# Patient Record
Sex: MALE | Race: WHITE | NOT HISPANIC OR LATINO | Employment: OTHER | URBAN - METROPOLITAN AREA
[De-identification: names, ages, dates, MRNs, and addresses within clinical notes are randomized per-mention and may not be internally consistent; named-entity substitution may affect disease eponyms.]

---

## 2020-02-21 ENCOUNTER — HISTORICAL (OUTPATIENT)
Dept: LAB | Facility: HOSPITAL | Age: 75
End: 2020-02-21

## 2020-02-21 LAB
ABS NEUT (OLG): 4.51 X10(3)/MCL (ref 2.1–9.2)
ALBUMIN SERPL-MCNC: 4 GM/DL (ref 3.4–5)
ALBUMIN/GLOB SERPL: 1.3 {RATIO}
ALP SERPL-CCNC: 80 UNIT/L (ref 50–136)
ALT SERPL-CCNC: 57 UNIT/L (ref 12–78)
APPEARANCE, UA: CLEAR
AST SERPL-CCNC: 26 UNIT/L (ref 15–37)
BACTERIA SPEC CULT: NORMAL /HPF
BASOPHILS # BLD AUTO: 0.1 X10(3)/MCL (ref 0–0.2)
BASOPHILS NFR BLD AUTO: 1 %
BILIRUB SERPL-MCNC: 0.4 MG/DL (ref 0.2–1)
BILIRUB UR QL STRIP: NEGATIVE
BILIRUBIN DIRECT+TOT PNL SERPL-MCNC: 0.1 MG/DL (ref 0–0.2)
BILIRUBIN DIRECT+TOT PNL SERPL-MCNC: 0.3 MG/DL (ref 0–0.8)
BUN SERPL-MCNC: 12 MG/DL (ref 7–18)
CALCIUM SERPL-MCNC: 9.1 MG/DL (ref 8.5–10.1)
CHLORIDE SERPL-SCNC: 107 MMOL/L (ref 98–107)
CHOLEST SERPL-MCNC: 110 MG/DL (ref 0–200)
CHOLEST/HDLC SERPL: 2.3 {RATIO} (ref 0–5)
CO2 SERPL-SCNC: 30 MMOL/L (ref 21–32)
COLOR UR: YELLOW
CREAT SERPL-MCNC: 0.7 MG/DL (ref 0.7–1.3)
CREAT UR-MCNC: 166 MG/DL
EOSINOPHIL # BLD AUTO: 0.4 X10(3)/MCL (ref 0–0.9)
EOSINOPHIL NFR BLD AUTO: 5 %
ERYTHROCYTE [DISTWIDTH] IN BLOOD BY AUTOMATED COUNT: 12.7 % (ref 11.5–17)
EST. AVERAGE GLUCOSE BLD GHB EST-MCNC: 131 MG/DL
GLOBULIN SER-MCNC: 3 GM/DL (ref 2.4–3.5)
GLUCOSE (UA): NEGATIVE
GLUCOSE SERPL-MCNC: 130 MG/DL (ref 74–106)
HBA1C MFR BLD: 6.2 % (ref 4.2–6.3)
HCT VFR BLD AUTO: 39.3 % (ref 42–52)
HDLC SERPL-MCNC: 47 MG/DL (ref 35–60)
HGB BLD-MCNC: 12.5 GM/DL (ref 14–18)
HGB UR QL STRIP: NEGATIVE
KETONES UR QL STRIP: NEGATIVE
LDLC SERPL CALC-MCNC: 47 MG/DL (ref 0–129)
LEUKOCYTE ESTERASE UR QL STRIP: NEGATIVE
LYMPHOCYTES # BLD AUTO: 2.2 X10(3)/MCL (ref 0.6–4.6)
LYMPHOCYTES NFR BLD AUTO: 28 %
MCH RBC QN AUTO: 29 PG (ref 27–31)
MCHC RBC AUTO-ENTMCNC: 31.8 GM/DL (ref 33–36)
MCV RBC AUTO: 91.2 FL (ref 80–94)
MICROALBUMIN UR-MCNC: 3.6 MG/DL
MICROALBUMIN/CREAT RATIO PNL UR: 21.7 MG/GM CR (ref 0–30)
MONOCYTES # BLD AUTO: 0.7 X10(3)/MCL (ref 0.1–1.3)
MONOCYTES NFR BLD AUTO: 9 %
NEUTROPHILS # BLD AUTO: 4.51 X10(3)/MCL (ref 2.1–9.2)
NEUTROPHILS NFR BLD AUTO: 57 %
NITRITE UR QL STRIP: NEGATIVE
PH UR STRIP: 5 [PH] (ref 5–9)
PLATELET # BLD AUTO: 228 X10(3)/MCL (ref 130–400)
PMV BLD AUTO: 10.9 FL (ref 9.4–12.4)
POTASSIUM SERPL-SCNC: 4.4 MMOL/L (ref 3.5–5.1)
PROT SERPL-MCNC: 7 GM/DL (ref 6.4–8.2)
PROT UR QL STRIP: NEGATIVE
RBC # BLD AUTO: 4.31 X10(6)/MCL (ref 4.7–6.1)
RBC #/AREA URNS HPF: NORMAL /[HPF]
SODIUM SERPL-SCNC: 139 MMOL/L (ref 136–145)
SP GR UR STRIP: 1.02 (ref 1–1.03)
SQUAMOUS EPITHELIAL, UA: NORMAL
TRIGL SERPL-MCNC: 79 MG/DL (ref 30–150)
UROBILINOGEN UR STRIP-ACNC: 0.2
VLDLC SERPL CALC-MCNC: 16 MG/DL
WBC # SPEC AUTO: 7.9 X10(3)/MCL (ref 4.5–11.5)
WBC #/AREA URNS HPF: NORMAL /HPF

## 2020-03-12 ENCOUNTER — HISTORICAL (OUTPATIENT)
Dept: LAB | Facility: HOSPITAL | Age: 75
End: 2020-03-12

## 2020-03-15 LAB — FINAL CULTURE: NO GROWTH

## 2020-06-26 ENCOUNTER — HISTORICAL (OUTPATIENT)
Dept: ADMINISTRATIVE | Facility: HOSPITAL | Age: 75
End: 2020-06-26

## 2020-06-26 LAB
ALBUMIN SERPL-MCNC: 3.9 GM/DL (ref 3.4–5)
ALP SERPL-CCNC: 85 UNIT/L (ref 40–150)
ALT SERPL-CCNC: 53 UNIT/L (ref 0–55)
AST SERPL-CCNC: 35 UNIT/L (ref 5–34)
BILIRUB SERPL-MCNC: 0.4 MG/DL
BILIRUBIN DIRECT+TOT PNL SERPL-MCNC: 0.2 MG/DL (ref 0–0.5)
BILIRUBIN DIRECT+TOT PNL SERPL-MCNC: 0.2 MG/DL (ref 0–0.8)
CHOLEST SERPL-MCNC: 110 MG/DL
CHOLEST/HDLC SERPL: 3 {RATIO} (ref 0–5)
HDLC SERPL-MCNC: 42 MG/DL (ref 35–60)
LDLC SERPL CALC-MCNC: 54 MG/DL (ref 50–140)
LIVER PROFILE INTERP: ABNORMAL
PROT SERPL-MCNC: 7.3 GM/DL (ref 5.8–7.6)
TRIGL SERPL-MCNC: 72 MG/DL (ref 34–140)
VLDLC SERPL CALC-MCNC: 14 MG/DL

## 2020-07-31 ENCOUNTER — HISTORICAL (OUTPATIENT)
Dept: ADMINISTRATIVE | Facility: HOSPITAL | Age: 75
End: 2020-07-31

## 2020-07-31 LAB
ABS NEUT (OLG): 4.33 X10(3)/MCL (ref 2.1–9.2)
ALBUMIN SERPL-MCNC: 3.9 GM/DL (ref 3.4–5)
ALBUMIN/GLOB SERPL: 1.1 RATIO (ref 1.1–2)
ALP SERPL-CCNC: 69 UNIT/L (ref 40–150)
ALT SERPL-CCNC: 50 UNIT/L (ref 0–55)
APPEARANCE, UA: CLEAR
AST SERPL-CCNC: 35 UNIT/L (ref 5–34)
BACTERIA SPEC CULT: NORMAL /HPF
BASOPHILS # BLD AUTO: 0 X10(3)/MCL (ref 0–0.2)
BASOPHILS NFR BLD AUTO: 1 %
BILIRUB SERPL-MCNC: 0.5 MG/DL
BILIRUB UR QL STRIP: NEGATIVE
BILIRUBIN DIRECT+TOT PNL SERPL-MCNC: 0.2 MG/DL (ref 0–0.5)
BILIRUBIN DIRECT+TOT PNL SERPL-MCNC: 0.3 MG/DL (ref 0–0.8)
BUN SERPL-MCNC: 12.1 MG/DL (ref 8.4–25.7)
CALCIUM SERPL-MCNC: 9.7 MG/DL (ref 8.8–10)
CHLORIDE SERPL-SCNC: 105 MMOL/L (ref 98–107)
CHOLEST SERPL-MCNC: 110 MG/DL
CHOLEST/HDLC SERPL: 2 {RATIO} (ref 0–5)
CO2 SERPL-SCNC: 28 MMOL/L (ref 23–31)
COLOR UR: YELLOW
CREAT SERPL-MCNC: 0.71 MG/DL (ref 0.73–1.18)
CREAT UR-MCNC: 55.6 MG/DL (ref 58–161)
EOSINOPHIL # BLD AUTO: 0.4 X10(3)/MCL (ref 0–0.9)
EOSINOPHIL NFR BLD AUTO: 5 %
ERYTHROCYTE [DISTWIDTH] IN BLOOD BY AUTOMATED COUNT: 12.5 % (ref 11.5–17)
EST. AVERAGE GLUCOSE BLD GHB EST-MCNC: 137 MG/DL
GLOBULIN SER-MCNC: 3.4 GM/DL (ref 2.4–3.5)
GLUCOSE (UA): NEGATIVE
GLUCOSE SERPL-MCNC: 121 MG/DL (ref 82–115)
HBA1C MFR BLD: 6.4 %
HCT VFR BLD AUTO: 38.5 % (ref 42–52)
HDLC SERPL-MCNC: 44 MG/DL (ref 35–60)
HGB BLD-MCNC: 12.7 GM/DL (ref 14–18)
HGB UR QL STRIP: NEGATIVE
KETONES UR QL STRIP: NEGATIVE
LDLC SERPL CALC-MCNC: 50 MG/DL (ref 50–140)
LEUKOCYTE ESTERASE UR QL STRIP: NEGATIVE
LYMPHOCYTES # BLD AUTO: 2.1 X10(3)/MCL (ref 0.6–4.6)
LYMPHOCYTES NFR BLD AUTO: 28 %
MCH RBC QN AUTO: 29.1 PG (ref 27–31)
MCHC RBC AUTO-ENTMCNC: 33 GM/DL (ref 33–36)
MCV RBC AUTO: 88.3 FL (ref 80–94)
MICROALBUMIN UR-MCNC: 21.4 UG/ML
MICROALBUMIN/CREAT RATIO PNL UR: 38.5 MG/GM CR (ref 0–30)
MONOCYTES # BLD AUTO: 0.6 X10(3)/MCL (ref 0.1–1.3)
MONOCYTES NFR BLD AUTO: 9 %
NEUTROPHILS # BLD AUTO: 4.33 X10(3)/MCL (ref 2.1–9.2)
NEUTROPHILS NFR BLD AUTO: 57 %
NITRITE UR QL STRIP: NEGATIVE
PH UR STRIP: 6.5 [PH] (ref 5–9)
PLATELET # BLD AUTO: 204 X10(3)/MCL (ref 130–400)
PMV BLD AUTO: 10.8 FL (ref 9.4–12.4)
POTASSIUM SERPL-SCNC: 4.5 MMOL/L (ref 3.5–5.1)
PROT SERPL-MCNC: 7.3 GM/DL (ref 5.8–7.6)
PROT UR QL STRIP: NEGATIVE
PSA SERPL-MCNC: 1 NG/ML
RBC # BLD AUTO: 4.36 X10(6)/MCL (ref 4.7–6.1)
RBC #/AREA URNS HPF: NORMAL /[HPF]
SODIUM SERPL-SCNC: 138 MMOL/L (ref 136–145)
SP GR UR STRIP: 1.01 (ref 1–1.03)
SQUAMOUS EPITHELIAL, UA: NORMAL
TRIGL SERPL-MCNC: 81 MG/DL (ref 34–140)
UROBILINOGEN UR STRIP-ACNC: 0.2
VLDLC SERPL CALC-MCNC: 16 MG/DL
WBC # SPEC AUTO: 7.6 X10(3)/MCL (ref 4.5–11.5)
WBC #/AREA URNS HPF: NORMAL /HPF

## 2021-02-03 ENCOUNTER — HISTORICAL (OUTPATIENT)
Dept: ADMINISTRATIVE | Facility: HOSPITAL | Age: 76
End: 2021-02-03

## 2021-02-03 LAB
ALBUMIN SERPL-MCNC: 4 GM/DL (ref 3.4–4.8)
ALBUMIN/GLOB SERPL: 1.2 RATIO (ref 1.1–2)
ALP SERPL-CCNC: 66 UNIT/L (ref 40–150)
ALT SERPL-CCNC: 39 UNIT/L (ref 0–55)
AST SERPL-CCNC: 26 UNIT/L (ref 5–34)
BILIRUB SERPL-MCNC: 0.5 MG/DL
BILIRUBIN DIRECT+TOT PNL SERPL-MCNC: 0.2 MG/DL (ref 0–0.8)
BILIRUBIN DIRECT+TOT PNL SERPL-MCNC: 0.3 MG/DL (ref 0–0.5)
BUN SERPL-MCNC: 12.7 MG/DL (ref 8.4–25.7)
CALCIUM SERPL-MCNC: 9.5 MG/DL (ref 8.8–10)
CHLORIDE SERPL-SCNC: 105 MMOL/L (ref 98–107)
CHOLEST SERPL-MCNC: 81 MG/DL
CHOLEST/HDLC SERPL: 2 {RATIO} (ref 0–5)
CO2 SERPL-SCNC: 29 MMOL/L (ref 23–31)
CREAT SERPL-MCNC: 0.71 MG/DL (ref 0.73–1.18)
EST. AVERAGE GLUCOSE BLD GHB EST-MCNC: 137 MG/DL
GLOBULIN SER-MCNC: 3.3 GM/DL (ref 2.4–3.5)
GLUCOSE SERPL-MCNC: 102 MG/DL (ref 82–115)
HBA1C MFR BLD: 6.4 %
HDLC SERPL-MCNC: 37 MG/DL (ref 35–60)
LDLC SERPL CALC-MCNC: 32 MG/DL (ref 50–140)
POTASSIUM SERPL-SCNC: 4.1 MMOL/L (ref 3.5–5.1)
PROT SERPL-MCNC: 7.3 GM/DL (ref 5.8–7.6)
SODIUM SERPL-SCNC: 141 MMOL/L (ref 136–145)
TRIGL SERPL-MCNC: 60 MG/DL (ref 34–140)
VLDLC SERPL CALC-MCNC: 12 MG/DL

## 2021-08-04 ENCOUNTER — HISTORICAL (OUTPATIENT)
Dept: ADMINISTRATIVE | Facility: HOSPITAL | Age: 76
End: 2021-08-04

## 2021-08-04 LAB
ABS NEUT (OLG): 4.73 X10(3)/MCL (ref 2.1–9.2)
ALBUMIN SERPL-MCNC: 3.6 GM/DL (ref 3.4–4.8)
ALBUMIN/GLOB SERPL: 1.1 RATIO (ref 1.1–2)
ALP SERPL-CCNC: 73 UNIT/L (ref 40–150)
ALT SERPL-CCNC: 35 UNIT/L (ref 0–55)
APPEARANCE, UA: CLEAR
AST SERPL-CCNC: 26 UNIT/L (ref 5–34)
BACTERIA SPEC CULT: NORMAL /HPF
BASOPHILS # BLD AUTO: 0 X10(3)/MCL (ref 0–0.2)
BASOPHILS NFR BLD AUTO: 1 %
BILIRUB SERPL-MCNC: 0.5 MG/DL
BILIRUB UR QL STRIP: NEGATIVE
BILIRUBIN DIRECT+TOT PNL SERPL-MCNC: 0.2 MG/DL (ref 0–0.5)
BILIRUBIN DIRECT+TOT PNL SERPL-MCNC: 0.3 MG/DL (ref 0–0.8)
BUN SERPL-MCNC: 10.5 MG/DL (ref 8.4–25.7)
CALCIUM SERPL-MCNC: 9 MG/DL (ref 8.8–10)
CHLORIDE SERPL-SCNC: 106 MMOL/L (ref 98–107)
CHOLEST SERPL-MCNC: 104 MG/DL
CHOLEST/HDLC SERPL: 3 {RATIO} (ref 0–5)
CO2 SERPL-SCNC: 24 MMOL/L (ref 23–31)
COLOR UR: YELLOW
CREAT SERPL-MCNC: 0.68 MG/DL (ref 0.73–1.18)
EOSINOPHIL # BLD AUTO: 0.4 X10(3)/MCL (ref 0–0.9)
EOSINOPHIL NFR BLD AUTO: 4 %
ERYTHROCYTE [DISTWIDTH] IN BLOOD BY AUTOMATED COUNT: 12.8 % (ref 11.5–17)
EST. AVERAGE GLUCOSE BLD GHB EST-MCNC: 134.1 MG/DL
GLOBULIN SER-MCNC: 3.2 GM/DL (ref 2.4–3.5)
GLUCOSE (UA): NEGATIVE
GLUCOSE SERPL-MCNC: 122 MG/DL (ref 82–115)
HBA1C MFR BLD: 6.3 %
HCT VFR BLD AUTO: 37.7 % (ref 42–52)
HDLC SERPL-MCNC: 38 MG/DL (ref 35–60)
HGB BLD-MCNC: 12.2 GM/DL (ref 14–18)
HGB UR QL STRIP: NEGATIVE
KETONES UR QL STRIP: NEGATIVE
LDLC SERPL CALC-MCNC: 51 MG/DL (ref 50–140)
LEUKOCYTE ESTERASE UR QL STRIP: NEGATIVE
LYMPHOCYTES # BLD AUTO: 2.1 X10(3)/MCL (ref 0.6–4.6)
LYMPHOCYTES NFR BLD AUTO: 26 %
MCH RBC QN AUTO: 29.8 PG (ref 27–31)
MCHC RBC AUTO-ENTMCNC: 32.4 GM/DL (ref 33–36)
MCV RBC AUTO: 92.2 FL (ref 80–94)
MONOCYTES # BLD AUTO: 0.7 X10(3)/MCL (ref 0.1–1.3)
MONOCYTES NFR BLD AUTO: 9 %
NEUTROPHILS # BLD AUTO: 4.73 X10(3)/MCL (ref 2.1–9.2)
NEUTROPHILS NFR BLD AUTO: 60 %
NITRITE UR QL STRIP: NEGATIVE
PH UR STRIP: 5 [PH] (ref 5–9)
PLATELET # BLD AUTO: 225 X10(3)/MCL (ref 130–400)
PMV BLD AUTO: 10.5 FL (ref 9.4–12.4)
POTASSIUM SERPL-SCNC: 4.5 MMOL/L (ref 3.5–5.1)
PROT SERPL-MCNC: 6.8 GM/DL (ref 5.8–7.6)
PROT UR QL STRIP: NEGATIVE
RBC # BLD AUTO: 4.09 X10(6)/MCL (ref 4.7–6.1)
RBC #/AREA URNS HPF: NORMAL /[HPF]
SODIUM SERPL-SCNC: 142 MMOL/L (ref 136–145)
SP GR UR STRIP: 1.01 (ref 1–1.03)
SQUAMOUS EPITHELIAL, UA: NORMAL /HPF (ref 0–4)
TRIGL SERPL-MCNC: 77 MG/DL (ref 34–140)
UROBILINOGEN UR STRIP-ACNC: 0.2
VLDLC SERPL CALC-MCNC: 15 MG/DL
WBC # SPEC AUTO: 8 X10(3)/MCL (ref 4.5–11.5)
WBC #/AREA URNS HPF: NORMAL /HPF

## 2022-02-07 ENCOUNTER — HISTORICAL (OUTPATIENT)
Dept: LAB | Facility: HOSPITAL | Age: 77
End: 2022-02-07

## 2022-02-07 LAB
ALBUMIN SERPL-MCNC: 3.9 G/DL (ref 3.4–4.8)
ALBUMIN/GLOB SERPL: 1.2 {RATIO} (ref 1.1–2)
ALP SERPL-CCNC: 97 U/L (ref 40–150)
ALT SERPL-CCNC: 63 U/L (ref 0–55)
AST SERPL-CCNC: 44 U/L (ref 5–34)
BILIRUB SERPL-MCNC: 0.6 MG/DL
BILIRUBIN DIRECT+TOT PNL SERPL-MCNC: 0.3 (ref 0–0.5)
BILIRUBIN DIRECT+TOT PNL SERPL-MCNC: 0.3 (ref 0–0.8)
BUN SERPL-MCNC: 11.5 MG/DL (ref 8.4–25.7)
CALCIUM SERPL-MCNC: 9.1 MG/DL (ref 8.7–10.5)
CHLORIDE SERPL-SCNC: 104 MMOL/L (ref 98–107)
CHOLEST SERPL-MCNC: 96 MG/DL
CHOLEST/HDLC SERPL: 3 {RATIO} (ref 0–5)
CO2 SERPL-SCNC: 26 MMOL/L (ref 23–31)
CREAT SERPL-MCNC: 0.73 MG/DL (ref 0.73–1.18)
EST. AVERAGE GLUCOSE BLD GHB EST-MCNC: 159.9 MG/DL
GLOBULIN SER-MCNC: 3.2 G/DL (ref 2.4–3.5)
GLUCOSE SERPL-MCNC: 128 MG/DL (ref 82–115)
HBA1C MFR BLD: 7.2 %
HDLC SERPL-MCNC: 33 MG/DL (ref 35–60)
HEMOLYSIS INTERF INDEX SERPL-ACNC: 7
ICTERIC INTERF INDEX SERPL-ACNC: 0
LDLC SERPL CALC-MCNC: 46 MG/DL (ref 50–140)
LIPEMIC INTERF INDEX SERPL-ACNC: <0
POTASSIUM SERPL-SCNC: 4.2 MMOL/L (ref 3.5–5.1)
PROT SERPL-MCNC: 7.1 G/DL (ref 5.8–7.6)
SODIUM SERPL-SCNC: 139 MMOL/L (ref 136–145)
TRIGL SERPL-MCNC: 86 MG/DL (ref 34–140)
VLDLC SERPL CALC-MCNC: 17 MG/DL

## 2022-05-09 ENCOUNTER — LAB REQUISITION (OUTPATIENT)
Dept: LAB | Facility: HOSPITAL | Age: 77
End: 2022-05-09
Payer: MEDICARE

## 2022-05-09 DIAGNOSIS — E11.40 TYPE 2 DIABETES MELLITUS WITH DIABETIC NEUROPATHY, UNSPECIFIED: ICD-10-CM

## 2022-05-09 LAB
EST. AVERAGE GLUCOSE BLD GHB EST-MCNC: 122.6 MG/DL
HBA1C MFR BLD: 5.9 %

## 2022-05-09 PROCEDURE — 83036 HEMOGLOBIN GLYCOSYLATED A1C: CPT | Performed by: INTERNAL MEDICINE

## 2022-07-02 ENCOUNTER — HOSPITAL ENCOUNTER (INPATIENT)
Facility: HOSPITAL | Age: 77
LOS: 10 days | Discharge: NURSING, REHAB OR LONG-TERM CARE | DRG: 038 | End: 2022-07-12
Attending: STUDENT IN AN ORGANIZED HEALTH CARE EDUCATION/TRAINING PROGRAM | Admitting: INTERNAL MEDICINE
Payer: MEDICARE

## 2022-07-02 DIAGNOSIS — I63.9 CVA (CEREBRAL VASCULAR ACCIDENT): ICD-10-CM

## 2022-07-02 DIAGNOSIS — R29.898 WEAKNESS OF RIGHT UPPER EXTREMITY: ICD-10-CM

## 2022-07-02 DIAGNOSIS — I63.9 STROKE: ICD-10-CM

## 2022-07-02 DIAGNOSIS — I63.9 CEREBROVASCULAR ACCIDENT (CVA), UNSPECIFIED MECHANISM: Primary | ICD-10-CM

## 2022-07-02 DIAGNOSIS — R29.898 WEAKNESS OF RIGHT LOWER EXTREMITY: ICD-10-CM

## 2022-07-02 DIAGNOSIS — I63.9 IMPENDING CEREBROVASCULAR ACCIDENT: ICD-10-CM

## 2022-07-02 LAB
ALBUMIN SERPL-MCNC: 4 GM/DL (ref 3.4–4.8)
ALBUMIN/GLOB SERPL: 1.3 RATIO (ref 1.1–2)
ALP SERPL-CCNC: 78 UNIT/L (ref 40–150)
ALT SERPL-CCNC: 22 UNIT/L (ref 0–55)
APPEARANCE UR: CLEAR
APTT PPP: 34.4 SECONDS (ref 23.2–33.7)
AST SERPL-CCNC: 19 UNIT/L (ref 5–34)
AV INDEX (PROSTH): 0.53
AV MEAN GRADIENT: 4 MMHG
AV PEAK GRADIENT: 6 MMHG
AV VALVE AREA: 1.83 CM2
AV VELOCITY RATIO: 0.54
BACTERIA #/AREA URNS AUTO: NORMAL /HPF
BASOPHILS # BLD AUTO: 0.04 X10(3)/MCL (ref 0–0.2)
BASOPHILS NFR BLD AUTO: 0.6 %
BILIRUB UR QL STRIP.AUTO: NEGATIVE MG/DL
BILIRUBIN DIRECT+TOT PNL SERPL-MCNC: 0.5 MG/DL
BNP BLD-MCNC: 141.3 PG/ML
BSA FOR ECHO PROCEDURE: 2.16 M2
BUN SERPL-MCNC: 8.7 MG/DL (ref 8.4–25.7)
CALCIUM SERPL-MCNC: 9.3 MG/DL (ref 8.8–10)
CHLORIDE SERPL-SCNC: 106 MMOL/L (ref 98–107)
CHOLEST SERPL-MCNC: 106 MG/DL
CHOLEST/HDLC SERPL: 2 {RATIO} (ref 0–5)
CO2 SERPL-SCNC: 24 MMOL/L (ref 23–31)
COLOR UR AUTO: YELLOW
CORRECTED TEMPERATURE (PCO2): 36 MMHG
CORRECTED TEMPERATURE (PH): 7.4
CORRECTED TEMPERATURE (PO2): 90 MMHG
CREAT SERPL-MCNC: 0.74 MG/DL (ref 0.73–1.18)
CV ECHO LV RWT: 0.66 CM
DOP CALC AO PEAK VEL: 1.27 M/S
DOP CALC AO VTI: 28.4 CM
DOP CALC LVOT AREA: 3.5 CM2
DOP CALC LVOT DIAMETER: 2.1 CM
DOP CALC LVOT PEAK VEL: 0.69 M/S
DOP CALC LVOT STROKE VOLUME: 51.93 CM3
DOP CALC MV VTI: 42.5 CM
DOP CALCLVOT PEAK VEL VTI: 15 CM
E WAVE DECELERATION TIME: 198 MSEC
E/A RATIO: 1.12
E/E' RATIO: 11.33 M/S
ECHO LV POSTERIOR WALL: 1.47 CM (ref 0.6–1.1)
EJECTION FRACTION: 60 %
EOSINOPHIL # BLD AUTO: 0.21 X10(3)/MCL (ref 0–0.9)
EOSINOPHIL NFR BLD AUTO: 3.2 %
ERYTHROCYTE [DISTWIDTH] IN BLOOD BY AUTOMATED COUNT: 13 % (ref 11.5–17)
FLUAV AG UPPER RESP QL IA.RAPID: NOT DETECTED
FLUBV AG UPPER RESP QL IA.RAPID: NOT DETECTED
FRACTIONAL SHORTENING: 31 % (ref 28–44)
GLOBULIN SER-MCNC: 3 GM/DL (ref 2.4–3.5)
GLUCOSE SERPL-MCNC: 198 MG/DL (ref 82–115)
GLUCOSE UR QL STRIP.AUTO: ABNORMAL MG/DL
HCO3 UR-SCNC: 22.3 MMOL/L
HCT VFR BLD AUTO: 36.9 % (ref 42–52)
HDLC SERPL-MCNC: 46 MG/DL (ref 35–60)
HGB BLD-MCNC: 12.4 GM/DL (ref 14–18)
HGB BLD-MCNC: 12.7 G/DL
IMM GRANULOCYTES # BLD AUTO: 0.02 X10(3)/MCL (ref 0–0.04)
IMM GRANULOCYTES NFR BLD AUTO: 0.3 %
INR BLD: 1.05 (ref 0–1.3)
INTERVENTRICULAR SEPTUM: 1.46 CM (ref 0.6–1.1)
KETONES UR QL STRIP.AUTO: NEGATIVE MG/DL
LDLC SERPL CALC-MCNC: 48 MG/DL (ref 50–140)
LEFT ATRIUM SIZE: 4.4 CM
LEFT ATRIUM VOLUME INDEX MOD: 29.2 ML/M2
LEFT ATRIUM VOLUME MOD: 62.3 CM3
LEFT INTERNAL DIMENSION IN SYSTOLE: 3.07 CM (ref 2.1–4)
LEFT VENTRICLE DIASTOLIC VOLUME INDEX: 41.83 ML/M2
LEFT VENTRICLE DIASTOLIC VOLUME: 89.1 ML
LEFT VENTRICLE MASS INDEX: 122 G/M2
LEFT VENTRICLE SYSTOLIC VOLUME INDEX: 17.4 ML/M2
LEFT VENTRICLE SYSTOLIC VOLUME: 37 ML
LEFT VENTRICULAR INTERNAL DIMENSION IN DIASTOLE: 4.43 CM (ref 3.5–6)
LEFT VENTRICULAR MASS: 259.98 G
LEUKOCYTE ESTERASE UR QL STRIP.AUTO: NEGATIVE UNIT/L
LV LATERAL E/E' RATIO: 9.27 M/S
LV SEPTAL E/E' RATIO: 14.57 M/S
LVOT MG: 1 MMHG
LVOT MV: 0.46 CM/S
LYMPHOCYTES # BLD AUTO: 1.98 X10(3)/MCL (ref 0.6–4.6)
LYMPHOCYTES NFR BLD AUTO: 29.9 %
MAGNESIUM SERPL-MCNC: 1.7 MG/DL (ref 1.6–2.6)
MCH RBC QN AUTO: 29.5 PG (ref 27–31)
MCHC RBC AUTO-ENTMCNC: 33.6 MG/DL (ref 33–36)
MCV RBC AUTO: 87.9 FL (ref 80–94)
MONOCYTES # BLD AUTO: 0.77 X10(3)/MCL (ref 0.1–1.3)
MONOCYTES NFR BLD AUTO: 11.6 %
MV MEAN GRADIENT: 3 MMHG
MV PEAK A VEL: 0.91 M/S
MV PEAK E VEL: 1.02 M/S
MV PEAK GRADIENT: 7 MMHG
MV STENOSIS PRESSURE HALF TIME: 62 MS
MV VALVE AREA BY CONTINUITY EQUATION: 1.22 CM2
MV VALVE AREA P 1/2 METHOD: 3.55 CM2
NEUTROPHILS # BLD AUTO: 3.6 X10(3)/MCL (ref 2.1–9.2)
NEUTROPHILS NFR BLD AUTO: 54.4 %
NITRITE UR QL STRIP.AUTO: NEGATIVE
NRBC BLD AUTO-RTO: 0 %
PCO2 BLDA: 36 MMHG
PH SMN: 7.4 [PH]
PH UR STRIP.AUTO: 6.5 [PH]
PHOSPHATE SERPL-MCNC: 1.9 MG/DL (ref 2.3–4.7)
PISA TR MAX VEL: 2.7 M/S
PLATELET # BLD AUTO: 201 X10(3)/MCL (ref 130–400)
PMV BLD AUTO: 10.6 FL (ref 7.4–10.4)
PO2 BLDA: 90 MMHG
POC BASE DEFICIT: -2.1 MMOL/L
POC COHB: 4.7 %
POC IONIZED CALCIUM: 1.04 MMOL/L
POC METHB: 0.8 %
POC O2HB: 92.8 %
POC PTINR: 1 (ref 0.9–1.2)
POC PTWBT: 11.7 SEC (ref 9.7–14.3)
POC SATURATED O2: 96.9 %
POC TEMPERATURE: 37 °C
POCT GLUCOSE: 186 MG/DL (ref 70–110)
POTASSIUM BLD-SCNC: 3.5 MMOL/L
POTASSIUM SERPL-SCNC: 3.7 MMOL/L (ref 3.5–5.1)
PROT SERPL-MCNC: 7 GM/DL (ref 5.8–7.6)
PROT UR QL STRIP.AUTO: ABNORMAL MG/DL
PROTHROMBIN TIME: 13.6 SECONDS (ref 12.5–14.5)
PV PEAK VELOCITY: 1.04 CM/S
RBC # BLD AUTO: 4.2 X10(6)/MCL (ref 4.7–6.1)
RBC #/AREA URNS AUTO: <5 /HPF
RBC UR QL AUTO: NEGATIVE UNIT/L
RIGHT VENTRICULAR END-DIASTOLIC DIMENSION: 3.21 CM
SAMPLE: NORMAL
SARS-COV-2 RNA RESP QL NAA+PROBE: NOT DETECTED
SODIUM BLD-SCNC: 136 MMOL/L
SODIUM SERPL-SCNC: 139 MMOL/L (ref 136–145)
SP GR UR STRIP.AUTO: 1.02 (ref 1–1.03)
SPECIMEN SOURCE: NORMAL
SQUAMOUS #/AREA URNS AUTO: <5 /HPF
TDI LATERAL: 0.11 M/S
TDI SEPTAL: 0.07 M/S
TDI: 0.09 M/S
TR MAX PG: 29 MMHG
TRICUSPID ANNULAR PLANE SYSTOLIC EXCURSION: 1.86 CM
TRIGL SERPL-MCNC: 59 MG/DL (ref 34–140)
TROPONIN I SERPL-MCNC: <0.01 NG/ML (ref 0–0.04)
TSH SERPL-ACNC: 1.41 UIU/ML (ref 0.35–4.94)
UROBILINOGEN UR STRIP-ACNC: 0.2 MG/DL
VLDLC SERPL CALC-MCNC: 12 MG/DL
WBC # SPEC AUTO: 6.6 X10(3)/MCL (ref 4.5–11.5)
WBC #/AREA URNS AUTO: <5 /HPF

## 2022-07-02 PROCEDURE — 84100 ASSAY OF PHOSPHORUS: CPT | Performed by: STUDENT IN AN ORGANIZED HEALTH CARE EDUCATION/TRAINING PROGRAM

## 2022-07-02 PROCEDURE — 85730 THROMBOPLASTIN TIME PARTIAL: CPT | Performed by: STUDENT IN AN ORGANIZED HEALTH CARE EDUCATION/TRAINING PROGRAM

## 2022-07-02 PROCEDURE — 99291 CRITICAL CARE FIRST HOUR: CPT | Mod: 25

## 2022-07-02 PROCEDURE — 84484 ASSAY OF TROPONIN QUANT: CPT | Performed by: STUDENT IN AN ORGANIZED HEALTH CARE EDUCATION/TRAINING PROGRAM

## 2022-07-02 PROCEDURE — 96374 THER/PROPH/DIAG INJ IV PUSH: CPT | Mod: 59

## 2022-07-02 PROCEDURE — 93005 ELECTROCARDIOGRAM TRACING: CPT

## 2022-07-02 PROCEDURE — 80061 LIPID PANEL: CPT | Performed by: STUDENT IN AN ORGANIZED HEALTH CARE EDUCATION/TRAINING PROGRAM

## 2022-07-02 PROCEDURE — 63600175 PHARM REV CODE 636 W HCPCS: Mod: JG | Performed by: STUDENT IN AN ORGANIZED HEALTH CARE EDUCATION/TRAINING PROGRAM

## 2022-07-02 PROCEDURE — 87636 SARSCOV2 & INF A&B AMP PRB: CPT | Performed by: STUDENT IN AN ORGANIZED HEALTH CARE EDUCATION/TRAINING PROGRAM

## 2022-07-02 PROCEDURE — 83735 ASSAY OF MAGNESIUM: CPT | Performed by: STUDENT IN AN ORGANIZED HEALTH CARE EDUCATION/TRAINING PROGRAM

## 2022-07-02 PROCEDURE — 85025 COMPLETE CBC W/AUTO DIFF WBC: CPT | Performed by: STUDENT IN AN ORGANIZED HEALTH CARE EDUCATION/TRAINING PROGRAM

## 2022-07-02 PROCEDURE — 25000003 PHARM REV CODE 250: Performed by: STUDENT IN AN ORGANIZED HEALTH CARE EDUCATION/TRAINING PROGRAM

## 2022-07-02 PROCEDURE — 83880 ASSAY OF NATRIURETIC PEPTIDE: CPT | Performed by: STUDENT IN AN ORGANIZED HEALTH CARE EDUCATION/TRAINING PROGRAM

## 2022-07-02 PROCEDURE — 81001 URINALYSIS AUTO W/SCOPE: CPT | Performed by: STUDENT IN AN ORGANIZED HEALTH CARE EDUCATION/TRAINING PROGRAM

## 2022-07-02 PROCEDURE — 99900035 HC TECH TIME PER 15 MIN (STAT)

## 2022-07-02 PROCEDURE — 84443 ASSAY THYROID STIM HORMONE: CPT | Performed by: STUDENT IN AN ORGANIZED HEALTH CARE EDUCATION/TRAINING PROGRAM

## 2022-07-02 PROCEDURE — 36415 COLL VENOUS BLD VENIPUNCTURE: CPT | Performed by: STUDENT IN AN ORGANIZED HEALTH CARE EDUCATION/TRAINING PROGRAM

## 2022-07-02 PROCEDURE — 99223 PR INITIAL HOSPITAL CARE,LEVL III: ICD-10-PCS | Mod: ,,, | Performed by: PSYCHIATRY & NEUROLOGY

## 2022-07-02 PROCEDURE — 25500020 PHARM REV CODE 255: Performed by: STUDENT IN AN ORGANIZED HEALTH CARE EDUCATION/TRAINING PROGRAM

## 2022-07-02 PROCEDURE — 99223 1ST HOSP IP/OBS HIGH 75: CPT | Mod: ,,, | Performed by: PSYCHIATRY & NEUROLOGY

## 2022-07-02 PROCEDURE — 93010 EKG 12-LEAD: ICD-10-PCS | Mod: ,,, | Performed by: INTERNAL MEDICINE

## 2022-07-02 PROCEDURE — 82962 GLUCOSE BLOOD TEST: CPT

## 2022-07-02 PROCEDURE — 80053 COMPREHEN METABOLIC PANEL: CPT | Performed by: STUDENT IN AN ORGANIZED HEALTH CARE EDUCATION/TRAINING PROGRAM

## 2022-07-02 PROCEDURE — 85610 PROTHROMBIN TIME: CPT | Performed by: STUDENT IN AN ORGANIZED HEALTH CARE EDUCATION/TRAINING PROGRAM

## 2022-07-02 PROCEDURE — 20000000 HC ICU ROOM

## 2022-07-02 PROCEDURE — 82803 BLOOD GASES ANY COMBINATION: CPT

## 2022-07-02 PROCEDURE — 93010 ELECTROCARDIOGRAM REPORT: CPT | Mod: ,,, | Performed by: INTERNAL MEDICINE

## 2022-07-02 PROCEDURE — 84484 ASSAY OF TROPONIN QUANT: CPT

## 2022-07-02 PROCEDURE — 80047 BASIC METABLC PNL IONIZED CA: CPT

## 2022-07-02 RX ORDER — INSULIN ASPART 100 [IU]/ML
0-5 INJECTION, SOLUTION INTRAVENOUS; SUBCUTANEOUS EVERY 6 HOURS PRN
Status: DISCONTINUED | OUTPATIENT
Start: 2022-07-02 | End: 2022-07-12 | Stop reason: HOSPADM

## 2022-07-02 RX ORDER — SODIUM CHLORIDE 9 MG/ML
INJECTION, SOLUTION INTRAVENOUS
Status: COMPLETED | OUTPATIENT
Start: 2022-07-02 | End: 2022-07-02

## 2022-07-02 RX ORDER — SODIUM CHLORIDE 9 MG/ML
50 INJECTION, SOLUTION INTRAVENOUS ONCE
Status: DISCONTINUED | OUTPATIENT
Start: 2022-07-02 | End: 2022-07-12 | Stop reason: HOSPADM

## 2022-07-02 RX ORDER — ATORVASTATIN CALCIUM 40 MG/1
80 TABLET, FILM COATED ORAL NIGHTLY
Status: DISCONTINUED | OUTPATIENT
Start: 2022-07-02 | End: 2022-07-12 | Stop reason: HOSPADM

## 2022-07-02 RX ORDER — HYDRALAZINE HYDROCHLORIDE 20 MG/ML
10 INJECTION INTRAMUSCULAR; INTRAVENOUS EVERY 6 HOURS PRN
Status: DISCONTINUED | OUTPATIENT
Start: 2022-07-02 | End: 2022-07-12 | Stop reason: HOSPADM

## 2022-07-02 RX ORDER — GLUCAGON 1 MG
1 KIT INJECTION
Status: DISCONTINUED | OUTPATIENT
Start: 2022-07-02 | End: 2022-07-12 | Stop reason: HOSPADM

## 2022-07-02 RX ORDER — LABETALOL HYDROCHLORIDE 5 MG/ML
10 INJECTION, SOLUTION INTRAVENOUS
Status: DISCONTINUED | OUTPATIENT
Start: 2022-07-02 | End: 2022-07-12 | Stop reason: HOSPADM

## 2022-07-02 RX ORDER — ROSUVASTATIN CALCIUM 10 MG/1
10 TABLET, COATED ORAL
Status: ON HOLD | COMMUNITY
End: 2022-07-12 | Stop reason: HOSPADM

## 2022-07-02 RX ORDER — ASPIRIN 81 MG/1
81 TABLET ORAL
Status: ON HOLD | COMMUNITY
End: 2022-07-12 | Stop reason: HOSPADM

## 2022-07-02 RX ORDER — SODIUM CHLORIDE 0.9 % (FLUSH) 0.9 %
10 SYRINGE (ML) INJECTION
Status: DISCONTINUED | OUTPATIENT
Start: 2022-07-02 | End: 2022-07-12 | Stop reason: HOSPADM

## 2022-07-02 RX ORDER — METOPROLOL SUCCINATE 50 MG/1
50 TABLET, EXTENDED RELEASE ORAL
COMMUNITY
End: 2022-08-01

## 2022-07-02 RX ORDER — METFORMIN HYDROCHLORIDE 1000 MG/1
1500 TABLET ORAL
Status: ON HOLD | COMMUNITY
End: 2022-08-01 | Stop reason: HOSPADM

## 2022-07-02 RX ORDER — LISINOPRIL 5 MG/1
5 TABLET ORAL DAILY
Status: ON HOLD | COMMUNITY
Start: 2022-06-26 | End: 2022-08-01 | Stop reason: SDUPTHER

## 2022-07-02 RX ORDER — LABETALOL HYDROCHLORIDE 5 MG/ML
10 INJECTION, SOLUTION INTRAVENOUS
Status: COMPLETED | OUTPATIENT
Start: 2022-07-02 | End: 2022-07-02

## 2022-07-02 RX ADMIN — IOPAMIDOL 100 ML: 755 INJECTION, SOLUTION INTRAVENOUS at 09:07

## 2022-07-02 RX ADMIN — SODIUM CHLORIDE: 9 INJECTION, SOLUTION INTRAVENOUS at 10:07

## 2022-07-02 RX ADMIN — ALTEPLASE 75.5 MG: KIT at 09:07

## 2022-07-02 RX ADMIN — LABETALOL HYDROCHLORIDE 10 MG: 5 INJECTION, SOLUTION INTRAVENOUS at 09:07

## 2022-07-02 RX ADMIN — LABETALOL HYDROCHLORIDE 10 MG: 5 INJECTION INTRAVENOUS at 12:07

## 2022-07-02 RX ADMIN — LABETALOL HYDROCHLORIDE 10 MG: 5 INJECTION INTRAVENOUS at 10:07

## 2022-07-02 NOTE — PLAN OF CARE
"  Problem: Adult Inpatient Plan of Care  Goal: Patient-Specific Goal (Individualized)  Flowsheets (Taken 7/2/2022 5264)  Patient-Specific Goals (Include Timeframe): Pt states, "I would like to be able to walk again."     "

## 2022-07-02 NOTE — H&P
[facility] - Emergency Dept  Pulmonary Critical Care Note    Patient Name: Ayaz Huggins  MRN: [id number]  Admission Date: 7/2/2022  Hospital Length of Stay: 0 days  Code Status: No Order  Attending Provider: Pa Mccarthy MD  Primary Care Provider: Angel Parham Jr, MD     Subjective:     HPI:   Mr. Huggins is a 78 y/o male with a PMH of Type II DM, diabetic neuropathy, CAD s/p CABG x2 (2007), and HTN who presents to [facility] ED with complaints of right sided upper and lower extremity weakness. Patient's story is unclear upon the time of last known normal. He states he woke up this morning with a cramp in the RLE and noticed the right sided weakness. He states he went to bed at 3:00am this morning and had no weakness at that time. Patient had initially told the ED that his symptoms began after 7:30am and his CT head was negative for bleed. He then received tPA. Patient is being admitted to the ICU for post tPA monitoring and treatment.    Hospital Course/Significant events:  tPA administration (07/02/22)         Past Medical History:   Diagnosis Date    Hypertension        Social History     Socioeconomic History    Marital status:            Objective:   No current outpatient medications    Current Inpatient Medications   sodium chloride 0.9%   Intravenous ED 1 Time    sodium chloride 0.9%  50 mL Intravenous Once    sodium chloride 0.9%  50 mL Intravenous Once    alteplase  0.81 mg/kg (Order-Specific) Intravenous Once         No intake or output data in the 24 hours ending 07/02/22 0957    Review of Systems   Constitutional: Negative for chills and fever.   HENT: Negative.    Eyes: Positive for blurred vision. Negative for double vision.   Respiratory: Negative for sputum production, shortness of breath and wheezing.    Cardiovascular: Negative for chest pain, palpitations and leg swelling.   Gastrointestinal: Negative for abdominal pain, diarrhea, heartburn, nausea and vomiting.    Genitourinary: Negative.    Musculoskeletal: Negative for falls.   Neurological: Positive for tremors, focal weakness and weakness. Negative for dizziness, tingling, loss of consciousness and headaches.   Psychiatric/Behavioral: Negative.         Vital Signs (Most Recent):  Temp: 97.5 °F (36.4 °C) (07/02/22 0819)  Pulse: 83 (07/02/22 0938)  Resp: (!) 21 (07/02/22 0938)  BP: (!) 185/82 (07/02/22 0938)  SpO2: 96 % (07/02/22 0938)  There is no height or weight on file to calculate BMI.  Weight: 93.2 kg (205 lb 6.8 oz) Vital Signs (24h Range):  Temp:  [97.5 °F (36.4 °C)] 97.5 °F (36.4 °C)  Pulse:  [] 83  Resp:  [16-25] 21  SpO2:  [96 %-100 %] 96 %  BP: (180-197)/(78-98) 185/82     Physical Exam  Constitutional:       General: He is not in acute distress.  HENT:      Head: Normocephalic and atraumatic.      Mouth/Throat:      Mouth: Mucous membranes are moist.      Pharynx: Oropharynx is clear.   Eyes:      Extraocular Movements: Extraocular movements intact.      Pupils: Pupils are equal, round, and reactive to light.   Cardiovascular:      Rate and Rhythm: Normal rate and regular rhythm.      Pulses: Normal pulses.      Heart sounds: Normal heart sounds. No murmur heard.  Pulmonary:      Effort: Pulmonary effort is normal. No respiratory distress.      Breath sounds: Normal breath sounds. No wheezing, rhonchi or rales.   Abdominal:      General: Bowel sounds are normal.      Palpations: Abdomen is soft.   Musculoskeletal:      Cervical back: Neck supple.   Skin:     General: Skin is warm and dry.      Capillary Refill: Capillary refill takes less than 2 seconds.   Neurological:      Mental Status: He is alert and oriented to person, place, and time.      Cranial Nerves: Cranial nerve deficit present.      Sensory: No sensory deficit.      Motor: Weakness present. No tremor, atrophy or seizure activity.      Coordination: Coordination abnormal. Finger-Nose-Finger Test normal.      Gait: Gait abnormal.      Deep  Tendon Reflexes: Babinski sign absent on the right side. Babinski sign absent on the left side.      Comments: CN II-X are normal, CN XI is abnormal to right shoulder lift, RLE strength 0/5, RUE 2/5, initial NIHSS 7, unable to ambulate, unable to preform heel to shin or finger to nose on right side           Mechanical ventilation support:   Room Air    Lines/Drains/Airways     Peripheral Intravenous Line  Duration                Peripheral IV - Single Lumen 07/02/22 0923 18 G Left Antecubital <1 day         Peripheral IV - Single Lumen 07/02/22 0923 20 G Left Wrist <1 day                Significant Labs:    Lab Results   Component Value Date    WBC 6.6 07/02/2022    HGB 12.4 (L) 07/02/2022    HCT 36.9 (L) 07/02/2022    MCV 87.9 07/02/2022     07/02/2022         BMP  Lab Results   Component Value Date     07/02/2022    K 3.7 07/02/2022    CO2 24 07/02/2022    BUN 8.7 07/02/2022    CREATININE 0.74 07/02/2022    CALCIUM 9.3 07/02/2022    EGFRNONAA >60 07/02/2022       ABG  Recent Labs   Lab 07/02/22  0856   PH 7.40   PO2 90   PCO2 36   HCO3 22.3           Significant Imaging:  I have reviewed all pertinent imaging within the past 24 hours.    CT HEAD WITHOUT CONTRAST     CLINICAL HISTORY:  Stroke, follow up;     TECHNIQUE:  Axial scans were obtained from skull base to the vertex.     Coronal and sagittal reconstructions obtained from the axial data.     Automatic exposure control was utilized to limit radiation dose.     Contrast: None     Radiation Dose:     Total DLP: 1186 mGy*cm     COMPARISON:  None     FINDINGS:  Scalp/Skull:     No abnormalities.     Brain sulci: Appropriate for patient's age.     Ventricles: Normal in size and configuration. No hydrocephalus.     Extra-axial spaces:     No masses or fluid collections.     Parenchyma:     Discrete and confluent supratentorial white matter hypodensities are mild-moderate nonspecific chronic microangiopathic changes, statistically chronic  microvascular ischemia.     No mass, hemorrhage or CT evidence of an acute vascular insult.     Dural sinuses: No abnormal densities.     Sellar/Suprasellar region: No abnormalities.     Skull base and Craniocervical junction: No abnormalities.     Incidental findings:     Carotid siphon and vertebrobasilar atherosclerotic vascular calcifications.     Hypopneumatized mastoid air cells bilaterally a     Impression:     No acute intracranial abnormalities.      CTA STROKE MULTI-PHASE     CLINICAL HISTORY:  Neuro deficit, acute, stroke suspected;     TECHNIQUE:  Axial images obtained through the head and neck before and after the administration of intravenous contrast. Coronal, sagittal, MIP and 3D reconstructions were obtained from the axial data.     Automatic exposure control was utilized to limit radiation dose.     Radiation Dose:     Total DLP: 1937 mGy*cm     COMPARISON:  Head CT earlier the same day     FINDINGS:  Head CT with contrast:     No interval changes when compared to the previous CT.     No enhancing abnormalities.     If present, stenosis of the carotid bulbs is measured based on NASCET criteria, i.e. area of maximal stenosis compared to the cervical ICA distal to the bulb.     Cervical CTA:     Aortic arch: Patent.  Scattered calcified and noncalcified plaque.  Otherwise no abnormalities.     Common Carotid arteries: Patent, no abnormalities.     Carotid Bulbs: Patent.  Mixed plaque bilaterally with mild (less than 50%) narrowing on the right and moderate (~70%) narrowing of the left.  Otherwise no abnormalities.     Internal Carotid arteries: Patent, no abnormalities.     Vertebral arteries: Patent, no abnormalities.     Intracranial CTA:     Carotid arteries:     Calcified plaque at the siphons without significant narrowing.     Normal A1 and M1 segments.     Vertebrobasilar Circulation:     Vertebral arteries: Patent.  Scattered calcified plaque bilaterally with mild narrowing on the left  proximally.  Otherwise no abnormalities.     Basilar artery: Patent, no abnormalities.     Posterior cerebral arteries: Patent, no abnormalities.     Dural venous sinuses: Patent.     Normal Variants:     ACom: Patent.     PComs: Not visualized bilaterally.     Vertebral arteries: Co-dominant.     Additional findings:     No cervical lymphadenopathy.     No acute bony pathology.     Scattered atelectasis, otherwise motion degraded lung apices clear.     Impression:     1. Moderate left and mild right carotid bulb stenoses.  2.  No large vessel occlusions, critical stenoses, vascular malformations or aneurysms.    Assessment/Plan:     Assessment  1. CVA s/p tPA  2. Type II DM  3. HTN  4. CAD s/p CABG x2 (2007)  5. Normocytic Anemia       Plan  Admit to the ICU for neurological monitoring s/p tPA  Unknown last known normal per my exam, ED states deficits began 1.5 hours prior  Continue with q1h neurochecks  Keep blood pressure < 180/105, allow for 24h permissive HTN if BP <180/105mmHg  Keep NPO  Ordered CTA head and neck  Ordered echocardiogram with bubble study  Ordered MRI brain   Neurology is following, appreciate recommendations   Started on high intensity statin  Ordered PT/OT/SLP  PRN Hydralazine 10mg to keep BP <180/105mmHg      DVT Prophylaxis: SCDs  GI Prophylaxis: None     Greater than 30 minutes of critical care was time spent personally by me on the following activities: development of treatment plan with patient or surrogate and bedside caregivers, discussions with consultants, evaluation of patient's response to treatment, examination of patient, ordering and performing treatments and interventions, ordering and review of laboratory studies, ordering and review of radiographic studies, pulse oximetry, re-evaluation of patient's condition.  This critical care time did not overlap with that of any other provider or involve time for any procedures.     Kash Olivares, DO  Pulmonary Critical Care  Medicine  [facility] - Emergency Dept

## 2022-07-02 NOTE — CONSULTS
Neurology  Consult performed by: Danilo Duncan MD  Consult ordered by: Kash Olivarse DO      re acute ischemic stroke  R HP onset 730 am  tpa started about 2 h after onset  Sx may have slightly improved  No prior episodes or TIA  He is on ASA at home  Nonsmoker  TYPE 2 DM    Past Medical History:   Diagnosis Date    Diabetes mellitus     Hypertension     Mixed hyperlipidemia      Past Surgical History:   Procedure Laterality Date    CARDIAC SURGERY       Social History     Socioeconomic History    Marital status:       History reviewed. No pertinent family history.    Current NIH Stroke Score Values    Flowsheet Row Most Recent Value   Interval 1hour post tPA or Endovascular procedure completion   1a. Level of Consciousness 0-->Alert, keenly responsive   1b. LOC Questions 0-->Answers both questions correctly   1c. LOC Commands 0-->Performs both tasks correctly   2. Best Gaze 0-->Normal   3. Visual 0-->No visual loss   4. Facial Palsy 0-->Normal symmetrical movements   5a. Motor Arm, Left 0-->No drift, limb holds 90 (or 45) degrees for full 10 secs   5b. Motor Arm, Right 2-->Some effort against gravity, limb cannot get to or maintain (if cued) 90 (or 45) degrees, drifts down to bed, but has some effort against gravity   6a. Motor Leg, Left 0-->No drift, leg holds 30 degree position for full 5 secs   6b. Motor Leg, Right 4-->No movement   7. Limb Ataxia 1-->Present in one limb   8. Sensory 0-->Normal, no sensory loss   9. Best Language 0-->No aphasia, normal   10. Dysarthria 0-->Normal   11. Extinction and Inattention (formerly Neglect) 0-->No abnormality   Total (NIH Stroke Scale) 7        Mental Status: Alert and oriented x3. Language is fluent with good comprehension.    Cranial Nerve: Pupils are equal, round, and reactive to light. Visual fields are intact to confrontation. Normal fundi. Ocular movements are intact. Face is symmetric at rest and with activation with intact sensation throughout.  Hearing intact to finger rub bilaterally. Muscles of tongue and palate activate symmetrically. No dysarthria. Strength is full in sternocleidomastoid and trapezius bilaterally.    Motor: some adduction RLE; distal strength preserved RUE  0/5 R delt    Sensory: Sensation is intact to light touch, pinprick, vibration, and proprioception throughout. Romberg is negative.    Reflexes: 2+ and symmetric at the biceps, triceps, brachioradialis, patella, and Achilles bilaterally. Plantar response is flexor bilaterally.    Coordination: No dysmetria on finger-nose-finger or heel-knee-shin. Normal rapid alternating movements. Fast finger tapping with normal amplitude and speed.    Gait: Narrow based with normal stride length and good arm swing bilaterally. Able to walk on heels, toes, and in tandem.    cta moderate carotid stenosis    Vitals:    07/02/22 1445   BP: (!) 162/75   Pulse: 76   Resp: 15   Temp:      ldl 48 at goal    Imp  Acute L hemisphere stroke  S/p ivtpa  Post tpa care; bp parameters per protocol  Rehab  Asa/plavix  Continue statin  Will follow

## 2022-07-02 NOTE — ED PROVIDER NOTES
Encounter Date: 7/2/2022    SCRIBE #1 NOTE: I, Garciazehra Granda, am scribing for, and in the presence of,  Pa Mccarthy MD. I have scribed the following portions of the note - Other sections scribed: HPI, ROS, PE.   SCRIBE #2 NOTE: I, Maria Shaylee, am scribing for, and in the presence of,  Pa Mccarthy MD. I have scribed the following portions of the note - Other sections scribed: HPI, ROS, PE.     History     Chief Complaint   Patient presents with    Cerebrovascular Accident     Right leg paralysis that started 30 minutes PTA. Activated as a code FAST. Dr. Mccarthy at bedside on arrival.     76 y/o male with hx of DM presents to ED via [facility] for stroke like symptoms that started after 0730. Per EMS pt has right arm weakness and right leg paralysis. EMS states that pt has no aphasia, blurry vision, or headache. Pt takes daily asprin,  lisinopril, and metoprolol. Per EMS CBG is 155 on scene.     The history is provided by the EMS personnel and the patient. No  was used.   Cerebrovascular Accident  The primary symptoms include focal weakness. Primary symptoms do not include loss of sensation or fever. The symptoms began 1 to 2 hours ago. The symptoms are unchanged. The neurological symptoms are focal.   Weakness began 1 - 3 hours ago. The weakness is unchanged. There is no ability to contract the muscle with maximum physical effort.       Additional symptoms include weakness (right arm). Medical issues also include diabetes and hypertension.     Review of patient's allergies indicates:  Not on File  Past Medical History:   Diagnosis Date    Hypertension      History reviewed. No pertinent surgical history.  History reviewed. No pertinent family history.     Review of Systems   Constitutional: Negative for fever.   HENT: Negative for sore throat.    Eyes: Negative for visual disturbance.   Respiratory: Negative for shortness of breath.    Cardiovascular: Negative for chest pain.   Gastrointestinal:  Negative for abdominal pain.   Genitourinary: Negative for dysuria.   Musculoskeletal: Negative for joint swelling.   Skin: Negative for rash.   Neurological: Positive for focal weakness and weakness (right arm).        Paralysis of right leg   All other systems reviewed and are negative.      Physical Exam     Initial Vitals [07/02/22 0819]   BP Pulse Resp Temp SpO2   (!) 197/98 105 16 97.5 °F (36.4 °C) 100 %      MAP       --         Physical Exam    Nursing note and vitals reviewed.  Constitutional: He appears well-developed and well-nourished. He is not diaphoretic. No distress.   HENT:   Head: Normocephalic and atraumatic.   Eyes: Conjunctivae and EOM are normal. Pupils are equal, round, and reactive to light.   Neck:   Normal range of motion.  Cardiovascular: Normal rate, normal heart sounds and intact distal pulses.   No murmur heard.  Tachycardia   Pulmonary/Chest: Breath sounds normal. No respiratory distress. He has no wheezes. He has no rales.   Abdominal: Abdomen is soft. He exhibits no distension. There is no abdominal tenderness.   Musculoskeletal:         General: No tenderness or edema.      Cervical back: Normal range of motion.     Neurological: He is alert and oriented to person, place, and time. No cranial nerve deficit.   Unable to move RLE, 3/5 strength right upper extremity.   Skin: Skin is warm and dry. Capillary refill takes less than 2 seconds. No rash noted. No erythema.   Psychiatric: He has a normal mood and affect.         ED Course   Critical Care    Date/Time: 7/2/2022 8:35 AM  Performed by: Pa Mccarthy MD  Authorized by: Pa Mccarthy MD   Direct patient critical care time: 32 minutes  Additional history critical care time: 4 minutes  Ordering / reviewing critical care time: 2 minutes  Documentation critical care time: 2 minutes  Consulting other physicians critical care time: 6 minutes  Consult with family critical care time: 2 minutes  Other critical care time: 2  minutes  Total critical care time (exclusive of procedural time) : 50 minutes  Critical care time was exclusive of teaching time and separately billable procedures and treating other patients.  Critical care was necessary to treat or prevent imminent or life-threatening deterioration of the following conditions: CNS failure or compromise, dehydration and metabolic crisis.  Critical care was time spent personally by me on the following activities: discussions with consultants, pulse oximetry, vascular access procedures, evaluation of patient's response to treatment, interpretation of cardiac output measurements, examination of patient, ordering and performing treatments and interventions, ordering and review of radiographic studies, re-evaluation of patient's condition, development of treatment plan with patient or surrogate, blood draw for specimens, obtaining history from patient or surrogate and ordering and review of laboratory studies.        Labs Reviewed   COMPREHENSIVE METABOLIC PANEL - Abnormal; Notable for the following components:       Result Value    Glucose Level 198 (*)     All other components within normal limits   APTT - Abnormal; Notable for the following components:    PTT 34.4 (*)     All other components within normal limits   B-TYPE NATRIURETIC PEPTIDE - Abnormal; Notable for the following components:    Natriuretic Peptide 141.3 (*)     All other components within normal limits   CBC WITH DIFFERENTIAL - Abnormal; Notable for the following components:    RBC 4.20 (*)     Hgb 12.4 (*)     Hct 36.9 (*)     MPV 10.6 (*)     All other components within normal limits   LIPID PANEL - Abnormal; Notable for the following components:    LDL Cholesterol 48.00 (*)     All other components within normal limits   PROTIME-INR - Normal   TSH - Normal   TROPONIN I - Normal   CBC W/ AUTO DIFFERENTIAL    Narrative:     The following orders were created for panel order CBC W/ AUTO DIFFERENTIAL.  Procedure                                Abnormality         Status                     ---------                               -----------         ------                     CBC with Differential[[id number]]        Abnormal            Final result                 Please view results for these tests on the individual orders.   COVID/FLU A&B PCR   EXTRA TUBES    Narrative:     The following orders were created for panel order EXTRA TUBES.  Procedure                               Abnormality         Status                     ---------                               -----------         ------                     Light Green Top Hold[[id number]]                                                        Light Green Top Hold[[id number]]                                                          Please view results for these tests on the individual orders.   LIGHT GREEN TOP HOLD   LIGHT GREEN TOP HOLD   POCT GLUCOSE, HAND-HELD DEVICE   POCT INR     EKG Readings: (Independently Interpreted)   0848 Sinus tachycardia, 101 bpm, st depression at v3, v4, v5, v6, no STEMI       Imaging Results          CTA STROKE MULTI-PHASE (Final result)  Result time 07/02/22 09:45:53    Final result by Abner Moore MD (07/02/22 09:45:53)                 Impression:      1. Moderate left and mild right carotid bulb stenoses.  2.  No large vessel occlusions, critical stenoses, vascular malformations or aneurysms.      Electronically signed by: Abner Moore  Date:    07/02/2022  Time:    09:45             Narrative:    EXAMINATION:  CTA STROKE MULTI-PHASE    CLINICAL HISTORY:  Neuro deficit, acute, stroke suspected;    TECHNIQUE:  Axial images obtained through the head and neck before and after the administration of intravenous contrast. Coronal, sagittal, MIP and 3D reconstructions were obtained from the axial data.    Automatic exposure control was utilized to limit radiation dose.    Radiation Dose:    Total DLP: 1937 mGy*cm    COMPARISON:  Head CT earlier the same  day    FINDINGS:  Head CT with contrast:    No interval changes when compared to the previous CT.    No enhancing abnormalities.    If present, stenosis of the carotid bulbs is measured based on NASCET criteria, i.e. area of maximal stenosis compared to the cervical ICA distal to the bulb.    Cervical CTA:    Aortic arch: Patent.  Scattered calcified and noncalcified plaque.  Otherwise no abnormalities.    Common Carotid arteries: Patent, no abnormalities.    Carotid Bulbs: Patent.  Mixed plaque bilaterally with mild (less than 50%) narrowing on the right and moderate (~70%) narrowing of the left.  Otherwise no abnormalities.    Internal Carotid arteries: Patent, no abnormalities.    Vertebral arteries: Patent, no abnormalities.    Intracranial CTA:    Carotid arteries:    Calcified plaque at the siphons without significant narrowing.    Normal A1 and M1 segments.    Vertebrobasilar Circulation:    Vertebral arteries: Patent.  Scattered calcified plaque bilaterally with mild narrowing on the left proximally.  Otherwise no abnormalities.    Basilar artery: Patent, no abnormalities.    Posterior cerebral arteries: Patent, no abnormalities.    Dural venous sinuses: Patent.    Normal Variants:    ACom: Patent.    PComs: Not visualized bilaterally.    Vertebral arteries: Co-dominant.    Additional findings:    No cervical lymphadenopathy.    No acute bony pathology.    Scattered atelectasis, otherwise motion degraded lung apices clear.                               CT Head Without Contrast (Final result)  Result time 07/02/22 08:47:10    Final result by Abner Moore MD (07/02/22 08:47:10)                 Impression:      No acute intracranial abnormalities.    Case discussed with Dr. Mccarthy, 7/2/2022 @ 0847 hours.      Electronically signed by: Abner Moore  Date:    07/02/2022  Time:    08:47             Narrative:    EXAMINATION:  CT HEAD WITHOUT CONTRAST    CLINICAL HISTORY:  Stroke, follow  up;    TECHNIQUE:  Axial scans were obtained from skull base to the vertex.    Coronal and sagittal reconstructions obtained from the axial data.    Automatic exposure control was utilized to limit radiation dose.    Contrast: None    Radiation Dose:    Total DLP: 1186 mGy*cm    COMPARISON:  None    FINDINGS:  Scalp/Skull:    No abnormalities.    Brain sulci: Appropriate for patient's age.    Ventricles: Normal in size and configuration. No hydrocephalus.    Extra-axial spaces:    No masses or fluid collections.    Parenchyma:    Discrete and confluent supratentorial white matter hypodensities are mild-moderate nonspecific chronic microangiopathic changes, statistically chronic microvascular ischemia.    No mass, hemorrhage or CT evidence of an acute vascular insult.    Dural sinuses: No abnormal densities.    Sellar/Suprasellar region: No abnormalities.    Skull base and Craniocervical junction: No abnormalities.    Incidental findings:    Carotid siphon and vertebrobasilar atherosclerotic vascular calcifications.    Hypopneumatized mastoid air cells bilaterally a                                 Medications   0.9%  NaCl infusion (has no administration in time range)   labetaloL injection 10 mg (has no administration in time range)   0.9%  NaCl infusion (has no administration in time range)   0.9%  NaCl infusion (has no administration in time range)   alteplase (ACtivase) injection 75.5 mg (has no administration in time range)   ALTEPLASE IV BOLUS FROM VIAL 8.4 mg (has no administration in time range)   labetaloL injection 10 mg (10 mg Intravenous Given 7/2/22 2221)   iopamidoL (ISOVUE-370) injection 100 mL (100 mLs Intravenous Given 7/2/22 8125)        Additional MDM:     NIH Stroke Scale:   Level of consciousness = 0 - alert  LOC questions = 0 - answers both correctly  LOC commands = 0 - performs both correctly  Best gaze = 0 - normal  Visual = 0 - no visual loss  Facial palsy = 0 - normal  Motor left arm =  0 -  no drift  Motor right arm =  3 - no effort against gravity  Motor left leg = 0 - no drift  Motor right leg =  4 - no movement  Limb ataxia = 0 - absent  Sensory = 0 - normal  Best language = 0 - no aphasia  Dysarthria = 0 - normal articulation  Extinction and inattention = 0 - no neglect  NIH Stroke Scale Total = 7        Patient is a 77-year-old male with past medical history of diabetes, hypertension, not on any anticoagulation medications presents to the emergency department for sudden onset of right-sided weakness that began at approximately 7:30 a.m. this morning.  Patient denies any falls or head injuries.  No history of significant bleeding.  No recent surgeries.  NIH of 7.  CT of the head without any evidence of hemorrhage.  CTA obtained.  Discussed results with patient.  Discussed option of tPA given the patient is within the window of receiving this medication, discussed the risks and benefits including the significant risk of life-threatening bleeding.  Patient and wife verbalized understanding and opted to receive tPA.  Patient's blood pressure less than 185 systolic, p.r.n. labetalol placed.  TPA administered.  Discussed with Neurology, Dr. Duncan.  Discussed with ICU who will evaluate admit the patient.  Answered all questions at this time.  Patient family verbalized understanding agreed to plan.             Attending Attestation:           Physician Attestation for Scribe:  Physician Attestation Statement for Scribe #1: I, Pa Mccarthy MD, reviewed documentation, as scribed by Jose Granda in my presence, and it is both accurate and complete.   Physician Attestation Statement for Scribe #2: I, Pa Mccarthy MD, reviewed documentation, as scribed by Maria June in my presence, and it is both accurate and complete. I also acknowledge and confirm the content of the note done by Scribe #1.          ED Course as of 07/02/22 0957   Sat Jul 02, 2022   2005 Paged Neurology  [DP]   1899 Pending INR.   [RP]      ED Course User Index  [DP] Kristal June  [RP] Pa Mccarthy MD             Clinical Impression:   Final diagnoses:  [I63.9] Stroke  [I63.9] Cerebrovascular accident (CVA), unspecified mechanism (Primary)  [R29.898] Weakness of right upper extremity  [R29.898] Weakness of right lower extremity          ED Disposition Condition    Admit               Pa Mccarthy MD  07/02/22 0958

## 2022-07-03 LAB
ALBUMIN SERPL-MCNC: 3.8 GM/DL (ref 3.4–4.8)
ALBUMIN/GLOB SERPL: 1.2 RATIO (ref 1.1–2)
ALP SERPL-CCNC: 80 UNIT/L (ref 40–150)
ALT SERPL-CCNC: 23 UNIT/L (ref 0–55)
APTT PPP: 32 SECONDS (ref 23.2–33.7)
AST SERPL-CCNC: 22 UNIT/L (ref 5–34)
BASOPHILS # BLD AUTO: 0.04 X10(3)/MCL (ref 0–0.2)
BASOPHILS NFR BLD AUTO: 0.4 %
BILIRUBIN DIRECT+TOT PNL SERPL-MCNC: 0.5 MG/DL
BUN SERPL-MCNC: 7.4 MG/DL (ref 8.4–25.7)
CALCIUM SERPL-MCNC: 9.7 MG/DL (ref 8.8–10)
CHLORIDE SERPL-SCNC: 109 MMOL/L (ref 98–107)
CO2 SERPL-SCNC: 22 MMOL/L (ref 23–31)
CREAT SERPL-MCNC: 0.69 MG/DL (ref 0.73–1.18)
EOSINOPHIL # BLD AUTO: 0.1 X10(3)/MCL (ref 0–0.9)
EOSINOPHIL NFR BLD AUTO: 0.9 %
ERYTHROCYTE [DISTWIDTH] IN BLOOD BY AUTOMATED COUNT: 13.1 % (ref 11.5–17)
GLOBULIN SER-MCNC: 3.3 GM/DL (ref 2.4–3.5)
GLUCOSE SERPL-MCNC: 156 MG/DL (ref 82–115)
HCT VFR BLD AUTO: 37.2 % (ref 42–52)
HGB BLD-MCNC: 12.6 GM/DL (ref 14–18)
IMM GRANULOCYTES # BLD AUTO: 0.04 X10(3)/MCL (ref 0–0.04)
IMM GRANULOCYTES NFR BLD AUTO: 0.4 %
INR BLD: 1.12 (ref 0–1.3)
LYMPHOCYTES # BLD AUTO: 1.89 X10(3)/MCL (ref 0.6–4.6)
LYMPHOCYTES NFR BLD AUTO: 17.2 %
MAGNESIUM SERPL-MCNC: 1.8 MG/DL (ref 1.6–2.6)
MCH RBC QN AUTO: 29.5 PG (ref 27–31)
MCHC RBC AUTO-ENTMCNC: 33.9 MG/DL (ref 33–36)
MCV RBC AUTO: 87.1 FL (ref 80–94)
MONOCYTES # BLD AUTO: 1.05 X10(3)/MCL (ref 0.1–1.3)
MONOCYTES NFR BLD AUTO: 9.6 %
NEUTROPHILS # BLD AUTO: 7.9 X10(3)/MCL (ref 2.1–9.2)
NEUTROPHILS NFR BLD AUTO: 71.5 %
NRBC BLD AUTO-RTO: 0 %
PHOSPHATE SERPL-MCNC: 3.1 MG/DL (ref 2.3–4.7)
PLATELET # BLD AUTO: 212 X10(3)/MCL (ref 130–400)
PMV BLD AUTO: 10.8 FL (ref 7.4–10.4)
POCT GLUCOSE: 141 MG/DL (ref 70–110)
POCT GLUCOSE: 157 MG/DL (ref 70–110)
POTASSIUM SERPL-SCNC: 3.8 MMOL/L (ref 3.5–5.1)
PROT SERPL-MCNC: 7.1 GM/DL (ref 5.8–7.6)
PROTHROMBIN TIME: 14.3 SECONDS (ref 12.5–14.5)
RBC # BLD AUTO: 4.27 X10(6)/MCL (ref 4.7–6.1)
SODIUM SERPL-SCNC: 142 MMOL/L (ref 136–145)
WBC # SPEC AUTO: 11 X10(3)/MCL (ref 4.5–11.5)

## 2022-07-03 PROCEDURE — 25500020 PHARM REV CODE 255: Performed by: INTERNAL MEDICINE

## 2022-07-03 PROCEDURE — 25000003 PHARM REV CODE 250: Performed by: STUDENT IN AN ORGANIZED HEALTH CARE EDUCATION/TRAINING PROGRAM

## 2022-07-03 PROCEDURE — 83735 ASSAY OF MAGNESIUM: CPT | Performed by: STUDENT IN AN ORGANIZED HEALTH CARE EDUCATION/TRAINING PROGRAM

## 2022-07-03 PROCEDURE — 85025 COMPLETE CBC W/AUTO DIFF WBC: CPT | Performed by: STUDENT IN AN ORGANIZED HEALTH CARE EDUCATION/TRAINING PROGRAM

## 2022-07-03 PROCEDURE — A9577 INJ MULTIHANCE: HCPCS | Performed by: INTERNAL MEDICINE

## 2022-07-03 PROCEDURE — 97162 PT EVAL MOD COMPLEX 30 MIN: CPT

## 2022-07-03 PROCEDURE — 99233 SBSQ HOSP IP/OBS HIGH 50: CPT | Mod: ,,, | Performed by: PSYCHIATRY & NEUROLOGY

## 2022-07-03 PROCEDURE — 85610 PROTHROMBIN TIME: CPT | Performed by: STUDENT IN AN ORGANIZED HEALTH CARE EDUCATION/TRAINING PROGRAM

## 2022-07-03 PROCEDURE — 63600175 PHARM REV CODE 636 W HCPCS

## 2022-07-03 PROCEDURE — 99233 PR SUBSEQUENT HOSPITAL CARE,LEVL III: ICD-10-PCS | Mod: ,,, | Performed by: PSYCHIATRY & NEUROLOGY

## 2022-07-03 PROCEDURE — 84100 ASSAY OF PHOSPHORUS: CPT | Performed by: STUDENT IN AN ORGANIZED HEALTH CARE EDUCATION/TRAINING PROGRAM

## 2022-07-03 PROCEDURE — 20000000 HC ICU ROOM

## 2022-07-03 PROCEDURE — 36415 COLL VENOUS BLD VENIPUNCTURE: CPT | Performed by: STUDENT IN AN ORGANIZED HEALTH CARE EDUCATION/TRAINING PROGRAM

## 2022-07-03 PROCEDURE — 92523 SPEECH SOUND LANG COMPREHEN: CPT

## 2022-07-03 PROCEDURE — 63600175 PHARM REV CODE 636 W HCPCS: Performed by: STUDENT IN AN ORGANIZED HEALTH CARE EDUCATION/TRAINING PROGRAM

## 2022-07-03 PROCEDURE — 85730 THROMBOPLASTIN TIME PARTIAL: CPT | Performed by: STUDENT IN AN ORGANIZED HEALTH CARE EDUCATION/TRAINING PROGRAM

## 2022-07-03 PROCEDURE — 80053 COMPREHEN METABOLIC PANEL: CPT | Performed by: STUDENT IN AN ORGANIZED HEALTH CARE EDUCATION/TRAINING PROGRAM

## 2022-07-03 RX ORDER — BISACODYL 10 MG
10 SUPPOSITORY, RECTAL RECTAL DAILY PRN
Status: DISCONTINUED | OUTPATIENT
Start: 2022-07-03 | End: 2022-07-12 | Stop reason: HOSPADM

## 2022-07-03 RX ORDER — ONDANSETRON 2 MG/ML
INJECTION INTRAMUSCULAR; INTRAVENOUS
Status: COMPLETED
Start: 2022-07-03 | End: 2022-07-03

## 2022-07-03 RX ORDER — LISINOPRIL 5 MG/1
5 TABLET ORAL DAILY
Status: DISCONTINUED | OUTPATIENT
Start: 2022-07-03 | End: 2022-07-04

## 2022-07-03 RX ORDER — METOPROLOL SUCCINATE 50 MG/1
50 TABLET, EXTENDED RELEASE ORAL DAILY
Status: DISCONTINUED | OUTPATIENT
Start: 2022-07-03 | End: 2022-07-12 | Stop reason: HOSPADM

## 2022-07-03 RX ORDER — POLYETHYLENE GLYCOL 3350 17 G/17G
17 POWDER, FOR SOLUTION ORAL DAILY
Status: DISCONTINUED | OUTPATIENT
Start: 2022-07-03 | End: 2022-07-12 | Stop reason: HOSPADM

## 2022-07-03 RX ORDER — AMOXICILLIN 250 MG
1 CAPSULE ORAL DAILY
Status: DISCONTINUED | OUTPATIENT
Start: 2022-07-03 | End: 2022-07-12 | Stop reason: HOSPADM

## 2022-07-03 RX ADMIN — HYDRALAZINE HYDROCHLORIDE 10 MG: 20 INJECTION INTRAMUSCULAR; INTRAVENOUS at 01:07

## 2022-07-03 RX ADMIN — LABETALOL HYDROCHLORIDE 10 MG: 5 INJECTION INTRAVENOUS at 06:07

## 2022-07-03 RX ADMIN — GADOBENATE DIMEGLUMINE 20 ML: 529 INJECTION, SOLUTION INTRAVENOUS at 01:07

## 2022-07-03 RX ADMIN — LABETALOL HYDROCHLORIDE 10 MG: 5 INJECTION INTRAVENOUS at 11:07

## 2022-07-03 RX ADMIN — HYDRALAZINE HYDROCHLORIDE 10 MG: 20 INJECTION INTRAMUSCULAR; INTRAVENOUS at 02:07

## 2022-07-03 RX ADMIN — SENNOSIDES AND DOCUSATE SODIUM 1 TABLET: 50; 8.6 TABLET ORAL at 02:07

## 2022-07-03 RX ADMIN — ONDANSETRON 4 MG: 2 INJECTION INTRAMUSCULAR; INTRAVENOUS at 09:07

## 2022-07-03 RX ADMIN — LABETALOL HYDROCHLORIDE 10 MG: 5 INJECTION INTRAVENOUS at 02:07

## 2022-07-03 RX ADMIN — ATORVASTATIN CALCIUM 80 MG: 40 TABLET, FILM COATED ORAL at 08:07

## 2022-07-03 RX ADMIN — METOPROLOL SUCCINATE 50 MG: 50 TABLET, FILM COATED, EXTENDED RELEASE ORAL at 06:07

## 2022-07-03 RX ADMIN — HYDRALAZINE HYDROCHLORIDE 10 MG: 20 INJECTION INTRAMUSCULAR; INTRAVENOUS at 08:07

## 2022-07-03 RX ADMIN — LABETALOL HYDROCHLORIDE 10 MG: 5 INJECTION INTRAVENOUS at 09:07

## 2022-07-03 RX ADMIN — LISINOPRIL 5 MG: 5 TABLET ORAL at 06:07

## 2022-07-03 NOTE — PLAN OF CARE
Problem: Physical Therapy  Goal: Physical Therapy Goal  Description: Goals to be met by: 2022     Patient will increase functional independence with mobility by performin. Supine to sit with Contact Guard Assistance  2. Sit to supine with Contact Guard Assistance  3. Sit to stand transfer with Contact Guard Assistance  4. Gait  x 150 feet with Minimal Assistance using Rolling Walker vs LRAD.     Outcome: Ongoing, Progressing

## 2022-07-03 NOTE — H&P
[facility] Hospital Medicine History & Physical Examination       Patient Name: Ayaz Huggins  MRN: [id number]  Patient Class: IP- Inpatient   Admission Date: 7/2/2022   Admitting Physician: MADELYN Service   Length of Stay: 1  Attending Physician: DAREN Orantes  Primary Care Provider: Angel Parham Jr, MD  Face-to-Face encounter date: 07/03/2022  Code Status:Full  Chief Complaint: Cerebrovascular Accident (Right leg paralysis that started 30 minutes PTA. Activated as a code FAST. Dr. Mccarthy at bedside on arrival.)        Patient information was obtained from patient, patient's family, past medical records and ER records.     HISTORY OF PRESENT ILLNESS:   Mr. Huggins is a 78 y/o male with a PMH of Type II DM, diabetic neuropathy, CAD s/p CABG x2 (2007), and HTN who presented to [facility] ED  On 7/2/22 with complaints of right sided upper and lower extremity weakness. Patient's story is unclear upon the time of last known normal. He states he woke up  with a cramp in the RLE and noticed the right sided weakness.The patient was admitted to the ICU with an ischemic CVA and administered tPA; he has had some improvement in his right-sided weakness although he is unable to purposefully move his right lower extremity.  Neurology has seen and evaluated the patient and has deemed this patient stable for transfer to the floor.  This patient was seen and evaluated in the intensive care unit.    PAST MEDICAL HISTORY:     Past Medical History:   Diagnosis Date    Diabetes mellitus     Hypertension     Mixed hyperlipidemia        PAST SURGICAL HISTORY:     Past Surgical History:   Procedure Laterality Date    CARDIAC SURGERY     hx of CABG x2 (2007)  ALLERGIES:   Penicillins    FAMILY HISTORY:   Reviewed and negative    SOCIAL HISTORY:     Social History     Tobacco Use    Smoking status: Not on file    Smokeless tobacco: Not on file   Substance Use Topics    Alcohol use: Not on file         HOME MEDICATIONS:     Prior to Admission medications    Medication Sig Start Date End Date Taking? Authorizing Provider   aspirin (ECOTRIN) 81 MG EC tablet Take 81 mg by mouth.   Yes Historical Provider   lisinopriL (PRINIVIL,ZESTRIL) 5 MG tablet Take 5 mg by mouth once daily. 6/26/22  Yes Historical Provider   metFORMIN (GLUCOPHAGE) 1000 MG tablet Take 1,500 mg by mouth.   Yes Historical Provider   metoprolol succinate (TOPROL-XL) 50 MG 24 hr tablet Take 50 mg by mouth.   Yes Historical Provider   rosuvastatin (CRESTOR) 10 MG tablet Take 10 mg by mouth.   Yes Historical Provider       REVIEW OF SYSTEMS:   Except as documented, all other systems reviewed and negative     PHYSICAL EXAM:     VITAL SIGNS: 24 HRS MIN & MAX LAST   Temp  Min: 98.2 °F (36.8 °C)  Max: 98.4 °F (36.9 °C) 98.2 °F (36.8 °C)   BP  Min: 154/78  Max: 200/84 (!) 179/80   Pulse  Min: 58  Max: 96  81   Resp  Min: 14  Max: 28 (!) 22   SpO2  Min: 94 %  Max: 99 % 98 %       General appearance: Well-developed, well-nourished male in no apparent distress.  HENT: Atraumatic head. Moist mucous membranes of oral cavity.  Eyes: Normal extraocular movements.   Neck: Supple.   Lungs: Clear to auscultation bilaterally. No wheezing present.   Heart: Regular rate and rhythm. S1 and S2 present with no murmurs/gallop/rub. No pedal edema. No JVD present.   Abdomen: Soft, non-distended, non-tender. No rebound tenderness/guarding. Bowel sounds are normal.   Extremities: No cyanosis, clubbing, or edema.  Skin: No Rash.   Neuro: Awake, alert, oriented x 3; RUE 3+, LUE 5+; unable to move RLE; good 5+ strength LLE; follows all simple commands; no facial asymmetry; speech is clear  Psych/mental status: Appropriate mood and affect. Responds appropriately to questions.     LABS AND IMAGING:     Recent Labs   Lab 07/02/22  0852 07/03/22  0228   WBC 6.6 11.0   RBC 4.20* 4.27*   HGB 12.4* 12.6*   HCT 36.9* 37.2*   MCV 87.9 87.1   MCH 29.5 29.5   MCHC 33.6 33.9   RDW 13.0  13.1    212   MPV 10.6* 10.8*       Recent Labs   Lab 07/02/22  0852 07/02/22  0856 07/03/22  0228     --  142   K 3.7  --  3.8   CO2 24  --  22*   BUN 8.7  --  7.4*   CREATININE 0.74  --  0.69*   CALCIUM 9.3  --  9.7   PH  --  7.40  --    MG 1.70  --  1.80   ALBUMIN 4.0  --  3.8   ALKPHOS 78  --  80   ALT 22  --  23   AST 19  --  22   BILITOT 0.5  --  0.5       Microbiology Results (last 7 days)     ** No results found for the last 168 hours. **           MRI Brain W WO Contrast  Narrative: EXAMINATION:  MRI BRAIN W WO CONTRAST    CLINICAL HISTORY:  Stroke, follow up;;    TECHNIQUE:  Multiplanar, multisequence MR images of the brain were obtained WITH and WITHOUT the administration of intravenous gadolinium based contrast.    COMPARISON:  Head CT/CTA 07/02/2022    FINDINGS:  Scalp: No abnormalities.    Bone marrow: No signal abnormalities.    Brain sulci: Appropriate for patient's age.    Ventricles: Normal in size and configuration. No hydrocephalus.    Extra-axial spaces:    No masses or fluid collections.    Parenchyma:    Scattered foci of ischemia supratentorially (L>R), the largest is a left posterior frontal 3 x 2.1 cm focus, with additional subcentimeter bifrontal, biparietal, left occipitotemporal and left basal ganglia foci.    No additional acute vascular insults.    The largest infarct is associated with patchy magnetic susceptibility, remaining do not show magnetic susceptibility.    No mass or large volume hemorrhage.    Punctate left posterior frontal foci of enhancement is favored to relate to ischemia.    Otherwise no enhancing abnormalities.    Vessels: Normal flow voids in major arteries and veins.    Sellar/Suprasellar region: No abnormalities.    Craniocervical junction: No abnormalities.  Impression: 1. Small acute infarcts supratentorially, the largest is a left posterior frontal 3 cm cortical based infarct with petechial hemorrhage.  No mass effect or shift.  Discussed with   Duncan.  2. Otherwise no acute intracranial abnormalities.    Electronically signed by: Abner Moore  Date:    07/03/2022  Time:    14:09        ASSESSMENT & PLAN:     Acute left hemispheric ischemic CVA, S/P TPA  -monitor neuro status closely  -ST/OT/PT    Hypertension  -BP parameters < 180/105  -prn antihypertensives    DM Type II  -diabetic diet   -CBG's ac & hs    Hyperlipidemia  -cont. Statin    Anemia of chronic disease  -monitor CBC      VTE Prophylaxis: will be placed on Heparin/Lovenox/ Xarelto/ SCD for DVT prophylaxis and will be advised to be as mobile as possible and sit in a chair as tolerated    Patient condition:  Stable/Fair/Gaurded/ Serious/ Critical    __________________________________________________________________________  INPATIENT LIST OF MEDICATIONS     Scheduled Meds:   sodium chloride 0.9%  50 mL Intravenous Once    sodium chloride 0.9%  50 mL Intravenous Once    atorvastatin  80 mg Oral QHS    lisinopriL  5 mg Oral Daily    metoprolol succinate  50 mg Oral Daily    polyethylene glycol  17 g Oral Daily    senna-docusate 8.6-50 mg  1 tablet Oral Daily     Continuous Infusions:  PRN Meds:.bisacodyL, dextrose 10%, dextrose 10%, dextrose 50%, glucagon (human recombinant), hydrALAZINE, insulin aspart U-100, labetalol, sodium chloride 0.9%      IKena NP  have reviewed and discussed the case with Dr. Murphy  Please see the following addendum for further assessment and plan from there attending MD.    07/03/2022    ________________________________________________________________________________    MD Addendum:  I, Dr. Murphy assumed care of this patient today at   For the patient encounter, I performed the substantive portion of the visit, I reviewed the NP/PA documentation, treatment plan, and medical decision making.  I had face to face time with this patient     Seen and examined the pt. Agree with above history physical exam and management.   Patient was downgraded  from ICU after receiving tPA and ICU monitoring.   Have petechial hemoragie in CT head and MRI which seems to be stable. Neurology is aware.   Denies having headache, RLE 1/5 and RUE3/5 strength. No CN abnormalities.     - cont atorvastatin 80 mg   - restart home BP meds.  - q4 neurochecks.   - pt/ot  - appreciate further neuro recs.       All diagnosis and differential diagnosis have been reviewed; assessment and plan has been documented; I have personally reviewed the labs and test results that are presently available; I have reviewed the patients medication list; I have reviewed the consulting providers response and recommendations. I have reviewed or attempted to review medical records based upon their availability.    All of the patient and family questions have been addressed and answered. Patient's is agreeable to the above stated plan. I will continue to monitor closely and make adjustments to medical management as needed.      Kena Mazariegos, DAREN   07/03/2022

## 2022-07-03 NOTE — PT/OT/SLP EVAL
Speech Language Pathology Department  Cognitive-Communication Evaluation    Patient Name:  Ayaz Huggins   MRN:  [id number]  Admitting Diagnosis: <principal problem not specified>    Recommendations:     General Recommendations:  SLP intervention not indicated  Communication strategies:  none    Barriers to Discharge:  None    History:     Past Medical History:   Diagnosis Date    Diabetes mellitus     Hypertension     Mixed hyperlipidemia        Past Surgical History:   Procedure Laterality Date    CARDIAC SURGERY         Previous level of Function   Education:college   Occupation: retired   Lives: with spouse   Handed: Right   Glasses: yes   Hearing Aids: no      Subjective     Patient awake.      Objective:     SPEECH PRODUCTION   Phoneme Production: wfl   Voice Quality: wfl   Voice Production: wfl   Speech Rate: wfl   Loudness: wfl   Respiration: wfl   Speech Intelligibility  Known Context: Greater that 90%  Unknown Context: Greater that 90%    AUDITORY COMPREHENSION  Identification:   Body parts: wfl   Objects: wfl  Following Directions:   1-Step: wfl   2-Step: wfl  Yes/No Questions:   Biographical: wfl   Environmental: wfl    VERBAL EXPRESSION  Automatic Speech:   Functional needs: wfl   Days of the week: wfl    Confrontation Naming   Body Parts: wfl   Objects: wfl  Wh- Questions:   Object name: [facility]   Object function: wfl    COGNITION  Orientation:   Person: [facility]   Place: wfl  Attention:   Focused: wfl   Sustained: wfl    Memory:   Immediate: wfl   Delayed: wfl   Short Term: wfl  Problem Solving   Functional simple: wfl   Functional complex: wfl  Organization:   Convergent thinking: wfl   Divergent thinking: wfl  Executive Function:   Attention to detail: wfl   Awareness: wfl    Assessment:     Pt presents with functional speech, language, cognitive linguistic note to be at baseline. No skilled SLP intervention is warranted at this time.     Goals:   Multidisciplinary  Problems     SLP Goals     Not on file                Plan:     Patient to be seen:      Plan of Care expires:     Plan of Care reviewed with:  patient   SLP Follow-Up:  No       Time Tracking:     SLP Treatment Date:    7/3/22  Speech Start Time:   1230  Speech Stop Time:      1300  Speech Total Time (min):       Billable minutes:  Evaluation of Speech Sound Production with Comprehension and Expression, 20 07/03/2022

## 2022-07-03 NOTE — PT/OT/SLP EVAL
Physical Therapy Evaluation    Patient Name:  Ayaz Huggins   MRN:  [id number]    Recommendations:     Discharge Recommendations:  rehabilitation facility   Discharge Equipment Recommendations: none   Barriers to discharge: severity of deficits    Assessment:     Ayaz Huggins is a 77 y.o. male admitted with a medical diagnosis of CVA s/p tPA.  He presents with the following impairments/functional limitations:  weakness, impaired endurance, impaired functional mobilty, gait instability, impaired balance, decreased coordination, decreased lower extremity function, decreased upper extremity function, impaired coordination. Pt will required rehab services upon dc to maximize functional potential.    Rehab Prognosis: Good; patient would benefit from acute skilled PT services to address these deficits and reach maximum level of function.    Recent Surgery: * No surgery found *      Plan:     During this hospitalization, patient to be seen daily to BID to address the identified rehab impairments via gait training, therapeutic activities, therapeutic exercises, neuromuscular re-education and progress toward the following goals:    · Plan of Care Expires:  08/02/22    Subjective     Chief Complaint: none  Patient/Family Comments/goals: to walk again  Pain/Comfort:  · Pain Rating 1: 0/10    Patients cultural, spiritual, Mosque conflicts given the current situation: no    Living Environment:  Pt lives w/ his wife in a SL home, no steps to enter.   Prior to admission, patients level of function was independent.  Equipment used at home: none.  DME owned (not currently used): none.  Upon discharge, patient will have assistance from wife.    Objective:     Communicated with RN prior to session.  Patient found HOB elevated with peripheral IV, SCD, telemetry  upon PT entry to room.    General Precautions: Standard, fall (BP <180/105mmHg)   Orthopedic Precautions:N/A   Braces: N/A  Respiratory Status: Room air     BP of  174/64mmHg     Exams:  · Cognitive Exam:  Patient is oriented to Person, Place, Time and Situation  · RLE Strength: Deficits: 1/5 hip flexion, 0/5 for knee and ankle  · LLE Strength: WFL    Functional Mobility:  · Bed Mobility:     · Supine to Sit: moderate assistance  · Sit to Supine: moderate assistance  · Transfers:     · Sit to Stand:  maximal assistance with rolling walker  · Gait: pt demo'd pre-gt side step/pivot toward HOB w/ use of RW, modA for balance, and PT blocking at R knee to prevent buckling.   · Balance: good sitting balance, poor+ standing balance w/ use of RW    Therapeutic Activities and Exercises:   Pt instructed to perform glut sets and bridges in the bed as tolerated for 10 reps 3x/day; demo'karma understanding.     AM-PAC 6 CLICK MOBILITY  Total Score:12     Patient left HOB elevated with all lines intact, call button in reach and RN notified.    GOALS:   Multidisciplinary Problems     Physical Therapy Goals        Problem: Physical Therapy    Goal Priority Disciplines Outcome Goal Variances Interventions   Physical Therapy Goal     PT, PT/OT Ongoing, Progressing     Description: Goals to be met by: 2022     Patient will increase functional independence with mobility by performin. Supine to sit with Contact Guard Assistance  2. Sit to supine with Contact Guard Assistance  3. Sit to stand transfer with Contact Guard Assistance  4. Gait  x 150 feet with Minimal Assistance using Rolling Walker vs LRAD.                      History:     Past Medical History:   Diagnosis Date    Diabetes mellitus     Hypertension     Mixed hyperlipidemia        Past Surgical History:   Procedure Laterality Date    CARDIAC SURGERY         Time Tracking:     PT Received On: 22  PT Start Time: 1102     PT Stop Time: 1118  PT Total Time (min): 16 min     Billable Minutes: Evaluation , moderate complexity      2022

## 2022-07-03 NOTE — PROGRESS NOTES
Subjective:       Patient ID: Ayaz Huggins is a 77 y.o. male.    Chief Complaint: Cerebrovascular Accident (Right leg paralysis that started 30 minutes PTA. Activated as a code FAST. Dr. Mccarthy at bedside on arrival.)    HPI no improvement in RLE  Otherwise, he is doing fine        Vitals:    07/03/22 1439   BP:    Pulse: 96   Resp: 17   Temp:        Review of Systems    The remainder of the 14 system ROS is noncontributory or negative unless mentioned/reviewed above.    Reviewed mri images with pt; mostly LACA, some small RACA and mild hemorrhage    Was on asa at home  Instructed nursing to hold blood thinners    He does endorse periodic palpitations, not so much here but at home  Objective:      Physical Exam  RLE0/5  Otherwise...  Mental Status: Alert and oriented x3. Language is fluent with good comprehension.    Cranial Nerve: Pupils are equal, round, and reactive to light. Visual fields are intact to confrontation. Normal fundi. Ocular movements are intact. Face is symmetric at rest and with activation with intact sensation throughout. Hearing intact to finger rub bilaterally. Muscles of tongue and palate activate symmetrically. No dysarthria. Strength is full in sternocleidomastoid and trapezius bilaterally.    Motor: Muscle bulk and tone are normal. Strength is 5/5 in all four extremities both proximally and distally. Intact fine motor movements bilaterally. There is no pronator drift or satelliting on arm roll.    Sensory: Sensation is intact to light touch, pinprick, vibration, and proprioception throughout. Romberg is negative.    Reflexes: 2+ and symmetric at the biceps, triceps, brachioradialis, patella, and Achilles bilaterally. Plantar response is flexor bilaterally.    Coordination: No dysmetria on finger-nose-finger or heel-knee-shin. Normal rapid alternating movements. Fast finger tapping with normal amplitude and speed.    Gait: Narrow based with normal stride length and good arm swing  bilaterally. Able to walk on heels, toes, and in tandem.    Assessment:       Problem List Items Addressed This Visit    None     Visit Diagnoses     Cerebrovascular accident (CVA), unspecified mechanism    -  Primary    Stroke        Relevant Orders    ECG 12 lead (Completed)    Weakness of right upper extremity        Weakness of right lower extremity        CVA (cerebral vascular accident)        Relevant Orders    Echo Saline Bubble? Yes (Completed)          Plan:       Bilateral stroke  Likely PAFib  Hold thinners for 7 days  Will need OAC  Will need inpatient rehab  Signing out to oncoming neurologist for tomorrow  The ICH elevates the level of complexity/acuity and complicates the management, thus necessitating the higher LOS

## 2022-07-03 NOTE — PROGRESS NOTES
[facility] - [facility] ICU  Pulmonary Critical Care Note    Patient Name: Ayaz Huggins  MRN: [id number]  Admission Date: 7/2/2022  Hospital Length of Stay: 1 days  Code Status: Full Code  Attending Provider: Kash Woodard MD  Primary Care Provider: Angel Parham Jr, MD     Subjective:     HPI:   Mr. Huggins is a 78 y/o male with a PMH of Type II DM, diabetic neuropathy, CAD s/p CABG x2 (2007), and HTN who presents to [facility] ED with complaints of right sided upper and lower extremity weakness. Patient's story is unclear upon the time of last known normal. He states he woke up this morning with a cramp in the RLE and noticed the right sided weakness. He states he went to bed at 3:00am this morning and had no weakness at that time. Patient had initially told the ED that his symptoms began after 7:30am and his CT head was negative for bleed. He then received tPA. Patient is being admitted to the ICU for post tPA monitoring and treatment.       Hospital Course/Significant events:      24 Hour Interval History:  No acute events overnight. Patient is showing ability to movement of his right upper extremity but lower extremity shows no movement. Has no complains today.    Past Medical History:   Diagnosis Date    Diabetes mellitus     Hypertension     Mixed hyperlipidemia        Social History     Socioeconomic History    Marital status:            Objective:     Current Outpatient Medications   Medication Instructions    aspirin (ECOTRIN) 81 mg, Oral    lisinopriL (PRINIVIL,ZESTRIL) 5 mg, Oral, Daily    metFORMIN (GLUCOPHAGE) 1,500 mg, Oral    metoprolol succinate (TOPROL-XL) 50 mg, Oral    rosuvastatin (CRESTOR) 10 mg, Oral       Current Inpatient Medications   sodium chloride 0.9%  50 mL Intravenous Once    sodium chloride 0.9%  50 mL Intravenous Once    atorvastatin  80 mg Oral QHS    polyethylene glycol  17 g Oral Daily    senna-docusate 8.6-50 mg  1 tablet Oral Daily            Intake/Output Summary (Last 24 hours) at 7/3/2022 1148  Last data filed at 7/3/2022 0400  Gross per 24 hour   Intake --   Output 1500 ml   Net -1500 ml       Review of Systems   Constitutional: Negative for chills and fever.   Respiratory: Negative for shortness of breath.    Cardiovascular: Negative for chest pain.   Gastrointestinal: Negative for nausea and vomiting.   Musculoskeletal: Negative for back pain.   Neurological: Negative for headaches.        Vital Signs (Most Recent):  Temp: 98.2 °F (36.8 °C) (07/03/22 1139)  Pulse: 69 (07/03/22 1139)  Resp: 20 (07/03/22 1141)  BP: (!) 174/94 (07/03/22 1114)  SpO2: 98 % (07/03/22 1141)  Body mass index is 30.18 kg/m².  Weight: 98.1 kg (216 lb 4.3 oz) Vital Signs (24h Range):  Temp:  [97.7 °F (36.5 °C)-98.4 °F (36.9 °C)] 98.2 °F (36.8 °C)  Pulse:  [58-92] 69  Resp:  [13-28] 20  SpO2:  [94 %-99 %] 98 %  BP: (154-194)/() 174/94     Physical Exam  Vitals and nursing note reviewed.   Constitutional:       Appearance: He is not ill-appearing.   HENT:      Head: Normocephalic and atraumatic.      Nose: Nose normal. No congestion.   Eyes:      Extraocular Movements: Extraocular movements intact.      Pupils: Pupils are equal, round, and reactive to light.   Cardiovascular:      Rate and Rhythm: Normal rate and regular rhythm.      Pulses: Normal pulses.      Heart sounds: No murmur heard.  Pulmonary:      Effort: Pulmonary effort is normal. No respiratory distress.      Breath sounds: Normal breath sounds. No wheezing.   Abdominal:      General: Abdomen is flat. There is no distension.      Palpations: Abdomen is soft.      Tenderness: There is no abdominal tenderness.   Musculoskeletal:         General: No tenderness. Normal range of motion.      Cervical back: Normal range of motion and neck supple. No tenderness.   Skin:     General: Skin is warm and dry.      Capillary Refill: Capillary refill takes 2 to 3 seconds.      Findings: No erythema.    Neurological:      Mental Status: He is alert.      Comments: CN I-XII intact. No deficits to the left side. Able to spontaneously move R upper extremity but not to lower extremity. Able to withdrawal to painful stimuli of R lower extremity.           Mechanical ventilation support:       Lines/Drains/Airways     Peripheral Intravenous Line  Duration                Peripheral IV - Single Lumen 07/02/22 0923 18 G Left Antecubital 1 day         Peripheral IV - Single Lumen 07/02/22 0923 20 G Left Wrist 1 day                Significant Labs:    Lab Results   Component Value Date    WBC 11.0 07/03/2022    HGB 12.6 (L) 07/03/2022    HCT 37.2 (L) 07/03/2022    MCV 87.1 07/03/2022     07/03/2022         BMP  Lab Results   Component Value Date     07/03/2022    K 3.8 07/03/2022    CO2 22 (L) 07/03/2022    BUN 7.4 (L) 07/03/2022    CREATININE 0.69 (L) 07/03/2022    CALCIUM 9.7 07/03/2022    EGFRNONAA >60 07/03/2022       ABG  Recent Labs   Lab 07/02/22  0856   PH 7.40   PO2 90   PCO2 36   HCO3 22.3           Significant Imaging:  I have reviewed all pertinent imaging within the past 24 hours.        Assessment/Plan:     Assessment  1. CVA s/p tPA  2. Type II DM  3. HTN  4. CAD s/p CABG x2 (2007)  5. Normocytic Anemia         Plan  1. MRI Brain will be done later today. Neurology is on board, appreciate their recommendations.  2. Will resume normal blood pressure goals now that he is 24 hours past tpa administration  3. Pending SLP assessment for swallow test. If passed, will re-start home meds. Will also start high-intensity statin.  4. Work-up for stroke, including echo and CTA negative at this point.  5. PT/OT.  6. Can likely be downgraded after MRI.    DVT Prophylaxis: SCDs  GI Prophylaxis: None     Greater than 30 minutes of critical care was time spent personally by me on the following activities: development of treatment plan with patient or surrogate and bedside caregivers, discussions with consultants,  evaluation of patient's response to treatment, examination of patient, ordering and performing treatments and interventions, ordering and review of laboratory studies, ordering and review of radiographic studies, pulse oximetry, re-evaluation of patient's condition.  This critical care time did not overlap with that of any other provider or involve time for any procedures.     Zackary Reynolds MD, PGY-III  Pulmonary Critical Care Medicine  [facility] - [facility] ICU

## 2022-07-04 PROBLEM — I63.9 STROKE: Status: ACTIVE | Noted: 2022-07-04

## 2022-07-04 LAB
ALBUMIN SERPL-MCNC: 3.9 GM/DL (ref 3.4–4.8)
ALBUMIN/GLOB SERPL: 1.1 RATIO (ref 1.1–2)
ALP SERPL-CCNC: 84 UNIT/L (ref 40–150)
ALT SERPL-CCNC: 21 UNIT/L (ref 0–55)
APTT PPP: 32.1 SECONDS (ref 23.2–33.7)
AST SERPL-CCNC: 17 UNIT/L (ref 5–34)
BASOPHILS # BLD AUTO: 0.03 X10(3)/MCL (ref 0–0.2)
BASOPHILS NFR BLD AUTO: 0.2 %
BILIRUBIN DIRECT+TOT PNL SERPL-MCNC: 0.7 MG/DL
BUN SERPL-MCNC: 11.7 MG/DL (ref 8.4–25.7)
CALCIUM SERPL-MCNC: 10.1 MG/DL (ref 8.8–10)
CHLORIDE SERPL-SCNC: 102 MMOL/L (ref 98–107)
CO2 SERPL-SCNC: 25 MMOL/L (ref 23–31)
CREAT SERPL-MCNC: 0.77 MG/DL (ref 0.73–1.18)
EOSINOPHIL # BLD AUTO: 0.01 X10(3)/MCL (ref 0–0.9)
EOSINOPHIL NFR BLD AUTO: 0.1 %
ERYTHROCYTE [DISTWIDTH] IN BLOOD BY AUTOMATED COUNT: 13.3 % (ref 11.5–17)
GLOBULIN SER-MCNC: 3.7 GM/DL (ref 2.4–3.5)
GLUCOSE SERPL-MCNC: 216 MG/DL (ref 82–115)
HCT VFR BLD AUTO: 39.1 % (ref 42–52)
HGB BLD-MCNC: 13.3 GM/DL (ref 14–18)
IMM GRANULOCYTES # BLD AUTO: 0.05 X10(3)/MCL (ref 0–0.04)
IMM GRANULOCYTES NFR BLD AUTO: 0.3 %
INR BLD: 1.1 (ref 0–1.3)
LYMPHOCYTES # BLD AUTO: 0.84 X10(3)/MCL (ref 0.6–4.6)
LYMPHOCYTES NFR BLD AUTO: 5.4 %
MAGNESIUM SERPL-MCNC: 1.7 MG/DL (ref 1.6–2.6)
MCH RBC QN AUTO: 29 PG (ref 27–31)
MCHC RBC AUTO-ENTMCNC: 34 MG/DL (ref 33–36)
MCV RBC AUTO: 85.2 FL (ref 80–94)
MONOCYTES # BLD AUTO: 1.12 X10(3)/MCL (ref 0.1–1.3)
MONOCYTES NFR BLD AUTO: 7.2 %
NEUTROPHILS # BLD AUTO: 13.5 X10(3)/MCL (ref 2.1–9.2)
NEUTROPHILS NFR BLD AUTO: 86.8 %
NRBC BLD AUTO-RTO: 0 %
PHOSPHATE SERPL-MCNC: 3.9 MG/DL (ref 2.3–4.7)
PLATELET # BLD AUTO: 251 X10(3)/MCL (ref 130–400)
PMV BLD AUTO: 10.7 FL (ref 7.4–10.4)
POCT GLUCOSE: 147 MG/DL (ref 70–110)
POCT GLUCOSE: 199 MG/DL (ref 70–110)
POCT GLUCOSE: 201 MG/DL (ref 70–110)
POTASSIUM SERPL-SCNC: 3.8 MMOL/L (ref 3.5–5.1)
PROT SERPL-MCNC: 7.6 GM/DL (ref 5.8–7.6)
PROTHROMBIN TIME: 14.1 SECONDS (ref 12.5–14.5)
RBC # BLD AUTO: 4.59 X10(6)/MCL (ref 4.7–6.1)
SODIUM SERPL-SCNC: 138 MMOL/L (ref 136–145)
WBC # SPEC AUTO: 15.5 X10(3)/MCL (ref 4.5–11.5)

## 2022-07-04 PROCEDURE — 97167 OT EVAL HIGH COMPLEX 60 MIN: CPT

## 2022-07-04 PROCEDURE — 25000003 PHARM REV CODE 250: Performed by: STUDENT IN AN ORGANIZED HEALTH CARE EDUCATION/TRAINING PROGRAM

## 2022-07-04 PROCEDURE — 85730 THROMBOPLASTIN TIME PARTIAL: CPT | Performed by: STUDENT IN AN ORGANIZED HEALTH CARE EDUCATION/TRAINING PROGRAM

## 2022-07-04 PROCEDURE — 99233 SBSQ HOSP IP/OBS HIGH 50: CPT | Mod: ,,, | Performed by: PSYCHIATRY & NEUROLOGY

## 2022-07-04 PROCEDURE — 63600175 PHARM REV CODE 636 W HCPCS: Performed by: STUDENT IN AN ORGANIZED HEALTH CARE EDUCATION/TRAINING PROGRAM

## 2022-07-04 PROCEDURE — 84100 ASSAY OF PHOSPHORUS: CPT | Performed by: STUDENT IN AN ORGANIZED HEALTH CARE EDUCATION/TRAINING PROGRAM

## 2022-07-04 PROCEDURE — 80053 COMPREHEN METABOLIC PANEL: CPT | Performed by: STUDENT IN AN ORGANIZED HEALTH CARE EDUCATION/TRAINING PROGRAM

## 2022-07-04 PROCEDURE — 83735 ASSAY OF MAGNESIUM: CPT | Performed by: STUDENT IN AN ORGANIZED HEALTH CARE EDUCATION/TRAINING PROGRAM

## 2022-07-04 PROCEDURE — 85610 PROTHROMBIN TIME: CPT | Performed by: STUDENT IN AN ORGANIZED HEALTH CARE EDUCATION/TRAINING PROGRAM

## 2022-07-04 PROCEDURE — 20000000 HC ICU ROOM

## 2022-07-04 PROCEDURE — 25000003 PHARM REV CODE 250: Performed by: NURSE PRACTITIONER

## 2022-07-04 PROCEDURE — 85025 COMPLETE CBC W/AUTO DIFF WBC: CPT | Performed by: STUDENT IN AN ORGANIZED HEALTH CARE EDUCATION/TRAINING PROGRAM

## 2022-07-04 PROCEDURE — 99233 PR SUBSEQUENT HOSPITAL CARE,LEVL III: ICD-10-PCS | Mod: ,,, | Performed by: PSYCHIATRY & NEUROLOGY

## 2022-07-04 PROCEDURE — 63600175 PHARM REV CODE 636 W HCPCS

## 2022-07-04 PROCEDURE — 36415 COLL VENOUS BLD VENIPUNCTURE: CPT | Performed by: STUDENT IN AN ORGANIZED HEALTH CARE EDUCATION/TRAINING PROGRAM

## 2022-07-04 RX ORDER — LISINOPRIL 10 MG/1
10 TABLET ORAL DAILY
Status: DISCONTINUED | OUTPATIENT
Start: 2022-07-05 | End: 2022-07-05

## 2022-07-04 RX ORDER — ONDANSETRON 2 MG/ML
INJECTION INTRAMUSCULAR; INTRAVENOUS
Status: COMPLETED
Start: 2022-07-04 | End: 2022-07-04

## 2022-07-04 RX ORDER — ASPIRIN 325 MG
325 TABLET ORAL DAILY
Status: DISCONTINUED | OUTPATIENT
Start: 2022-07-04 | End: 2022-07-12 | Stop reason: HOSPADM

## 2022-07-04 RX ORDER — PROMETHAZINE HYDROCHLORIDE 25 MG/ML
25 INJECTION, SOLUTION INTRAMUSCULAR; INTRAVENOUS EVERY 6 HOURS PRN
Status: DISCONTINUED | OUTPATIENT
Start: 2022-07-04 | End: 2022-07-12 | Stop reason: HOSPADM

## 2022-07-04 RX ORDER — ONDANSETRON 2 MG/ML
4 INJECTION INTRAMUSCULAR; INTRAVENOUS EVERY 6 HOURS PRN
Status: DISCONTINUED | OUTPATIENT
Start: 2022-07-04 | End: 2022-07-12 | Stop reason: HOSPADM

## 2022-07-04 RX ORDER — SODIUM CHLORIDE, SODIUM LACTATE, POTASSIUM CHLORIDE, CALCIUM CHLORIDE 600; 310; 30; 20 MG/100ML; MG/100ML; MG/100ML; MG/100ML
INJECTION, SOLUTION INTRAVENOUS CONTINUOUS
Status: DISCONTINUED | OUTPATIENT
Start: 2022-07-04 | End: 2022-07-11

## 2022-07-04 RX ADMIN — ASPIRIN 325 MG ORAL TABLET 325 MG: 325 PILL ORAL at 12:07

## 2022-07-04 RX ADMIN — INSULIN ASPART 2 UNITS: 100 INJECTION, SOLUTION INTRAVENOUS; SUBCUTANEOUS at 06:07

## 2022-07-04 RX ADMIN — ONDANSETRON 4 MG: 2 INJECTION INTRAMUSCULAR; INTRAVENOUS at 06:07

## 2022-07-04 RX ADMIN — LISINOPRIL 5 MG: 5 TABLET ORAL at 09:07

## 2022-07-04 RX ADMIN — SENNOSIDES AND DOCUSATE SODIUM 1 TABLET: 50; 8.6 TABLET ORAL at 09:07

## 2022-07-04 RX ADMIN — METOPROLOL SUCCINATE 50 MG: 50 TABLET, FILM COATED, EXTENDED RELEASE ORAL at 09:07

## 2022-07-04 RX ADMIN — SODIUM CHLORIDE, POTASSIUM CHLORIDE, SODIUM LACTATE AND CALCIUM CHLORIDE: 600; 310; 30; 20 INJECTION, SOLUTION INTRAVENOUS at 12:07

## 2022-07-04 RX ADMIN — ATORVASTATIN CALCIUM 80 MG: 40 TABLET, FILM COATED ORAL at 08:07

## 2022-07-04 RX ADMIN — POLYETHYLENE GLYCOL 3350 17 G: 17 POWDER, FOR SOLUTION ORAL at 09:07

## 2022-07-04 NOTE — HOSPITAL COURSE
Stroke workup  -CTh: no acute intracranial abnormalities  -CTA h/n: moderate left and mild right carotid bulb stenoses  -MRI brain: small acute infarcts supratentorially, the largest is a left posterior frontal 3cm cortical based infarct with petechial hemorrhage  -ECHO: EF 60-65%, bubble study negative, mild LA enlargement  -LDL: 48  -A1c: 5.9  -TSH: 1.4067  -reported compliance with ASA 81mg daily and Rosuvastatin 10mg daily at home

## 2022-07-04 NOTE — SUBJECTIVE & OBJECTIVE
Subjective:     Interval History: Sitting up in bed.  No new issues overnight.  Downgraded from ICU status, however, still boarding in ICU.    Current Facility-Administered Medications   Medication Dose Route Frequency Provider Last Rate Last Admin    0.9%  NaCl infusion  50 mL Intravenous Once Pa Mccarthy MD        0.9%  NaCl infusion  50 mL Intravenous Once Pa Mccarthy MD        aspirin tablet 325 mg  325 mg Oral Daily Dian Nevarez, FNP        atorvastatin tablet 80 mg  80 mg Oral QHS Kash Olivares, DO   80 mg at 07/03/22 2025    bisacodyL suppository 10 mg  10 mg Rectal Daily PRN Zackary Reynolds MD        dextrose 10% bolus 125 mL  12.5 g Intravenous PRN Kash Olivares, DO        dextrose 10% bolus 250 mL  25 g Intravenous PRN Kash Olivares, DO        dextrose 50% injection 12.5 g  12.5 g Intravenous PRN Kash Olivares, DO        glucagon (human recombinant) injection 1 mg  1 mg Intramuscular PRN Kash Olivares, DO        hydrALAZINE injection 10 mg  10 mg Intravenous Q6H PRN Kash Olivares, DO   10 mg at 07/03/22 2025    insulin aspart U-100 injection 0-5 Units  0-5 Units Subcutaneous Q6H PRN Kash Olivares, DO   2 Units at 07/04/22 0642    labetaloL injection 10 mg  10 mg Intravenous Q15 Min PRN Pa Mccarthy MD   10 mg at 07/03/22 2311    lactated ringers infusion   Intravenous Continuous Nikko Murphy MD 75 mL/hr at 07/04/22 1230 New Bag at 07/04/22 1230    [START ON 7/5/2022] lisinopriL tablet 10 mg  10 mg Oral Daily Nikko Murphy MD        metoprolol succinate (TOPROL-XL) 24 hr tablet 50 mg  50 mg Oral Daily Nikko Murphy MD   50 mg at 07/04/22 0914    ondansetron injection 4 mg  4 mg Intravenous Q6H PRN Nikko Murphy MD        polyethylene glycol packet 17 g  17 g Oral Daily Zackary Reynolds MD   17 g at 07/04/22 0914    promethazine injection 25 mg  25 mg Intramuscular Q6H PRN Nikko Murphy MD        senna-docusate 8.6-50 mg per tablet 1 tablet  1 tablet Oral Daily Zackary ARMAS  MD Rodolfo   1 tablet at 07/04/22 0914    sodium chloride 0.9% flush 10 mL  10 mL Intravenous PRN Kash Olivares DO           Review of Systems   Neurological:  Positive for weakness.   All other systems reviewed and are negative.    Objective:     Vital Signs (Most Recent):  Temp: 98.2 °F (36.8 °C) (07/04/22 0800)  Pulse: 67 (07/04/22 1015)  Resp: 18 (07/04/22 1015)  BP: (!) 158/71 (07/04/22 1000)  SpO2: (!) 93 % (07/04/22 1015)   Vital Signs (24h Range):  Temp:  [98.1 °F (36.7 °C)-98.2 °F (36.8 °C)] 98.2 °F (36.8 °C)  Pulse:  [64-96] 67  Resp:  [14-26] 18  SpO2:  [89 %-98 %] 93 %  BP: (143-213)/() 158/71     Weight: 98.1 kg (216 lb 4.3 oz)  Body mass index is 30.18 kg/m².    Physical Exam  Constitutional:       General: He is awake. He is not in acute distress.     Appearance: Normal appearance. He is not ill-appearing.   Eyes:      General: Vision grossly intact.      Extraocular Movements: Extraocular movements intact.      Pupils: Pupils are equal, round, and reactive to light.   Cardiovascular:      Rate and Rhythm: Normal rate and regular rhythm.      Pulses: Normal pulses.   Pulmonary:      Effort: Pulmonary effort is normal.   Skin:     General: Skin is warm and dry.   Neurological:      Mental Status: He is alert.      Comments:   Mental Status: Alert, oriented x 4, follows commands reliably  Language/Speech: naming and repetition intact; speech clear, fluent, coherent  Cranial Nerves:  -CN II, III: PERR, no visual field deficit, ptosis absent  -CN III, IV, VI: EOMI, no gaze preference, no nystagmus  -CN V: facial tactile sensation intact symmetrical  -CN VII: right facial droop  Motor: RUE 3/5, RLE 0/5 ... LUE 5/5, LLE 5/5  Sensory: normal to tactile stimulation  Cerebellar: no tremor or dysmetria  Memory: normal thought process intact  Gait: not observed     Psychiatric:         Mood and Affect: Mood normal.         Behavior: Behavior normal.     Significant Labs: BMP:   Recent Labs   Lab  07/03/22 0228 07/04/22  0203    138   K 3.8 3.8   CO2 22* 25   BUN 7.4* 11.7   CREATININE 0.69* 0.77   CALCIUM 9.7 10.1*   MG 1.80 1.70     CBC:   Recent Labs   Lab 07/03/22 0228 07/04/22  0203   WBC 11.0 15.5*   HGB 12.6* 13.3*   HCT 37.2* 39.1*    251       Significant Imaging: I have reviewed all pertinent imaging results/findings within the past 24 hours.

## 2022-07-04 NOTE — ASSESSMENT & PLAN NOTE
Start ASA 325mg daily  Continue Atorvastatin 80mg daily  Normalize blood pressure    Further recommendations may follow by MD.          Antithrombotics for secondary stroke prevention: Antiplatelets: Aspirin: 325 mg daily    Statins for secondary stroke prevention and hyperlipidemia, if present: Statins: Atorvastatin- 80 mg daily    Aggressive risk factor modification: HTN, DM, HLD     Rehab efforts: The patient has been evaluated by a stroke team provider and the therapy needs have been fully considered based off the presenting complaints and exam findings. The following therapy evaluations are needed: PM&R evaluate for appropriate placement    Diagnostics ordered/pending: MRI head without contrast to assess brain parenchyma    VTE prophylaxis: Mechanical prophylaxis: Place SCDs    BP parameters: Infarct: normalize blood pressure

## 2022-07-04 NOTE — PROGRESS NOTES
[facility] Progress Note        Chief Complaint: Inpatient Follow-up for     Subjective:  Mr. Huggins is a 78 y/o male with a PMH of Type II DM, diabetic neuropathy, CAD s/p CABG x2 (2007), and HTN who presented to [facility] ED  On 7/2/22 with complaints of right sided upper and lower extremity weakness. Patient's story is unclear upon the time of last known normal. He states he woke up  with a cramp in the RLE and noticed the right sided weakness.The patient was admitted to the ICU with an ischemic CVA and administered tPA; he has had some improvement in his right-sided weakness although he is unable to purposefully move his right lower extremity.  Neurology has seen and evaluated the patient and has deemed this patient stable for transfer to the floor.  This patient was seen and evaluated in the intensive care unit.    Seen and examined the patient febrile vital stable hemodynamically stable.  Patient is doing well.  Weakness in the right side persists working with physical therapy.  Only complaint is nausea and vomiting does not have any abdominal pain abdominal exam is completely with normal and he is passing flatus.    Objective/physical exam:  General: In no acute distress, AAOx3   Chest: Clear to auscultation bilaterally, non-labored   Heart: RRR, +S1, S2, no appreciable murmur  Abdomen: Soft, nontender, BS +  MSK: Warm, no lower extremity edema, no clubbing or cyanosis  Neurologic:  Cranial nerve II-XII intact, Strength 5/5 in all 4 extremities    VITAL SIGNS: 24 HRS MIN & MAX LAST   Temp  Min: 98.1 °F (36.7 °C)  Max: 98.2 °F (36.8 °C) 98.2 °F (36.8 °C)   BP  Min: 143/64  Max: 213/101 (!) 158/71   Pulse  Min: 64  Max: 96  67   Resp  Min: 14  Max: 26 18   SpO2  Min: 89 %  Max: 98 % (!) 93 %       Labs, Microbiology and Imaging were Reviewed.      Microbiology Results (last 7 days)     ** No results found for the last 168 hours. **             Medications   sodium  chloride 0.9%  50 mL Intravenous Once    sodium chloride 0.9%  50 mL Intravenous Once    atorvastatin  80 mg Oral QHS    [START ON 7/5/2022] lisinopriL  10 mg Oral Daily    metoprolol succinate  50 mg Oral Daily    polyethylene glycol  17 g Oral Daily    senna-docusate 8.6-50 mg  1 tablet Oral Daily        bisacodyL, dextrose 10%, dextrose 10%, dextrose 50%, glucagon (human recombinant), hydrALAZINE, insulin aspart U-100, labetalol, ondansetron, promethazine, sodium chloride 0.9%     Radiology:  MRI Brain W WO Contrast  Narrative: EXAMINATION:  MRI BRAIN W WO CONTRAST    CLINICAL HISTORY:  Stroke, follow up;;    TECHNIQUE:  Multiplanar, multisequence MR images of the brain were obtained WITH and WITHOUT the administration of intravenous gadolinium based contrast.    COMPARISON:  Head CT/CTA 07/02/2022    FINDINGS:  Scalp: No abnormalities.    Bone marrow: No signal abnormalities.    Brain sulci: Appropriate for patient's age.    Ventricles: Normal in size and configuration. No hydrocephalus.    Extra-axial spaces:    No masses or fluid collections.    Parenchyma:    Scattered foci of ischemia supratentorially (L>R), the largest is a left posterior frontal 3 x 2.1 cm focus, with additional subcentimeter bifrontal, biparietal, left occipitotemporal and left basal ganglia foci.    No additional acute vascular insults.    The largest infarct is associated with patchy magnetic susceptibility, remaining do not show magnetic susceptibility.    No mass or large volume hemorrhage.    Punctate left posterior frontal foci of enhancement is favored to relate to ischemia.    Otherwise no enhancing abnormalities.    Vessels: Normal flow voids in major arteries and veins.    Sellar/Suprasellar region: No abnormalities.    Craniocervical junction: No abnormalities.  Impression: 1. Small acute infarcts supratentorially, the largest is a left posterior frontal 3 cm cortical based infarct with petechial hemorrhage.  No mass  effect or shift.  Discussed with Dr. Duncan.  2. Otherwise no acute intracranial abnormalities.    Electronically signed by: Abner Moore  Date:    07/03/2022  Time:    14:09          Assessment/Plan:  Acute left hemispheric ischemic CVA, S/P TPA  -monitor neuro status closely  - q4 hr neurochecks  -ST/OT/PT  - zofran and phenergan for nausea.      Hypertension  -BP parameters < 180/105  -prn antihypertensives  - continue metoprolol 50 mg and continue lisinopril 10 mg.      DM Type II  -diabetic diet   -CBG's ac & hs     Hyperlipidemia  -cont. Statin     Anemia of chronic disease  -monitor CBC        All diagnosis and differential diagnosis have been reviewed; assessment and plan has been documented; I have personally reviewed the labs and test results that are presently available; I have reviewed the patients medication list; I have reviewed the consulting providers response and recommendations. I have reviewed or attempted to review medical records based upon their availability.       Nikko Murphy MD   07/04/2022

## 2022-07-04 NOTE — PLAN OF CARE
Problem: Occupational Therapy  Goal: Occupational Therapy Goal  Description: Goals to be met by: 7/18/22    Patient will increase functional independence with ADLs by performing:    UE Dressing with Setup Assistance.  LE Dressing with Contact Guard Assistance.  Toileting from bedside commode or toilet with Contact Guard Assistance for hygiene and clothing management.     Outcome: Ongoing, Progressing

## 2022-07-04 NOTE — PROGRESS NOTES
[facility]  Neurology  Progress Note    Patient Name: Ayaz Huggins  MRN: [id number]  Admission Date: 7/2/2022  Hospital Length of Stay: 2 days  Code Status: Full Code   Attending Provider: Nikko Murphy MD  Primary Care Physician: Angel Parham Jr, MD   Principal Problem:<principal problem not specified>    HPI:   Mr Huggins is a 77-year-old male with past medical history of DM, HTN, HLD, diabetic neuropathy, CAD s/p CABG, presented to ED on 7/2 with reports of right sided hemiplegia.  CTh showed no acute abnormalities.  He was given IV tPA and admitted to ICU for post tPA protocol.      Overview/Hospital Course:  Stroke workup  -CTh: no acute intracranial abnormalities  -CTA h/n: moderate left and mild right carotid bulb stenoses  -MRI brain: small acute infarcts supratentorially, the largest is a left posterior frontal 3cm cortical based infarct with petechial hemorrhage  -ECHO: EF 60-65%, bubble study negative, mild LA enlargement  -LDL: 48  -A1c: 5.9  -TSH: 1.4067  -reported compliance with ASA 81mg daily and Rosuvastatin 10mg daily at home          Subjective:     Interval History: Sitting up in bed.  No new issues overnight.  Downgraded from ICU status, however, still boarding in ICU.    Current Facility-Administered Medications   Medication Dose Route Frequency Provider Last Rate Last Admin    0.9%  NaCl infusion  50 mL Intravenous Once Pa Mccarthy MD        0.9%  NaCl infusion  50 mL Intravenous Once aP Mccarthy MD        aspirin tablet 325 mg  325 mg Oral Daily ARVIND Araujo        atorvastatin tablet 80 mg  80 mg Oral QHS Kash Olivares, DO   80 mg at 07/03/22 2025    bisacodyL suppository 10 mg  10 mg Rectal Daily PRN Zackary Reynolds MD        dextrose 10% bolus 125 mL  12.5 g Intravenous PRN Kash Olivares DO        dextrose 10% bolus 250 mL  25 g Intravenous PRN Kash Olivares DO        dextrose 50% injection 12.5 g  12.5 g Intravenous PRN  Kash Olivares,         glucagon (human recombinant) injection 1 mg  1 mg Intramuscular PRN Kash Olivares DO        hydrALAZINE injection 10 mg  10 mg Intravenous Q6H PRN Kash Olivares DO   10 mg at 07/03/22 2025    insulin aspart U-100 injection 0-5 Units  0-5 Units Subcutaneous Q6H PRN Kash Olivares, DO   2 Units at 07/04/22 0642    labetaloL injection 10 mg  10 mg Intravenous Q15 Min PRN Pa Mccarthy MD   10 mg at 07/03/22 2311    lactated ringers infusion   Intravenous Continuous Nikko Murphy MD 75 mL/hr at 07/04/22 1230 New Bag at 07/04/22 1230    [START ON 7/5/2022] lisinopriL tablet 10 mg  10 mg Oral Daily Nikko Murphy MD        metoprolol succinate (TOPROL-XL) 24 hr tablet 50 mg  50 mg Oral Daily Nikko Murphy MD   50 mg at 07/04/22 0914    ondansetron injection 4 mg  4 mg Intravenous Q6H PRN Nikko Murphy MD        polyethylene glycol packet 17 g  17 g Oral Daily Zackary Reynolds MD   17 g at 07/04/22 0914    promethazine injection 25 mg  25 mg Intramuscular Q6H PRN Nikko Murphy MD        senna-docusate 8.6-50 mg per tablet 1 tablet  1 tablet Oral Daily Zackary Reynolds MD   1 tablet at 07/04/22 0914    sodium chloride 0.9% flush 10 mL  10 mL Intravenous PRN Kash Olivares DO           Review of Systems   Neurological:  Positive for weakness.   All other systems reviewed and are negative.    Objective:     Vital Signs (Most Recent):  Temp: 98.2 °F (36.8 °C) (07/04/22 0800)  Pulse: 67 (07/04/22 1015)  Resp: 18 (07/04/22 1015)  BP: (!) 158/71 (07/04/22 1000)  SpO2: (!) 93 % (07/04/22 1015)   Vital Signs (24h Range):  Temp:  [98.1 °F (36.7 °C)-98.2 °F (36.8 °C)] 98.2 °F (36.8 °C)  Pulse:  [64-96] 67  Resp:  [14-26] 18  SpO2:  [89 %-98 %] 93 %  BP: (143-213)/() 158/71     Weight: 98.1 kg (216 lb 4.3 oz)  Body mass index is 30.18 kg/m².    Physical Exam  Constitutional:       General: He is awake. He is not in acute distress.     Appearance: Normal appearance. He is not  ill-appearing.   Eyes:      General: Vision grossly intact.      Extraocular Movements: Extraocular movements intact.      Pupils: Pupils are equal, round, and reactive to light.   Cardiovascular:      Rate and Rhythm: Normal rate and regular rhythm.      Pulses: Normal pulses.   Pulmonary:      Effort: Pulmonary effort is normal.   Skin:     General: Skin is warm and dry.   Neurological:      Mental Status: He is alert.      Comments:   Mental Status: Alert, oriented x 4, follows commands reliably  Language/Speech: naming and repetition intact; speech clear, fluent, coherent  Cranial Nerves:  -CN II, III: PERR, no visual field deficit, ptosis absent  -CN III, IV, VI: EOMI, no gaze preference, no nystagmus  -CN V: facial tactile sensation intact symmetrical  -CN VII: right facial droop  Motor: RUE 3/5, RLE 0/5 ... LUE 5/5, LLE 5/5  Sensory: normal to tactile stimulation  Cerebellar: no tremor or dysmetria  Memory: normal thought process intact  Gait: not observed     Psychiatric:         Mood and Affect: Mood normal.         Behavior: Behavior normal.     Significant Labs: BMP:   Recent Labs   Lab 07/03/22 0228 07/04/22  0203    138   K 3.8 3.8   CO2 22* 25   BUN 7.4* 11.7   CREATININE 0.69* 0.77   CALCIUM 9.7 10.1*   MG 1.80 1.70     CBC:   Recent Labs   Lab 07/03/22 0228 07/04/22  0203   WBC 11.0 15.5*   HGB 12.6* 13.3*   HCT 37.2* 39.1*    251       Significant Imaging: I have reviewed all pertinent imaging results/findings within the past 24 hours.    Assessment and Plan:     Stroke  Start ASA 325mg daily  Continue Atorvastatin 80mg daily  Normalize blood pressure    Further recommendations may follow by MD.          Antithrombotics for secondary stroke prevention: Antiplatelets: Aspirin: 325 mg daily    Statins for secondary stroke prevention and hyperlipidemia, if present: Statins: Atorvastatin- 80 mg daily    Aggressive risk factor modification: HTN, DM, HLD     Rehab efforts: The patient has  been evaluated by a stroke team provider and the therapy needs have been fully considered based off the presenting complaints and exam findings. The following therapy evaluations are needed: PM&R evaluate for appropriate placement    Diagnostics ordered/pending: MRI head without contrast to assess brain parenchyma    VTE prophylaxis: Mechanical prophylaxis: Place SCDs  VTE Risk Mitigation (From admission, onward)         Ordered     IP VTE HIGH RISK PATIENT  Once         07/02/22 1019     Place sequential compression device  Until discontinued         07/02/22 1019                BP parameters: Infarct: normalize blood pressure        ARVIND Araujo  Neurology  [facility]

## 2022-07-04 NOTE — HPI
Mr Huggins is a 77-year-old male with past medical history of DM, HTN, HLD, diabetic neuropathy, CAD s/p CABG, presented to ED on 7/2 with reports of right sided hemiplegia.  CTh showed no acute abnormalities.  He was given IV tPA and admitted to ICU for post tPA protocol.

## 2022-07-04 NOTE — PT/OT/SLP EVAL
Occupational Therapy   Evaluation    Name: Ayaz Huggins  MRN: [id number]  Admitting Diagnosis: CVA s/p tPA    Recommendations:     Discharge Recommendations: rehabilitation facility  Discharge Equipment Recommendations:  bedside commode, HW vs RW (pending progress), and possibly w/c (pending progress)    Assessment:     Ayaz Huggins is a 77 y.o. male with a medical diagnosis of CVA s/p tPA. Performance deficits affecting function: weakness, impaired endurance, impaired self care skills, impaired functional mobilty, impaired balance, decreased upper extremity function, decreased lower extremity function, decreased safety awareness, decreased ROM.      Rehab Prognosis: Good; patient would benefit from acute skilled OT services to address these deficits and reach maximum level of function.       Plan:     Patient to be seen daily to address the above listed problems via self-care/home management, therapeutic activities, therapeutic exercises, neuromuscular re-education  · Plan of Care Expires: 07/18/22  · Plan of Care Reviewed with: patient, spouse, daughter    Subjective     Chief Complaint: Pt reported slight nausea.  Patient/Family Comments/goals: Improve mobility of RLE.    Occupational Profile:  Living Environment: Lives with wife and 2 grandchildren (20 and 20 y/o) in 1-story house with 1 YUNIOR. Pt owns a walk-in shower with small threshold to enter and built-in bench.  Previous level of function: Independent with ADLs and med mgmt task, provided assist with remainder of IADLs, and pt was driving.  Assistance upon Discharge: Pt's wife.    Pain/Comfort:  · Pain Rating 1: 0/10    Patients cultural, spiritual, Yarsanism conflicts given the current situation: no    Objective:     Communicated with: RN prior to session.  Patient found lying supine with HOB elevated with peripheral IV, SCD, telemetry upon OT entry to room.    General Precautions: Standard, other (see comments) (BP < 180/105 mmHg)   Respiratory Status:  Room air   Vitals:   BP: 184/75 and 188/77 mmHg while lying supine. RN was notified, and cleared pt to sit EOB.  Pt's BP then measured 165/71 mmHg while seated EOB. RN was notified, and cleared pt to perform sit to stand.  HR: 70 and 82 bpm  O2 95% on RA    Occupational Performance:    Bed Mobility:    · Patient completed Supine to Sit with maximal assistance  · Patient completed Sit to Supine with maximal assistance   · Note: Pt required assist to lift RLE and with trunk during bed mobility tasks.    Functional Mobility/Transfers:  · Patient completed Sit <> Stand Transfer with maximal assistance  with  rolling walker     Cognitive/Visual Perceptual:  Cognitive/Psychosocial Skills:     -       Oriented to: Person, Place, Time and Situation   -       Follows Commands/attention:Follows one-step commands and Follows two-step commands  -       Communication: clear/fluent  -       Safety awareness/insight to disability: impaired     Physical Exam:  Balance:    -       Decreased sitting/standing balance. Pt required min A to assume/maintain upright seated posture 2/2 R trunk lean.  Sensation:    -       Intact LT sensation on BUEs. Pt reported chronic neuropathy in B feet.  Dominant hand:    -       R  Upper Extremity Range of Motion:     -       Right Upper Extremity: R elbow, wrist, and finger flex/ext WFL against gravity. Deficits noted in AROM of R shoulder flex/ext in gravity eliminated plane.  -       Left Upper Extremity: WNL  Upper Extremity Strength:    -       Right Upper Extremity: WFL except R shoulder flex/ext=2-/5  -       Left Upper Extremity: WNL    Patient left lying supine with HOB elevated with all lines intact, SCDs donned, call button in reach, pt's wife present, and pt's RUE positioned on pillow to increase pt's positioning and comfort.    GOALS:   Multidisciplinary Problems     Occupational Therapy Goals        Problem: Occupational Therapy    Goal Priority Disciplines Outcome Interventions    Occupational Therapy Goal     OT, PT/OT Ongoing, Progressing    Description: Goals to be met by: 7/18/22    Patient will increase functional independence with ADLs by performing:    UE Dressing with Setup Assistance.  LE Dressing with Contact Guard Assistance.  Toileting from bedside commode or toilet with Contact Guard Assistance for hygiene and clothing management.                      History:     Past Medical History:   Diagnosis Date    Diabetes mellitus     Hypertension     Mixed hyperlipidemia        Past Surgical History:   Procedure Laterality Date    CARDIAC SURGERY         Time Tracking:     OT Date of Treatment: 7/4/22   OT Start Time: 1258  OT Stop Time: 1332  OT Total Time (min): 34 min    Billable Minutes:Evaluation High complexity 34 mins    7/4/2022

## 2022-07-05 LAB
ALBUMIN SERPL-MCNC: 3.5 GM/DL (ref 3.4–4.8)
ALBUMIN/GLOB SERPL: 1 RATIO (ref 1.1–2)
ALP SERPL-CCNC: 72 UNIT/L (ref 40–150)
ALT SERPL-CCNC: 16 UNIT/L (ref 0–55)
APTT PPP: 31.1 SECONDS (ref 23.2–33.7)
AST SERPL-CCNC: 16 UNIT/L (ref 5–34)
BASOPHILS # BLD AUTO: 0.05 X10(3)/MCL (ref 0–0.2)
BASOPHILS NFR BLD AUTO: 0.4 %
BILIRUBIN DIRECT+TOT PNL SERPL-MCNC: 0.9 MG/DL
BUN SERPL-MCNC: 14.3 MG/DL (ref 8.4–25.7)
CALCIUM SERPL-MCNC: 9.8 MG/DL (ref 8.8–10)
CHLORIDE SERPL-SCNC: 104 MMOL/L (ref 98–107)
CO2 SERPL-SCNC: 26 MMOL/L (ref 23–31)
CREAT SERPL-MCNC: 0.73 MG/DL (ref 0.73–1.18)
EOSINOPHIL # BLD AUTO: 0.45 X10(3)/MCL (ref 0–0.9)
EOSINOPHIL NFR BLD AUTO: 3.4 %
ERYTHROCYTE [DISTWIDTH] IN BLOOD BY AUTOMATED COUNT: 13.4 % (ref 11.5–17)
GLOBULIN SER-MCNC: 3.4 GM/DL (ref 2.4–3.5)
GLUCOSE SERPL-MCNC: 148 MG/DL (ref 82–115)
HCT VFR BLD AUTO: 38.4 % (ref 42–52)
HGB BLD-MCNC: 12.6 GM/DL (ref 14–18)
IMM GRANULOCYTES # BLD AUTO: 0.04 X10(3)/MCL (ref 0–0.04)
IMM GRANULOCYTES NFR BLD AUTO: 0.3 %
INR BLD: 1.09 (ref 0–1.3)
LYMPHOCYTES # BLD AUTO: 2.38 X10(3)/MCL (ref 0.6–4.6)
LYMPHOCYTES NFR BLD AUTO: 18.1 %
MAGNESIUM SERPL-MCNC: 2 MG/DL (ref 1.6–2.6)
MCH RBC QN AUTO: 29.1 PG (ref 27–31)
MCHC RBC AUTO-ENTMCNC: 32.8 MG/DL (ref 33–36)
MCV RBC AUTO: 88.7 FL (ref 80–94)
MONOCYTES # BLD AUTO: 1.38 X10(3)/MCL (ref 0.1–1.3)
MONOCYTES NFR BLD AUTO: 10.5 %
NEUTROPHILS # BLD AUTO: 8.9 X10(3)/MCL (ref 2.1–9.2)
NEUTROPHILS NFR BLD AUTO: 67.3 %
NRBC BLD AUTO-RTO: 0 %
PHOSPHATE SERPL-MCNC: 3 MG/DL (ref 2.3–4.7)
PLATELET # BLD AUTO: 223 X10(3)/MCL (ref 130–400)
PMV BLD AUTO: 10.5 FL (ref 7.4–10.4)
POCT GLUCOSE: 154 MG/DL (ref 70–110)
POCT GLUCOSE: 157 MG/DL (ref 70–110)
POCT GLUCOSE: 207 MG/DL (ref 70–110)
POCT GLUCOSE: 215 MG/DL (ref 70–110)
POTASSIUM SERPL-SCNC: 3.6 MMOL/L (ref 3.5–5.1)
PROT SERPL-MCNC: 6.9 GM/DL (ref 5.8–7.6)
PROTHROMBIN TIME: 14 SECONDS (ref 12.5–14.5)
RBC # BLD AUTO: 4.33 X10(6)/MCL (ref 4.7–6.1)
SODIUM SERPL-SCNC: 139 MMOL/L (ref 136–145)
WBC # SPEC AUTO: 13.2 X10(3)/MCL (ref 4.5–11.5)

## 2022-07-05 PROCEDURE — 80053 COMPREHEN METABOLIC PANEL: CPT | Performed by: STUDENT IN AN ORGANIZED HEALTH CARE EDUCATION/TRAINING PROGRAM

## 2022-07-05 PROCEDURE — 85025 COMPLETE CBC W/AUTO DIFF WBC: CPT | Performed by: STUDENT IN AN ORGANIZED HEALTH CARE EDUCATION/TRAINING PROGRAM

## 2022-07-05 PROCEDURE — 97112 NEUROMUSCULAR REEDUCATION: CPT | Mod: CO

## 2022-07-05 PROCEDURE — 63600175 PHARM REV CODE 636 W HCPCS: Performed by: STUDENT IN AN ORGANIZED HEALTH CARE EDUCATION/TRAINING PROGRAM

## 2022-07-05 PROCEDURE — 97535 SELF CARE MNGMENT TRAINING: CPT | Mod: CO

## 2022-07-05 PROCEDURE — 97530 THERAPEUTIC ACTIVITIES: CPT

## 2022-07-05 PROCEDURE — 83735 ASSAY OF MAGNESIUM: CPT | Performed by: STUDENT IN AN ORGANIZED HEALTH CARE EDUCATION/TRAINING PROGRAM

## 2022-07-05 PROCEDURE — 85610 PROTHROMBIN TIME: CPT | Performed by: STUDENT IN AN ORGANIZED HEALTH CARE EDUCATION/TRAINING PROGRAM

## 2022-07-05 PROCEDURE — 25000003 PHARM REV CODE 250: Performed by: STUDENT IN AN ORGANIZED HEALTH CARE EDUCATION/TRAINING PROGRAM

## 2022-07-05 PROCEDURE — 99233 PR SUBSEQUENT HOSPITAL CARE,LEVL III: ICD-10-PCS | Mod: ,,, | Performed by: PSYCHIATRY & NEUROLOGY

## 2022-07-05 PROCEDURE — 84100 ASSAY OF PHOSPHORUS: CPT | Performed by: STUDENT IN AN ORGANIZED HEALTH CARE EDUCATION/TRAINING PROGRAM

## 2022-07-05 PROCEDURE — 36415 COLL VENOUS BLD VENIPUNCTURE: CPT | Performed by: STUDENT IN AN ORGANIZED HEALTH CARE EDUCATION/TRAINING PROGRAM

## 2022-07-05 PROCEDURE — 99233 SBSQ HOSP IP/OBS HIGH 50: CPT | Mod: ,,, | Performed by: PSYCHIATRY & NEUROLOGY

## 2022-07-05 PROCEDURE — 25000003 PHARM REV CODE 250: Performed by: NURSE PRACTITIONER

## 2022-07-05 PROCEDURE — 20000000 HC ICU ROOM

## 2022-07-05 PROCEDURE — 85730 THROMBOPLASTIN TIME PARTIAL: CPT | Performed by: STUDENT IN AN ORGANIZED HEALTH CARE EDUCATION/TRAINING PROGRAM

## 2022-07-05 RX ORDER — AMLODIPINE BESYLATE 5 MG/1
5 TABLET ORAL DAILY
Status: DISCONTINUED | OUTPATIENT
Start: 2022-07-05 | End: 2022-07-11

## 2022-07-05 RX ORDER — LISINOPRIL 20 MG/1
20 TABLET ORAL DAILY
Status: DISCONTINUED | OUTPATIENT
Start: 2022-07-06 | End: 2022-07-12 | Stop reason: HOSPADM

## 2022-07-05 RX ADMIN — SENNOSIDES AND DOCUSATE SODIUM 1 TABLET: 50; 8.6 TABLET ORAL at 08:07

## 2022-07-05 RX ADMIN — POLYETHYLENE GLYCOL 3350 17 G: 17 POWDER, FOR SOLUTION ORAL at 08:07

## 2022-07-05 RX ADMIN — ATORVASTATIN CALCIUM 80 MG: 40 TABLET, FILM COATED ORAL at 08:07

## 2022-07-05 RX ADMIN — ASPIRIN 325 MG ORAL TABLET 325 MG: 325 PILL ORAL at 08:07

## 2022-07-05 RX ADMIN — LISINOPRIL 10 MG: 10 TABLET ORAL at 08:07

## 2022-07-05 RX ADMIN — INSULIN ASPART 2 UNITS: 100 INJECTION, SOLUTION INTRAVENOUS; SUBCUTANEOUS at 06:07

## 2022-07-05 RX ADMIN — SODIUM CHLORIDE, POTASSIUM CHLORIDE, SODIUM LACTATE AND CALCIUM CHLORIDE: 600; 310; 30; 20 INJECTION, SOLUTION INTRAVENOUS at 01:07

## 2022-07-05 RX ADMIN — INSULIN ASPART 2 UNITS: 100 INJECTION, SOLUTION INTRAVENOUS; SUBCUTANEOUS at 09:07

## 2022-07-05 RX ADMIN — METOPROLOL SUCCINATE 50 MG: 50 TABLET, FILM COATED, EXTENDED RELEASE ORAL at 08:07

## 2022-07-05 RX ADMIN — AMLODIPINE BESYLATE 5 MG: 5 TABLET ORAL at 12:07

## 2022-07-05 NOTE — SUBJECTIVE & OBJECTIVE
Subjective:     Interval History: Sitting up in bed.  No new issues overnight. Still boarding in ICU.      Current Facility-Administered Medications   Medication Dose Route Frequency Provider Last Rate Last Admin    0.9%  NaCl infusion  50 mL Intravenous Once Pa Mccarthy MD        0.9%  NaCl infusion  50 mL Intravenous Once Pa Mccarthy MD        amLODIPine tablet 5 mg  5 mg Oral Daily Nikko Murphy MD   5 mg at 07/05/22 1253    aspirin tablet 325 mg  325 mg Oral Daily ARVIND Araujo   325 mg at 07/05/22 0831    atorvastatin tablet 80 mg  80 mg Oral QHS Kash Olivares, DO   80 mg at 07/04/22 2011    bisacodyL suppository 10 mg  10 mg Rectal Daily PRN Zackary Reynolds MD        dextrose 10% bolus 125 mL  12.5 g Intravenous PRN Kash Olivares, DO        dextrose 10% bolus 250 mL  25 g Intravenous PRN Kash Olivares, DO        dextrose 50% injection 12.5 g  12.5 g Intravenous PRN Kash Olivares, DO        glucagon (human recombinant) injection 1 mg  1 mg Intramuscular PRN Kash Olivares, DO        hydrALAZINE injection 10 mg  10 mg Intravenous Q6H PRN Kash Olivares, DO   10 mg at 07/03/22 2025    insulin aspart U-100 injection 0-5 Units  0-5 Units Subcutaneous Q6H PRN Kash Olivares, DO   2 Units at 07/05/22 0933    labetaloL injection 10 mg  10 mg Intravenous Q15 Min PRN Pa Mccarthy MD   10 mg at 07/03/22 2311    lactated ringers infusion   Intravenous Continuous Nikko Murphy MD 75 mL/hr at 07/05/22 1314 New Bag at 07/05/22 1314    [START ON 7/6/2022] lisinopriL tablet 20 mg  20 mg Oral Daily Nikko Murphy MD        metoprolol succinate (TOPROL-XL) 24 hr tablet 50 mg  50 mg Oral Daily Nikko Murphy MD   50 mg at 07/05/22 0831    ondansetron injection 4 mg  4 mg Intravenous Q6H PRN Nikko Murphy MD        polyethylene glycol packet 17 g  17 g Oral Daily Zackary Reynolds MD   17 g at 07/05/22 0831    promethazine injection 25 mg  25 mg Intramuscular Q6H PRN Nikko Murphy MD         senna-docusate 8.6-50 mg per tablet 1 tablet  1 tablet Oral Daily Zackary Reynolds MD   1 tablet at 07/05/22 0831    sodium chloride 0.9% flush 10 mL  10 mL Intravenous PRN Kash Olivares DO           Review of Systems   Neurological:  Positive for weakness.   All other systems reviewed and are negative.    Objective:     Vital Signs (Most Recent):  Temp: 98.1 °F (36.7 °C) (07/05/22 1600)  Pulse: 66 (07/05/22 1600)  Resp: (!) 22 (07/05/22 1600)  BP: (!) 157/68 (07/05/22 1600)  SpO2: 95 % (07/05/22 1600)   Vital Signs (24h Range):  Temp:  [97.5 °F (36.4 °C)-98.6 °F (37 °C)] 98.1 °F (36.7 °C)  Pulse:  [52-78] 66  Resp:  [15-26] 22  SpO2:  [91 %-98 %] 95 %  BP: (116-191)/() 157/68     Weight: 98.1 kg (216 lb 4.3 oz)  Body mass index is 30.18 kg/m².      Physical Exam  Constitutional:       General: He is awake. He is not in acute distress.     Appearance: Normal appearance. He is not ill-appearing.   Eyes:      General: Vision grossly intact.      Extraocular Movements: Extraocular movements intact.      Pupils: Pupils are equal, round, and reactive to light.   Cardiovascular:      Rate and Rhythm: Normal rate and regular rhythm.      Pulses: Normal pulses.   Pulmonary:      Effort: Pulmonary effort is normal.   Skin:     General: Skin is warm and dry.   Neurological:      Mental Status: He is alert.      Comments:   Mental Status: Alert, oriented x 4, follows commands reliably  Language/Speech: naming and repetition intact; speech clear, fluent, coherent  Cranial Nerves:  -CN II, III: PERR, no visual field deficit, ptosis absent  -CN III, IV, VI: EOMI, no gaze preference, no nystagmus  -CN VII: right facial droop  Motor: RUE 3/5, RLE 0/5 ... LUE 5/5, LLE 5/5  Sensory: normal to tactile stimulation  Cerebellar: no tremor or dysmetria  Memory: normal thought process intact  Gait: not observed  Psychiatric:         Mood and Affect: Mood normal.         Behavior: Behavior normal.      Significant Labs: BMP:    Recent Labs   Lab 07/04/22  0203 07/05/22  0124    139   K 3.8 3.6   CO2 25 26   BUN 11.7 14.3   CREATININE 0.77 0.73   CALCIUM 10.1* 9.8   MG 1.70 2.00     CBC:   Recent Labs   Lab 07/04/22  0203 07/05/22  0124   WBC 15.5* 13.2*   HGB 13.3* 12.6*   HCT 39.1* 38.4*    223       Significant Imaging: I have reviewed all pertinent imaging results/findings within the past 24 hours.

## 2022-07-05 NOTE — ASSESSMENT & PLAN NOTE
Continue ASA 325mg daily  Continue Atorvastatin 80mg daily  Normalize blood pressure  Aggressive risk management including BP< 140/90, LDL < 70, A1c< 7    Cardiology consulted for LINQ placement  Awaiting Vascular Surgery evaluation for carotid stenosis    Further recommendations may follow by MD.          Antithrombotics for secondary stroke prevention: Antiplatelets: Aspirin: 325 mg daily    Statins for secondary stroke prevention and hyperlipidemia, if present: Statins: Atorvastatin- 80 mg daily    Aggressive risk factor modification: HTN, DM, HLD     Rehab efforts: The patient has been evaluated by a stroke team provider and the therapy needs have been fully considered based off the presenting complaints and exam findings. The following therapy evaluations are needed: PM&R evaluate for appropriate placement    Diagnostics ordered/pending: None     VTE prophylaxis: Mechanical prophylaxis: Place SCDs    BP parameters: Infarct: normalize blood pressure

## 2022-07-05 NOTE — CONSULTS
[facility] - [facility] ICU  Cardiology  Consult Note    Patient Name: Ayaz Huggins  MRN: [id number]  Admission Date: 7/2/2022  Hospital Length of Stay: 3 days  Code Status: Full Code   Attending Provider: Nikko Murphy MD   Consulting Provider: CONCEPCION Parson  Primary Care Physician: Angel Parham Jr, MD  Principal Problem:<principal problem not specified>    Patient information was obtained from patient, past medical records and ER records.     Inpatient consult to Cardiology  Consult performed by: CONCEPCION Parson  Consult ordered by: ARVIND Elizondo        Subjective:     Chief Complaint:  Right side weakness     HPI: Mr. Huggins is a 78yo male patient known to Dr Marin with a history of HTN, HLD, DMII, and CAD s/p CABGx2 in 2007 by Dr Melgoza.  He presented to the ER on 7/2 with complaints of right sided weakness. He was given tPA and admitted for further evaluation.  A CT head was done and was negative.  A CTA was performed and revealed patent ICAs but ~70% stenosis of the left carotid bulb.  Neurology was consulted and has recommended vascular surgery consult for carotid stenosis and cardiology consult for Linq placement.  Patient denies any chest pain or SOB.  He denies any prior arrhythmias other than PVCs. He reports feeling well this morning and states slight improvement in RUE strength.      Past Medical History:   Diagnosis Date    Diabetes mellitus     Hypertension     Mixed hyperlipidemia        Past Surgical History:   Procedure Laterality Date    CARDIAC SURGERY         Review of patient's allergies indicates:   Allergen Reactions    Penicillins        No current facility-administered medications on file prior to encounter.     Current Outpatient Medications on File Prior to Encounter   Medication Sig    aspirin (ECOTRIN) 81 MG EC tablet Take 81 mg by mouth.    lisinopriL (PRINIVIL,ZESTRIL) 5 MG tablet Take 5 mg by mouth once daily.    metFORMIN  (GLUCOPHAGE) 1000 MG tablet Take 1,500 mg by mouth.    metoprolol succinate (TOPROL-XL) 50 MG 24 hr tablet Take 50 mg by mouth.    rosuvastatin (CRESTOR) 10 MG tablet Take 10 mg by mouth.     Family History    None       Tobacco Use    Smoking status: Not on file    Smokeless tobacco: Not on file   Substance and Sexual Activity    Alcohol use: Not on file    Drug use: Not on file    Sexual activity: Not on file     Review of Systems   Constitutional: Negative for chills and fever.   Cardiovascular: Negative for chest pain, dyspnea on exertion, orthopnea and palpitations.   Respiratory: Negative for cough, shortness of breath and wheezing.    Gastrointestinal: Negative for abdominal pain, nausea and vomiting.   Neurological: Positive for focal weakness. Negative for dizziness, headaches and numbness.   Psychiatric/Behavioral: Negative for altered mental status.     Objective:     Vital Signs (Most Recent):  Temp: 97.5 °F (36.4 °C) (07/05/22 0800)  Pulse: 63 (07/05/22 0915)  Resp: 17 (07/05/22 0915)  BP: (!) 153/109 (07/05/22 0900)  SpO2: 96 % (07/05/22 0915) Vital Signs (24h Range):  Temp:  [97.5 °F (36.4 °C)-98.6 °F (37 °C)] 97.5 °F (36.4 °C)  Pulse:  [52-90] 63  Resp:  [15-26] 17  SpO2:  [89 %-97 %] 96 %  BP: (116-188)/() 153/109     Weight: 98.1 kg (216 lb 4.3 oz)  Body mass index is 30.18 kg/m².    SpO2: 96 %  O2 Device (Oxygen Therapy): room air      Intake/Output Summary (Last 24 hours) at 7/5/2022 1135  Last data filed at 7/5/2022 0800  Gross per 24 hour   Intake 1866 ml   Output 1725 ml   Net 141 ml       Lines/Drains/Airways     Peripheral Intravenous Line  Duration                Peripheral IV - Single Lumen 07/02/22 0923 18 G Left Antecubital 3 days         Peripheral IV - Single Lumen 07/02/22 0923 20 G Left Wrist 3 days                Physical Exam  Vitals and nursing note reviewed.   Constitutional:       Appearance: Normal appearance.   HENT:      Head: Normocephalic.   Cardiovascular:       Rate and Rhythm: Normal rate and regular rhythm.      Pulses: Normal pulses.      Heart sounds: Normal heart sounds.   Abdominal:      Palpations: Abdomen is soft.   Musculoskeletal:      Cervical back: Normal range of motion.   Skin:     General: Skin is warm.   Neurological:      Mental Status: He is alert and oriented to person, place, and time.      Motor: Weakness present.   Psychiatric:         Mood and Affect: Mood normal.         Behavior: Behavior normal.         Significant Labs:   CMP   Recent Labs   Lab 07/04/22  0203 07/05/22  0124    139   K 3.8 3.6   CO2 25 26   BUN 11.7 14.3   CREATININE 0.77 0.73   CALCIUM 10.1* 9.8   ALBUMIN 3.9 3.5   BILITOT 0.7 0.9   ALKPHOS 84 72   AST 17 16   ALT 21 16   EGFRNONAA >60 >60   , CBC   Recent Labs   Lab 07/04/22  0203 07/05/22  0124   WBC 15.5* 13.2*   HGB 13.3* 12.6*   HCT 39.1* 38.4*    223   , Lipid Panel No results for input(s): CHOL, HDL, LDLCALC, TRIG, CHOLHDL in the last 48 hours. and Troponin No results for input(s): TROPONINI in the last 48 hours.      Assessment and Plan:     Acute CVA     S/p tPA on 7/2/22     MRI brain revealed scattered foci of ischemia in the left posterior frontal, biparietal, left occipitotemporal, and left basal ganglia     CTA revealed ~70% stenosis of the left carotid bulb     Echocardiogram revealed EF 60-65%, mild LAE, and negative bubble study     Still with RUE and RLE weakness     PT/OT following     Has been in sinus rhythm with PVCs on monitor       Will plan for 30 day [employer] monitor upon discharge to rule out Afib    Hx of CAD with CABGx2 in 2007      Denies any chest pain or SOB      Reports having an angiogram a few years ago which showed no significant disease    DMII      Management per attending                   Thank you for your consult. I will follow-up with patient. Please contact us if you have any additional questions.    Sweta Kruger, [facility]  Cardiology   [facility]  - [facility]

## 2022-07-05 NOTE — PROGRESS NOTES
Vascular Surgery Progress Note:     Patient CVA and left ICA stenosis. Would benefit from left CEA.     Continue with aspirin and statin.     Will plan surgery left CEA this week, likely Friday.     Dread Lama MD

## 2022-07-05 NOTE — PROGRESS NOTES
[facility] Medicine Progress Note        Chief Complaint: Inpatient Follow-up for     Subjective:  Mr. Huggins is a 76 y/o male with a PMH of Type II DM, diabetic neuropathy, CAD s/p CABG x2 (2007), and HTN who presented to [facility] ED  On 7/2/22 with complaints of right sided upper and lower extremity weakness. Patient's story is unclear upon the time of last known normal. He states he woke up  with a cramp in the RLE and noticed the right sided weakness.The patient was admitted to the ICU with an ischemic CVA and administered tPA; he has had some improvement in his right-sided weakness although he is unable to purposefully move his right lower extremity.  Neurology has seen and evaluated the patient and has deemed this patient stable for transfer to the floor.  This patient was seen and evaluated in the intensive care unit.    Patient stated his abdominal symptoms resolved completely.   Afeb VSS no issues. Working with Physical therapy.      Objective/physical exam:  General: In no acute distress, AAOx3   Chest: Clear to auscultation bilaterally, non-labored   Heart: RRR, +S1, S2, no appreciable murmur  Abdomen: Soft, nontender, BS +  MSK: Warm, no lower extremity edema, no clubbing or cyanosis  Neurologic:  Cranial nerve II-XII intact, Strength 5/5 in all 4 extremities    VITAL SIGNS: 24 HRS MIN & MAX LAST   Temp  Min: 97.5 °F (36.4 °C)  Max: 98.6 °F (37 °C) 97.5 °F (36.4 °C)   BP  Min: 116/67  Max: 188/77 (!) 153/109   Pulse  Min: 52  Max: 90  63   Resp  Min: 15  Max: 26 17   SpO2  Min: 89 %  Max: 97 % 96 %       Labs, Microbiology and Imaging were Reviewed.      Microbiology Results (last 7 days)     ** No results found for the last 168 hours. **             Medications   sodium chloride 0.9%  50 mL Intravenous Once    sodium chloride 0.9%  50 mL Intravenous Once    aspirin  325 mg Oral Daily    atorvastatin  80 mg Oral QHS    lisinopriL  10 mg Oral Daily    metoprolol  succinate  50 mg Oral Daily    polyethylene glycol  17 g Oral Daily    senna-docusate 8.6-50 mg  1 tablet Oral Daily        bisacodyL, dextrose 10%, dextrose 10%, dextrose 50%, glucagon (human recombinant), hydrALAZINE, insulin aspart U-100, labetalol, ondansetron, promethazine, sodium chloride 0.9%     Radiology:  MRI Brain W WO Contrast  Narrative: EXAMINATION:  MRI BRAIN W WO CONTRAST    CLINICAL HISTORY:  Stroke, follow up;;    TECHNIQUE:  Multiplanar, multisequence MR images of the brain were obtained WITH and WITHOUT the administration of intravenous gadolinium based contrast.    COMPARISON:  Head CT/CTA 07/02/2022    FINDINGS:  Scalp: No abnormalities.    Bone marrow: No signal abnormalities.    Brain sulci: Appropriate for patient's age.    Ventricles: Normal in size and configuration. No hydrocephalus.    Extra-axial spaces:    No masses or fluid collections.    Parenchyma:    Scattered foci of ischemia supratentorially (L>R), the largest is a left posterior frontal 3 x 2.1 cm focus, with additional subcentimeter bifrontal, biparietal, left occipitotemporal and left basal ganglia foci.    No additional acute vascular insults.    The largest infarct is associated with patchy magnetic susceptibility, remaining do not show magnetic susceptibility.    No mass or large volume hemorrhage.    Punctate left posterior frontal foci of enhancement is favored to relate to ischemia.    Otherwise no enhancing abnormalities.    Vessels: Normal flow voids in major arteries and veins.    Sellar/Suprasellar region: No abnormalities.    Craniocervical junction: No abnormalities.  Impression: 1. Small acute infarcts supratentorially, the largest is a left posterior frontal 3 cm cortical based infarct with petechial hemorrhage.  No mass effect or shift.  Discussed with Dr. Duncan.  2. Otherwise no acute intracranial abnormalities.    Electronically signed by: Abner  Moore  Date:    07/03/2022  Time:    14:09          Assessment/Plan:  Acute left hemispheric ischemic CVA, S/P TPA  -monitor neuro status closely  - q4 hr neurochecks  -ST/OT/PT  - zofran and phenergan for nausea.   - linq placement and Vascular surgery consult per neurology.  - aspirin 325 mg daily per neurology.      Hypertension  -BP parameters < 180/105  -prn antihypertensives  - continue metoprolol 50 mg and continue lisinopril 10 mg.      DM Type II  -diabetic diet   -CBG's ac & hs     Hyperlipidemia  -cont. Statin     Anemia of chronic disease  -monitor CBC        All diagnosis and differential diagnosis have been reviewed; assessment and plan has been documented; I have personally reviewed the labs and test results that are presently available; I have reviewed the patients medication list; I have reviewed the consulting providers response and recommendations. I have reviewed or attempted to review medical records based upon their availability.       Nikko Murphy MD   07/05/2022

## 2022-07-05 NOTE — PROGRESS NOTES
[facility] - [facility] ICU  Neurology  Progress Note    Patient Name: Ayaz Huggins  MRN: [id number]  Admission Date: 7/2/2022  Hospital Length of Stay: 3 days  Code Status: Full Code   Attending Provider: Nikko Murphy MD  Primary Care Physician: Angel Parham Jr, MD   Principal Problem:<principal problem not specified>    HPI:   Mr Huggins is a 77-year-old male with past medical history of DM, HTN, HLD, diabetic neuropathy, CAD s/p CABG, presented to ED on 7/2 with reports of right sided hemiplegia.  CTh showed no acute abnormalities.  He was given IV tPA and admitted to ICU for post tPA protocol.      Overview/Hospital Course:  Stroke workup  -CTh: no acute intracranial abnormalities  -CTA h/n: moderate left and mild right carotid bulb stenoses  -MRI brain: small acute infarcts supratentorially, the largest is a left posterior frontal 3cm cortical based infarct with petechial hemorrhage  -ECHO: EF 60-65%, bubble study negative, mild LA enlargement  -LDL: 48  -A1c: 5.9  -TSH: 1.4067  -reported compliance with ASA 81mg daily and Rosuvastatin 10mg daily at home          Subjective:     Interval History: Sitting up in bed.  No new issues overnight. Still boarding in ICU.      Current Facility-Administered Medications   Medication Dose Route Frequency Provider Last Rate Last Admin    0.9%  NaCl infusion  50 mL Intravenous Once Pa Mccarthy MD        0.9%  NaCl infusion  50 mL Intravenous Once Pa Mccarthy MD        amLODIPine tablet 5 mg  5 mg Oral Daily Nikko Murphy MD   5 mg at 07/05/22 1253    aspirin tablet 325 mg  325 mg Oral Daily ARVIND Araujo   325 mg at 07/05/22 0831    atorvastatin tablet 80 mg  80 mg Oral QHS Kash Olivares DO   80 mg at 07/04/22 2011    bisacodyL suppository 10 mg  10 mg Rectal Daily PRN Zackary Reynolds MD        dextrose 10% bolus 125 mL  12.5 g Intravenous PRN Kash Olivares DO        dextrose 10% bolus 250 mL  25 g Intravenous PRN Kash CHANEY  Elise, DO        dextrose 50% injection 12.5 g  12.5 g Intravenous PRN Kash Olivares, DO        glucagon (human recombinant) injection 1 mg  1 mg Intramuscular PRN Kash Olivares,         hydrALAZINE injection 10 mg  10 mg Intravenous Q6H PRN Kash Olivares, DO   10 mg at 07/03/22 2025    insulin aspart U-100 injection 0-5 Units  0-5 Units Subcutaneous Q6H PRN Kash Olivares, DO   2 Units at 07/05/22 0933    labetaloL injection 10 mg  10 mg Intravenous Q15 Min PRN Pa Mccarthy MD   10 mg at 07/03/22 2311    lactated ringers infusion   Intravenous Continuous Nikko Murphy MD 75 mL/hr at 07/05/22 1314 New Bag at 07/05/22 1314    [START ON 7/6/2022] lisinopriL tablet 20 mg  20 mg Oral Daily Nikko Murphy MD        metoprolol succinate (TOPROL-XL) 24 hr tablet 50 mg  50 mg Oral Daily Nikko Murphy MD   50 mg at 07/05/22 0831    ondansetron injection 4 mg  4 mg Intravenous Q6H PRN Nikko Murphy MD        polyethylene glycol packet 17 g  17 g Oral Daily Zackary Reynolds MD   17 g at 07/05/22 0831    promethazine injection 25 mg  25 mg Intramuscular Q6H PRN Nikko Murphy MD        senna-docusate 8.6-50 mg per tablet 1 tablet  1 tablet Oral Daily Zackary Reynolds MD   1 tablet at 07/05/22 0831    sodium chloride 0.9% flush 10 mL  10 mL Intravenous PRN Kash Olivares DO           Review of Systems   Neurological:  Positive for weakness.   All other systems reviewed and are negative.    Objective:     Vital Signs (Most Recent):  Temp: 98.1 °F (36.7 °C) (07/05/22 1600)  Pulse: 66 (07/05/22 1600)  Resp: (!) 22 (07/05/22 1600)  BP: (!) 157/68 (07/05/22 1600)  SpO2: 95 % (07/05/22 1600)   Vital Signs (24h Range):  Temp:  [97.5 °F (36.4 °C)-98.6 °F (37 °C)] 98.1 °F (36.7 °C)  Pulse:  [52-78] 66  Resp:  [15-26] 22  SpO2:  [91 %-98 %] 95 %  BP: (116-191)/() 157/68     Weight: 98.1 kg (216 lb 4.3 oz)  Body mass index is 30.18 kg/m².      Physical Exam  Constitutional:       General: He is awake. He is  not in acute distress.     Appearance: Normal appearance. He is not ill-appearing.   Eyes:      General: Vision grossly intact.      Extraocular Movements: Extraocular movements intact.      Pupils: Pupils are equal, round, and reactive to light.   Cardiovascular:      Rate and Rhythm: Normal rate and regular rhythm.      Pulses: Normal pulses.   Pulmonary:      Effort: Pulmonary effort is normal.   Skin:     General: Skin is warm and dry.   Neurological:      Mental Status: He is alert.      Comments:   Mental Status: Alert, oriented x 4, follows commands reliably  Language/Speech: naming and repetition intact; speech clear, fluent, coherent  Cranial Nerves:  -CN II, III: PERR, no visual field deficit, ptosis absent  -CN III, IV, VI: EOMI, no gaze preference, no nystagmus  -CN VII: right facial droop  Motor: RUE 3/5, RLE 0/5 ... LUE 5/5, LLE 5/5  Sensory: normal to tactile stimulation  Cerebellar: no tremor or dysmetria  Memory: normal thought process intact  Gait: not observed  Psychiatric:         Mood and Affect: Mood normal.         Behavior: Behavior normal.      Significant Labs: BMP:   Recent Labs   Lab 07/04/22  0203 07/05/22  0124    139   K 3.8 3.6   CO2 25 26   BUN 11.7 14.3   CREATININE 0.77 0.73   CALCIUM 10.1* 9.8   MG 1.70 2.00     CBC:   Recent Labs   Lab 07/04/22  0203 07/05/22  0124   WBC 15.5* 13.2*   HGB 13.3* 12.6*   HCT 39.1* 38.4*    223       Significant Imaging: I have reviewed all pertinent imaging results/findings within the past 24 hours.    Assessment and Plan:     Stroke  Continue ASA 325mg daily  Continue Atorvastatin 80mg daily  Aggressive risk management including BP< 140/90, LDL < 70, A1c< 7    Cardiology consulted for LINQ placement  Awaiting Vascular Surgery evaluation for carotid stenosis    Further recommendations may follow by MD.          Antithrombotics for secondary stroke prevention: Antiplatelets: Aspirin: 325 mg daily    Statins for secondary stroke  prevention and hyperlipidemia, if present: Statins: Atorvastatin- 80 mg daily    Aggressive risk factor modification: HTN, DM, HLD     Rehab efforts: The patient has been evaluated by a stroke team provider and the therapy needs have been fully considered based off the presenting complaints and exam findings. The following therapy evaluations are needed: PM&R evaluate for appropriate placement    Diagnostics ordered/pending: None     VTE prophylaxis: Mechanical prophylaxis: Place SCDs  VTE Risk Mitigation (From admission, onward)         Ordered     IP VTE HIGH RISK PATIENT  Once         07/02/22 1019     Place sequential compression device  Until discontinued         07/02/22 1019                BP parameters: Infarct: normalize blood pressure        ARVIND Araujo  Neurology  [facility] - [facility] ICU

## 2022-07-05 NOTE — CARE UPDATE
[id number] Spoke with therapist re placement for pt. They recommend rehab. Spoke with pt re rehab who is in agreement for it. Pt will be having a CEA prior to dcing from hospital. Will speak w pt about rehab after pt's sx.

## 2022-07-05 NOTE — CONSULTS
Vascular Surgery Consultation Note      Patient Name: Ayaz Huggins  : 1945  Age: 77 y.o.  MRN: [id number]                                                   History of Present Illness   HPI     Cerebrovascular Accident      Additional comments: Right leg paralysis that started 30 minutes PTA.   Activated as a code FAST. Dr. Mccarthy at bedside on arrival.          Last edited by Kena Hong RN on 2022  8:19 AM. (History)      Ayaz Huggins is a 77 y.o. male with past medical history of coronary artery disease status post CABG in , hypertensions, who presented to the emergency room on  with complaint right-sided weakness and paralysis.  Patient was given tPA and was transferred to the ICU for post tPA monitoring and treatment.  Vascular surgery was consulted for management of left ICA stenosis.    Since patient has received tPA he did regain some function to the right upper extremity however still no function to the right lower extremity.     Patient denies any chest pain or shortness of breath.  No nausea or vomiting.                                                 Past Medical & Surgical History   Past Medical and Surgical History  Allergies :   Penicillins    No current facility-administered medications on file prior to encounter.     Current Outpatient Medications on File Prior to Encounter   Medication Sig Dispense Refill    lisinopriL (PRINIVIL,ZESTRIL) 5 MG tablet Take 5 mg by mouth once daily.      metFORMIN (GLUCOPHAGE) 1000 MG tablet Take 1,500 mg by mouth.      metoprolol succinate (TOPROL-XL) 50 MG 24 hr tablet Take 50 mg by mouth.      [DISCONTINUED] rosuvastatin (CRESTOR) 10 MG tablet Take 10 mg by mouth.           Medical :   He has a past medical history of Diabetes mellitus, Hypertension, and Mixed hyperlipidemia.    Surgical :   He has a past surgical history that includes Cardiac surgery.     Family History  His family history is not on file.    Social History  He                                                    Review of Systems   Review of Systems   Constitutional: Negative for fever and malaise/fatigue.   HENT: Negative.    Eyes: Negative for blurred vision and double vision.   Respiratory: Negative for shortness of breath.    Cardiovascular: Negative for chest pain.   Gastrointestinal: Negative for diarrhea and nausea.   Genitourinary: Negative.    Musculoskeletal: Negative.    Skin: Negative.    Neurological: Positive for sensory change and focal weakness. Negative for speech change and headaches.   Endo/Heme/Allergies: Negative.    Psychiatric/Behavioral: Negative.                                                     Current Medications   Scheduled:   sodium chloride 0.9%  50 mL Intravenous Once    sodium chloride 0.9%  50 mL Intravenous Once    amLODIPine  5 mg Oral Daily    aspirin  325 mg Oral Daily    atorvastatin  80 mg Oral QHS    [START ON 7/6/2022] lisinopriL  20 mg Oral Daily    metoprolol succinate  50 mg Oral Daily    polyethylene glycol  17 g Oral Daily    senna-docusate 8.6-50 mg  1 tablet Oral Daily     Continuous:   lactated ringers 75 mL/hr at 07/05/22 1314     PRN:  bisacodyL, dextrose 10%, dextrose 10%, dextrose 50%, glucagon (human recombinant), hydrALAZINE, insulin aspart U-100, labetalol, ondansetron, promethazine, sodium chloride 0.9%                                                 Laboratory Findings   Labs:  Recent Labs     07/03/22 0228 07/04/22  0203 07/05/22  0124   WBC 11.0 15.5* 13.2*   HGB 12.6* 13.3* 12.6*   HCT 37.2* 39.1* 38.4*   MCV 87.1 85.2 88.7   MCH 29.5 29.0 29.1   MCHC 33.9 34.0 32.8*   RDW 13.1 13.3 13.4    251 223     Recent Labs     07/03/22 0228 07/04/22  0203 07/05/22  0124    138 139   K 3.8 3.8 3.6   BUN 7.4* 11.7 14.3   CREATININE 0.69* 0.77 0.73   GLUCOSE 156* 216* 148*     Recent Labs     07/03/22 0228 07/04/22 0203 07/05/22  0124   CALCIUM 9.7 10.1* 9.8   MG 1.80 1.70 2.00   PHOS 3.1 3.9 3.0  "    Recent Labs     07/03/22 0228 07/04/22  0203 07/05/22  0124   PROTIME 14.3 14.1 14.0   INR 1.12 1.10 1.09     Recent Labs     07/03/22 0228 07/04/22  0203 07/05/22  0124   BILITOT 0.5 0.7 0.9   AST 22 17 16   ALT 23 21 16   ALKPHOS 80 84 72   ALBUMIN 3.8 3.9 3.5                                                  Physical Exam & Imaging   BP (!) 157/68 (BP Location: Left arm, Patient Position: Lying)   Pulse 66   Temp 98.1 °F (36.7 °C) (Oral)   Resp (!) 22   Ht 5' 10.98" (1.803 m)   Wt 98.1 kg (216 lb 4.3 oz)   SpO2 95%   BMI 30.18 kg/m²     Gen: alert, well appearing, and in no distress  HEENT: NCAT.   CVS: RRR   Chest: No increase work of breathing,   Abd: soft, nontender, nondistended, no masses or organomegaly.  : Voiding spontaneously  MSK: no joint tenderness, deformity or swelling  Neuro:  No motor function to the right lower extremity.  Right upper extremity with motor function is strong  strength.  + weakness in the proximal muscle group.  Skin: normal coloration      Imaging:  MRI Brain W WO Contrast   Final Result      1. Small acute infarcts supratentorially, the largest is a left posterior frontal 3 cm cortical based infarct with petechial hemorrhage.  No mass effect or shift.  Discussed with Dr. Duncan.   2. Otherwise no acute intracranial abnormalities.         Electronically signed by: Abner Moore   Date:    07/03/2022   Time:    14:09      CTA STROKE MULTI-PHASE   Final Result      1. Moderate left and mild right carotid bulb stenoses.   2.  No large vessel occlusions, critical stenoses, vascular malformations or aneurysms.         Electronically signed by: Abner Moore   Date:    07/02/2022   Time:    09:45      CT Head Without Contrast   Final Result      No acute intracranial abnormalities.      Case discussed with Dr. Mccarthy, 7/2/2022 @ 0847 hours.         Electronically signed by: Abner Moore   Date:    07/02/2022   Time:    08:47                                            "          Assessment & Recommendations     Patient Active Problem List   Diagnosis    Stroke       Assessment:  Ayaz Huggins is 77 y.o. male with left CVA with significant motor deficit to the right lower extremity.    Recommendations:  - Patient would benefit from left carotid endarterectomy.  Risks and benefits were discussed with the patient regarding left carotid endarterectomy.  -He wished to proceed with the procedures.  -Continue with aspirin and statin therapy.      Dread Lama MD   Vascular Surgery

## 2022-07-05 NOTE — PT/OT/SLP PROGRESS
Physical Therapy Treatment    Patient Name:  Ayaz Huggins   MRN:  [id number]    Recommendations:     Discharge Recommendations:  rehabilitation facility   Discharge Equipment Recommendations: walker, rolling   Barriers to discharge: severity of deficits    Assessment:     Ayaz Huggins is a 77 y.o. male admitted with a medical diagnosis of CVA.  He presents with the following impairments/functional limitations:  weakness, impaired endurance, impaired functional mobilty, gait instability, decreased lower extremity function, decreased upper extremity function, decreased coordination, impaired balance, impaired coordination. Pt continues to demo significant weakness in RLE but does demo slight improvement w/ R UE strength.     Rehab Prognosis: Good; patient would benefit from acute skilled PT services to address these deficits and reach maximum level of function.    Recent Surgery: * No surgery found *      Plan:     During this hospitalization, patient to be seen daily to BID to address the identified rehab impairments via gait training, therapeutic activities, therapeutic exercises, neuromuscular re-education and progress toward the following goals:    · Plan of Care Expires:  08/04/22    Subjective     Chief Complaint: weakness  Patient/Family Comments/goals: to be able to walk  Pain/Comfort:  · Pain Rating 1: 0/10      Objective:     Communicated with RN prior to session.  Patient found HOB elevated with peripheral IV, SCD, telemetry upon PT entry to room.     General Precautions: Standard, fall (BP <180/105mmhg)   Orthopedic Precautions:N/A   Braces: N/A  Respiratory Status: Room air      BP prior to activity- 15/65mmHg     Functional Mobility:  · Bed Mobility:     · Supine to Sit: moderate assistance  · Sit to Supine: moderate assistance  · Transfers:     · Sit to Stand:  maximal assistance with rolling walker  · Gait: Pt was able to take side steps toward HOB w/ maxA to faciliate R LE and modA for balance at  trunk w/ use of RW. PT blocked R knee to prevent buckling and performed lateral weight shift.       AM-PAC 6 CLICK MOBILITY  Turning over in bed (including adjusting bedclothes, sheets and blankets)?: 2  Sitting down on and standing up from a chair with arms (e.g., wheelchair, bedside commode, etc.): 2  Moving from lying on back to sitting on the side of the bed?: 2  Moving to and from a bed to a chair (including a wheelchair)?: 2  Need to walk in hospital room?: 2  Climbing 3-5 steps with a railing?: 1  Basic Mobility Total Score: 11         Patient left HOB elevated with all lines intact, call button in reach and RN notified..    GOALS:   Multidisciplinary Problems     Physical Therapy Goals        Problem: Physical Therapy    Goal Priority Disciplines Outcome Goal Variances Interventions   Physical Therapy Goal     PT, PT/OT Ongoing, Progressing     Description: Goals to be met by: 2022     Patient will increase functional independence with mobility by performin. Supine to sit with Contact Guard Assistance  2. Sit to supine with Contact Guard Assistance  3. Sit to stand transfer with Contact Guard Assistance  4. Gait  x 150 feet with Minimal Assistance using Rolling Walker vs LRAD.                      Time Tracking:     PT Received On: 22  PT Start Time: 952     PT Stop Time: 1008  PT Total Time (min): 16 min     Billable Minutes: Therapeutic Activity , 16 mins    Treatment Type: Treatment  PT/PTA: PT     PTA Visit Number: 0     2022

## 2022-07-05 NOTE — PT/OT/SLP PROGRESS
Occupational Therapy  Treatment    Ayaz Huggins   MRN: [id number]   Admitting Diagnosis: CVA    OT Date of Treatment: 07/05/22   OT Start Time: 1127  OT Stop Time: 1157  OT Total Time (min): 30 min     Billable Minutes:  Self Care/Home Management 15 and Neuromuscular Re-education 15  Total Minutes: 30     OT/GREGORY: GREGORY     GREGORY Visit Number: 1    General Precautions: Standard, fall, other (see comments) (BP<180/105)  Orthopedic Precautions: N/A  Braces: N/A    Spiritual, Cultural Beliefs, Mu-ism Practices, Values that Affect Care: no    Subjective:  Communicated with RN prior to session.  Pt alert upon entry and agreeable to OT session.      Objective:  Patient found with: peripheral IV, SCD, telemetry, blood pressure cuff   /75    Functional Mobility:  Bed Mobility:   Supine to sit: Moderate Assistance   Sit to supine: Moderate Assistance   Rolling: Activity did not occur   Scooting: Minimal Assistance    LE Dressing:  Instructed pt in figure four position to don socks. SBA during task with extended time needed to bring leg up and use RUE. Min A for sitting balance    Balance:   Static Sit: POOR+: Needs MINIMAL assist to maintain  Dynamic Sit:  FAIR+: Maintains balance through MINIMAL excursions of active trunk motion  Static Stand: POOR: Needs MODERATE assist to maintain  Dynamic stand: POOR: Needs MOD (moderate) assist during gait    Additional Treatment:  Pt sit>stand x2 trials with Mod A x2 and R knee blocked to prevent buckling. Used RW for 1st trial with TC for proper RUE placement onto walker. Pt RLE noted to turn outwards during stand. HHA for 2nd trial with R knee blocked and RLE blocked in neutral position . Pt able to pivot feet laterally towards HOB. Mod A x2 throughout task with rest breaks provided 2/2 fatigue.     Pt instructed in dynamic sitting balance with emphasis on WB to affected side, crossing of midline, and coordination. Min verbal and visual cues for proper execution of task. Rest  break provided.     Patient left HOB elevated with all lines intact, call button in reach and SCDs donned.     ASSESSMENT:  Ayaz Huggins is a 77 y.o. male with a medical diagnosis of CVA. Tolerated session well and demo's good motivation and effort. Would benefit from rehab to increase strength, endurance and independence with ADLs.     Rehab potential is excellent    Activity tolerance: Good    Discharge recommendations: rehabilitation facility     Equipment recommendations: walker, rolling     GOALS:   Multidisciplinary Problems       Occupational Therapy Goals          Problem: Occupational Therapy    Goal Priority Disciplines Outcome Interventions   Occupational Therapy Goal     OT, PT/OT Ongoing, Progressing    Description: Goals to be met by: 7/18/22    Patient will increase functional independence with ADLs by performing:    UE Dressing with Setup Assistance.  LE Dressing with Contact Guard Assistance.  Toileting from bedside commode or toilet with Contact Guard Assistance for hygiene and clothing management.                          Plan:  Patient to be seen daily to address the above listed problems via self-care/home management, therapeutic activities, therapeutic exercises  Plan of Care expires: 07/18/22  Plan of Care reviewed with: patient     Documented by NORA Hart. Co-signed and reviewed by OLIVIA Villalobos Mai.          07/05/2022

## 2022-07-06 LAB
ALBUMIN SERPL-MCNC: 3.5 GM/DL (ref 3.4–4.8)
ALBUMIN/GLOB SERPL: 1 RATIO (ref 1.1–2)
ALP SERPL-CCNC: 79 UNIT/L (ref 40–150)
ALT SERPL-CCNC: 22 UNIT/L (ref 0–55)
ANION GAP SERPL CALC-SCNC: 18 MMOL/L (ref 8–16)
APTT PPP: 30.7 SECONDS (ref 23.2–33.7)
AST SERPL-CCNC: 21 UNIT/L (ref 5–34)
BASOPHILS # BLD AUTO: 0.07 X10(3)/MCL (ref 0–0.2)
BASOPHILS NFR BLD AUTO: 0.6 %
BILIRUBIN DIRECT+TOT PNL SERPL-MCNC: 1 MG/DL
BUN SERPL-MCNC: 11.5 MG/DL (ref 8.4–25.7)
BUN SERPL-MCNC: 9 MG/DL (ref 6–30)
CALCIUM SERPL-MCNC: 9.6 MG/DL (ref 8.8–10)
CHLORIDE SERPL-SCNC: 104 MMOL/L (ref 95–110)
CHLORIDE SERPL-SCNC: 105 MMOL/L (ref 98–107)
CO2 SERPL-SCNC: 25 MMOL/L (ref 23–31)
CREAT SERPL-MCNC: 0.6 MG/DL (ref 0.5–1.4)
CREAT SERPL-MCNC: 0.64 MG/DL (ref 0.73–1.18)
EOSINOPHIL # BLD AUTO: 0.57 X10(3)/MCL (ref 0–0.9)
EOSINOPHIL NFR BLD AUTO: 5.1 %
ERYTHROCYTE [DISTWIDTH] IN BLOOD BY AUTOMATED COUNT: 13.2 % (ref 11.5–17)
GLOBULIN SER-MCNC: 3.4 GM/DL (ref 2.4–3.5)
GLUCOSE SERPL-MCNC: 150 MG/DL (ref 82–115)
GLUCOSE SERPL-MCNC: 191 MG/DL (ref 70–110)
HCT VFR BLD AUTO: 37.4 % (ref 42–52)
HCT VFR BLD CALC: 38 %PCV (ref 36–54)
HGB BLD-MCNC: 12.5 GM/DL (ref 14–18)
HGB BLD-MCNC: 13 G/DL
IMM GRANULOCYTES # BLD AUTO: 0.03 X10(3)/MCL (ref 0–0.04)
IMM GRANULOCYTES NFR BLD AUTO: 0.3 %
INR BLD: 1.11 (ref 0–1.3)
LYMPHOCYTES # BLD AUTO: 2.19 X10(3)/MCL (ref 0.6–4.6)
LYMPHOCYTES NFR BLD AUTO: 19.6 %
MAGNESIUM SERPL-MCNC: 1.8 MG/DL (ref 1.6–2.6)
MCH RBC QN AUTO: 29.5 PG (ref 27–31)
MCHC RBC AUTO-ENTMCNC: 33.4 MG/DL (ref 33–36)
MCV RBC AUTO: 88.2 FL (ref 80–94)
MONOCYTES # BLD AUTO: 1.29 X10(3)/MCL (ref 0.1–1.3)
MONOCYTES NFR BLD AUTO: 11.6 %
NEUTROPHILS # BLD AUTO: 7 X10(3)/MCL (ref 2.1–9.2)
NEUTROPHILS NFR BLD AUTO: 62.8 %
NRBC BLD AUTO-RTO: 0 %
PHOSPHATE SERPL-MCNC: 2.9 MG/DL (ref 2.3–4.7)
PLATELET # BLD AUTO: 210 X10(3)/MCL (ref 130–400)
PMV BLD AUTO: 10.3 FL (ref 7.4–10.4)
POC CARDIAC TROPONIN I: 0 NG/ML
POC IONIZED CALCIUM: 1.21 MMOL/L (ref 1.06–1.42)
POC PTINR: 0.9 (ref 0.9–1.2)
POC PTWBT: 11.3 SEC (ref 9.7–14.3)
POC TCO2 (MEASURED): 25 MMOL/L (ref 23–29)
POCT GLUCOSE: 142 MG/DL (ref 70–110)
POCT GLUCOSE: 206 MG/DL (ref 70–110)
POCT GLUCOSE: 223 MG/DL (ref 70–110)
POTASSIUM BLD-SCNC: 3.6 MMOL/L (ref 3.5–5.1)
POTASSIUM SERPL-SCNC: 3.6 MMOL/L (ref 3.5–5.1)
PROT SERPL-MCNC: 6.9 GM/DL (ref 5.8–7.6)
PROTHROMBIN TIME: 14.2 SECONDS (ref 12.5–14.5)
RBC # BLD AUTO: 4.24 X10(6)/MCL (ref 4.7–6.1)
SAMPLE: ABNORMAL
SAMPLE: NORMAL
SAMPLE: NORMAL
SODIUM BLD-SCNC: 142 MMOL/L (ref 136–145)
SODIUM SERPL-SCNC: 139 MMOL/L (ref 136–145)
WBC # SPEC AUTO: 11.2 X10(3)/MCL (ref 4.5–11.5)

## 2022-07-06 PROCEDURE — 80053 COMPREHEN METABOLIC PANEL: CPT | Performed by: STUDENT IN AN ORGANIZED HEALTH CARE EDUCATION/TRAINING PROGRAM

## 2022-07-06 PROCEDURE — 97535 SELF CARE MNGMENT TRAINING: CPT | Mod: CO

## 2022-07-06 PROCEDURE — 84100 ASSAY OF PHOSPHORUS: CPT | Performed by: STUDENT IN AN ORGANIZED HEALTH CARE EDUCATION/TRAINING PROGRAM

## 2022-07-06 PROCEDURE — 25000003 PHARM REV CODE 250: Performed by: STUDENT IN AN ORGANIZED HEALTH CARE EDUCATION/TRAINING PROGRAM

## 2022-07-06 PROCEDURE — 85610 PROTHROMBIN TIME: CPT | Performed by: STUDENT IN AN ORGANIZED HEALTH CARE EDUCATION/TRAINING PROGRAM

## 2022-07-06 PROCEDURE — 63600175 PHARM REV CODE 636 W HCPCS: Performed by: STUDENT IN AN ORGANIZED HEALTH CARE EDUCATION/TRAINING PROGRAM

## 2022-07-06 PROCEDURE — 97530 THERAPEUTIC ACTIVITIES: CPT | Mod: CO

## 2022-07-06 PROCEDURE — 85730 THROMBOPLASTIN TIME PARTIAL: CPT | Performed by: STUDENT IN AN ORGANIZED HEALTH CARE EDUCATION/TRAINING PROGRAM

## 2022-07-06 PROCEDURE — 85025 COMPLETE CBC W/AUTO DIFF WBC: CPT | Performed by: STUDENT IN AN ORGANIZED HEALTH CARE EDUCATION/TRAINING PROGRAM

## 2022-07-06 PROCEDURE — 25000003 PHARM REV CODE 250: Performed by: NURSE PRACTITIONER

## 2022-07-06 PROCEDURE — 11000001 HC ACUTE MED/SURG PRIVATE ROOM

## 2022-07-06 PROCEDURE — 97530 THERAPEUTIC ACTIVITIES: CPT | Mod: CQ

## 2022-07-06 PROCEDURE — 83735 ASSAY OF MAGNESIUM: CPT | Performed by: STUDENT IN AN ORGANIZED HEALTH CARE EDUCATION/TRAINING PROGRAM

## 2022-07-06 PROCEDURE — 36415 COLL VENOUS BLD VENIPUNCTURE: CPT | Performed by: STUDENT IN AN ORGANIZED HEALTH CARE EDUCATION/TRAINING PROGRAM

## 2022-07-06 RX ADMIN — INSULIN ASPART 2 UNITS: 100 INJECTION, SOLUTION INTRAVENOUS; SUBCUTANEOUS at 04:07

## 2022-07-06 RX ADMIN — BISACODYL 10 MG: 10 SUPPOSITORY RECTAL at 01:07

## 2022-07-06 RX ADMIN — LISINOPRIL 20 MG: 20 TABLET ORAL at 09:07

## 2022-07-06 RX ADMIN — ASPIRIN 325 MG ORAL TABLET 325 MG: 325 PILL ORAL at 09:07

## 2022-07-06 RX ADMIN — METOPROLOL SUCCINATE 50 MG: 50 TABLET, FILM COATED, EXTENDED RELEASE ORAL at 09:07

## 2022-07-06 RX ADMIN — HYDRALAZINE HYDROCHLORIDE 10 MG: 20 INJECTION INTRAMUSCULAR; INTRAVENOUS at 01:07

## 2022-07-06 RX ADMIN — HYDRALAZINE HYDROCHLORIDE 10 MG: 20 INJECTION INTRAMUSCULAR; INTRAVENOUS at 02:07

## 2022-07-06 RX ADMIN — ATORVASTATIN CALCIUM 80 MG: 40 TABLET, FILM COATED ORAL at 08:07

## 2022-07-06 RX ADMIN — SENNOSIDES AND DOCUSATE SODIUM 1 TABLET: 50; 8.6 TABLET ORAL at 09:07

## 2022-07-06 RX ADMIN — AMLODIPINE BESYLATE 5 MG: 5 TABLET ORAL at 09:07

## 2022-07-06 NOTE — PROGRESS NOTES
[facility] Medicine Progress Note        Chief Complaint: Inpatient Follow-up for     Subjective:  Mr. Huggins is a 76 y/o male with a PMH of Type II DM, diabetic neuropathy, CAD s/p CABG x2 (2007), and HTN who presented to [facility] ED  On 7/2/22 with complaints of right sided upper and lower extremity weakness. Patient's story is unclear upon the time of last known normal. He states he woke up  with a cramp in the RLE and noticed the right sided weakness.The patient was admitted to the ICU with an ischemic CVA and administered tPA; he has had some improvement in his right-sided weakness although he is unable to purposefully move his right lower extremity.  Neurology has seen and evaluated the patient and has deemed this patient stable for transfer to the floor.  This patient was seen and evaluated in the intensive care unit.    Seen and examined patient afebrile vital stable hemodynamically stable.  Neurologically doing fine.      Objective/physical exam:  General: In no acute distress, AAOx3   Chest: Clear to auscultation bilaterally, non-labored   Heart: RRR, +S1, S2, no appreciable murmur  Abdomen: Soft, nontender, BS +  MSK: Warm, no lower extremity edema, no clubbing or cyanosis  Neurologic:  Cranial nerve II-XII intact, Strength 5/5 in all 4 extremities    VITAL SIGNS: 24 HRS MIN & MAX LAST   Temp  Min: 98 °F (36.7 °C)  Max: 98.6 °F (37 °C) 98.4 °F (36.9 °C)   BP  Min: 125/62  Max: 193/73 (!) 151/66   Pulse  Min: 57  Max: 88  64   Resp  Min: 14  Max: 24 19   SpO2  Min: 92 %  Max: 99 % 97 %       Labs, Microbiology and Imaging were Reviewed.      Microbiology Results (last 7 days)     ** No results found for the last 168 hours. **             Medications   sodium chloride 0.9%  50 mL Intravenous Once    sodium chloride 0.9%  50 mL Intravenous Once    amLODIPine  5 mg Oral Daily    aspirin  325 mg Oral Daily    atorvastatin  80 mg Oral QHS    lisinopriL  20 mg Oral Daily     metoprolol succinate  50 mg Oral Daily    polyethylene glycol  17 g Oral Daily    senna-docusate 8.6-50 mg  1 tablet Oral Daily        bisacodyL, dextrose 10%, dextrose 10%, dextrose 50%, glucagon (human recombinant), hydrALAZINE, insulin aspart U-100, labetalol, ondansetron, promethazine, sodium chloride 0.9%     Radiology:  MRI Brain W WO Contrast  Narrative: EXAMINATION:  MRI BRAIN W WO CONTRAST    CLINICAL HISTORY:  Stroke, follow up;;    TECHNIQUE:  Multiplanar, multisequence MR images of the brain were obtained WITH and WITHOUT the administration of intravenous gadolinium based contrast.    COMPARISON:  Head CT/CTA 07/02/2022    FINDINGS:  Scalp: No abnormalities.    Bone marrow: No signal abnormalities.    Brain sulci: Appropriate for patient's age.    Ventricles: Normal in size and configuration. No hydrocephalus.    Extra-axial spaces:    No masses or fluid collections.    Parenchyma:    Scattered foci of ischemia supratentorially (L>R), the largest is a left posterior frontal 3 x 2.1 cm focus, with additional subcentimeter bifrontal, biparietal, left occipitotemporal and left basal ganglia foci.    No additional acute vascular insults.    The largest infarct is associated with patchy magnetic susceptibility, remaining do not show magnetic susceptibility.    No mass or large volume hemorrhage.    Punctate left posterior frontal foci of enhancement is favored to relate to ischemia.    Otherwise no enhancing abnormalities.    Vessels: Normal flow voids in major arteries and veins.    Sellar/Suprasellar region: No abnormalities.    Craniocervical junction: No abnormalities.  Impression: 1. Small acute infarcts supratentorially, the largest is a left posterior frontal 3 cm cortical based infarct with petechial hemorrhage.  No mass effect or shift.  Discussed with Dr. Duncan.  2. Otherwise no acute intracranial abnormalities.    Electronically signed by: Abner  Teresa  Date:    07/03/2022  Time:    14:09          Assessment/Plan:  Acute left hemispheric ischemic CVA, S/P TPA  -monitor neuro status closely  - q4 hr neurochecks  -ST/OT/PT  - zofran and phenergan for nausea.   - vascular surgery is planning carotid endarterectomy on Friday.  Will follow-up.  - aspirin 325 mg daily per neurology.      Hypertension  -BP parameters < 180/105  -prn antihypertensives  - continue metoprolol 50 mg and continue lisinopril 10 mg.      DM Type II  -diabetic diet   -CBG's ac & hs     Hyperlipidemia  -cont. Statin     Anemia of chronic disease  -monitor CBC        All diagnosis and differential diagnosis have been reviewed; assessment and plan has been documented; I have personally reviewed the labs and test results that are presently available; I have reviewed the patients medication list; I have reviewed the consulting providers response and recommendations. I have reviewed or attempted to review medical records based upon their availability.       Nikko Murphy MD   07/06/2022

## 2022-07-06 NOTE — PT/OT/SLP PROGRESS
Physical Therapy Treatment    Patient Name:  Ayaz Huggins   MRN:  [id number]    Recommendations:     Discharge Recommendations:  rehabilitation facility   Discharge Equipment Recommendations: walker, rolling   Barriers to discharge: None    Assessment:     Pt seated in recliner, pleasant and motivated to participate in therapy. Tolerated tx session very well. Returned to bed and provided with HEP.     Rehab Prognosis: Good; patient would benefit from acute skilled PT services to address these deficits and reach maximum level of function.    Recent Surgery: Procedure(s) (LRB):  ENDARTERECTOMY, CAROTID (Right)      Plan:     During this hospitalization, patient to be seen BID to address the identified rehab impairments via gait training, therapeutic activities, therapeutic exercises, neuromuscular re-education and progress toward the following goals:    · Plan of Care Expires:  08/04/22    Subjective     Chief Complaint: None  Patient/Family Comments/goals: Improve strength and mobility  Pain/Comfort:  · Pain Rating 1: 0/10      Objective:     Communicated with RN prior to session.  Patient found up in chair with SCD, pulse ox (continuous), blood pressure cuff, telemetry upon PT entry to room.     General Precautions: Standard, fall   Orthopedic Precautions:N/A   Braces: N/A  Respiratory Status: Room air   HR 61  /67     Functional Mobility:  · Bed Mobility:     · Sit to Supine: moderate assistance using LLE to assist RLE into bed. Bridge to HOB with mod assist to complete.   · Transfers:     · Sit to Stand:  moderate assistance with hand-held assist  · Bed to Chair: moderate assistance and of 2 persons with  hand-held assist  using  Squat Pivot. Attempted stand pivot initially but unable to complete safely.   · Gait: Side steps at EOB with R knee blocked d/t buckling, total assist to advance RLE, cues to improve lateral weight shifting and to improve R knee stability.           Therapeutic Activities and  Exercises:   R hip IR and quad sets (no active quad contraction noted)    Patient left HOB elevated with all lines intact and call button in reach..    GOALS:   Multidisciplinary Problems     Physical Therapy Goals        Problem: Physical Therapy    Goal Priority Disciplines Outcome Goal Variances Interventions   Physical Therapy Goal     PT, PT/OT Ongoing, Progressing     Description: Goals to be met by: 2022     Patient will increase functional independence with mobility by performin. Supine to sit with Contact Guard Assistance  2. Sit to supine with Contact Guard Assistance  3. Sit to stand transfer with Contact Guard Assistance  4. Gait  x 150 feet with Minimal Assistance using Rolling Walker vs LRAD.                      Time Tracking:     PT Received On: 22  PT Start Time: 1150     PT Stop Time: 1223  PT Total Time (min): 33 min     Billable Minutes: Therapeutic Activity 2 units    Treatment Type: Treatment  PT/PTA: PTA     PTA Visit Number: 1     2022

## 2022-07-06 NOTE — PT/OT/SLP PROGRESS
Occupational Therapy  Treatment    Ayaz Huggins   MRN: [id number]   Admitting Diagnosis: Stroke    OT Date of Treatment: 07/06/22   OT Start Time: 1028  OT Stop Time: 1059  OT Total Time (min): 31 min     Billable Minutes:  Self Care/Home Management 16 and Therapeutic Activity 15  Total Minutes: 31     OT/GREGORY: GREGORY     GREGORY Visit Number: 2    General Precautions: Standard, fall, other (see comments) (BP<180/105)  Orthopedic Precautions: N/A  Braces: N/A    Spiritual, Cultural Beliefs, Jehovah's witness Practices, Values that Affect Care: no    Subjective:  Communicated with RN prior to session.  Pt alert and agreeable to OT session.    Objective:  Patient found with: SCD, peripheral IV, telemetry, blood pressure cuff   /68    Functional Mobility:  Bed Mobility:   Supine to sit: Moderate Assistance   Sit to supine: Activity did not occur   Rolling: Activity did not occur   Scooting: Minimal Assistance    Transfer Training:   Mod A squat-pivot t/f from EOB>BS recliner. Pt cued to reach across for chair with LUE. Pt left [facility] with NSG notified.    Grooming:  Pt completed oral hygiene seated EOB with table positioned on R side for RUE support. Pt cued to WB through RUE and cross midline with LUE to grab oral hygiene supplies. Completed task with SBA.     Balance:   Static Sit: GOOD-: Takes MODERATE challenges from all directions but inconsistently  Dynamic Sit:  GOOD-: Incosistently Maintains balance through MODERATE excursions of active trunk movement,     Static Stand: POOR: Needs MODERATE assist to maintain  Dynamic stand: POOR: Needs MOD (moderate) assist during gait    Additional Treatment:  Pt instructed in dynamic reaching for therapist hand seated EOB while WB through RUE. Pt able to cross midline at different levels while maintaining sitting balance with CGA for safety.     Patient left up in chair with all lines intact and call button in reach    ASSESSMENT:  Ayaz Huggins is a 77 y.o. male with a medical diagnosis  of stroke. Good motivation and participation noted throughout session. Progressing well towards goals. Would benefit from rehab to increase strength, endurance and independence with ADLs.     Rehab potential is excellent    Activity tolerance: Excellent    Discharge recommendations: rehabilitation facility     Equipment recommendations: walker, rolling     GOALS:   Multidisciplinary Problems     Occupational Therapy Goals        Problem: Occupational Therapy    Goal Priority Disciplines Outcome Interventions   Occupational Therapy Goal     OT, PT/OT Ongoing, Progressing    Description: Goals to be met by: 7/18/22    Patient will increase functional independence with ADLs by performing:    UE Dressing with Setup Assistance.  LE Dressing with Contact Guard Assistance.  Toileting from bedside commode or toilet with Contact Guard Assistance for hygiene and clothing management.                      Plan:  Patient to be seen daily to address the above listed problems via self-care/home management, therapeutic activities, therapeutic exercises  Plan of Care expires: 07/18/22  Plan of Care reviewed with: patient    Documented by NORA Hart. Co-signed and reviewed by OLIVIA Villalobos Mai.        07/06/2022

## 2022-07-07 LAB
ALBUMIN SERPL-MCNC: 3.6 GM/DL (ref 3.4–4.8)
ALBUMIN/GLOB SERPL: 1.2 RATIO (ref 1.1–2)
ALP SERPL-CCNC: 88 UNIT/L (ref 40–150)
ALT SERPL-CCNC: 26 UNIT/L (ref 0–55)
APTT PPP: 29.9 SECONDS (ref 23.2–33.7)
AST SERPL-CCNC: 20 UNIT/L (ref 5–34)
BASOPHILS # BLD AUTO: 0.07 X10(3)/MCL (ref 0–0.2)
BASOPHILS NFR BLD AUTO: 0.7 %
BILIRUBIN DIRECT+TOT PNL SERPL-MCNC: 0.9 MG/DL
BUN SERPL-MCNC: 11.9 MG/DL (ref 8.4–25.7)
CALCIUM SERPL-MCNC: 9.3 MG/DL (ref 8.8–10)
CHLORIDE SERPL-SCNC: 106 MMOL/L (ref 98–107)
CO2 SERPL-SCNC: 23 MMOL/L (ref 23–31)
CREAT SERPL-MCNC: 0.68 MG/DL (ref 0.73–1.18)
EOSINOPHIL # BLD AUTO: 0.39 X10(3)/MCL (ref 0–0.9)
EOSINOPHIL NFR BLD AUTO: 3.9 %
ERYTHROCYTE [DISTWIDTH] IN BLOOD BY AUTOMATED COUNT: 13.1 % (ref 11.5–17)
GLOBULIN SER-MCNC: 3.1 GM/DL (ref 2.4–3.5)
GLUCOSE SERPL-MCNC: 145 MG/DL (ref 82–115)
HCT VFR BLD AUTO: 37.4 % (ref 42–52)
HGB BLD-MCNC: 12.8 GM/DL (ref 14–18)
IMM GRANULOCYTES # BLD AUTO: 0.07 X10(3)/MCL (ref 0–0.04)
IMM GRANULOCYTES NFR BLD AUTO: 0.7 %
INR BLD: 1.06 (ref 0–1.3)
LYMPHOCYTES # BLD AUTO: 1.96 X10(3)/MCL (ref 0.6–4.6)
LYMPHOCYTES NFR BLD AUTO: 19.8 %
MAGNESIUM SERPL-MCNC: 1.9 MG/DL (ref 1.6–2.6)
MCH RBC QN AUTO: 29.6 PG (ref 27–31)
MCHC RBC AUTO-ENTMCNC: 34.2 MG/DL (ref 33–36)
MCV RBC AUTO: 86.4 FL (ref 80–94)
MONOCYTES # BLD AUTO: 1.2 X10(3)/MCL (ref 0.1–1.3)
MONOCYTES NFR BLD AUTO: 12.1 %
NEUTROPHILS # BLD AUTO: 6.2 X10(3)/MCL (ref 2.1–9.2)
NEUTROPHILS NFR BLD AUTO: 62.8 %
NRBC BLD AUTO-RTO: 0 %
PHOSPHATE SERPL-MCNC: 2.8 MG/DL (ref 2.3–4.7)
PLATELET # BLD AUTO: 237 X10(3)/MCL (ref 130–400)
PMV BLD AUTO: 10.6 FL (ref 7.4–10.4)
POCT GLUCOSE: 134 MG/DL (ref 70–110)
POCT GLUCOSE: 175 MG/DL (ref 70–110)
POCT GLUCOSE: 177 MG/DL (ref 70–110)
POCT GLUCOSE: 212 MG/DL (ref 70–110)
POTASSIUM SERPL-SCNC: 3.8 MMOL/L (ref 3.5–5.1)
PROT SERPL-MCNC: 6.7 GM/DL (ref 5.8–7.6)
PROTHROMBIN TIME: 13.7 SECONDS (ref 12.5–14.5)
RBC # BLD AUTO: 4.33 X10(6)/MCL (ref 4.7–6.1)
SODIUM SERPL-SCNC: 140 MMOL/L (ref 136–145)
WBC # SPEC AUTO: 9.9 X10(3)/MCL (ref 4.5–11.5)

## 2022-07-07 PROCEDURE — 99233 SBSQ HOSP IP/OBS HIGH 50: CPT | Mod: ,,, | Performed by: PSYCHIATRY & NEUROLOGY

## 2022-07-07 PROCEDURE — 25000003 PHARM REV CODE 250: Performed by: STUDENT IN AN ORGANIZED HEALTH CARE EDUCATION/TRAINING PROGRAM

## 2022-07-07 PROCEDURE — 85610 PROTHROMBIN TIME: CPT | Performed by: STUDENT IN AN ORGANIZED HEALTH CARE EDUCATION/TRAINING PROGRAM

## 2022-07-07 PROCEDURE — 97110 THERAPEUTIC EXERCISES: CPT | Mod: CQ

## 2022-07-07 PROCEDURE — 85025 COMPLETE CBC W/AUTO DIFF WBC: CPT | Performed by: STUDENT IN AN ORGANIZED HEALTH CARE EDUCATION/TRAINING PROGRAM

## 2022-07-07 PROCEDURE — 63600175 PHARM REV CODE 636 W HCPCS: Performed by: STUDENT IN AN ORGANIZED HEALTH CARE EDUCATION/TRAINING PROGRAM

## 2022-07-07 PROCEDURE — 83735 ASSAY OF MAGNESIUM: CPT | Performed by: STUDENT IN AN ORGANIZED HEALTH CARE EDUCATION/TRAINING PROGRAM

## 2022-07-07 PROCEDURE — 25000003 PHARM REV CODE 250: Performed by: NURSE PRACTITIONER

## 2022-07-07 PROCEDURE — 80053 COMPREHEN METABOLIC PANEL: CPT | Performed by: STUDENT IN AN ORGANIZED HEALTH CARE EDUCATION/TRAINING PROGRAM

## 2022-07-07 PROCEDURE — 99233 PR SUBSEQUENT HOSPITAL CARE,LEVL III: ICD-10-PCS | Mod: ,,, | Performed by: PSYCHIATRY & NEUROLOGY

## 2022-07-07 PROCEDURE — 97535 SELF CARE MNGMENT TRAINING: CPT | Mod: CO

## 2022-07-07 PROCEDURE — 97530 THERAPEUTIC ACTIVITIES: CPT | Mod: CQ

## 2022-07-07 PROCEDURE — 85730 THROMBOPLASTIN TIME PARTIAL: CPT | Performed by: STUDENT IN AN ORGANIZED HEALTH CARE EDUCATION/TRAINING PROGRAM

## 2022-07-07 PROCEDURE — 11000001 HC ACUTE MED/SURG PRIVATE ROOM

## 2022-07-07 PROCEDURE — 84100 ASSAY OF PHOSPHORUS: CPT | Performed by: STUDENT IN AN ORGANIZED HEALTH CARE EDUCATION/TRAINING PROGRAM

## 2022-07-07 PROCEDURE — 36415 COLL VENOUS BLD VENIPUNCTURE: CPT | Performed by: STUDENT IN AN ORGANIZED HEALTH CARE EDUCATION/TRAINING PROGRAM

## 2022-07-07 RX ADMIN — INSULIN ASPART 2 UNITS: 100 INJECTION, SOLUTION INTRAVENOUS; SUBCUTANEOUS at 11:07

## 2022-07-07 RX ADMIN — ASPIRIN 325 MG ORAL TABLET 325 MG: 325 PILL ORAL at 08:07

## 2022-07-07 RX ADMIN — SODIUM CHLORIDE, POTASSIUM CHLORIDE, SODIUM LACTATE AND CALCIUM CHLORIDE: 600; 310; 30; 20 INJECTION, SOLUTION INTRAVENOUS at 04:07

## 2022-07-07 RX ADMIN — LABETALOL HYDROCHLORIDE 10 MG: 5 INJECTION INTRAVENOUS at 04:07

## 2022-07-07 RX ADMIN — LISINOPRIL 20 MG: 20 TABLET ORAL at 08:07

## 2022-07-07 RX ADMIN — ATORVASTATIN CALCIUM 80 MG: 40 TABLET, FILM COATED ORAL at 08:07

## 2022-07-07 RX ADMIN — HYDRALAZINE HYDROCHLORIDE 10 MG: 20 INJECTION INTRAMUSCULAR; INTRAVENOUS at 12:07

## 2022-07-07 RX ADMIN — SENNOSIDES AND DOCUSATE SODIUM 1 TABLET: 50; 8.6 TABLET ORAL at 08:07

## 2022-07-07 RX ADMIN — AMLODIPINE BESYLATE 5 MG: 5 TABLET ORAL at 08:07

## 2022-07-07 RX ADMIN — METOPROLOL SUCCINATE 50 MG: 50 TABLET, FILM COATED, EXTENDED RELEASE ORAL at 08:07

## 2022-07-07 NOTE — PT/OT/SLP PROGRESS
Occupational Therapy  Treatment    Ayaz Huggins   MRN: [id number]   Admitting Diagnosis: CVA    OT Date of Treatment: 07/07/22   OT Start Time: 0900  OT Stop Time: 0930  OT Total Time (min): 30 min     Billable Minutes:  Self Care/Home Management 30  Total Minutes: 30     OT/GREGORY: GREGORY     GREGORY Visit Number: 3    General Precautions: Standard, fall, other (see comments) (BP<180/105)  Orthopedic Precautions: N/A  Braces: N/A    Spiritual, Cultural Beliefs, Muslim Practices, Values that Affect Care: no    Subjective:  Communicated with RN prior to session.  Pt alert and motivated for OT session this AM.     Objective:  Patient found with: SCD, PRAFO, pulse ox (continuous), blood pressure cuff, telemetry   /78    Functional Mobility:  Bed Mobility:   Supine to sit: Minimal Assistance   Sit to supine: Activity did not occur   Rolling: Activity did not occur   Scooting: Standby Assistance    Transfer Training:  Sit>stand from EOB with Min A in prep for t/f. Mod A x2 stand-step t/f from EOB>BS recliner. A required to advance RLE, proper RUE placement onto RW, and management of RW. Cues provided for safe descend to BS recliner. Pt educated on reaching back for chair and sitting slowly.     Grooming:  Pt rolled to sink for grooming. Sit>stand from recliner Mod A x2 with RW. Oral hygiene completed in standing with SBA for completion of task and Min A for standing balance. Slight knee buckling noted. RLE noted to turn outwards, blocked to keep in midline.     Balance:   Static Sit: GOOD: Takes MODERATE challenges from all directions  Dynamic Sit:  GOOD-: Incosistently Maintains balance through MODERATE excursions of active trunk movement,     Static Stand: POOR+: Needs MINIMAL assist to maintain  Dynamic stand: POOR: Needs MOD (moderate) assist during gait    Additional Treatment:  Pt instructed in table glide exercises for strengthening RUE. Demo'd good carryover.    Patient left up in chair with all lines intact and  call button in reach    ASSESSMENT:  Ayaz Huggins is a 77 y.o. male with a medical diagnosis of CVA. Tolerated session well with good motivation and participation noted.  Would benefit from rehab to continue increasing strength, endurance and independence with ADLs.      Rehab potential is excellent    Activity tolerance: Excellent    Discharge recommendations: rehabilitation facility     Equipment recommendations: walker, rolling     GOALS:   Multidisciplinary Problems     Occupational Therapy Goals        Problem: Occupational Therapy    Goal Priority Disciplines Outcome Interventions   Occupational Therapy Goal     OT, PT/OT Ongoing, Progressing    Description: Goals to be met by: 7/18/22    Patient will increase functional independence with ADLs by performing:    UE Dressing with Setup Assistance.  LE Dressing with Contact Guard Assistance.  Toileting from bedside commode or toilet with Contact Guard Assistance for hygiene and clothing management.                      Plan:  Patient to be seen daily to address the above listed problems via self-care/home management, therapeutic activities, therapeutic exercises  Plan of Care expires: 07/18/22  Plan of Care reviewed with: patient     Documented by NORA Hart. Co-signed and reviewed by OLIVIA Villalobos Mai.          07/07/2022

## 2022-07-07 NOTE — PLAN OF CARE
07/07/22 1256   Discharge Assessment   Assessment Type Discharge Planning Assessment   Source of Information patient;family   Reason For Admission stroke   Lives With spouse;grandchild(jhonny)   Do you expect to return to your current living situation? No  (plans for rehab facility upon discharge)   Prior to hospitilization cognitive status: Alert/Oriented;No Deficits   Current cognitive status: Alert/Oriented;No Deficits   Walking or Climbing Stairs Difficulty none   Dressing/Bathing Difficulty none   Home Layout Able to live on 1st floor   Equipment Currently Used at Home glucometer   Do you currently have service(s) that help you manage your care at home? No   Who is going to help you get home at discharge? Pooja, wife   How do you get to doctors appointments? car, drives self;family or friend will provide   Are you on dialysis? No   Do you take coumadin? No   Discharge Plan A Rehab   Discharge Plan B Rehab   Discharge Plan discussed with: Spouse/sig other   Name(s) and Number(s) dominique Patton, [phone]   Discharge Barriers Identified None   Relationship/Environment   Name(s) of Who Lives With Patient Pooja   Pt states he lives at home with wife and 2 adult grandchildren in ground level home. Pt states he was exercising, driving, independent with ADL's prior to admit. PCP Dr Parham. Pt is expected to have carotid endardterectomy on Friday. Pt would like to go to [facility] rehab upon discharge. FOC verbally obtained. Explained to pt and family referral to be made after surgery tomorrow and after evaluation of recovery post procedure. Both voice understanding.

## 2022-07-07 NOTE — ASSESSMENT & PLAN NOTE
Continue ASA 325mg daily  Continue Atorvastatin 80mg daily  Normalize blood pressure  Aggressive risk management including BP< 140/90, LDL < 70, A1c< 7    Cardiology consulted for LINQ placement  Vascular Surgery planning CEA tomorrow (7/8)        Antithrombotics for secondary stroke prevention: Antiplatelets: Aspirin: 325 mg daily    Statins for secondary stroke prevention and hyperlipidemia, if present: Statins: Atorvastatin- 80 mg daily    Aggressive risk factor modification: HTN, DM, HLD     Rehab efforts: The patient has been evaluated by a stroke team provider and the therapy needs have been fully considered based off the presenting complaints and exam findings. The following therapy evaluations are needed: PM&R evaluate for appropriate placement    Diagnostics ordered/pending: None     VTE prophylaxis: Mechanical prophylaxis: Place SCDs    BP parameters: Infarct: normalize blood pressure

## 2022-07-07 NOTE — PROGRESS NOTES
[facility] Tele[facility]  Neurology  Progress Note    Patient Name: Ayaz Huggins  MRN: [id number]  Admission Date: 7/2/2022  Hospital Length of Stay: 5 days  Code Status: Full Code   Attending Provider: Nikko Murphy MD  Primary Care Physician: Angel Parham Jr, MD   Principal Problem:<principal problem not specified>    HPI:   Mr Huggins is a 77-year-old male with past medical history of DM, HTN, HLD, diabetic neuropathy, CAD s/p CABG, presented to ED on 7/2 with reports of right sided hemiplegia.  CTh showed no acute abnormalities.  He was given IV tPA and admitted to ICU for post tPA protocol.      Overview/Hospital Course:  Stroke workup  -CTh: no acute intracranial abnormalities  -CTA h/n: moderate left and mild right carotid bulb stenoses  -MRI brain: small acute infarcts supratentorially, the largest is a left posterior frontal 3cm cortical based infarct with petechial hemorrhage  -ECHO: EF 60-65%, bubble study negative, mild LA enlargement  -LDL: 48  -A1c: 5.9  -TSH: 1.4067  -reported compliance with ASA 81mg daily and Rosuvastatin 10mg daily at home          Subjective:     Interval History: Sitting in a chair.  Wife at bedside.  No new issues overnight.  Scheduled for CEA tomorrow.    Current Facility-Administered Medications   Medication Dose Route Frequency Provider Last Rate Last Admin    0.9%  NaCl infusion  50 mL Intravenous Once Pa Mccarthy MD        0.9%  NaCl infusion  50 mL Intravenous Once Pa Mccarthy MD        amLODIPine tablet 5 mg  5 mg Oral Daily Nikko Murphy MD   5 mg at 07/07/22 0805    aspirin tablet 325 mg  325 mg Oral Daily ARVIND Araujo   325 mg at 07/07/22 0805    atorvastatin tablet 80 mg  80 mg Oral QHS Kash Olivares DO   80 mg at 07/06/22 2033    bisacodyL suppository 10 mg  10 mg Rectal Daily PRN Zackary Reynolds MD   10 mg at 07/06/22 0106    dextrose 10% bolus 125 mL  12.5 g Intravenous PRN Kash Olivares DO         dextrose 10% bolus 250 mL  25 g Intravenous PRN Kash Olivares, DO        dextrose 50% injection 12.5 g  12.5 g Intravenous PRN Kash Olivares, DO        glucagon (human recombinant) injection 1 mg  1 mg Intramuscular PRN Kash Olivares, DO        hydrALAZINE injection 10 mg  10 mg Intravenous Q6H PRN Kash Olivares, DO   10 mg at 07/07/22 0001    insulin aspart U-100 injection 0-5 Units  0-5 Units Subcutaneous Q6H PRN Kash Olivares, DO   2 Units at 07/07/22 1133    labetaloL injection 10 mg  10 mg Intravenous Q15 Min PRN Pa Mccarthy MD   10 mg at 07/07/22 0433    lactated ringers infusion   Intravenous Continuous Nikko Murphy MD 75 mL/hr at 07/07/22 0449 New Bag at 07/07/22 0449    lisinopriL tablet 20 mg  20 mg Oral Daily Nikko Murphy MD   20 mg at 07/07/22 0805    metoprolol succinate (TOPROL-XL) 24 hr tablet 50 mg  50 mg Oral Daily Nikko Murphy MD   50 mg at 07/07/22 0805    ondansetron injection 4 mg  4 mg Intravenous Q6H PRN Nikko Murphy MD        polyethylene glycol packet 17 g  17 g Oral Daily Zackary Reynolds MD   17 g at 07/05/22 0831    promethazine injection 25 mg  25 mg Intramuscular Q6H PRN Nikko Murphy MD        senna-docusate 8.6-50 mg per tablet 1 tablet  1 tablet Oral Daily Zackary Reynolds MD   1 tablet at 07/07/22 0805    sodium chloride 0.9% flush 10 mL  10 mL Intravenous PRN Kash Olivares,            Review of Systems   Neurological:  Positive for weakness.   All other systems reviewed and are negative.    Objective:     Vital Signs (Most Recent):  Temp: 97.7 °F (36.5 °C) (07/07/22 1120)  Pulse: 64 (07/07/22 1120)  Resp: 18 (07/07/22 1120)  BP: (!) 163/64 (07/07/22 1120)  SpO2: 98 % (07/07/22 1120)   Vital Signs (24h Range):  Temp:  [97.7 °F (36.5 °C)-98.1 °F (36.7 °C)] 97.7 °F (36.5 °C)  Pulse:  [60-89] 64  Resp:  [18-20] 18  SpO2:  [96 %-99 %] 98 %  BP: (155-187)/(64-78) 163/64     Weight: 98.1 kg (216 lb 4.3 oz)  Body mass index is 30.18 kg/m².      Physical  Exam  Constitutional:       General: He is awake. He is not in acute distress.     Appearance: Normal appearance. He is not ill-appearing.   Eyes:      General: Vision grossly intact.      Extraocular Movements: Extraocular movements intact.      Pupils: Pupils are equal, round, and reactive to light.   Cardiovascular:      Rate and Rhythm: Normal rate and regular rhythm.      Pulses: Normal pulses.   Pulmonary:      Effort: Pulmonary effort is normal.   Skin:     General: Skin is warm and dry.   Neurological:      Mental Status: He is alert.      Comments:   Mental Status: Alert, oriented x 4, follows commands reliably  Language/Speech: naming and repetition intact; speech clear, fluent, coherent  Cranial Nerves:  -CN II, III: PERR, no visual field deficit, ptosis absent  -CN III, IV, VI: EOMI, no gaze preference, no nystagmus  -CN VII: right facial droop  Motor: RUE 4/5, RLE 0/5 ... LUE 5/5, LLE 5/5  Sensory: normal to tactile stimulation  Cerebellar: no tremor or dysmetria  Memory: normal thought process intact  Gait: not observed  Psychiatric:         Mood and Affect: Mood normal.         Behavior: Behavior normal.     Significant Labs: BMP:   Recent Labs   Lab 07/06/22  0143 07/07/22  0421    140   K 3.6 3.8   CO2 25 23   BUN 11.5 11.9   CREATININE 0.64* 0.68*   CALCIUM 9.6 9.3   MG 1.80 1.90     CBC:   Recent Labs   Lab 07/06/22  0143 07/07/22  0421   WBC 11.2 9.9   HGB 12.5* 12.8*   HCT 37.4* 37.4*    237       Significant Imaging: I have reviewed all pertinent imaging results/findings within the past 24 hours.    Assessment and Plan:     Stroke  Continue ASA 325mg daily  Continue Atorvastatin 80mg daily  Normalize blood pressure  Aggressive risk management including BP< 140/90, LDL < 70, A1c< 7    Cardiology consulted for LINQ placement  Vascular Surgery planning CEA tomorrow (7/8)        Antithrombotics for secondary stroke prevention: Antiplatelets: Aspirin: 325 mg daily    Statins for  secondary stroke prevention and hyperlipidemia, if present: Statins: Atorvastatin- 80 mg daily    Aggressive risk factor modification: HTN, DM, HLD     Rehab efforts: The patient has been evaluated by a stroke team provider and the therapy needs have been fully considered based off the presenting complaints and exam findings. The following therapy evaluations are needed: PM&R evaluate for appropriate placement    Diagnostics ordered/pending: None     VTE prophylaxis: Mechanical prophylaxis: Place SCDs  VTE Risk Mitigation (From admission, onward)         Ordered     IP VTE HIGH RISK PATIENT  Once         07/02/22 1019     Place sequential compression device  Until discontinued         07/02/22 1019               BP parameters: Infarct: normalize blood pressure        Further recommendations may follow by MD. Dian Nevarez, RAYMONDP  Neurology  [facility] - [facility]

## 2022-07-07 NOTE — PT/OT/SLP PROGRESS
Physical Therapy Treatment    Patient Name:  Ayaz Huggins   MRN:  [id number]    Recommendations:     Discharge Recommendations:  rehabilitation facility   Discharge Equipment Recommendations: walker, rolling   Barriers to discharge: None    Assessment:     Pt sitting up in recliner since ~ 9:30 a.m. Pleasant and very motivated to participate. Returned to bed post tx session. Spouse present and education provided on HEP.     Rehab Prognosis: Good; patient would benefit from acute skilled PT services to address these deficits and reach maximum level of function.    Recent Surgery: Procedure(s) (LRB):  ENDARTERECTOMY, CAROTID (Right)      Plan:     During this hospitalization, patient to be seen BID to address the identified rehab impairments via gait training, therapeutic activities, therapeutic exercises, neuromuscular re-education and progress toward the following goals:    · Plan of Care Expires:  08/04/22    Subjective     Chief Complaint: None  Patient/Family Comments/goals: Return to PLOF  Pain/Comfort:  · Pain Rating 1: 0/10      Objective:     Communicated with RN prior to session.  Patient found up in chair with pulse ox (continuous), telemetry upon PT entry to room.     General Precautions: Standard, fall   Orthopedic Precautions:N/A   Braces: N/A  Respiratory Status: Room air   Vitals:   Vitals after tx session:   HR 66  67  /65 149/63  SPO2 97%     Functional Mobility:  · Bed Mobility:     · Sit to Supine: moderate assistance  · Transfers:     · Sit to Stand:  minimum assistance with rolling walker  · Bed to chair with mod x 2 assist; assist to weight shift and advance RLE. R knee blocked to prevent buckling.   · Gait: Side steps at EOB with RW, Mod assist for balance, mild R knee buckling x 2, assist to advance RLE, cues to weight shift.         Therapeutic Activities and Exercises:  Bridges x 15 reps total. Pillow squeeze and with RLE placed closer to buttocks to increase challenge. Cues for  breathing technique.   RLE hip IR, hip abd/add, and ankle DF/PF. Trace hip adduction only.    Supine RLE hip adduction with hip and knee flexed, foot stabilized.        Patient left HOB elevated with all lines intact and call button in reach..    GOALS:   Multidisciplinary Problems     Physical Therapy Goals        Problem: Physical Therapy    Goal Priority Disciplines Outcome Goal Variances Interventions   Physical Therapy Goal     PT, PT/OT Ongoing, Progressing     Description: Goals to be met by: 2022     Patient will increase functional independence with mobility by performin. Supine to sit with Contact Guard Assistance  2. Sit to supine with Contact Guard Assistance  3. Sit to stand transfer with Contact Guard Assistance  4. Gait  x 150 feet with Minimal Assistance using Rolling Walker vs LRAD.                      Time Tracking:     PT Received On: 22  PT Start Time: 1403     PT Stop Time: 1450  PT Total Time (min): 47 min     Billable Minutes: Therapeutic Activity 2 unit and Therapeutic Exercise 1 unit    Treatment Type: Treatment  PT/PTA: PTA     PTA Visit Number: 2     2022

## 2022-07-07 NOTE — PROGRESS NOTES
[facility] Progress Note        Chief Complaint: Inpatient Follow-up for     Subjective:  Mr. Huggins is a 76 y/o male with a PMH of Type II DM, diabetic neuropathy, CAD s/p CABG x2 (2007), and HTN who presented to [facility] ED  On 7/2/22 with complaints of right sided upper and lower extremity weakness. Patient's story is unclear upon the time of last known normal. He states he woke up  with a cramp in the RLE and noticed the right sided weakness.The patient was admitted to the ICU with an ischemic CVA and administered tPA; he has had some improvement in his right-sided weakness although he is unable to purposefully move his right lower extremity.  Neurology has seen and evaluated the patient and has deemed this patient stable for transfer to the floor.  This patient was seen and evaluated in the intensive care unit.    Seen and examined patient afebrile vital stable hemodynamically stable.  Neurologically doing fine.      Objective/physical exam:  General: In no acute distress, AAOx3   Chest: Clear to auscultation bilaterally, non-labored   Heart: RRR, +S1, S2, no appreciable murmur  Abdomen: Soft, nontender, BS +  MSK: Warm, no lower extremity edema, no clubbing or cyanosis  Neurologic:  Cranial nerve II-XII intact, Strength 5/5 in all 4 extremities    VITAL SIGNS: 24 HRS MIN & MAX LAST   Temp  Min: 97.7 °F (36.5 °C)  Max: 98.1 °F (36.7 °C) 97.7 °F (36.5 °C)   BP  Min: 155/73  Max: 187/72 (!) 163/64   Pulse  Min: 64  Max: 89  64   Resp  Min: 18  Max: 20 18   SpO2  Min: 96 %  Max: 99 % 98 %       Labs, Microbiology and Imaging were Reviewed.      Microbiology Results (last 7 days)     ** No results found for the last 168 hours. **             Medications   sodium chloride 0.9%  50 mL Intravenous Once    sodium chloride 0.9%  50 mL Intravenous Once    amLODIPine  5 mg Oral Daily    aspirin  325 mg Oral Daily    atorvastatin  80 mg Oral QHS    lisinopriL  20 mg Oral  Daily    metoprolol succinate  50 mg Oral Daily    polyethylene glycol  17 g Oral Daily    senna-docusate 8.6-50 mg  1 tablet Oral Daily        bisacodyL, dextrose 10%, dextrose 10%, dextrose 50%, glucagon (human recombinant), hydrALAZINE, insulin aspart U-100, labetalol, ondansetron, promethazine, sodium chloride 0.9%     Radiology:  MRI Brain W WO Contrast  Narrative: EXAMINATION:  MRI BRAIN W WO CONTRAST    CLINICAL HISTORY:  Stroke, follow up;;    TECHNIQUE:  Multiplanar, multisequence MR images of the brain were obtained WITH and WITHOUT the administration of intravenous gadolinium based contrast.    COMPARISON:  Head CT/CTA 07/02/2022    FINDINGS:  Scalp: No abnormalities.    Bone marrow: No signal abnormalities.    Brain sulci: Appropriate for patient's age.    Ventricles: Normal in size and configuration. No hydrocephalus.    Extra-axial spaces:    No masses or fluid collections.    Parenchyma:    Scattered foci of ischemia supratentorially (L>R), the largest is a left posterior frontal 3 x 2.1 cm focus, with additional subcentimeter bifrontal, biparietal, left occipitotemporal and left basal ganglia foci.    No additional acute vascular insults.    The largest infarct is associated with patchy magnetic susceptibility, remaining do not show magnetic susceptibility.    No mass or large volume hemorrhage.    Punctate left posterior frontal foci of enhancement is favored to relate to ischemia.    Otherwise no enhancing abnormalities.    Vessels: Normal flow voids in major arteries and veins.    Sellar/Suprasellar region: No abnormalities.    Craniocervical junction: No abnormalities.  Impression: 1. Small acute infarcts supratentorially, the largest is a left posterior frontal 3 cm cortical based infarct with petechial hemorrhage.  No mass effect or shift.  Discussed with Dr. Duncan.  2. Otherwise no acute intracranial abnormalities.    Electronically signed by: Abner  Teresa  Date:    07/03/2022  Time:    14:09          Assessment/Plan:  Acute left hemispheric ischemic CVA, S/P TPA  -monitor neuro status closely  - q4 hr neurochecks  -ST/OT/PT  - zofran and phenergan for nausea.   - vascular surgery is planning carotid endarterectomy tomorrow   - aspirin 325 mg daily per neurology.      Hypertension  -BP parameters < 180/105  -prn antihypertensives  - continue metoprolol 50 mg and continue lisinopril 10 mg.      DM Type II  -diabetic diet   -CBG's ac & hs     Hyperlipidemia  -cont. Statin     Anemia of chronic disease  -monitor CBC        All diagnosis and differential diagnosis have been reviewed; assessment and plan has been documented; I have personally reviewed the labs and test results that are presently available; I have reviewed the patients medication list; I have reviewed the consulting providers response and recommendations. I have reviewed or attempted to review medical records based upon their availability.       Nikko Murphy MD   07/07/2022

## 2022-07-07 NOTE — PROGRESS NOTES
[facility]  Progress Note      Patient: Ayaz Huggins  MRN: [id number]  STATUS: IP- Inpatient   DOS: 7/7/2022   PCP: Angel Parham Jr, MD      SUBJECTIVE  77 y.o. male with left hemispheric stroke with right lower extremity paralysis    No acute issue overnight     ALLERGIES  Reviewed as documented in EMR  [place]      PHYSICAL EXAM  VITALS:  Temp: 97.6 °F (36.4 °C) (07/07/22 1650)  Pulse: 64 (07/07/22 1650)  Resp: 18 (07/07/22 1650)  BP: (!) 164/73 (07/07/22 1650)  SpO2: 98 % (07/07/22 1650)    GENERAL: Awake and in NAD  HEENT: NC/AT, EOMI, Conjunctiva are pink, sclera are white.  NECK: Supple  LUNGS: CTA anteriorly, fair air movement, no peripheral cyanosis  CVS: RRR, No JVD  ABD: Soft, NT, ND, BS+  EXT: No motor function in right lower extremity   SKIN: Warm, dry, no rashes or lesions  NEURO: AAOx3  PSYCH: Cooperative, appropriate mood and affect      LABS  Labs:  Recent Labs     07/05/22 0124 07/06/22 0143 07/07/22 0421   WBC 13.2* 11.2 9.9   HGB 12.6* 12.5* 12.8*   HCT 38.4* 37.4* 37.4*   MCV 88.7 88.2 86.4   MCH 29.1 29.5 29.6   MCHC 32.8* 33.4 34.2   RDW 13.4 13.2 13.1    210 237     Recent Labs     07/05/22 0124 07/06/22 0143 07/07/22 0421    139 140   K 3.6 3.6 3.8   BUN 14.3 11.5 11.9   CREATININE 0.73 0.64* 0.68*   GLUCOSE 148* 150* 145*     Recent Labs     07/05/22 0124 07/06/22 0143 07/07/22 0421   CALCIUM 9.8 9.6 9.3   MG 2.00 1.80 1.90   PHOS 3.0 2.9 2.8     Recent Labs     07/05/22 0124 07/06/22  0143 07/07/22  0421   PROTIME 14.0 14.2 13.7   INR 1.09 1.11 1.06     Recent Labs     07/05/22  0124 07/06/22  0143 07/07/22  0421   BILITOT 0.9 1.0 0.9   AST 16 21 20   ALT 16 22 26   ALKPHOS 72 79 88   ALBUMIN 3.5 3.5 3.6           ASSESSMENT  Patient with left symptomatic carotid stenosis with no motor function in the right lower extremity     PLAN  Continue with aspirin   Continue with statin   Left CEA tomorrow   Risks and benefits were  discussed with patient       Dread Lama MD

## 2022-07-07 NOTE — SUBJECTIVE & OBJECTIVE
Subjective:     Interval History: Sitting in a chair.  Wife at bedside.  No new issues overnight.  Scheduled for CEA tomorrow.    Current Facility-Administered Medications   Medication Dose Route Frequency Provider Last Rate Last Admin    0.9%  NaCl infusion  50 mL Intravenous Once Pa Mccarthy MD        0.9%  NaCl infusion  50 mL Intravenous Once Pa Mccarthy MD        amLODIPine tablet 5 mg  5 mg Oral Daily Nikko Murphy MD   5 mg at 07/07/22 0805    aspirin tablet 325 mg  325 mg Oral Daily ARVIND Araujo   325 mg at 07/07/22 0805    atorvastatin tablet 80 mg  80 mg Oral QHS Kash Olivares, DO   80 mg at 07/06/22 2033    bisacodyL suppository 10 mg  10 mg Rectal Daily PRN Zackary Reynolds MD   10 mg at 07/06/22 0106    dextrose 10% bolus 125 mL  12.5 g Intravenous PRN Kash Olivares, DO        dextrose 10% bolus 250 mL  25 g Intravenous PRN Kash Olivares, DO        dextrose 50% injection 12.5 g  12.5 g Intravenous PRN Kash Olivares, DO        glucagon (human recombinant) injection 1 mg  1 mg Intramuscular PRN Kash Olivares, DO        hydrALAZINE injection 10 mg  10 mg Intravenous Q6H PRN Kash Olivares, DO   10 mg at 07/07/22 0001    insulin aspart U-100 injection 0-5 Units  0-5 Units Subcutaneous Q6H PRN Kash Olivares, DO   2 Units at 07/07/22 1133    labetaloL injection 10 mg  10 mg Intravenous Q15 Min PRN Pa Mccarthy MD   10 mg at 07/07/22 0433    lactated ringers infusion   Intravenous Continuous Nikok Murphy MD 75 mL/hr at 07/07/22 0449 New Bag at 07/07/22 0449    lisinopriL tablet 20 mg  20 mg Oral Daily Nikko Murphy MD   20 mg at 07/07/22 0805    metoprolol succinate (TOPROL-XL) 24 hr tablet 50 mg  50 mg Oral Daily Nikko Murphy MD   50 mg at 07/07/22 0805    ondansetron injection 4 mg  4 mg Intravenous Q6H PRN Nikko Murphy MD        polyethylene glycol packet 17 g  17 g Oral Daily Zackary Reynolds MD   17 g at 07/05/22 0831    promethazine injection 25 mg  25 mg  Intramuscular Q6H PRN Nikko Murphy MD        senna-docusate 8.6-50 mg per tablet 1 tablet  1 tablet Oral Daily Zackary Reynolds MD   1 tablet at 07/07/22 0805    sodium chloride 0.9% flush 10 mL  10 mL Intravenous PRN Kash Olivares,            Review of Systems   Neurological:  Positive for weakness.   All other systems reviewed and are negative.    Objective:     Vital Signs (Most Recent):  Temp: 97.7 °F (36.5 °C) (07/07/22 1120)  Pulse: 64 (07/07/22 1120)  Resp: 18 (07/07/22 1120)  BP: (!) 163/64 (07/07/22 1120)  SpO2: 98 % (07/07/22 1120)   Vital Signs (24h Range):  Temp:  [97.7 °F (36.5 °C)-98.1 °F (36.7 °C)] 97.7 °F (36.5 °C)  Pulse:  [60-89] 64  Resp:  [18-20] 18  SpO2:  [96 %-99 %] 98 %  BP: (155-187)/(64-78) 163/64     Weight: 98.1 kg (216 lb 4.3 oz)  Body mass index is 30.18 kg/m².      Physical Exam  Constitutional:       General: He is awake. He is not in acute distress.     Appearance: Normal appearance. He is not ill-appearing.   Eyes:      General: Vision grossly intact.      Extraocular Movements: Extraocular movements intact.      Pupils: Pupils are equal, round, and reactive to light.   Cardiovascular:      Rate and Rhythm: Normal rate and regular rhythm.      Pulses: Normal pulses.   Pulmonary:      Effort: Pulmonary effort is normal.   Skin:     General: Skin is warm and dry.   Neurological:      Mental Status: He is alert.      Comments:   Mental Status: Alert, oriented x 4, follows commands reliably  Language/Speech: naming and repetition intact; speech clear, fluent, coherent  Cranial Nerves:  -CN II, III: PERR, no visual field deficit, ptosis absent  -CN III, IV, VI: EOMI, no gaze preference, no nystagmus  -CN VII: right facial droop  Motor: RUE 4/5, RLE 0/5 ... LUE 5/5, LLE 5/5  Sensory: normal to tactile stimulation  Cerebellar: no tremor or dysmetria  Memory: normal thought process intact  Gait: not observed  Psychiatric:         Mood and Affect: Mood normal.         Behavior: Behavior  normal.     Significant Labs: BMP:   Recent Labs   Lab 07/06/22  0143 07/07/22  0421    140   K 3.6 3.8   CO2 25 23   BUN 11.5 11.9   CREATININE 0.64* 0.68*   CALCIUM 9.6 9.3   MG 1.80 1.90     CBC:   Recent Labs   Lab 07/06/22  0143 07/07/22  0421   WBC 11.2 9.9   HGB 12.5* 12.8*   HCT 37.4* 37.4*    237       Significant Imaging: I have reviewed all pertinent imaging results/findings within the past 24 hours.

## 2022-07-08 ENCOUNTER — ANESTHESIA EVENT (OUTPATIENT)
Dept: SURGERY | Facility: HOSPITAL | Age: 77
DRG: 038 | End: 2022-07-08
Payer: MEDICARE

## 2022-07-08 ENCOUNTER — ANESTHESIA (OUTPATIENT)
Dept: SURGERY | Facility: HOSPITAL | Age: 77
DRG: 038 | End: 2022-07-08
Payer: MEDICARE

## 2022-07-08 PROBLEM — I65.22 CAROTID ARTERY STENOSIS, SYMPTOMATIC, LEFT: Status: ACTIVE | Noted: 2022-07-08

## 2022-07-08 LAB
ALBUMIN SERPL-MCNC: 3.6 GM/DL (ref 3.4–4.8)
ALBUMIN/GLOB SERPL: 1.2 RATIO (ref 1.1–2)
ALP SERPL-CCNC: 91 UNIT/L (ref 40–150)
ALT SERPL-CCNC: 28 UNIT/L (ref 0–55)
APTT PPP: 31.1 SECONDS (ref 23.2–33.7)
AST SERPL-CCNC: 23 UNIT/L (ref 5–34)
BASOPHILS # BLD AUTO: 0.06 X10(3)/MCL (ref 0–0.2)
BASOPHILS NFR BLD AUTO: 0.6 %
BILIRUBIN DIRECT+TOT PNL SERPL-MCNC: 0.9 MG/DL
BUN SERPL-MCNC: 11.3 MG/DL (ref 8.4–25.7)
CALCIUM SERPL-MCNC: 9.1 MG/DL (ref 8.8–10)
CHLORIDE SERPL-SCNC: 107 MMOL/L (ref 98–107)
CO2 SERPL-SCNC: 23 MMOL/L (ref 23–31)
CREAT SERPL-MCNC: 0.67 MG/DL (ref 0.73–1.18)
EOSINOPHIL # BLD AUTO: 0.36 X10(3)/MCL (ref 0–0.9)
EOSINOPHIL NFR BLD AUTO: 3.6 %
ERYTHROCYTE [DISTWIDTH] IN BLOOD BY AUTOMATED COUNT: 13.2 % (ref 11.5–17)
GLOBULIN SER-MCNC: 3 GM/DL (ref 2.4–3.5)
GLUCOSE SERPL-MCNC: 150 MG/DL (ref 82–115)
GROUP & RH: NORMAL
HCT VFR BLD AUTO: 40.1 % (ref 42–52)
HGB BLD-MCNC: 13.2 GM/DL (ref 14–18)
IMM GRANULOCYTES # BLD AUTO: 0.04 X10(3)/MCL (ref 0–0.04)
IMM GRANULOCYTES NFR BLD AUTO: 0.4 %
INDIRECT COOMBS GEL: NORMAL
INR BLD: 1.11 (ref 0–1.3)
LYMPHOCYTES # BLD AUTO: 2.26 X10(3)/MCL (ref 0.6–4.6)
LYMPHOCYTES NFR BLD AUTO: 22.8 %
MAGNESIUM SERPL-MCNC: 2 MG/DL (ref 1.6–2.6)
MCH RBC QN AUTO: 29.3 PG (ref 27–31)
MCHC RBC AUTO-ENTMCNC: 32.9 MG/DL (ref 33–36)
MCV RBC AUTO: 89.1 FL (ref 80–94)
MONOCYTES # BLD AUTO: 1.01 X10(3)/MCL (ref 0.1–1.3)
MONOCYTES NFR BLD AUTO: 10.2 %
NEUTROPHILS # BLD AUTO: 6.2 X10(3)/MCL (ref 2.1–9.2)
NEUTROPHILS NFR BLD AUTO: 62.4 %
NRBC BLD AUTO-RTO: 0 %
PHOSPHATE SERPL-MCNC: 2.7 MG/DL (ref 2.3–4.7)
PLATELET # BLD AUTO: 228 X10(3)/MCL (ref 130–400)
PMV BLD AUTO: 10 FL (ref 7.4–10.4)
POCT GLUCOSE: 140 MG/DL (ref 70–110)
POCT GLUCOSE: 166 MG/DL (ref 70–110)
POTASSIUM SERPL-SCNC: 4 MMOL/L (ref 3.5–5.1)
PROT SERPL-MCNC: 6.6 GM/DL (ref 5.8–7.6)
PROTHROMBIN TIME: 14.2 SECONDS (ref 12.5–14.5)
RBC # BLD AUTO: 4.5 X10(6)/MCL (ref 4.7–6.1)
SODIUM SERPL-SCNC: 139 MMOL/L (ref 136–145)
WBC # SPEC AUTO: 9.9 X10(3)/MCL (ref 4.5–11.5)

## 2022-07-08 PROCEDURE — 71000033 HC RECOVERY, INTIAL HOUR: Performed by: SURGERY

## 2022-07-08 PROCEDURE — 86850 RBC ANTIBODY SCREEN: CPT | Performed by: ANESTHESIOLOGY

## 2022-07-08 PROCEDURE — 36000706: Performed by: SURGERY

## 2022-07-08 PROCEDURE — 97110 THERAPEUTIC EXERCISES: CPT | Mod: CO

## 2022-07-08 PROCEDURE — 36415 COLL VENOUS BLD VENIPUNCTURE: CPT | Performed by: STUDENT IN AN ORGANIZED HEALTH CARE EDUCATION/TRAINING PROGRAM

## 2022-07-08 PROCEDURE — 85730 THROMBOPLASTIN TIME PARTIAL: CPT | Performed by: STUDENT IN AN ORGANIZED HEALTH CARE EDUCATION/TRAINING PROGRAM

## 2022-07-08 PROCEDURE — 25000003 PHARM REV CODE 250: Performed by: SURGERY

## 2022-07-08 PROCEDURE — 25000003 PHARM REV CODE 250: Performed by: NURSE ANESTHETIST, CERTIFIED REGISTERED

## 2022-07-08 PROCEDURE — 25000003 PHARM REV CODE 250: Performed by: STUDENT IN AN ORGANIZED HEALTH CARE EDUCATION/TRAINING PROGRAM

## 2022-07-08 PROCEDURE — 11000001 HC ACUTE MED/SURG PRIVATE ROOM

## 2022-07-08 PROCEDURE — 85610 PROTHROMBIN TIME: CPT | Performed by: STUDENT IN AN ORGANIZED HEALTH CARE EDUCATION/TRAINING PROGRAM

## 2022-07-08 PROCEDURE — 37000009 HC ANESTHESIA EA ADD 15 MINS: Performed by: SURGERY

## 2022-07-08 PROCEDURE — 27000221 HC OXYGEN, UP TO 24 HOURS

## 2022-07-08 PROCEDURE — 63600175 PHARM REV CODE 636 W HCPCS: Performed by: NURSE ANESTHETIST, CERTIFIED REGISTERED

## 2022-07-08 PROCEDURE — 83735 ASSAY OF MAGNESIUM: CPT | Performed by: STUDENT IN AN ORGANIZED HEALTH CARE EDUCATION/TRAINING PROGRAM

## 2022-07-08 PROCEDURE — 85025 COMPLETE CBC W/AUTO DIFF WBC: CPT | Performed by: STUDENT IN AN ORGANIZED HEALTH CARE EDUCATION/TRAINING PROGRAM

## 2022-07-08 PROCEDURE — 80053 COMPREHEN METABOLIC PANEL: CPT | Performed by: STUDENT IN AN ORGANIZED HEALTH CARE EDUCATION/TRAINING PROGRAM

## 2022-07-08 PROCEDURE — C1729 CATH, DRAINAGE: HCPCS | Performed by: SURGERY

## 2022-07-08 PROCEDURE — 71000039 HC RECOVERY, EACH ADD'L HOUR: Performed by: SURGERY

## 2022-07-08 PROCEDURE — 37000008 HC ANESTHESIA 1ST 15 MINUTES: Performed by: SURGERY

## 2022-07-08 PROCEDURE — 97535 SELF CARE MNGMENT TRAINING: CPT | Mod: CO

## 2022-07-08 PROCEDURE — 25000003 PHARM REV CODE 250: Performed by: NURSE PRACTITIONER

## 2022-07-08 PROCEDURE — C1768 GRAFT, VASCULAR: HCPCS | Performed by: SURGERY

## 2022-07-08 PROCEDURE — 84100 ASSAY OF PHOSPHORUS: CPT | Performed by: STUDENT IN AN ORGANIZED HEALTH CARE EDUCATION/TRAINING PROGRAM

## 2022-07-08 PROCEDURE — 63600175 PHARM REV CODE 636 W HCPCS: Performed by: SURGERY

## 2022-07-08 PROCEDURE — 36415 COLL VENOUS BLD VENIPUNCTURE: CPT | Performed by: ANESTHESIOLOGY

## 2022-07-08 PROCEDURE — 36000707: Performed by: SURGERY

## 2022-07-08 PROCEDURE — 97530 THERAPEUTIC ACTIVITIES: CPT | Mod: CQ

## 2022-07-08 DEVICE — PATCH XENOSURE TAPR .8X8CM: Type: IMPLANTABLE DEVICE | Site: CAROTID | Status: FUNCTIONAL

## 2022-07-08 RX ORDER — PROPOFOL 10 MG/ML
VIAL (ML) INTRAVENOUS
Status: DISCONTINUED | OUTPATIENT
Start: 2022-07-08 | End: 2022-07-08

## 2022-07-08 RX ORDER — MIDAZOLAM HYDROCHLORIDE 1 MG/ML
INJECTION INTRAMUSCULAR; INTRAVENOUS
Status: DISCONTINUED | OUTPATIENT
Start: 2022-07-08 | End: 2022-07-08

## 2022-07-08 RX ORDER — FENTANYL CITRATE 50 UG/ML
INJECTION, SOLUTION INTRAMUSCULAR; INTRAVENOUS
Status: DISCONTINUED | OUTPATIENT
Start: 2022-07-08 | End: 2022-07-08

## 2022-07-08 RX ORDER — ONDANSETRON 2 MG/ML
4 INJECTION INTRAMUSCULAR; INTRAVENOUS EVERY 12 HOURS PRN
Status: DISCONTINUED | OUTPATIENT
Start: 2022-07-08 | End: 2022-07-12 | Stop reason: HOSPADM

## 2022-07-08 RX ORDER — PROTAMINE SULFATE 10 MG/ML
INJECTION, SOLUTION INTRAVENOUS
Status: DISCONTINUED | OUTPATIENT
Start: 2022-07-08 | End: 2022-07-08

## 2022-07-08 RX ORDER — SODIUM CHLORIDE 9 MG/ML
INJECTION, SOLUTION INTRAVENOUS CONTINUOUS
Status: DISCONTINUED | OUTPATIENT
Start: 2022-07-08 | End: 2022-07-11

## 2022-07-08 RX ORDER — HYDROCODONE BITARTRATE AND ACETAMINOPHEN 5; 325 MG/1; MG/1
1 TABLET ORAL EVERY 4 HOURS PRN
Status: DISCONTINUED | OUTPATIENT
Start: 2022-07-08 | End: 2022-07-12 | Stop reason: HOSPADM

## 2022-07-08 RX ORDER — GLYCOPYRROLATE 0.2 MG/ML
INJECTION INTRAMUSCULAR; INTRAVENOUS
Status: DISCONTINUED | OUTPATIENT
Start: 2022-07-08 | End: 2022-07-08

## 2022-07-08 RX ORDER — DOCUSATE SODIUM 100 MG/1
100 CAPSULE, LIQUID FILLED ORAL 2 TIMES DAILY
Status: DISCONTINUED | OUTPATIENT
Start: 2022-07-08 | End: 2022-07-12 | Stop reason: HOSPADM

## 2022-07-08 RX ORDER — VANCOMYCIN HCL IN 5 % DEXTROSE 1G/250ML
1000 PLASTIC BAG, INJECTION (ML) INTRAVENOUS
Status: COMPLETED | OUTPATIENT
Start: 2022-07-09 | End: 2022-07-09

## 2022-07-08 RX ORDER — HEPARIN SODIUM 1000 [USP'U]/ML
INJECTION, SOLUTION INTRAVENOUS; SUBCUTANEOUS
Status: DISCONTINUED | OUTPATIENT
Start: 2022-07-08 | End: 2022-07-08

## 2022-07-08 RX ORDER — SODIUM CHLORIDE 900 MG/100ML
500 INJECTION INTRAVENOUS ONCE
Status: COMPLETED | OUTPATIENT
Start: 2022-07-08 | End: 2022-07-08

## 2022-07-08 RX ORDER — CLINDAMYCIN PHOSPHATE 900 MG/50ML
INJECTION, SOLUTION INTRAVENOUS
Status: DISPENSED
Start: 2022-07-08 | End: 2022-07-09

## 2022-07-08 RX ORDER — ROCURONIUM BROMIDE 10 MG/ML
INJECTION, SOLUTION INTRAVENOUS
Status: DISCONTINUED | OUTPATIENT
Start: 2022-07-08 | End: 2022-07-08

## 2022-07-08 RX ORDER — LIDOCAINE HYDROCHLORIDE 20 MG/ML
INJECTION, SOLUTION INFILTRATION; PERINEURAL
Status: DISCONTINUED | OUTPATIENT
Start: 2022-07-08 | End: 2022-07-08 | Stop reason: HOSPADM

## 2022-07-08 RX ORDER — CLINDAMYCIN PHOSPHATE 900 MG/50ML
900 INJECTION, SOLUTION INTRAVENOUS ONCE
Status: DISCONTINUED | OUTPATIENT
Start: 2022-07-08 | End: 2022-07-09

## 2022-07-08 RX ORDER — HEPARIN SODIUM 5000 [USP'U]/ML
5000 INJECTION, SOLUTION INTRAVENOUS; SUBCUTANEOUS EVERY 8 HOURS
Status: DISCONTINUED | OUTPATIENT
Start: 2022-07-09 | End: 2022-07-12 | Stop reason: HOSPADM

## 2022-07-08 RX ORDER — DEXAMETHASONE SODIUM PHOSPHATE 4 MG/ML
INJECTION, SOLUTION INTRA-ARTICULAR; INTRALESIONAL; INTRAMUSCULAR; INTRAVENOUS; SOFT TISSUE
Status: DISCONTINUED | OUTPATIENT
Start: 2022-07-08 | End: 2022-07-08

## 2022-07-08 RX ORDER — ONDANSETRON 2 MG/ML
INJECTION INTRAMUSCULAR; INTRAVENOUS
Status: DISCONTINUED | OUTPATIENT
Start: 2022-07-08 | End: 2022-07-08

## 2022-07-08 RX ORDER — ACETAMINOPHEN 10 MG/ML
INJECTION, SOLUTION INTRAVENOUS
Status: DISCONTINUED | OUTPATIENT
Start: 2022-07-08 | End: 2022-07-08

## 2022-07-08 RX ORDER — MUPIROCIN 20 MG/G
OINTMENT TOPICAL 2 TIMES DAILY
Status: DISCONTINUED | OUTPATIENT
Start: 2022-07-08 | End: 2022-07-12 | Stop reason: HOSPADM

## 2022-07-08 RX ORDER — HEPARIN SODIUM 5000 [USP'U]/ML
INJECTION, SOLUTION INTRAVENOUS; SUBCUTANEOUS
Status: DISCONTINUED | OUTPATIENT
Start: 2022-07-08 | End: 2022-07-08 | Stop reason: HOSPADM

## 2022-07-08 RX ADMIN — SUGAMMADEX 200 MG: 100 INJECTION, SOLUTION INTRAVENOUS at 06:07

## 2022-07-08 RX ADMIN — VANCOMYCIN HYDROCHLORIDE 1000 MG: 1 INJECTION, POWDER, LYOPHILIZED, FOR SOLUTION INTRAVENOUS at 04:07

## 2022-07-08 RX ADMIN — ATORVASTATIN CALCIUM 80 MG: 40 TABLET, FILM COATED ORAL at 09:07

## 2022-07-08 RX ADMIN — PHENYLEPHRINE HYDROCHLORIDE 30 MCG/MIN: 10 INJECTION INTRAVENOUS at 04:07

## 2022-07-08 RX ADMIN — PROPOFOL 50 MG: 10 INJECTION, EMULSION INTRAVENOUS at 05:07

## 2022-07-08 RX ADMIN — METOPROLOL SUCCINATE 50 MG: 50 TABLET, FILM COATED, EXTENDED RELEASE ORAL at 08:07

## 2022-07-08 RX ADMIN — FENTANYL CITRATE 100 MCG: 50 INJECTION, SOLUTION INTRAMUSCULAR; INTRAVENOUS at 04:07

## 2022-07-08 RX ADMIN — SODIUM CHLORIDE: 9 INJECTION, SOLUTION INTRAVENOUS at 11:07

## 2022-07-08 RX ADMIN — SODIUM CHLORIDE, SODIUM GLUCONATE, SODIUM ACETATE, POTASSIUM CHLORIDE AND MAGNESIUM CHLORIDE: 526; 502; 368; 37; 30 INJECTION, SOLUTION INTRAVENOUS at 04:07

## 2022-07-08 RX ADMIN — SODIUM CHLORIDE 500 ML: 9 INJECTION, SOLUTION INTRAVENOUS at 10:07

## 2022-07-08 RX ADMIN — PROTAMINE SULFATE 45 MG: 10 INJECTION, SOLUTION INTRAVENOUS at 06:07

## 2022-07-08 RX ADMIN — ACETAMINOPHEN 1000 MG: 10 INJECTION, SOLUTION INTRAVENOUS at 05:07

## 2022-07-08 RX ADMIN — HEPARIN SODIUM 10000 UNITS: 1000 INJECTION, SOLUTION INTRAVENOUS; SUBCUTANEOUS at 05:07

## 2022-07-08 RX ADMIN — ROCURONIUM BROMIDE 20 MG: 10 SOLUTION INTRAVENOUS at 05:07

## 2022-07-08 RX ADMIN — ONDANSETRON 4 MG: 2 INJECTION INTRAMUSCULAR; INTRAVENOUS at 04:07

## 2022-07-08 RX ADMIN — LISINOPRIL 20 MG: 20 TABLET ORAL at 08:07

## 2022-07-08 RX ADMIN — ROCURONIUM BROMIDE 50 MG: 10 SOLUTION INTRAVENOUS at 04:07

## 2022-07-08 RX ADMIN — PROPOFOL 200 MG: 10 INJECTION, EMULSION INTRAVENOUS at 04:07

## 2022-07-08 RX ADMIN — FENTANYL CITRATE 100 MCG: 50 INJECTION, SOLUTION INTRAMUSCULAR; INTRAVENOUS at 06:07

## 2022-07-08 RX ADMIN — SODIUM CHLORIDE 500 ML: 900 INJECTION INTRAVENOUS at 09:07

## 2022-07-08 RX ADMIN — DEXAMETHASONE SODIUM PHOSPHATE 4 MG: 4 INJECTION, SOLUTION INTRA-ARTICULAR; INTRALESIONAL; INTRAMUSCULAR; INTRAVENOUS; SOFT TISSUE at 04:07

## 2022-07-08 RX ADMIN — MIDAZOLAM 2 MG: 1 INJECTION INTRAMUSCULAR; INTRAVENOUS at 04:07

## 2022-07-08 RX ADMIN — AMLODIPINE BESYLATE 5 MG: 5 TABLET ORAL at 08:07

## 2022-07-08 RX ADMIN — ASPIRIN 325 MG ORAL TABLET 325 MG: 325 PILL ORAL at 08:07

## 2022-07-08 RX ADMIN — GLYCOPYRROLATE 0.2 MG: 0.2 INJECTION INTRAMUSCULAR; INTRAVENOUS at 04:07

## 2022-07-08 NOTE — PT/OT/SLP PROGRESS
Occupational Therapy  Treatment    Ayaz Huggins   MRN: [id number]   Admitting Diagnosis: <principal problem not specified>    OT Date of Treatment: 07/08/22   OT Start Time: 1018  OT Stop Time: 1104  OT Total Time (min): 46 min     Billable Minutes:  Self Care/Home Management 31 and Therapeutic Exercise 15  Total Minutes: 46     OT/GREGORY: GREGORY     GREGORY Visit Number: 4    General Precautions: Standard, fall, other (see comments) (BP<180/105)  Orthopedic Precautions:    Braces:      Spiritual, Cultural Beliefs, Rastafari Practices, Values that Affect Care: no    Subjective:  Communicated with RN prior to session.         Objective:       Functional Mobility:  Bed Mobility:   Supine to sit: Minimal Assistance   Sit to supine: Moderate Assistance   Scooting: Moderate Assistance    Transfer Training:   Sit to stand:Moderate Assistance with HHA from EOB  Bed <> Chair:  Stand Pivot with Moderate Assistance with HHA from EOB to chair    Grooming:  Patient performed oral hygeine with Moderate Assistance at standing at sink and with VC and TC to maintain standing balance.     Pt. Participating in R UE exercises to increase independence in ADLs.     Patient left HOB elevated with all lines intact and call button in reach    ASSESSMENT:  Ayaz Huggins is a 77 y.o. male with a medical diagnosis of <principal problem not specified>.    Rehab potential is excellent    Activity tolerance: Excellent    Discharge recommendations: rehabilitation facility     Equipment recommendations:       GOALS:   Multidisciplinary Problems     Occupational Therapy Goals        Problem: Occupational Therapy    Goal Priority Disciplines Outcome Interventions   Occupational Therapy Goal     OT, PT/OT Ongoing, Progressing    Description: Goals to be met by: 7/18/22    Patient will increase functional independence with ADLs by performing:    UE Dressing with Setup Assistance.  LE Dressing with Contact Guard Assistance.  Toileting from bedside commode or  toilet with Contact Guard Assistance for hygiene and clothing management.                      Plan:  Patient to be seen daily to address the above listed problems via self-care/home management, therapeutic activities, therapeutic exercises  Plan of Care expires: 07/18/22  Plan of Care reviewed with: patient         07/08/2022

## 2022-07-08 NOTE — PROGRESS NOTES
[facility]  Hospital Medicine Progress Note        Chief Complaint: Inpatient Follow-up for     Subjective:  Mr. Huggins is a 76 y/o male with a PMH of Type II DM, diabetic neuropathy, CAD s/p CABG x2 (2007), and HTN who presented to [facility] ED  On 7/2/22 with complaints of right sided upper and lower extremity weakness. Patient's story is unclear upon the time of last known normal. He states he woke up  with a cramp in the RLE and noticed the right sided weakness.The patient was admitted to the ICU with an ischemic CVA and administered tPA; he has had some improvement in his right-sided weakness although he is unable to purposefully move his right lower extremity.  Neurology has seen and evaluated the patient and has deemed this patient stable for transfer to the floor.  This patient was seen and evaluated in the intensive care unit.    Patient is doing fine no new neurological symptoms.  Waiting for CEA  Afebrile vitals stable hemodynamically stable      Objective/physical exam:  General: In no acute distress, AAOx3   Chest: Clear to auscultation bilaterally, non-labored   Heart: RRR, +S1, S2, no appreciable murmur  Abdomen: Soft, nontender, BS +  MSK: Warm, no lower extremity edema, no clubbing or cyanosis  Neurologic:  Cranial nerve II-XII intact, Strength 5/5 in all 4 extremities    VITAL SIGNS: 24 HRS MIN & MAX LAST   Temp  Min: 97.5 °F (36.4 °C)  Max: 98.4 °F (36.9 °C) 98.1 °F (36.7 °C)   BP  Min: 138/69  Max: 166/72 138/69   Pulse  Min: 64  Max: 75  68   Resp  Min: 18  Max: 18 18   SpO2  Min: 96 %  Max: 99 % 96 %       Labs, Microbiology and Imaging were Reviewed.      Microbiology Results (last 7 days)     ** No results found for the last 168 hours. **             Medications   sodium chloride 0.9%  50 mL Intravenous Once    sodium chloride 0.9%  50 mL Intravenous Once    amLODIPine  5 mg Oral Daily    aspirin  325 mg Oral Daily    atorvastatin  80 mg Oral QHS    lisinopriL   20 mg Oral Daily    metoprolol succinate  50 mg Oral Daily    polyethylene glycol  17 g Oral Daily    senna-docusate 8.6-50 mg  1 tablet Oral Daily        bisacodyL, dextrose 10%, dextrose 10%, dextrose 50%, glucagon (human recombinant), hydrALAZINE, insulin aspart U-100, labetalol, ondansetron, promethazine, sodium chloride 0.9%     Radiology:  MRI Brain W WO Contrast  Narrative: EXAMINATION:  MRI BRAIN W WO CONTRAST    CLINICAL HISTORY:  Stroke, follow up;;    TECHNIQUE:  Multiplanar, multisequence MR images of the brain were obtained WITH and WITHOUT the administration of intravenous gadolinium based contrast.    COMPARISON:  Head CT/CTA 07/02/2022    FINDINGS:  Scalp: No abnormalities.    Bone marrow: No signal abnormalities.    Brain sulci: Appropriate for patient's age.    Ventricles: Normal in size and configuration. No hydrocephalus.    Extra-axial spaces:    No masses or fluid collections.    Parenchyma:    Scattered foci of ischemia supratentorially (L>R), the largest is a left posterior frontal 3 x 2.1 cm focus, with additional subcentimeter bifrontal, biparietal, left occipitotemporal and left basal ganglia foci.    No additional acute vascular insults.    The largest infarct is associated with patchy magnetic susceptibility, remaining do not show magnetic susceptibility.    No mass or large volume hemorrhage.    Punctate left posterior frontal foci of enhancement is favored to relate to ischemia.    Otherwise no enhancing abnormalities.    Vessels: Normal flow voids in major arteries and veins.    Sellar/Suprasellar region: No abnormalities.    Craniocervical junction: No abnormalities.  Impression: 1. Small acute infarcts supratentorially, the largest is a left posterior frontal 3 cm cortical based infarct with petechial hemorrhage.  No mass effect or shift.  Discussed with Dr. Duncan.  2. Otherwise no acute intracranial abnormalities.    Electronically signed by: Abner  Moore  Date:    07/03/2022  Time:    14:09          Assessment/Plan:  Acute left hemispheric ischemic CVA, S/P TPA   Hypertension  DM Type II  HLD    - CEA today.  -monitor neuro status closely  - q4 hr neurochecks  -ST/OT/PT  - zofran and phenergan for nausea.   - vascular surgery is planning carotid endarterectomy tomorrow   - aspirin 325 mg daily per neurology.   -BP parameters < 180/105  -prn antihypertensives  - continue metoprolol 50 mg and continue lisinopril 10 mg.         All diagnosis and differential diagnosis have been reviewed; assessment and plan has been documented; I have personally reviewed the labs and test results that are presently available; I have reviewed the patients medication list; I have reviewed the consulting providers response and recommendations. I have reviewed or attempted to review medical records based upon their availability.       Nikko Murphy MD   07/08/2022

## 2022-07-08 NOTE — ANESTHESIA PROCEDURE NOTES
Intubation    Date/Time: 7/8/2022 4:50 PM  Performed by: Ada Taylro CRNA  Authorized by: Terry Lopes MD     Intubation:     Induction:  Intravenous    Intubated:  Postinduction    Mask Ventilation:  Easy with oral airway    Attempts:  1    Attempted By:  CRNA    Method of Intubation:  Direct    Blade:  Malhotra 2    Laryngeal View Grade: Grade IIA - cords partially seen      Difficult Airway Encountered?: No      Complications:  None    Airway Device:  Oral endotracheal tube    Airway Device Size:  7.5    Style/Cuff Inflation:  Cuffed (inflated to minimal occlusive pressure)    Inflation Amount (mL):  6    Tube secured:  21    Secured at:  The teeth    Placement Verified By:  Capnometry    Complicating Factors:  None    Findings Post-Intubation:  BS equal bilateral and atraumatic/condition of teeth unchanged

## 2022-07-08 NOTE — PLAN OF CARE
Problem: Adult Inpatient Plan of Care  Goal: Plan of Care Review  7/8/2022 0200 by Jennifer Rutherford RN  Outcome: Ongoing, Progressing  7/8/2022 0200 by Jennifer Rutherford RN  Outcome: Ongoing, Progressing  Goal: Patient-Specific Goal (Individualized)  7/8/2022 0200 by Jennifer Rutherford RN  Outcome: Ongoing, Progressing  7/8/2022 0200 by Jennifer Rutherford RN  Outcome: Ongoing, Progressing  Goal: Absence of Hospital-Acquired Illness or Injury  7/8/2022 0200 by Jennifer Rutherford RN  Outcome: Ongoing, Progressing  7/8/2022 0200 by Jennifer Rutherford RN  Outcome: Ongoing, Progressing  Goal: Optimal Comfort and Wellbeing  7/8/2022 0200 by Jennifer Rutherford RN  Outcome: Ongoing, Progressing  7/8/2022 0200 by Jennifer Rutherford RN  Outcome: Ongoing, Progressing  Goal: Readiness for Transition of Care  7/8/2022 0200 by Jennifer Rutherford RN  Outcome: Ongoing, Progressing  7/8/2022 0200 by Jennifer Rutherford RN  Outcome: Ongoing, Progressing     Problem: Skin Injury Risk Increased  Goal: Skin Health and Integrity  7/8/2022 0200 by Jennifer Rutherford RN  Outcome: Ongoing, Progressing  7/8/2022 0200 by Jennifer Rutherford RN  Outcome: Ongoing, Progressing     Problem: Fall Injury Risk  Goal: Absence of Fall and Fall-Related Injury  Outcome: Ongoing, Progressing

## 2022-07-08 NOTE — ANESTHESIA PREPROCEDURE EVALUATION
07/08/2022  Ayaz Huggins is a 77 y.o., male.      Pre-op Assessment    I have reviewed the Patient Summary Reports.     I have reviewed the Nursing Notes. I have reviewed the NPO Status.   I have reviewed the Medications.     Review of Systems  Anesthesia Hx:  No problems with previous Anesthesia    Hematology/Oncology:  Hematology Normal   Oncology Normal     EENT/Dental:EENT/Dental Normal   Cardiovascular:   Hypertension    Pulmonary:  Pulmonary Normal    Renal/:  Renal/ Normal     Hepatic/GI:  Hepatic/GI Normal    Musculoskeletal:  Musculoskeletal Normal    Neurological:   CVA, residual symptoms    Endocrine:   Diabetes    Dermatological:  Skin Normal    Psych:  Psychiatric Normal           Physical Exam  General: Well nourished, Cooperative, Alert and Oriented    Airway:  Mallampati: II   Mouth Opening: Normal  Tongue: Normal  Neck ROM: Normal ROM    Dental:  Intact    Chest/Lungs:  Clear to auscultation    Heart:  Rate: Normal  Rhythm: Regular Rhythm        Anesthesia Plan  Type of Anesthesia, risks & benefits discussed:    Anesthesia Type: Gen ETT  Intra-op Monitoring Plan: Art Line and Standard ASA Monitors  Post Op Pain Control Plan: multimodal analgesia  Induction:  IV  Informed Consent: Informed consent signed with the Patient and all parties understand the risks and agree with anesthesia plan.  All questions answered.   ASA Score: 3  Day of Surgery Review of History & Physical: I have interviewed and examined the patient. I have reviewed the patient's H&P dated: There are no significant changes.     Ready For Surgery From Anesthesia Perspective.     .

## 2022-07-08 NOTE — PT/OT/SLP PROGRESS
Physical Therapy Treatment    Patient Name:  Ayaz Huggins   MRN:  [id number]    Recommendations:     Discharge Recommendations:  rehabilitation facility   Discharge Equipment Recommendations: walker, rolling   Barriers to discharge: None    Assessment:     Pt scheduled to have CEA at 1 p.m. today. Tolerated tx session fairly well. Noted slightly increased delay in RUE movements today. Returned to bed post tx due to procedure.     Rehab Prognosis: Good; patient would benefit from acute skilled PT services to address these deficits and reach maximum level of function.    Recent Surgery: Procedure(s) (LRB):  ENDARTERECTOMY, CAROTID (Right)      Plan:     During this hospitalization, patient to be seen BID to address the identified rehab impairments via gait training, therapeutic activities, therapeutic exercises, neuromuscular re-education and progress toward the following goals:    · Plan of Care Expires:  08/04/22    Subjective     Chief Complaint: None  Patient/Family Comments/goals: Return to PLOF  Pain/Comfort:  · Pain Rating 1: 0/10  · Location - Orientation 1: proximal      Objective:     Communicated with RN prior to session.  Patient found HOB elevated with telemetry, pulse ox (continuous) upon PT entry to room.     General Precautions: Standard, fall   Orthopedic Precautions:N/A   Braces: N/A  Respiratory Status: Room air  Vitals:   /66  HR 66  SPO2 97%     Functional Mobility:  · Bed Mobility:     · Rolling Right: minimum assistance  · Scooting: minimum assistance  · Bridging: moderate assistance  · Supine to Sit: minimum assistance  · Sit to Supine: minimum assistance  · Transfers:     · Sit to Stand:  moderate assistance with grab bars  · Bed<->chair with mod assist using bedrail, encouraged to increase RUE use during transfers. Assist to advance RLE. R knee buckled x 1.   · Balance: Standing balance at sink with R knee blocked to prevent buckling.  Pt performed ADL task at sink followed by ~ 2  minutes standing with cues to improve R gluteal contraction, R knee extension, hip extension and intermittent cues to improve R lateral weight shift. Pt performed ADL task at sink followed by ~ 2 minutes standing with cues to correct posture.        Therapeutic Activities and Exercises:   Bridges to HOB with encouraged use of RLE; pt able to maintain R knee and hip flexion on bed with min assist to prevent foot from sliding.     Patient left HOB elevated with all lines intact and call button in reach..    GOALS:   Multidisciplinary Problems     Physical Therapy Goals        Problem: Physical Therapy    Goal Priority Disciplines Outcome Goal Variances Interventions   Physical Therapy Goal     PT, PT/OT Ongoing, Progressing     Description: Goals to be met by: 2022     Patient will increase functional independence with mobility by performin. Supine to sit with Contact Guard Assistance  2. Sit to supine with Contact Guard Assistance  3. Sit to stand transfer with Contact Guard Assistance  4. Gait  x 150 feet with Minimal Assistance using Rolling Walker vs LRAD.                      Time Tracking:     PT Received On: 22  PT Start Time: 1017     PT Stop Time: 1057  PT Total Time (min): 40 min     Billable Minutes: Therapeutic Activity 3 units    Treatment Type: Treatment  PT/PTA: PTA     PTA Visit Number: 3     2022

## 2022-07-09 LAB
ALBUMIN SERPL-MCNC: 2.9 GM/DL (ref 3.4–4.8)
ALBUMIN/GLOB SERPL: 1.1 RATIO (ref 1.1–2)
ALP SERPL-CCNC: 81 UNIT/L (ref 40–150)
ALT SERPL-CCNC: 27 UNIT/L (ref 0–55)
AST SERPL-CCNC: 22 UNIT/L (ref 5–34)
BASOPHILS # BLD AUTO: 0.02 X10(3)/MCL (ref 0–0.2)
BASOPHILS NFR BLD AUTO: 0.2 %
BILIRUBIN DIRECT+TOT PNL SERPL-MCNC: 0.7 MG/DL
BUN SERPL-MCNC: 20.7 MG/DL (ref 8.4–25.7)
CALCIUM SERPL-MCNC: 7.8 MG/DL (ref 8.8–10)
CHLORIDE SERPL-SCNC: 108 MMOL/L (ref 98–107)
CO2 SERPL-SCNC: 21 MMOL/L (ref 23–31)
CREAT SERPL-MCNC: 0.69 MG/DL (ref 0.73–1.18)
EOSINOPHIL # BLD AUTO: 0 X10(3)/MCL (ref 0–0.9)
EOSINOPHIL NFR BLD AUTO: 0 %
ERYTHROCYTE [DISTWIDTH] IN BLOOD BY AUTOMATED COUNT: 13 % (ref 11.5–17)
GLOBULIN SER-MCNC: 2.7 GM/DL (ref 2.4–3.5)
GLUCOSE SERPL-MCNC: 199 MG/DL (ref 82–115)
HCT VFR BLD AUTO: 33.4 % (ref 42–52)
HGB BLD-MCNC: 10.9 GM/DL (ref 14–18)
IMM GRANULOCYTES # BLD AUTO: 0.06 X10(3)/MCL (ref 0–0.04)
IMM GRANULOCYTES NFR BLD AUTO: 0.7 %
LYMPHOCYTES # BLD AUTO: 0.85 X10(3)/MCL (ref 0.6–4.6)
LYMPHOCYTES NFR BLD AUTO: 10.4 %
MCH RBC QN AUTO: 29.5 PG (ref 27–31)
MCHC RBC AUTO-ENTMCNC: 32.6 MG/DL (ref 33–36)
MCV RBC AUTO: 90.5 FL (ref 80–94)
MONOCYTES # BLD AUTO: 0.66 X10(3)/MCL (ref 0.1–1.3)
MONOCYTES NFR BLD AUTO: 8 %
NEUTROPHILS # BLD AUTO: 6.6 X10(3)/MCL (ref 2.1–9.2)
NEUTROPHILS NFR BLD AUTO: 80.7 %
NRBC BLD AUTO-RTO: 0 %
PLATELET # BLD AUTO: 202 X10(3)/MCL (ref 130–400)
PMV BLD AUTO: 10.4 FL (ref 7.4–10.4)
POCT GLUCOSE: 149 MG/DL (ref 70–110)
POCT GLUCOSE: 150 MG/DL (ref 70–110)
POCT GLUCOSE: 152 MG/DL (ref 70–110)
POCT GLUCOSE: 185 MG/DL (ref 70–110)
POCT GLUCOSE: 193 MG/DL (ref 70–110)
POCT GLUCOSE: 273 MG/DL (ref 70–110)
POTASSIUM SERPL-SCNC: 4.5 MMOL/L (ref 3.5–5.1)
PROT SERPL-MCNC: 5.6 GM/DL (ref 5.8–7.6)
RBC # BLD AUTO: 3.69 X10(6)/MCL (ref 4.7–6.1)
SODIUM SERPL-SCNC: 137 MMOL/L (ref 136–145)
WBC # SPEC AUTO: 8.2 X10(3)/MCL (ref 4.5–11.5)

## 2022-07-09 PROCEDURE — 63600175 PHARM REV CODE 636 W HCPCS: Performed by: SURGERY

## 2022-07-09 PROCEDURE — 85025 COMPLETE CBC W/AUTO DIFF WBC: CPT | Performed by: SURGERY

## 2022-07-09 PROCEDURE — 80053 COMPREHEN METABOLIC PANEL: CPT | Performed by: SURGERY

## 2022-07-09 PROCEDURE — 25000003 PHARM REV CODE 250: Performed by: SURGERY

## 2022-07-09 PROCEDURE — 36415 COLL VENOUS BLD VENIPUNCTURE: CPT | Performed by: SURGERY

## 2022-07-09 PROCEDURE — 11000001 HC ACUTE MED/SURG PRIVATE ROOM

## 2022-07-09 RX ADMIN — DOCUSATE SODIUM 100 MG: 100 CAPSULE, LIQUID FILLED ORAL at 10:07

## 2022-07-09 RX ADMIN — DOCUSATE SODIUM 100 MG: 100 CAPSULE, LIQUID FILLED ORAL at 08:07

## 2022-07-09 RX ADMIN — ASPIRIN 325 MG ORAL TABLET 325 MG: 325 PILL ORAL at 08:07

## 2022-07-09 RX ADMIN — HEPARIN SODIUM 5000 UNITS: 5000 INJECTION, SOLUTION INTRAVENOUS; SUBCUTANEOUS at 10:07

## 2022-07-09 RX ADMIN — MUPIROCIN: 20 OINTMENT TOPICAL at 10:07

## 2022-07-09 RX ADMIN — LISINOPRIL 20 MG: 20 TABLET ORAL at 02:07

## 2022-07-09 RX ADMIN — HEPARIN SODIUM 5000 UNITS: 5000 INJECTION, SOLUTION INTRAVENOUS; SUBCUTANEOUS at 06:07

## 2022-07-09 RX ADMIN — SENNOSIDES AND DOCUSATE SODIUM 1 TABLET: 50; 8.6 TABLET ORAL at 08:07

## 2022-07-09 RX ADMIN — METOPROLOL SUCCINATE 50 MG: 50 TABLET, FILM COATED, EXTENDED RELEASE ORAL at 08:07

## 2022-07-09 RX ADMIN — VANCOMYCIN HYDROCHLORIDE 1000 MG: 1 INJECTION, POWDER, LYOPHILIZED, FOR SOLUTION INTRAVENOUS at 08:07

## 2022-07-09 RX ADMIN — ATORVASTATIN CALCIUM 80 MG: 40 TABLET, FILM COATED ORAL at 10:07

## 2022-07-09 RX ADMIN — POLYETHYLENE GLYCOL 3350 17 G: 17 POWDER, FOR SOLUTION ORAL at 08:07

## 2022-07-09 RX ADMIN — INSULIN ASPART 3 UNITS: 100 INJECTION, SOLUTION INTRAVENOUS; SUBCUTANEOUS at 12:07

## 2022-07-09 RX ADMIN — AMLODIPINE BESYLATE 5 MG: 5 TABLET ORAL at 02:07

## 2022-07-09 RX ADMIN — MUPIROCIN: 20 OINTMENT TOPICAL at 09:07

## 2022-07-09 RX ADMIN — HEPARIN SODIUM 5000 UNITS: 5000 INJECTION, SOLUTION INTRAVENOUS; SUBCUTANEOUS at 02:07

## 2022-07-09 NOTE — TRANSFER OF CARE
"Anesthesia Transfer of Care Note    Patient: Ayaz Huggins    Procedure(s) Performed: Procedure(s) (LRB):  ENDARTERECTOMY, CAROTID (Right)    Patient location: PACU    Anesthesia Type: general    Transport from OR: Transported from OR on room air with adequate spontaneous ventilation    Post pain: adequate analgesia    Post assessment: no apparent anesthetic complications    Post vital signs: stable    Level of consciousness: sedated    Complications: none    Transfer of care protocol was followed      Last vitals:   Visit Vitals  BP (!) 174/83   Pulse 71   Temp 36.8 °C (98.2 °F) (Skin)   Resp 16   Ht 5' 10.98" (1.803 m)   Wt 98.1 kg (216 lb 4.3 oz)   SpO2 99%   BMI 30.18 kg/m²     "

## 2022-07-09 NOTE — OP NOTE
Patient Name: Ayaz Huggins  Age: 77 y.o.  Sex: male  YOB: 1945  MRN: [id number]  Author: Dread Lama  Location: [facility]     DATE OF OPERATION: 7/8/2022    PREOPERATIVE DIAGNOSIS:   High-grade symptomatic left carotid stenosis.     POSTOPERATIVE DIAGNOSIS:   High-grade symptomatic left carotid stenosis.       OPERATION PERFORMED:    Left carotid endarterectomy with bovine pericardial patch angioplasty    SURGEON: Dread Lama MD     ASSISTANT: None      ANESTHESIA: General      ESTIMATED BLOOD LOSS: 50 cc     DRAINS: None    SPECIMENS:  Left carotid plaque    IMPLANTS: 0.8x8cm bovine pericardial patch    COMPLICATIONS: None     INDICATIONS:  Ayaz Huggins is a 77 y.o. male who is presenting with high-grade symptomatic left carotid stenosis.  Patient with paralysis of the right lower extremity. The risks, benefits and indications of the procedure were described to the patient, including risk of stroke, bleeding, cranial nerve injury, major cardiac event and infection, and he wishes to proceed with a  left carotid endarterectomy .    DESCRIPTION OF OPERATION:  The patient was brought to the operating room and placed supine on the operating room table. General anesthesia was administered and a radial arterial line was placed. Preoperative antibiotics were administered. The patient's left neck was prepped and draped in standard surgical fashion. The surgical time out procedure was performed.   A skin incision was made along the anterior border of the sternocleidomastoid. This was carried down to the subcutaneous tissues to the level of the platysma. The platysma was divided with electrocautery over the anterior border of the internal jugular vein. The carotid sheath was identified and entered. The facial vein was identified and ligated with 2-0 silk suture. Small anterior branches of the jugular were ligated using 2-0 silk ligatures.The internal jugular vein was retracted laterally  and the  left common carotid artery was identified and controlled proximally with an umbilical tape. Dissection was carried up to the internal carotid artery, taking care to not manipulate the carotid bifurcation. The internal carotid artery dissection was taken distally beyond the diseased segment of the internal carotid and looped with a vessel loop. Careful identification and preservation was employed with relation to the hypoglossal nerve and the vagus nerve. The proximal portion of the external carotid artery was dissected and looped with a vessel loop. 80085 units of intravenous heparin was administered. Three minutes after heparin had been administered, the internal carotid artery was clamped.  The common carotid and external carotid arteries were clamped.   At this time the carotid bulb was carefully dissected with minimal manipulation of the bifurcation.  A longitunial arteriotomy starting in the distal common carotid artery and extended this with James scissors onto the internal carotid artery beyond the diseased segment of the artery. This demonstrated a severely stenosed lumen with hemorrhagic plaque. We then turned our attention to the endarterectomy portion. A large multi-layered plaque was then removed from the common carotid artery and transected using James scissors. The plaque was removed from the external carotid artery in an eversion fashion. The remainder of the plaque was then removed from the internal carotid artery where it feathered nicely at the endpoint in the internal. We then turned our attention to removing all of the small remnants of plaque that could be appreciated. The end point was well adhered to the posterior wall and requried no tacking sutures. A 0.8x8cm bovine pericardial patch was brought onto the field and this was sutured on as an onlay patch with running 6-0 Prolene suture.  Prior to completing the closure, the interior was reinspected and flushing maneuvers were  performed, prior to completion of the patch closure. The external and common carotid arteries were unclamped and after a 10 beat delay, the internal carotid artery.  45 mg of protamine was administered.  Hemostasis was achieved with Fibrillar. We then approximated the platysma using a running 3-0 Vicryl. A subcuticular 4-0 Monocryl was placed and Dermabond was placed over this.    The patient was awakened, extubated and noted to be alert and following commands. The patient was taken to the PACU in stable condition without any complications.     At the completion of the case all surgical instrument, sponge and needle counts were correct.     Attestation: I was scrubbed for the entire portion of the case.     Dread Lama

## 2022-07-09 NOTE — PLAN OF CARE
Pt. Transported back to room and report given to Shubham on 6W with family at bedside. Notified Dr. Lama of Pts. BP remaining in 110s/40s. Pt. Without any complications, Dr. Lama stated he is ok with pts. BP as long as he is not having any other problems. I let Shubham know this as well. On arrival to room pt. Is AAOx4. Incision site intact.

## 2022-07-09 NOTE — ANESTHESIA POSTPROCEDURE EVALUATION
Anesthesia Post Evaluation    Patient: Ayaz Huggins    Procedure(s) Performed: Procedure(s) (LRB):  ENDARTERECTOMY, CAROTID (Right)    Final Anesthesia Type: general      Patient location during evaluation: PACU  Patient participation: Yes- Able to Participate  Level of consciousness: awake and alert and oriented  Post-procedure vital signs: reviewed and stable  Pain management: adequate  Airway patency: patent  SARA mitigation strategies: Multimodal analgesia and Verification of full reversal of neuromuscular block  PONV status at discharge: No PONV  Anesthetic complications: no      Cardiovascular status: hemodynamically stable  Respiratory status: unassisted  Hydration status: euvolemic  Follow-up not needed.          Vitals Value Taken Time   /52 07/08/22 2121   Temp 36 07/08/22 2126   Pulse 59 07/08/22 2126   Resp 17 07/08/22 2126   SpO2 100 % 07/08/22 2126   Vitals shown include unvalidated device data.      No case tracking events are documented in the log.      Pain/Derick Score: Pain Rating Post Med Admin: 0 (7/7/2022  9:05 AM)  Derick Score: 8 (7/8/2022  9:00 PM)

## 2022-07-09 NOTE — PROGRESS NOTES
Pt procedure discussed in detail. Pt pre-op symptoms compared to post op symptoms. Pt noted to have numbness to R Hand; Lama notified and states to continue to monitor for worsening of symptoms and bolus 500cc of fluid. Vitals reviewed. Everyone understands pt info with no further questions.

## 2022-07-10 LAB — POCT GLUCOSE: 163 MG/DL (ref 70–110)

## 2022-07-10 PROCEDURE — 25000003 PHARM REV CODE 250: Performed by: SURGERY

## 2022-07-10 PROCEDURE — 63600175 PHARM REV CODE 636 W HCPCS: Performed by: SURGERY

## 2022-07-10 PROCEDURE — 11000001 HC ACUTE MED/SURG PRIVATE ROOM

## 2022-07-10 RX ADMIN — HEPARIN SODIUM 5000 UNITS: 5000 INJECTION, SOLUTION INTRAVENOUS; SUBCUTANEOUS at 09:07

## 2022-07-10 RX ADMIN — METOPROLOL SUCCINATE 50 MG: 50 TABLET, FILM COATED, EXTENDED RELEASE ORAL at 08:07

## 2022-07-10 RX ADMIN — DOCUSATE SODIUM 100 MG: 100 CAPSULE, LIQUID FILLED ORAL at 08:07

## 2022-07-10 RX ADMIN — POLYETHYLENE GLYCOL 3350 17 G: 17 POWDER, FOR SOLUTION ORAL at 08:07

## 2022-07-10 RX ADMIN — AMLODIPINE BESYLATE 5 MG: 5 TABLET ORAL at 08:07

## 2022-07-10 RX ADMIN — ATORVASTATIN CALCIUM 80 MG: 40 TABLET, FILM COATED ORAL at 08:07

## 2022-07-10 RX ADMIN — ASPIRIN 325 MG ORAL TABLET 325 MG: 325 PILL ORAL at 08:07

## 2022-07-10 RX ADMIN — LISINOPRIL 20 MG: 20 TABLET ORAL at 08:07

## 2022-07-10 RX ADMIN — HEPARIN SODIUM 5000 UNITS: 5000 INJECTION, SOLUTION INTRAVENOUS; SUBCUTANEOUS at 03:07

## 2022-07-10 RX ADMIN — SENNOSIDES AND DOCUSATE SODIUM 1 TABLET: 50; 8.6 TABLET ORAL at 08:07

## 2022-07-10 RX ADMIN — MUPIROCIN: 20 OINTMENT TOPICAL at 09:07

## 2022-07-10 RX ADMIN — HEPARIN SODIUM 5000 UNITS: 5000 INJECTION, SOLUTION INTRAVENOUS; SUBCUTANEOUS at 05:07

## 2022-07-10 NOTE — PROGRESS NOTES
Pt feeling quite well today.  Motor is improving in the right arm (worsened after surgery).  Biggest issue is proximal strength, distal stength and hand function intact.  BP within expected parameters over night.  No CN deficit on exam.  Continue [id number] daily.  Dressing to be removed in 24 hours.  OK for discharge to rehab when ready per other teams.  Please call with any questions.

## 2022-07-10 NOTE — PROGRESS NOTES
[facility] Medicine Progress Note        Chief Complaint: Inpatient Follow-up for     Subjective:  Mr. Huggins is a 76 y/o male with a PMH of Type II DM, diabetic neuropathy, CAD s/p CABG x2 (2007), and HTN who presented to [facility] ED  On 7/2/22 with complaints of right sided upper and lower extremity weakness. Patient's story is unclear upon the time of last known normal. He states he woke up  with a cramp in the RLE and noticed the right sided weakness.The patient was admitted to the ICU with an ischemic CVA and administered tPA; he has had some improvement in his right-sided weakness although he is unable to purposefully move his right lower extremity.  Neurology has seen and evaluated the patient and has deemed this patient stable for transfer to the floor.  This patient was seen and evaluated in the intensive care unit.    Patient is neurologically stable.  Right arm is improving in strength and left leg is also showing improvement in passive motor strength  He denies having symptoms of abdominal pain nausea vomiting chest pain shortness O breath.    Objective/physical exam:  General: In no acute distress, AAOx3   Chest: Clear to auscultation bilaterally, non-labored   Heart: RRR, +S1, S2, no appreciable murmur  Abdomen: Soft, nontender, BS +  MSK: Warm, no lower extremity edema, no clubbing or cyanosis  Neurologic:  Cranial nerve II-XII intact, Strength 5/5 in all 4 extremities    VITAL SIGNS: 24 HRS MIN & MAX LAST   Temp  Min: 97.8 °F (36.6 °C)  Max: 98.6 °F (37 °C) 97.8 °F (36.6 °C)   BP  Min: 128/62  Max: 152/75 (!) 152/75   Pulse  Min: 62  Max: 64  63   Resp  Min: 16  Max: 18 18   SpO2  Min: 95 %  Max: 99 % 96 %       Labs, Microbiology and Imaging were Reviewed.      Microbiology Results (last 7 days)     ** No results found for the last 168 hours. **             Medications   sodium chloride 0.9%  50 mL Intravenous Once    sodium chloride 0.9%  50 mL Intravenous Once     amLODIPine  5 mg Oral Daily    aspirin  325 mg Oral Daily    atorvastatin  80 mg Oral QHS    docusate sodium  100 mg Oral BID    heparin (porcine)  5,000 Units Subcutaneous Q8H    lisinopriL  20 mg Oral Daily    metoprolol succinate  50 mg Oral Daily    mupirocin   Nasal BID    polyethylene glycol  17 g Oral Daily    senna-docusate 8.6-50 mg  1 tablet Oral Daily        bisacodyL, dextrose 10%, dextrose 10%, dextrose 50%, glucagon (human recombinant), hydrALAZINE, HYDROcodone-acetaminophen, insulin aspart U-100, labetalol, ondansetron, ondansetron, promethazine, sodium chloride 0.9%     Radiology:  MRI Brain W WO Contrast  Narrative: EXAMINATION:  MRI BRAIN W WO CONTRAST    CLINICAL HISTORY:  Stroke, follow up;;    TECHNIQUE:  Multiplanar, multisequence MR images of the brain were obtained WITH and WITHOUT the administration of intravenous gadolinium based contrast.    COMPARISON:  Head CT/CTA 07/02/2022    FINDINGS:  Scalp: No abnormalities.    Bone marrow: No signal abnormalities.    Brain sulci: Appropriate for patient's age.    Ventricles: Normal in size and configuration. No hydrocephalus.    Extra-axial spaces:    No masses or fluid collections.    Parenchyma:    Scattered foci of ischemia supratentorially (L>R), the largest is a left posterior frontal 3 x 2.1 cm focus, with additional subcentimeter bifrontal, biparietal, left occipitotemporal and left basal ganglia foci.    No additional acute vascular insults.    The largest infarct is associated with patchy magnetic susceptibility, remaining do not show magnetic susceptibility.    No mass or large volume hemorrhage.    Punctate left posterior frontal foci of enhancement is favored to relate to ischemia.    Otherwise no enhancing abnormalities.    Vessels: Normal flow voids in major arteries and veins.    Sellar/Suprasellar region: No abnormalities.    Craniocervical junction: No abnormalities.  Impression: 1. Small acute infarcts  supratentorially, the largest is a left posterior frontal 3 cm cortical based infarct with petechial hemorrhage.  No mass effect or shift.  Discussed with Dr. Duncan.  2. Otherwise no acute intracranial abnormalities.    Electronically signed by: Abenr Moore  Date:    07/03/2022  Time:    14:09          Assessment/Plan:  Acute left hemispheric ischemic CVA, S/P TPA   Carotid artery stenosis status post CEA  Hypertension  DM Type II  HLD    - Patient tolerated CEA well and now improving.  - will continue monitoring patient in the hospital and start working on discharge to rehab.  - zofran and phenergan for nausea.   - vascular surgery is planning carotid endarterectomy tomorrow   - aspirin 325 mg daily per neurology.   -BP parameters < 180/105  -prn antihypertensives  - continue metoprolol 50 mg and continue lisinopril 10 mg.       All diagnosis and differential diagnosis have been reviewed; assessment and plan has been documented; I have personally reviewed the labs and test results that are presently available; I have reviewed the patients medication list; I have reviewed the consulting providers response and recommendations. I have reviewed or attempted to review medical records based upon their availability.       Nikko Murphy MD   07/10/2022

## 2022-07-11 LAB
POCT GLUCOSE: 163 MG/DL (ref 70–110)
POCT GLUCOSE: 176 MG/DL (ref 70–110)
POCT GLUCOSE: 180 MG/DL (ref 70–110)

## 2022-07-11 PROCEDURE — 63600175 PHARM REV CODE 636 W HCPCS: Performed by: SURGERY

## 2022-07-11 PROCEDURE — 25000003 PHARM REV CODE 250: Performed by: SURGERY

## 2022-07-11 PROCEDURE — 97535 SELF CARE MNGMENT TRAINING: CPT

## 2022-07-11 PROCEDURE — 25000003 PHARM REV CODE 250: Performed by: STUDENT IN AN ORGANIZED HEALTH CARE EDUCATION/TRAINING PROGRAM

## 2022-07-11 PROCEDURE — 11000001 HC ACUTE MED/SURG PRIVATE ROOM

## 2022-07-11 PROCEDURE — 97530 THERAPEUTIC ACTIVITIES: CPT | Mod: CQ

## 2022-07-11 PROCEDURE — 97110 THERAPEUTIC EXERCISES: CPT | Mod: CQ

## 2022-07-11 RX ORDER — AMLODIPINE BESYLATE 5 MG/1
10 TABLET ORAL DAILY
Status: DISCONTINUED | OUTPATIENT
Start: 2022-07-11 | End: 2022-07-12 | Stop reason: HOSPADM

## 2022-07-11 RX ORDER — CLOTRIMAZOLE AND BETAMETHASONE DIPROPIONATE 10; .64 MG/G; MG/G
CREAM TOPICAL 2 TIMES DAILY
Status: DISCONTINUED | OUTPATIENT
Start: 2022-07-11 | End: 2022-07-12 | Stop reason: HOSPADM

## 2022-07-11 RX ADMIN — ASPIRIN 325 MG ORAL TABLET 325 MG: 325 PILL ORAL at 08:07

## 2022-07-11 RX ADMIN — HEPARIN SODIUM 5000 UNITS: 5000 INJECTION, SOLUTION INTRAVENOUS; SUBCUTANEOUS at 05:07

## 2022-07-11 RX ADMIN — DOCUSATE SODIUM 100 MG: 100 CAPSULE, LIQUID FILLED ORAL at 09:07

## 2022-07-11 RX ADMIN — MUPIROCIN: 20 OINTMENT TOPICAL at 09:07

## 2022-07-11 RX ADMIN — METOPROLOL SUCCINATE 50 MG: 50 TABLET, FILM COATED, EXTENDED RELEASE ORAL at 08:07

## 2022-07-11 RX ADMIN — HEPARIN SODIUM 5000 UNITS: 5000 INJECTION, SOLUTION INTRAVENOUS; SUBCUTANEOUS at 02:07

## 2022-07-11 RX ADMIN — DOCUSATE SODIUM 100 MG: 100 CAPSULE, LIQUID FILLED ORAL at 08:07

## 2022-07-11 RX ADMIN — SENNOSIDES AND DOCUSATE SODIUM 1 TABLET: 50; 8.6 TABLET ORAL at 08:07

## 2022-07-11 RX ADMIN — LISINOPRIL 20 MG: 20 TABLET ORAL at 08:07

## 2022-07-11 RX ADMIN — ATORVASTATIN CALCIUM 80 MG: 40 TABLET, FILM COATED ORAL at 09:07

## 2022-07-11 RX ADMIN — CLOTRIMAZOLE AND BETAMETHASONE DIPROPIONATE: 10; .64 CREAM TOPICAL at 09:07

## 2022-07-11 RX ADMIN — HYDRALAZINE HYDROCHLORIDE 10 MG: 20 INJECTION INTRAMUSCULAR; INTRAVENOUS at 05:07

## 2022-07-11 RX ADMIN — AMLODIPINE BESYLATE 10 MG: 5 TABLET ORAL at 08:07

## 2022-07-11 RX ADMIN — HEPARIN SODIUM 5000 UNITS: 5000 INJECTION, SOLUTION INTRAVENOUS; SUBCUTANEOUS at 09:07

## 2022-07-11 RX ADMIN — MUPIROCIN: 20 OINTMENT TOPICAL at 08:07

## 2022-07-11 NOTE — PROGRESS NOTES
[facility] Medicine Progress Note        Chief Complaint: Inpatient Follow-up for     Subjective:  Mr. Huggins is a 76 y/o male with a PMH of Type II DM, diabetic neuropathy, CAD s/p CABG x2 (2007), and HTN who presented to [facility] ED  On 7/2/22 with complaints of right sided upper and lower extremity weakness. Patient's story is unclear upon the time of last known normal. He states he woke up  with a cramp in the RLE and noticed the right sided weakness.The patient was admitted to the ICU with an ischemic CVA and administered tPA; he has had some improvement in his right-sided weakness although he is unable to purposefully move his right lower extremity.  Neurology has seen and evaluated the patient and has deemed this patient stable for transfer to the floor.  This patient was seen and evaluated in the intensive care unit.    Patient is neurologically stable.  Right arm is improving in strength and left leg is also showing improvement in passive motor strength  He denies having symptoms of abdominal pain nausea vomiting chest pain shortness O breath.    Objective/physical exam:  General: In no acute distress, AAOx3   Chest: Clear to auscultation bilaterally, non-labored   Heart: RRR, +S1, S2, no appreciable murmur  Abdomen: Soft, nontender, BS +  MSK: Warm, no lower extremity edema, no clubbing or cyanosis  Neurologic:  Cranial nerve II-XII intact, Strength 5/5 in all 4 extremities    VITAL SIGNS: 24 HRS MIN & MAX LAST   Temp  Min: 98 °F (36.7 °C)  Max: 98.7 °F (37.1 °C) 98 °F (36.7 °C)   BP  Min: 145/70  Max: 167/67 (!) 145/70   Pulse  Min: 61  Max: 70  70   Resp  Min: 18  Max: 18 18   SpO2  Min: 95 %  Max: 98 % 97 %       Labs, Microbiology and Imaging were Reviewed.      Microbiology Results (last 7 days)     ** No results found for the last 168 hours. **             Medications   sodium chloride 0.9%  50 mL Intravenous Once    sodium chloride 0.9%  50 mL Intravenous Once     amLODIPine  10 mg Oral Daily    aspirin  325 mg Oral Daily    atorvastatin  80 mg Oral QHS    docusate sodium  100 mg Oral BID    heparin (porcine)  5,000 Units Subcutaneous Q8H    lisinopriL  20 mg Oral Daily    metoprolol succinate  50 mg Oral Daily    mupirocin   Nasal BID    polyethylene glycol  17 g Oral Daily    senna-docusate 8.6-50 mg  1 tablet Oral Daily        bisacodyL, dextrose 10%, dextrose 10%, dextrose 50%, glucagon (human recombinant), hydrALAZINE, HYDROcodone-acetaminophen, insulin aspart U-100, labetalol, ondansetron, ondansetron, promethazine, sodium chloride 0.9%     Radiology:  MRI Brain W WO Contrast  Narrative: EXAMINATION:  MRI BRAIN W WO CONTRAST    CLINICAL HISTORY:  Stroke, follow up;;    TECHNIQUE:  Multiplanar, multisequence MR images of the brain were obtained WITH and WITHOUT the administration of intravenous gadolinium based contrast.    COMPARISON:  Head CT/CTA 07/02/2022    FINDINGS:  Scalp: No abnormalities.    Bone marrow: No signal abnormalities.    Brain sulci: Appropriate for patient's age.    Ventricles: Normal in size and configuration. No hydrocephalus.    Extra-axial spaces:    No masses or fluid collections.    Parenchyma:    Scattered foci of ischemia supratentorially (L>R), the largest is a left posterior frontal 3 x 2.1 cm focus, with additional subcentimeter bifrontal, biparietal, left occipitotemporal and left basal ganglia foci.    No additional acute vascular insults.    The largest infarct is associated with patchy magnetic susceptibility, remaining do not show magnetic susceptibility.    No mass or large volume hemorrhage.    Punctate left posterior frontal foci of enhancement is favored to relate to ischemia.    Otherwise no enhancing abnormalities.    Vessels: Normal flow voids in major arteries and veins.    Sellar/Suprasellar region: No abnormalities.    Craniocervical junction: No abnormalities.  Impression: 1. Small acute infarcts  supratentorially, the largest is a left posterior frontal 3 cm cortical based infarct with petechial hemorrhage.  No mass effect or shift.  Discussed with Dr. Duncan.  2. Otherwise no acute intracranial abnormalities.    Electronically signed by: Abner Moroe  Date:    07/03/2022  Time:    14:09          Assessment/Plan:  Acute left hemispheric ischemic CVA, S/P TPA   Carotid artery stenosis status post CEA  Hypertension  DM Type II  HLD    - Patient tolerated CEA well and now improving.  - will continue monitoring patient in the hospital and start working on discharge to rehab.  - zofran and phenergan for nausea.   - vascular surgery is planning carotid endarterectomy tomorrow   - aspirin 325 mg daily per neurology.   -BP parameters < 180/105  -prn antihypertensives  - continue metoprolol 50 mg and continue lisinopril 10 mg.       All diagnosis and differential diagnosis have been reviewed; assessment and plan has been documented; I have personally reviewed the labs and test results that are presently available; I have reviewed the patients medication list; I have reviewed the consulting providers response and recommendations. I have reviewed or attempted to review medical records based upon their availability.       Nikko Murphy MD   07/11/2022

## 2022-07-11 NOTE — PT/OT/SLP PROGRESS
Occupational Therapy   Treatment    Name: Ayaz Huggins  MRN: [id number]  Admitting Diagnosis: CVA s/p tPA; S/p left carotid endarterectomy with bovine pericardial patch angioplasty on 7/8/22.    Recommendations:     Discharge Recommendations: rehabilitation facility  Discharge Equipment Recommendations:  walker, rolling    Assessment:     Ayaz Huggins is a 77 y.o. male with a medical diagnosis of CVA s/p tPA; s/p left carotid endarterectomy with bovine pericardial patch angioplasty on 7/8/22. Pt presents with increased AROM of R shoulder flexion against gravity compared to initial eval (however, pt still demonstrates mod deficits). Pt continues to demonstrate decreased standing balance and limited functional mobility and strength/endurance of RLE, requiring assist to stabilize R knee in ext while standing using RW. Performance deficits affecting function are weakness, impaired endurance, impaired self care skills, impaired functional mobilty, impaired balance, decreased safety awareness, decreased ROM.     Rehab Prognosis:  Good; patient would benefit from acute skilled OT services to address these deficits and reach maximum level of function.       Plan:     Patient to be seen daily to address the above listed problems via self-care/home management, therapeutic activities, therapeutic exercises  · Plan of Care Expires: 07/18/22  · Plan of Care Reviewed with: patient, spouse    Subjective     Pain/Comfort:  · Pain Rating 1: 0/10    Objective:     Communicated with: RN prior to session. Patient found HOB elevated with peripheral IV and pt's wife present upon OT entry to room.    General Precautions: Standard, fall, other (see comments) (BP < 180/105 mmHg)   Orthopedic Precautions:N/A   Respiratory Status: Room air   Vitals:  BP: 169/72  MS: 75 bpm     Occupational Performance:     Bed Mobility:    · Patient completed Supine to Sit with minimum assistance, using bedrail.    Functional Mobility/Transfers:  · Patient  completed Sit <> Stand Transfer with mod A x2 for safety, using RW.  · Patient completed Bed <> Chair Transfer using Stand Pivot technique with mod A x2 for safety with HHA.    Activities of Daily Living:  Grooming: Pt performed oral hygiene task while standing at sink, using RW. Pt able to grasp/manipulate toothbrush using R hand and transfer toothbrush to mouth using RUE during task. Pt required assist to stabilize R knee in extension and min A to maintain balance while standing using RW during task.    Treatment & Education:  Pt seen for A/PROM of R shoulder and elbow flex/ext and AROM of R wrist and finger flex/ext (with all movements performed against gravity) while seated EOB to increase functional mobility and decrease risk of contracture of RUE. Pt demonstrated mod deficits in AROM of R shoulder flex against gravity and limited PROM of R shoulder flex 2/2 pain during session.  OT provided education on proper positioning of RUE during functional t/fs and while seated in chair or lying in bed in order to increase pt's positioning/comfort and decrease risk of injury.    Patient left up in chair with pillow positioned under pt's RUE, RLE PRAFO donned, all lines intact, call button in reach, RN notified and pt's wife presentEducation:      GOALS:   Multidisciplinary Problems     Occupational Therapy Goals        Problem: Occupational Therapy    Goal Priority Disciplines Outcome Interventions   Occupational Therapy Goal     OT, PT/OT Ongoing, Progressing    Description: Goals to be met by: 7/18/22    Patient will increase functional independence with ADLs by performing:    UE Dressing with Setup Assistance.  LE Dressing with Contact Guard Assistance.  Toileting from bedside commode or toilet with Contact Guard Assistance for hygiene and clothing management.                      Time Tracking:     OT Date of Treatment: 7/11/22  OT Start Time: 1016  OT Stop Time: 1047  OT Total Time (min): 31 min    Billable  Minutes:Self Care/Home Management 31 mins    OT/GREGORY: OT     GREGORY Visit Number: 5    7/11/2022

## 2022-07-11 NOTE — PLAN OF CARE
Referral sent to [facility] rehab sent via [facility].

## 2022-07-11 NOTE — PT/OT/SLP PROGRESS
Physical Therapy Treatment    Patient Name:  Ayaz Huggins   MRN:  [id number]    Recommendations:     Discharge Recommendations:  rehabilitation facility   Discharge Equipment Recommendations: walker, rolling   Barriers to discharge: placement    Assessment:     Ayaz Huggins is a 77 y.o. male admitted with a medical diagnosis of <principal problem not specified>.  He presents with the following impairments/functional limitations:  weakness, gait instability, impaired functional mobilty, impaired balance .    Rehab Prognosis: Good; patient would benefit from acute skilled PT services to address these deficits and reach maximum level of function.    Recent Surgery: Procedure(s) (LRB):  ENDARTERECTOMY, CAROTID (Right) 3 Days Post-Op    Plan:     During this hospitalization, patient to be seen BID to address the identified rehab impairments via gait training, therapeutic activities, therapeutic exercises and progress toward the following goals:    · Plan of Care Expires:  08/04/22    Subjective     Chief Complaint: none  Patient/Family Comments/goals:   Pain/Comfort:  ·        Objective:     Communicated with RN prior to session.  Patient found up in chair with   upon PT entry to room.     General Precautions: Standard, fall   Orthopedic Precautions:N/A   Braces:    Respiratory Status: Room air     Functional Mobility:  · Bed Mobility:     · Sit to Supine: minimum assistance  · Transfers:     · Sit to Stand:  moderate assistance with hand-held assist  · Bed to Chair: moderate assistance with  hand-held assist  using  Stand Pivot      AM-PAC 6 CLICK MOBILITY          Therapeutic Activities and Exercises:   Performed weight shifting and sit<>stands 3x Christiano. Scooting HOB SBA with cuing for proper sequencing. Pt performed LE therex UIC with assistance with on LLE 15x1.     Patient left HOB elevated with all lines intact, call button in reach and wife present..    GOALS:   Multidisciplinary Problems     Physical Therapy Goals         Problem: Physical Therapy    Goal Priority Disciplines Outcome Goal Variances Interventions   Physical Therapy Goal     PT, PT/OT Ongoing, Progressing     Description: Goals to be met by: 2022     Patient will increase functional independence with mobility by performin. Supine to sit with Contact Guard Assistance  2. Sit to supine with Contact Guard Assistance  3. Sit to stand transfer with Contact Guard Assistance  4. Gait  x 150 feet with Minimal Assistance using Rolling Walker vs LRAD.                      Time Tracking:     PT Received On: 22  PT Start Time: 1230     PT Stop Time: 1302  PT Total Time (min): 32 min     Billable Minutes: Therapeutic Activity 20 and Therapeutic Exercise 12    Treatment Type: Treatment  PT/PTA: PTA     PTA Visit Number: 4     2022

## 2022-07-11 NOTE — PROGRESS NOTES
Vascular Progress Note:     Patient was seen and examined. No issue over night.     No numbness in right hand. Proximal strength to the right arm is improving compared to post op. Good  strength.     No motor function in right leg.     Left neck incision intact with some swelling.     Patient is stable for discharge to rehab.     Continue with aspirin and statin therapy     I will see patient in 6 weeks f or follow up with surveillance US     Dread Lama MD

## 2022-07-12 ENCOUNTER — HOSPITAL ENCOUNTER (INPATIENT)
Facility: HOSPITAL | Age: 77
LOS: 21 days | Discharge: HOME HEALTH | DRG: 057 | End: 2022-08-02
Attending: INTERNAL MEDICINE | Admitting: INTERNAL MEDICINE
Payer: MEDICARE

## 2022-07-12 VITALS
DIASTOLIC BLOOD PRESSURE: 54 MMHG | OXYGEN SATURATION: 97 % | TEMPERATURE: 98 F | HEART RATE: 74 BPM | SYSTOLIC BLOOD PRESSURE: 143 MMHG | HEIGHT: 71 IN | BODY MASS INDEX: 30.27 KG/M2 | RESPIRATION RATE: 18 BRPM | WEIGHT: 216.25 LBS

## 2022-07-12 DIAGNOSIS — Z98.890 S/P CAROTID ENDARTERECTOMY: Primary | ICD-10-CM

## 2022-07-12 DIAGNOSIS — I63.9 STROKE: ICD-10-CM

## 2022-07-12 LAB
ESTROGEN SERPL-MCNC: NORMAL PG/ML
INSULIN SERPL-ACNC: NORMAL U[IU]/ML
LAB AP CLINICAL INFORMATION: NORMAL
LAB AP GROSS DESCRIPTION: NORMAL
LAB AP REPORT FOOTNOTES: NORMAL
POCT GLUCOSE: 204 MG/DL (ref 70–110)
POCT GLUCOSE: 213 MG/DL (ref 70–110)
SARS-COV-2 RDRP RESP QL NAA+PROBE: NEGATIVE
T3RU NFR SERPL: NORMAL %

## 2022-07-12 PROCEDURE — 94799 UNLISTED PULMONARY SVC/PX: CPT

## 2022-07-12 PROCEDURE — 25000003 PHARM REV CODE 250: Performed by: NURSE PRACTITIONER

## 2022-07-12 PROCEDURE — 63600175 PHARM REV CODE 636 W HCPCS: Performed by: SURGERY

## 2022-07-12 PROCEDURE — 97530 THERAPEUTIC ACTIVITIES: CPT | Mod: CQ

## 2022-07-12 PROCEDURE — 97116 GAIT TRAINING THERAPY: CPT

## 2022-07-12 PROCEDURE — 25000003 PHARM REV CODE 250: Performed by: SURGERY

## 2022-07-12 PROCEDURE — 97162 PT EVAL MOD COMPLEX 30 MIN: CPT

## 2022-07-12 PROCEDURE — 97166 OT EVAL MOD COMPLEX 45 MIN: CPT

## 2022-07-12 PROCEDURE — 99900035 HC TECH TIME PER 15 MIN (STAT)

## 2022-07-12 PROCEDURE — 63600175 PHARM REV CODE 636 W HCPCS: Performed by: NURSE PRACTITIONER

## 2022-07-12 PROCEDURE — 25000003 PHARM REV CODE 250: Performed by: STUDENT IN AN ORGANIZED HEALTH CARE EDUCATION/TRAINING PROGRAM

## 2022-07-12 PROCEDURE — 97535 SELF CARE MNGMENT TRAINING: CPT

## 2022-07-12 PROCEDURE — 97116 GAIT TRAINING THERAPY: CPT | Mod: CQ

## 2022-07-12 PROCEDURE — 87635 SARS-COV-2 COVID-19 AMP PRB: CPT | Performed by: STUDENT IN AN ORGANIZED HEALTH CARE EDUCATION/TRAINING PROGRAM

## 2022-07-12 PROCEDURE — 97110 THERAPEUTIC EXERCISES: CPT

## 2022-07-12 PROCEDURE — 97530 THERAPEUTIC ACTIVITIES: CPT

## 2022-07-12 PROCEDURE — 11800000 HC REHAB PRIVATE ROOM

## 2022-07-12 RX ORDER — LISINOPRIL 5 MG/1
5 TABLET ORAL DAILY
Status: DISCONTINUED | OUTPATIENT
Start: 2022-07-13 | End: 2022-08-01

## 2022-07-12 RX ORDER — PROMETHAZINE HYDROCHLORIDE 25 MG/1
25 TABLET ORAL EVERY 6 HOURS PRN
Status: DISCONTINUED | OUTPATIENT
Start: 2022-07-12 | End: 2022-08-02 | Stop reason: HOSPADM

## 2022-07-12 RX ORDER — AMLODIPINE BESYLATE 5 MG/1
10 TABLET ORAL DAILY
Status: DISCONTINUED | OUTPATIENT
Start: 2022-07-14 | End: 2022-08-02 | Stop reason: HOSPADM

## 2022-07-12 RX ORDER — ASPIRIN 325 MG
325 TABLET ORAL DAILY
Status: DISCONTINUED | OUTPATIENT
Start: 2022-07-14 | End: 2022-08-02 | Stop reason: HOSPADM

## 2022-07-12 RX ORDER — HYDROCODONE BITARTRATE AND ACETAMINOPHEN 5; 325 MG/1; MG/1
1 TABLET ORAL EVERY 6 HOURS PRN
Status: DISCONTINUED | OUTPATIENT
Start: 2022-07-12 | End: 2022-08-02 | Stop reason: HOSPADM

## 2022-07-12 RX ORDER — IBUPROFEN 200 MG
16 TABLET ORAL
Status: DISCONTINUED | OUTPATIENT
Start: 2022-07-12 | End: 2022-08-02 | Stop reason: HOSPADM

## 2022-07-12 RX ORDER — METOPROLOL SUCCINATE 50 MG/1
50 TABLET, EXTENDED RELEASE ORAL DAILY
Status: DISCONTINUED | OUTPATIENT
Start: 2022-07-13 | End: 2022-07-13

## 2022-07-12 RX ORDER — DEXTROSE 50 % IN WATER (D50W) INTRAVENOUS SYRINGE
12.5
Status: DISCONTINUED | OUTPATIENT
Start: 2022-07-12 | End: 2022-08-02 | Stop reason: HOSPADM

## 2022-07-12 RX ORDER — INSULIN ASPART 100 [IU]/ML
1-10 INJECTION, SOLUTION INTRAVENOUS; SUBCUTANEOUS
Status: DISCONTINUED | OUTPATIENT
Start: 2022-07-12 | End: 2022-08-02 | Stop reason: HOSPADM

## 2022-07-12 RX ORDER — METFORMIN HYDROCHLORIDE 500 MG/1
1000 TABLET ORAL 2 TIMES DAILY WITH MEALS
Status: DISCONTINUED | OUTPATIENT
Start: 2022-07-12 | End: 2022-08-02 | Stop reason: HOSPADM

## 2022-07-12 RX ORDER — POLYETHYLENE GLYCOL 3350 17 G/17G
17 POWDER, FOR SOLUTION ORAL DAILY
Status: DISCONTINUED | OUTPATIENT
Start: 2022-07-13 | End: 2022-07-15

## 2022-07-12 RX ORDER — HYDRALAZINE HYDROCHLORIDE 20 MG/ML
10 INJECTION INTRAMUSCULAR; INTRAVENOUS EVERY 4 HOURS PRN
Status: DISCONTINUED | OUTPATIENT
Start: 2022-07-12 | End: 2022-08-02 | Stop reason: HOSPADM

## 2022-07-12 RX ORDER — BISACODYL 10 MG
10 SUPPOSITORY, RECTAL RECTAL DAILY PRN
Status: DISCONTINUED | OUTPATIENT
Start: 2022-07-12 | End: 2022-08-02 | Stop reason: HOSPADM

## 2022-07-12 RX ORDER — ALPRAZOLAM 0.25 MG/1
0.25 TABLET ORAL 3 TIMES DAILY PRN
Status: DISCONTINUED | OUTPATIENT
Start: 2022-07-12 | End: 2022-08-02 | Stop reason: HOSPADM

## 2022-07-12 RX ORDER — ATORVASTATIN CALCIUM 40 MG/1
80 TABLET, FILM COATED ORAL NIGHTLY
Status: DISCONTINUED | OUTPATIENT
Start: 2022-07-12 | End: 2022-08-02 | Stop reason: HOSPADM

## 2022-07-12 RX ORDER — LABETALOL HCL 20 MG/4 ML
10 SYRINGE (ML) INTRAVENOUS EVERY 4 HOURS PRN
Status: DISCONTINUED | OUTPATIENT
Start: 2022-07-12 | End: 2022-08-02 | Stop reason: HOSPADM

## 2022-07-12 RX ORDER — DOCUSATE SODIUM 100 MG/1
100 CAPSULE, LIQUID FILLED ORAL 2 TIMES DAILY
Status: DISCONTINUED | OUTPATIENT
Start: 2022-07-12 | End: 2022-07-15

## 2022-07-12 RX ORDER — ASPIRIN 325 MG
325 TABLET ORAL DAILY
Refills: 0 | Status: ON HOLD
Start: 2022-07-13 | End: 2022-08-01 | Stop reason: HOSPADM

## 2022-07-12 RX ORDER — ATORVASTATIN CALCIUM 80 MG/1
80 TABLET, FILM COATED ORAL NIGHTLY
Qty: 90 TABLET | Refills: 3
Start: 2022-07-12 | End: 2022-08-01

## 2022-07-12 RX ORDER — DEXTROSE 50 % IN WATER (D50W) INTRAVENOUS SYRINGE
25
Status: DISCONTINUED | OUTPATIENT
Start: 2022-07-12 | End: 2022-08-02 | Stop reason: HOSPADM

## 2022-07-12 RX ORDER — ONDANSETRON 4 MG/1
4 TABLET, ORALLY DISINTEGRATING ORAL EVERY 6 HOURS PRN
Status: DISCONTINUED | OUTPATIENT
Start: 2022-07-12 | End: 2022-07-12

## 2022-07-12 RX ORDER — ACETAMINOPHEN 325 MG/1
650 TABLET ORAL EVERY 4 HOURS PRN
Status: DISCONTINUED | OUTPATIENT
Start: 2022-07-12 | End: 2022-08-02 | Stop reason: HOSPADM

## 2022-07-12 RX ORDER — MUPIROCIN 20 MG/G
OINTMENT TOPICAL 2 TIMES DAILY
Status: ON HOLD
Start: 2022-07-12 | End: 2022-08-01 | Stop reason: HOSPADM

## 2022-07-12 RX ORDER — NITROGLYCERIN 0.4 MG/1
0.4 TABLET SUBLINGUAL EVERY 5 MIN PRN
Status: DISCONTINUED | OUTPATIENT
Start: 2022-07-12 | End: 2022-08-02 | Stop reason: HOSPADM

## 2022-07-12 RX ORDER — BENZONATATE 100 MG/1
100 CAPSULE ORAL 3 TIMES DAILY PRN
Status: DISCONTINUED | OUTPATIENT
Start: 2022-07-12 | End: 2022-08-02 | Stop reason: HOSPADM

## 2022-07-12 RX ORDER — HYDROXYZINE PAMOATE 50 MG/1
50 CAPSULE ORAL NIGHTLY
Status: DISCONTINUED | OUTPATIENT
Start: 2022-07-12 | End: 2022-08-02 | Stop reason: HOSPADM

## 2022-07-12 RX ORDER — GLUCAGON 1 MG
1 KIT INJECTION
Status: DISCONTINUED | OUTPATIENT
Start: 2022-07-12 | End: 2022-08-02 | Stop reason: HOSPADM

## 2022-07-12 RX ORDER — METOPROLOL TARTRATE 1 MG/ML
10 INJECTION, SOLUTION INTRAVENOUS
Status: DISCONTINUED | OUTPATIENT
Start: 2022-07-12 | End: 2022-08-02 | Stop reason: HOSPADM

## 2022-07-12 RX ORDER — ONDANSETRON 4 MG/1
8 TABLET, ORALLY DISINTEGRATING ORAL EVERY 6 HOURS PRN
Status: DISCONTINUED | OUTPATIENT
Start: 2022-07-12 | End: 2022-08-02 | Stop reason: HOSPADM

## 2022-07-12 RX ORDER — AMLODIPINE BESYLATE 10 MG/1
10 TABLET ORAL DAILY
Qty: 30 TABLET | Refills: 11 | Status: ON HOLD
Start: 2022-07-13 | End: 2022-08-01 | Stop reason: SDUPTHER

## 2022-07-12 RX ORDER — IBUPROFEN 200 MG
24 TABLET ORAL
Status: DISCONTINUED | OUTPATIENT
Start: 2022-07-12 | End: 2022-08-02 | Stop reason: HOSPADM

## 2022-07-12 RX ADMIN — LISINOPRIL 20 MG: 20 TABLET ORAL at 08:07

## 2022-07-12 RX ADMIN — INSULIN ASPART 2 UNITS: 100 INJECTION, SOLUTION INTRAVENOUS; SUBCUTANEOUS at 08:07

## 2022-07-12 RX ADMIN — HYDROXYZINE PAMOATE 50 MG: 50 CAPSULE ORAL at 08:07

## 2022-07-12 RX ADMIN — AMLODIPINE BESYLATE 10 MG: 5 TABLET ORAL at 08:07

## 2022-07-12 RX ADMIN — ATORVASTATIN CALCIUM 80 MG: 40 TABLET, FILM COATED ORAL at 08:07

## 2022-07-12 RX ADMIN — DOCUSATE SODIUM 100 MG: 100 CAPSULE, LIQUID FILLED ORAL at 08:07

## 2022-07-12 RX ADMIN — METFORMIN HYDROCHLORIDE 1000 MG: 500 TABLET, FILM COATED ORAL at 05:07

## 2022-07-12 RX ADMIN — INSULIN ASPART 2 UNITS: 100 INJECTION, SOLUTION INTRAVENOUS; SUBCUTANEOUS at 12:07

## 2022-07-12 RX ADMIN — CLOTRIMAZOLE AND BETAMETHASONE DIPROPIONATE: 10; .64 CREAM TOPICAL at 08:07

## 2022-07-12 RX ADMIN — HEPARIN SODIUM 5000 UNITS: 5000 INJECTION, SOLUTION INTRAVENOUS; SUBCUTANEOUS at 06:07

## 2022-07-12 RX ADMIN — SENNOSIDES AND DOCUSATE SODIUM 1 TABLET: 50; 8.6 TABLET ORAL at 08:07

## 2022-07-12 RX ADMIN — HYDRALAZINE HYDROCHLORIDE 10 MG: 20 INJECTION INTRAMUSCULAR; INTRAVENOUS at 12:07

## 2022-07-12 RX ADMIN — METOPROLOL SUCCINATE 50 MG: 50 TABLET, FILM COATED, EXTENDED RELEASE ORAL at 08:07

## 2022-07-12 RX ADMIN — ASPIRIN 325 MG ORAL TABLET 325 MG: 325 PILL ORAL at 08:07

## 2022-07-12 NOTE — PLAN OF CARE
Problem: Rehabilitation (IRF) Plan of Care  Goal: Plan of Care Review  Outcome: Ongoing, Progressing  Goal: Patient-Specific Goal (Individualized)  Outcome: Ongoing, Progressing  Goal: Absence of New-Onset Illness or Injury  Outcome: Ongoing, Progressing  Goal: Optimal Comfort and Wellbeing  Outcome: Ongoing, Progressing  Goal: Readiness for Transition of Care  Outcome: Ongoing, Progressing     Problem: Impaired Wound Healing  Goal: Optimal Wound Healing  Outcome: Ongoing, Progressing     Problem: Skin Injury Risk Increased  Goal: Skin Health and Integrity  Outcome: Ongoing, Progressing

## 2022-07-12 NOTE — CONSULTS
[facility]  Inpatient Rehab Prescreen    PATIENT INFORMATION     Assessment date/time:     Name: Ayaz Huggins Phone: [phone]   Address:   [address] [place] [address] SSN:    YOB: 1945 Age: 77 y.o. Gender: male   Race: White   Marital Status:    Advance Directives: Full code     COVERAGE INFORMATION     Patient Medicare #:      Primary Insurance Type:  /  Secondary Insurance Type:  /    Policy #:   Policy #:     Insurance contact name/number:  Insurance contact name/number:    Authorization #:  Authorization #:    Verified Coverage: Insurance Carrier   Pending Coverage:    Prescription Coverage:  Insurance details/comments:      PHYSICIAN/REFERRAL INFORMATION     Primary Care Physician: Angel Parham Jr, MD Attending Physician: Weston Rivera MD   Consulting physician/specialist:  Referring Physician: Dr. plaza    Referring facility: [facility]  Referring contact name/phone:    Physician details/comments:      CONTACT INFORMATION   Extended Emergency Contact Information  Primary Emergency Contact: Pooja Huggins  Address: [address] [place] [address]  Home Phone: [phone]  Mobile Phone: [phone]  Relation: Spouse  Preferred language: English   needed? No    PRIOR LIVING SITUATION         Bedroom location/setup: 1st floor    Bathroom location/setup: 1st floor    Equipment at home: n/a    Prior device use: n/a     PRIOR LEVEL OF FUNCTION     Did a helper need to assist with the following activities prior to the current illness, exacerbation, or injury?   Self-care: independent    Indoor mobility: independent    Stairs: independent    Functional Cognition: awake alert and oriented x4    Comments:    Caregivers providing assistance:   Pre-hospital home care service:  Name of Agency:    Home/personal responsibilities:    Pre-hospital vocational category: Retired for age    Pre-hospital vocational effort: Retired for age   Occupation/profession:  Return to work/school plan:    Educational history:    Hobbies/leisure activities:  Resources used prior to admission:    Available resources:  Resource information comments:      REHABILITATION DIAGNOSIS     History of present illness: 76 y/o male with a PMH of Type II DM, diabetic neuropathy, CAD s/p CABG x2 (2007), and HTN who presented to [facility] ED  On 7/2/22 with complaints of right sided upper and lower extremity weakness. Patient's story is unclear upon the time of last known normal. He states he woke up  with a cramp in the RLE and noticed the right sided weakness.The patient was admitted to the ICU with an ischemic CVA and administered tPA; he has had some improvement in his right-sided weakness although he is unable to purposefully move his right lower extremity.  Neurology has seen and evaluated the patient and has deemed this patient stable for transfer to the floor.  This patient was seen and evaluated in the intensive care unit.     Patient is neurologically stable.  Right arm is improving in strength and left leg is also showing improvement in passive motor strength  He denies having symptoms of abdominal pain nausea vomiting chest pain shortness Of breath.    Patient requires acute inpatient rehab admission with 24-hour nursing and active physician oversight to monitor and manage acute medical comorbid conditions, labs, pain, and functional deficits. Patient/family will also require teaching and integration of improving functional skills into daily living. he will also require individualized, interdisciplinary approach to her care, receiving PT, OT services 3 hours of therapy, at least 5 days per week. Required care cannot be provided at lower level of care. Patient is anticipated to require approximately 10-12-day LOS with expected DC home with spouse with HH or OP therapy services.    Impairment group (IGC):   Right Body  Involvement (Left Brain) 01.2 Etiologic diagnosis/description:    Date of onset:  07/02/2022 Date of surgery:    Allergies: Penicillins   Comorbid condition: Diabetes and Hypertension   Medical/functional conditions requiring inpatient rehabilitation: This patient requires medical management/24-hour nursing of complex comorbidities (htn ), labs, medications (see medications list), pain, sleep, hygiene, anticoagulation, nutrition, hydration, neurological, pulmonary, and cardiac status, and preventive healthcare.    This patient requires intense therapy and an integrated, interdisciplinary approach to address safety, impaired mobility, impaired ADLs (dressing, toileting, grooming, showering), impaired cognition, judgment, and memory, communication, pulmonary insufficiency, bowel/bladder problems, preventive healthcare, medication management, integration of improving functional skills into daily living, and home caregiver support and training.   Risk for medical/clinical complications: This patient is at risk for the following complications: DVT/PE, pneumonia, malnutrition, neurological decline, respiratory insufficiency, worsening activity intolerance, complications from anticoagulation, skin breakdown, inadequate sleep, and constipation.     SPECIAL REHABILITATION NEEDS     IV: right arm piv Preferred Language: english        Peripheral IV - Single Lumen 07/02/22 0923 20 G Left Wrist (Active)   Site Assessment Clean;Dry;Intact;No redness;No swelling 07/12/22 0800   Extremity Assessment Distal to IV No abnormal discoloration 07/12/22 0800   Line Status Saline locked 07/12/22 0800   Dressing Status Clean;Dry;Intact 07/12/22 0800   Dressing Intervention Integrity maintained 07/12/22 0800            Peripheral IV - Single Lumen 07/08/22 1652 20 G Left;Anterior Hand (Active)   Site Assessment Clean;Dry;Intact;No redness;No swelling 07/12/22 0800   Extremity Assessment Distal to IV No abnormal discoloration 07/12/22 0800  "  Line Status Saline locked 07/12/22 0800   Dressing Status Clean;Dry;Intact 07/12/22 0800   Dressing Intervention Integrity maintained 07/12/22 0800          PRECAUTIONS     Safety/fall precautions: Decreased balance and Seizure precautions: s/p cva      PAST MEDICAL, SOCIAL, FAMILY HISTORY     Pertinent past medical history:   Past Medical History:   Diagnosis Date    Diabetes mellitus     Hypertension     Mixed hyperlipidemia       Has the patient had two or more falls in the past year or any fall with injury in the past year: no    Has the patient had major surgery during the 100 days prior to admission: no    Family Medical History: No family history on file.   Substance use history:   Social History     Substance and Sexual Activity   Alcohol Use None     Social History     Tobacco Use   Smoking Status Not on file   Smokeless Tobacco Not on file     Social History     Substance and Sexual Activity   Drug Use Not on file      VITALS   BP:  (!) 143/54     Temp: 98.1 °F (36.7 °C)     HR: 74     Resp: 18     SpO2: 97 %     Height: 5' 10.98" (1.803 m)     Weight: 98.1 kg (216 lb 4.3 oz)     BMI: 30.2          MED/LABS/DIAGNOSITICS   No current facility-administered medications for this encounter.     No current outpatient medications on file.     Facility-Administered Medications Ordered in Other Encounters   Medication Dose Route Frequency Provider Last Rate Last Admin    0.9%  NaCl infusion  50 mL Intravenous Once Dread Lama MD        0.9%  NaCl infusion  50 mL Intravenous Once Dread Lama MD        amLODIPine tablet 10 mg  10 mg Oral Daily Nikko Murphy MD   10 mg at 07/12/22 0821    aspirin tablet 325 mg  325 mg Oral Daily Dread Lama MD   325 mg at 07/12/22 0821    atorvastatin tablet 80 mg  80 mg Oral QHS Dread Lama MD   80 mg at 07/11/22 2152    bisacodyL suppository 10 mg  10 mg Rectal Daily PRN Dread Lama MD   10 mg at 07/06/22 0106    clotrimazole-betamethasone 1-0.05% cream   Topical (Top) BID Nikko " MD Jeffrey   Given at 07/12/22 0822    dextrose 10% bolus 125 mL  12.5 g Intravenous PRFAUSTINA Lama MD        dextrose 10% bolus 250 mL  25 g Intravenous PRFAUSTINA Lama MD        dextrose 50% injection 12.5 g  12.5 g Intravenous PRN Dread Lama MD        docusate sodium capsule 100 mg  100 mg Oral BID Dread Lama MD   100 mg at 07/12/22 0821    glucagon (human recombinant) injection 1 mg  1 mg Intramuscular PRN Dread Lama MD        heparin (porcine) injection 5,000 Units  5,000 Units Subcutaneous Q8H Dread Lama MD   5,000 Units at 07/12/22 0641    hydrALAZINE injection 10 mg  10 mg Intravenous Q6H PRFAUSTINA Lama MD   10 mg at 07/12/22 0024    HYDROcodone-acetaminophen 5-325 mg per tablet 1 tablet  1 tablet Oral Q4H CRISTIAN Lama MD        insulin aspart U-100 injection 0-5 Units  0-5 Units Subcutaneous Q6H PRFAUSTINA Lama MD   3 Units at 07/09/22 1200    labetaloL injection 10 mg  10 mg Intravenous Q15 Min PRFAUSTINA Lama MD   10 mg at 07/07/22 0433    lisinopriL tablet 20 mg  20 mg Oral Daily Dread Lama MD   20 mg at 07/12/22 0821    metoprolol succinate (TOPROL-XL) 24 hr tablet 50 mg  50 mg Oral Daily Dread Lama MD   50 mg at 07/12/22 0822    mupirocin 2 % ointment   Nasal BID Dread Lama MD   Given at 07/11/22 2152    ondansetron injection 4 mg  4 mg Intravenous Q6H PRFAUSTINA Lama MD        ondansetron injection 4 mg  4 mg Intravenous Q12H PRFAUSTINA Lama MD        polyethylene glycol packet 17 g  17 g Oral Daily Dread Lama MD   17 g at 07/10/22 0855    promethazine injection 25 mg  25 mg Intramuscular Q6H PRFAUSTINA Lama MD        senna-docusate 8.6-50 mg per tablet 1 tablet  1 tablet Oral Daily Dread Lama MD   1 tablet at 07/12/22 0822    sodium chloride 0.9% flush 10 mL  10 mL Intravenous PRFAUSTINA Lama MD          Pertinent lab results:   Lab Results   Component Value Date    WBC 8.2 07/09/2022    HGB 10.9 (L) 07/09/2022    HCT 33.4 (L) 07/09/2022     07/09/2022      07/09/2022    K 4.5 07/09/2022    BUN 20.7 07/09/2022    CO2 21 (L) 07/09/2022      Pertinent diagnostic studies:    Additional labs and diagnostic studies required prior to admission:      QI: GG Care Tool     QI: GG Care Tool  Therapy Evaluation   Swallowing/  Nutritional Status   Diet/Feeding/  Swallowing    Eating       Oral Hygiene   Grooming Min assist    Toileting Hygiene       Shower/Bathe   Bathing    Upper Body Dressing   Dressing Upper    Lower Body Dressing   Dressing Lower    Putting On/Taking Off Footwear       Toilet Transfer   Toileting    Bladder Continence Status   Bladder     Bowel Continence Status   Bowel     Roll Left and Right   Bed Mobility Mod assist    Sit to Lying       Lying to Sitting on Side of Bed       Sit to Stand       Chair/Bed-to- Chair   Transfers   Mod assist    Car Transfer       Walk 10 Feet   Equipment   rw    Walk 50 Feet with Two Turns   Balance    Walk 150 Feet   Endurance    Walk 10 Feet on Uneven Surfaces   Gait    Picking up an Object       Wheel 50 Feet with Two Turns   Wheelchair    Wheel 150 Feet       1 Step (Curb)   Stairs    4 Steps       12 Steps       Expression of Ideas and Wants   Communication    Understanding  Verbal and Non-Verbal Content       Cognitive Patterns   Cognition       Safety Precautions       Lower Extremity      Strength       ROM       Upper Extremity      Strength       ROM      Care Score Value Definitions  6: Independent. [place] provides no assistance with tasks. A device may or may not have been used.  5: Set-up or clean-up assistance. Hope sets up or cleans up, but does not assist with tasks. Hope may have assisted prior to or following the activity.  4: Supervision or touching assistance. Hope provides verbal cues or touching/steadying or contact guard assistance. Assistance may be provided throughout the activity or intermittently.  3: Partial/moderate assistance. Hope does less than half the effort. Hope lifts, holds, or  supports trunk or limbs, but provides less than half the effort.  2: Substantial/maximal assistance. [place] does more than half the effort. [place] lifts or holds trunk or limbs, and provides more than half the effort.  1: Dependent. [place] does all of the effort, or the assistance of two or more helpers is required for the patient to complete the activity.  Care Score Activity Not Attempted Value Definitions  7: Patient refused.  9: Not applicable. Not attempted and the patient did not perform this activity prior to the current illness, exacerbation, or injury.  10: Not attempted due to environmental limitations (e.g., lack of equipment, weather constraints).  88: Not attempted due to medical condition or safety concerns.    DISCHARGE GOALS/ANTICPATED INTERVENTIONS/SERVICES     Expected Level of Improvement for Safe Discharge: Supervision    Patient/Family/Caregiver Goals: Patient caregiver anticipate discharge home at or near baseline level of functioning and/or decreased burden of care.    Required Treatments/Services: Rehabilitation Nursing, Dietitian/nutrition, Social  and Case Management   Required Therapy Therapy Type Min/Day Days/Week Duration of Therapy   Physical Therapy 90 5 10 days   Occupational Therapy 90 5 10 days    Speech and Language Pathology      Prosthetic/Orthotics      Recreational Therapy      Anticipated Services upon Discharge: home health pt, ot    Additional rehabilitation needs:  n/a   Expected Discharge Destination: home with     Barriers to Discharge: Limited caregiver availability   Discharge Support: Patient has a caregiver available, Discharge plan has been verified with patient's caregiver and Caregiver is in agreement with the discharge plan   Patient/Family/Caregiver Orientation: Patient/family/caregiver oriented and agreeable to inpatient rehabilitation plan   Estimated Length of Stay: 10 days    Projected Admission Date: July 12, 2022    Medical Prognosis: good   Physicians  Review and Admission Determination:   I have discussed patient with Nurse Liaison. I have reviewed the prescreen. Patient is in need of, appropriate for and should tolerate and benefit from an aggressive IRF stay. I have discussed patient with Nurse Liaison. I have reviewed the prescreen. Patient is in need of, appropriate for and should tolerate and benefit from an aggressive IRF stay.

## 2022-07-12 NOTE — PROGRESS NOTES
Discharge instructions given to patient and wife. New medications and side effects discussed. Report given to NATACHA Hernández at [facility]. Instructed to keep Ivs in. Telemetry discontinued. Pt COVID test negative. Wife at bedside upon discharge. Pt wheeled down via wheelchair and picked up via [place] with transport.

## 2022-07-12 NOTE — PT/OT/SLP EVAL
"      Physical Therapy        Inpatient Rehab Evaluation    Ayaz Huggins   MRN: [id number]      [id number]    Billable Minutes:   Evaluation 30, Gait [id number], Therapeutic Activity 30 and Therapeutic Exercise 10    Diagnosis: Stroke - L ischemic CVA s/p tPA on 22. L carotid endarterectomy on 22  Past Medical History:   Diagnosis Date    Diabetes mellitus     Hypertension     Mixed hyperlipidemia       Past Surgical History:   Procedure Laterality Date    CARDIAC SURGERY         General Precautions: Standard, fall  Orthopedic Precautions: N/A   Braces: N/A     Spiritual, Cultural Beliefs, Jew Practices, Values that Affect Care: yes    Patient History:  Lives With: spouse (2 college-aged grandchildren)  Home Accessibility: other (see comments) (Threshold to enter)  Home Layout: Able to live on 1st floor  Stair Railings at Home: none  Transportation Anticipated: car, drives self, family or friend will provide  Equipment Currently Used at Home: none    DME owned (not currently used): none    Previous Level of Function:  Ambulation Skills: independent  Transfer Skills: independent  ADL Skills: independent  Work/Leisure Activity: independent    Subjective:  Communicated with NATACHA Shay prior to session.    Chief Complaint: R sided weakness  Patient goals: "to walk" and "To get back to what I was doing before" - pt reports that he and his wife were riding their bikes 6 miles every morning, he likes to woodwork and make toys for kids, and he likes to collect coins.   Family goals: not present upon evaluation    Pain/Comfort  Pain Rating 1: 0/10    Objective:    BP at start: 157/69, 90       In supine  BP durin/69, 94bpm  BP at end: 152/67, 81    Patient found supine in bed with wife and Dr. Rivera present in room;      Cognitive Exam:  Oriented to: Person, Place, Time and Situation  Follows Commands/attention: Follows two-step commands  Communication: clear/fluent  Safety awareness/insight to " disability: intact    Physical Exam:  Postural examination/scapula alignment:    -       Forward head    Skin integrity: Visible skin intact and incision on L neck  Edema: Mild L neck, jolene hands    Sensation:      -       Intact    Upper Extremity Range of Motion:  Refer to OT evaluation for UE ROM.    Upper Extremity Strength:  Refer to OT evaluation for UE strength.    Lower Extremity Range of Motion:  Right Lower Extremity: No AROM, WFL for PROM  Left Lower Extremity: WFL    Lower Extremity Strength:  Right Lower Extremity: 0/5 or trace movements (ADD and ABD)  Left Lower Extremity: WFL    Gross motor coordination: impaired    Functional Mobility:   Current   Status   Functional Area: Care Score:   Roll Left and Right 3   Sit to Lying 3   Lying to Sitting on Side of Bed 3   Sit to Stand 3   Chair/Bed-to-Chair Transfer 3   Car Transfer 1   Walk 10 Feet 88   Walk 50 Feet with Two Turns 88   Walk 150 Feet 88   Walk 10 Feet Uneven Surface 88   1 Step (Curb) 88   4 Steps 88   12 Steps 88   Picking Up Object 88   Wheel 50 Feet with Two Turns 3   Wheel 150 Feet 2       Bed Mobility :   Supine to sit: Minimal Assistance, increased time required   Sit to supine: Minimal Assistance   Rolling: Minimal Assistance   Scooting: Minimal Assistance    Transfers:  Sit to stand:Moderate Assistance with No Assistive Device HHA  Bed <> Chair:  Stand Pivot with Moderate Assistance with No Assistive Device toward L side  Car Transfer:  Stand Pivot with Total Assistance: 2 person assist for safety with No Assistive Device. Pt required Mod A x1 to get into the car, but Mod A x2 to get out of car.     Wheelchair:  Patient propels wheelchair 100 feet using LUE and LLE with  Minimal Assistance and Moderate Assistance and Moderate verbal cues.    Gait:  - Pt unable to ambulate at this time.  - Pregait performed in Parallel bars. Two trials. Second trial, ACE wrap donned as AFO/DF assist. Pt able to initiate advancement of RLE with ankle in  DF but unable to actually advance foot more than 1-2 times. PT blocking R knee to prevent buckling, and PT advancing RLE. Another person following closely behind with WC and assisting with balance as need. Pt able to use RUE and LUE on // bar for trunk support/balance.   Note: Pt will benefit from an AFO and knee brace.     Endurance deficit:  Slightly impaired, requires rest breaks    Additional Treatment:  Supine in bed: 3x10 bridges, x10 RLE add/abd AROM with knee in bent position.     Education Provided: roles and goals of PT/PTA, transfer training, bed mob, gait training, balance training, safety awareness, body mechanics, wheelchair management, strengthening exercises and fall prevention    Expected compliance: High compliance        Patient left supine with call button in reach and Henny, OT present for next therapy session.    Assessment:  Ayaz Huggins is a 77 y.o. male with a medical diagnosis of Stroke. He presents with R sided weakness   limiting tolerance and safety during functional mobility tasks.  Pt to benefit from skilled PT services to address impairments with progression towards functional independence as tolerated.      IMPAIRMENTS  coordination, endurance, R/L UE weakness, R/L LE weakness, trunk weakness and functional strength    FUNCTIONAL LIMITATIONS  Bed mob, dynamic standing balance, gait impairments and wheelchair mobility    ACTIVITY LIMITATIONS  endurance and functional mobility    ACTIVITY CAPABILITIES  Independent premorbid, family support and safety awareness      Patient agrees with need for treatment: yes      QI Scores:   Admission Assessment   Functional Area: Care Score:    Roll Left and Right 3   Sit to Lying 3   Lying to Sitting on Side of Bed 3   Sit to Stand 3   Chair/Bed-to-Chair Transfer 3   Walk 10 Feet 88   Walk 50 Feet with Two Turns 88   Walk 150 Feet 88   1 Step (Curb) 88   4 Steps 88   12 Steps 88   Wheel 50 Feet with Two Turns 3   Wheel 150 Feet 2   Car Transfer  1   Walk 10 Feet on Uneven Surfaces 88   Picking up Object 88     Rehab potential is excellent.    Activity tolerance: Good    Plan: plan care intiated, bed mobility initiated, transfer training iniated, gait training, balance training, stretching, strengthening, neuromuscular re-education, motor coordination training, postural re-education, manual therapy techniques, orthotic fitting/training, wheelchair management/propulsion and electrical stimulation    Discharge recommendations:  (Pending progress)     Equipment recommendations: wheelchair (Pending progress)    GOALS:   Multidisciplinary Problems     Physical Therapy Goals        Problem: Physical Therapy    Goal Priority Disciplines Outcome Goal Variances Interventions   Physical Therapy Goal     PT, PT/OT      Description: Bed Mobility:  Roll left and right with supervision/touching assist.  Sit to supine transfer with supervision/touching assist.  Supine to sit transfer with supervision/touching assist.    Transfers:  Sit to stand transfer with supervision/touching assist using RW or LRAD.  Bed to chair transfer with supervision/touching assist using RW or LRAD.  Car transfer with partial/moderate assist using RW or LRAD.    Mobility:  Ambulate 10 feet with partial/moderate assist using RW or LRAD.                     PLAN:    Patient to be seen 5 x/week to address the above listed problems via gait training, therapeutic activities, therapeutic exercises, neuromuscular re-education, wheelchair management/training  Plan of Care expires: 07/18/22  Plan of Care reviewed with: patient, spouse    7/12/2022

## 2022-07-12 NOTE — PT/OT/SLP EVAL
"      Occupational Therapy         Evaluation    Ayaz Huggins   MRN: [id number]   Admitting Diagnosis: Stroke    Therapy Minutes  OT Date of Treatment: 07/12/22  OT Start Time: 1615  OT Stop Time: 1745  OT Total Time (min): 90 min  OT Individual: 90    Billable Minutes:   Evaluation 30 and Self Care/Home Management 60    Diagnosis: Stroke      Past Medical History:   Diagnosis Date    Diabetes mellitus     Hypertension     Mixed hyperlipidemia       Past Surgical History:   Procedure Laterality Date    CARDIAC SURGERY         Referring physician: Miguel  Date referred to OT: 7/12/22    General Precautions: Standard, fall  Orthopedic Precautions: N/A  Braces: N/A          Patient History:       Prior level of function:    Bed Mobility/Transfers: independent  Grooming: independent  Bathing: independent  Upper Body Dressing: independent  Lower Body Dressing: independent  Toileting: independent  Homemaking Responsibilities: Yes  Meal Prep Responsibility: Primary  Laundry Responsibility: Secondary  Bill Paying/Finance Responsibility: Primary  Driving License: Yes  Mode of Transportation: Car  Occupation: Retired  Leisure and Hobbies:  (riding bike)  IADL Comments: pt able to complete all IADL responsibilities Independently prior to admission     Dominant hand: right    Subjective:  Patient communicated  Chief Complaint: I want to be less dependent  Patient stated goals: "LTG would be to ride my bike again"         Objective:       Cognitive Exam:  Oriented to: Person, Place, Time and Situation  Follows Commands/attention: WFL  Communication: clear/fluent  Memory:  No Deficits noted  Safety awareness/insight to disability: intact  Coping skills/emotional control: Appropriate to situation    Visual/perceptual:  Intact  wears glasses-near sighted    Physical Exam:  Postural examination/scapula alignment:    -       Rounded shoulders      Sensation:      -       Intact    Upper Extremity Range of Motion:  Right Upper " Extremity: PROM WFL, decreased AROM of shoulder against gravity  Left Upper Extremity: WFL   Tightness noted of R hip limiting fig-4 position. edu pt about stretches that could be performed in bed to improve    Upper Extremity Strength:  Right Upper Extremity: Deficits: shoulder flex/ext/abduct 3-/5, elbow flex 4/5, elbow ext 3+/5, Pro/sup 3+/5, wrist flex/ext 4+  Left Upper Extremity: WFL   Strength: dynamometer testing-3 trials  R: 68, 70, 65  L: 73, 73, 63    Fine motor coordination:      -       Impaired  Right hand, manipulation of objects during functional tasks    Gross motor coordination: RUE deficits noted    Functional Mobility:  Bed Mobility:   Supine to sit: Moderate Assistance     Transfers:   Bed <> Chair:  Step Transfer with Moderate Assistance with No Assistive Device HHA, RLE guided/blocked  Toilet Transfer:  Pt Stand Pivot with Maximum Assistance with No Assistive Device RLE guided/blocked. increased assist d/t fatigue. using BS     Current Status   Functional Area: Care Score:   Eating 6   Oral Hygiene 5   Toileting Hygiene 2   Shower/Bathe Self 3   Upper Body Dressing 3   Lower Body Dressing 2   Putting On/Taking Off Footwear 3   Toilet Transfer 2   Pt sponge bathed seated in w/c at sink. Able to shave face, brush teeth and bathe most body parts. Assist for balance and bathing bottom.         Assessment:  Ayaz Huggins is a great rehab candidate and would benefit from skilled OT treatment to increase ADL independence and safety.  Ayaz Huggins is a 77 y.o. male with a medical diagnosis of Stroke and presents with the following impairments, limitations, and capabilities.     Impairments: coordination, endurance, R/L UE weakness, decreased ROM and functional strength  Functional Limitations: ADLs, home mgmt, functional t/fs, bed mobility, sit balance, stand balance and community integration  Activity capabilities: Independent premorbid  Activity limitations: endurance, functional mobility and  weakness, coordination    Recommended length of stay: 14-21 days  Patient agrees with need for treatment:   Yes  No      TTB T/F: did not occur    Quality Indicators:  Eating 6   Oral Hygiene 5   Toileting Hygiene 2   Shower/Bathe Self 3   Upper Body Dressing 3   Lower Body Dressing 2   Putting On/Taking Off Footwear 3   Toilet Transfer 2       Rehab potential is excellent    Activity tolerance: Good      GOALS:   Multidisciplinary Problems     Occupational Therapy Goals        Problem: Occupational Therapy    Goal Priority Disciplines Outcome Interventions   Occupational Therapy Goal     OT, PT/OT Ongoing, Progressing    Description: LTG: Pt will be Ind with all ADLS.  Pt LTG: to return to bike riding    ADLs:  Pt to perform grooming tasks with [place] from seated position by d/c.  Pt to perform UB dressing with Setup using modified techniques by 1 week.   Pt to perform LB dressing with SPV using modified techniques in 1 week.    Pt to perform putting on/off footwear task with SPV by re-eval.  Pt to perform toileting with Partial A using BSC by re-eval.    Functional Transfers:  Pt to perform toilet transfers with Partial A by re-eval using BSC and LRAD for mobility.    Pt to perform a tub transfer with Max A by re-eval using TTB.   Pt to perform a walk-in shower transfer Max A using LRAD by re-eval.       Balance, Strengthening, Endurance, Balance:  Pt  will perform dynamic standing tasks using LRAD for 2 minutes with SPV assist by re-eval  Pt to demonstrate 4/5 R shoulder strength and 5/5 distal RUE strength during functional task by re-eval.                      PLAN: Patient to be seen 5 x/week, 6 x/week to address the above listed problems via self-care/home management, community/work re-entry, therapeutic activities, therapeutic exercises, neuromuscular re-education  Plan of Care expires:    Plan of Care reviewed with: spouse, patient    07/12/2022

## 2022-07-12 NOTE — DISCHARGE SUMMARY
[facility] Hospital Medicine Discharge Summary    Admit Date: 7/2/2022  Discharge Date and Time: 7/12/202211:55 AM  Admitting Physician: [unfilled]  Discharging Physician: Marion Villalpando MD.  Primary Care Physician: Angel Parham Jr, MD  Consults: neurology and vascular surgery  Discharge Diagnoses:  Acute left hemispheric ischemic CVA, S/P TPA   Carotid artery stenosis status post CEA  Hypertension  DM Type II  HLD    Hospital Course:   76 y/o male with a PMH of Type II DM, diabetic neuropathy, CAD s/p CABG x2 (2007), and HTN who presented to [facility] ED  On 7/2/22 with complaints of right sided upper and lower extremity weakness. Patient's story is unclear upon the time of last known normal. He states he woke up  with a cramp in the RLE and noticed the right sided weakness.The patient was admitted to the ICU with an ischemic CVA and administered tPA; he has had some improvement in his right-sided weakness although he is unable to purposefully move his right lower extremity.  Neurology has seen and evaluated the patient and has deemed this patient stable for transfer to the floor.  Patient tolerated CEA well the patient was continued on aspirin the patient/ot consulted and pt was dced to rehab  Pt was seen and examined on the day of discharge  Vitals:  VITAL SIGNS: 24 HRS MIN & MAX LAST   Temp  Min: 97.9 °F (36.6 °C)  Max: 98.5 °F (36.9 °C) 98.1 °F (36.7 °C)   BP  Min: 127/73  Max: 175/62 (!) 143/54   Pulse  Min: 63  Max: 74  74   Resp  Min: 18  Max: 18 18   SpO2  Min: 96 %  Max: 97 % 97 %       Physical Exam:  General: In no acute distress, AAOx3   Chest: Clear to auscultation bilaterally, non-labored   Heart: RRR, +S1, S2, no appreciable murmur  Abdomen: Soft, nontender, BS +  MSK: Warm, no lower extremity edema, no clubbing or cyanosis  Neurologic:awake and alert    Procedures Performed: No admission procedures for hospital encounter.     Significant Diagnostic Studies: See  Full reports for all details    Recent Labs   Lab 07/07/22  0421 07/08/22  0501 07/09/22  0614   WBC 9.9 9.9 8.2   RBC 4.33* 4.50* 3.69*   HGB 12.8* 13.2* 10.9*   HCT 37.4* 40.1* 33.4*   MCV 86.4 89.1 90.5   MCH 29.6 29.3 29.5   MCHC 34.2 32.9* 32.6*   RDW 13.1 13.2 13.0    228 202   MPV 10.6* 10.0 10.4       Recent Labs   Lab 07/06/22  0143 07/07/22  0421 07/08/22  0501 07/09/22  0614    140 139 137   K 3.6 3.8 4.0 4.5   CO2 25 23 23 21*   BUN 11.5 11.9 11.3 20.7   CREATININE 0.64* 0.68* 0.67* 0.69*   CALCIUM 9.6 9.3 9.1 7.8*   MG 1.80 1.90 2.00  --    ALBUMIN 3.5 3.6 3.6 2.9*   ALKPHOS 79 88 91 81   ALT 22 26 28 27   AST 21 20 23 22   BILITOT 1.0 0.9 0.9 0.7        Microbiology Results (last 7 days)     ** No results found for the last 168 hours. **           MRI Brain W WO Contrast  Narrative: EXAMINATION:  MRI BRAIN W WO CONTRAST    CLINICAL HISTORY:  Stroke, follow up;;    TECHNIQUE:  Multiplanar, multisequence MR images of the brain were obtained WITH and WITHOUT the administration of intravenous gadolinium based contrast.    COMPARISON:  Head CT/CTA 07/02/2022    FINDINGS:  Scalp: No abnormalities.    Bone marrow: No signal abnormalities.    Brain sulci: Appropriate for patient's age.    Ventricles: Normal in size and configuration. No hydrocephalus.    Extra-axial spaces:    No masses or fluid collections.    Parenchyma:    Scattered foci of ischemia supratentorially (L>R), the largest is a left posterior frontal 3 x 2.1 cm focus, with additional subcentimeter bifrontal, biparietal, left occipitotemporal and left basal ganglia foci.    No additional acute vascular insults.    The largest infarct is associated with patchy magnetic susceptibility, remaining do not show magnetic susceptibility.    No mass or large volume hemorrhage.    Punctate left posterior frontal foci of enhancement is favored to relate to ischemia.    Otherwise no enhancing abnormalities.    Vessels: Normal flow voids in major  arteries and veins.    Sellar/Suprasellar region: No abnormalities.    Craniocervical junction: No abnormalities.  Impression: 1. Small acute infarcts supratentorially, the largest is a left posterior frontal 3 cm cortical based infarct with petechial hemorrhage.  No mass effect or shift.  Discussed with Dr. Duncan.  2. Otherwise no acute intracranial abnormalities.    Electronically signed by: Abner Moore  Date:    07/03/2022  Time:    14:09         Medication List      START taking these medications    amLODIPine 10 MG tablet  Commonly known as: NORVASC  Take 1 tablet (10 mg total) by mouth once daily.  Start taking on: July 13, 2022     aspirin 325 MG tablet  Take 1 tablet (325 mg total) by mouth once daily.  Start taking on: July 13, 2022  Replaces: aspirin 81 MG EC tablet     atorvastatin 80 MG tablet  Commonly known as: LIPITOR  Take 1 tablet (80 mg total) by mouth every evening.     mupirocin 2 % ointment  Commonly known as: BACTROBAN  by Nasal route 2 (two) times daily.        CONTINUE taking these medications    lisinopriL 5 MG tablet  Commonly known as: PRINIVIL,ZESTRIL     metFORMIN 1000 MG tablet  Commonly known as: GLUCOPHAGE     metoprolol succinate 50 MG 24 hr tablet  Commonly known as: TOPROL-XL        STOP taking these medications    aspirin 81 MG EC tablet  Commonly known as: ECOTRIN  Replaced by: aspirin 325 MG tablet     rosuvastatin 10 MG tablet  Commonly known as: CRESTOR           Where to Get Your Medications      Information about where to get these medications is not yet available    Ask your nurse or doctor about these medications  · amLODIPine 10 MG tablet  · aspirin 325 MG tablet  · atorvastatin 80 MG tablet  · mupirocin 2 % ointment          Explained in detail to the patient about the discharge plan, medications, and follow-up visits. Pt understands and agrees with the treatment plan  Discharge Disposition:     Discharged Condition: stable  Diet-   Dietary Orders (From admission,  onward)     Start     Ordered    07/08/22 2328  Diet Adult Regular  (Diet/Nutrition [facility])  Diet effective now         07/08/22 2328               Medications Per DC med rec  Activities as tolerated   Follow-up Information     Angel Parham Jr, MD Follow up in 1 week(s).    Specialty: Internal Medicine  Why: Date: Monday July 18,2022  Time: 1:45PM  Contact information:  [address]  [address]  [facility] [address]  [phone]             Jacque Wang MD Follow up in 1 month(s).    Specialty: Neurology  Contact information:  [address] [place] [address] [address]  [facility] [address]  [phone]             Dread Lama MD Follow up in 6 week(s).    Specialty: Vascular Surgery  Why: with surveillence   Date: Wednesday August 24, 2022  Time:10:45 AM  Contact information:  [address]  [facility] [id number] [phone]                       For further questions contact hospitalist office    Discharge time 33 minutes    For worsening symptoms, chest pain, shortness of breath, increased abdominal pain, high grade fever, stroke or stroke like symptoms, immediately go to the nearest Emergency Room or call 911 as soon as possible.      Marion Galeas M.D, on 7/12/2022. at 11:55 AM.

## 2022-07-12 NOTE — PLAN OF CARE
Problem: Occupational Therapy  Goal: Occupational Therapy Goal  Description: LTG: Pt will be Ind with all ADLS.  Pt LTG: to return to bike riding    ADLs:  Pt to perform grooming tasks with [place] from seated position by d/c.  Pt to perform UB dressing with Setup using modified techniques by 1 week.   Pt to perform LB dressing with SPV using modified techniques in 1 week.    Pt to perform putting on/off footwear task with SPV by re-eval.  Pt to perform toileting with Partial A using BSC by re-eval.    Functional Transfers:  Pt to perform toilet transfers with Partial A by re-eval using BSC and LRAD for mobility.    Pt to perform a tub transfer with Max A by re-eval using TTB.   Pt to perform a walk-in shower transfer Max A using LRAD by re-eval.       Balance, Strengthening, Endurance, Balance:  Pt  will perform dynamic standing tasks using LRAD for 2 minutes with SPV assist by re-eval  Pt to demonstrate 4/5 R shoulder strength and 5/5 distal RUE strength during functional task by re-eval.     Outcome: Ongoing, Progressing

## 2022-07-12 NOTE — PLAN OF CARE
Pt has been accepted to [facility] inpatient rehab. Pt will need covid swab. Order placed and notified nurse. Will follow up with time of  for patient.

## 2022-07-12 NOTE — PT/OT/SLP PROGRESS
Physical Therapy Treatment    Patient Name:  Ayaz Huggins   MRN:  [id number]    Recommendations:     Discharge Recommendations:  rehabilitation facility   Discharge Equipment Recommendations: walker, rolling   Barriers to discharge: placement    Assessment:     Ayaz Huggins is a 77 y.o. male admitted with a medical diagnosis of Stroke.  He presents with the following impairments/functional limitations:  impaired functional mobilty, gait instability, impaired balance .    Rehab Prognosis: Good; patient would benefit from acute skilled PT services to address these deficits and reach maximum level of function.    Recent Surgery: Procedure(s) (LRB):  ENDARTERECTOMY, CAROTID (Right) 4 Days Post-Op    Plan:     During this hospitalization, patient to be seen BID to address the identified rehab impairments via gait training, therapeutic activities, therapeutic exercises and progress toward the following goals:    · Plan of Care Expires:  08/04/22    Subjective     Chief Complaint: none  Patient/Family Comments/goals: To return to PLOF  Pain/Comfort:  ·        Objective:     Communicated with RN prior to session.  Patient found HOB elevated with   upon PT entry to room.     General Precautions: Standard, fall   Orthopedic Precautions:N/A   Braces:    Respiratory Status: Room air     Functional Mobility:  · Bed Mobility:     · Supine to Sit: minimum assistance  · Transfers:     · Sit to Stand:  moderate assistance with grab bars  · Bed to Chair: moderate assistance with  hand-held assist  using  Squat Pivot  · Gait: Pt amb 10ftx1, 10ft x2, 10ftx3 with hallway railing. Pt requires manual advancement and blocking of RLE. Cuing for lat weightshifting and errect posture. Seated rest breaks required between trials.      AM-PAC 6 CLICK MOBILITY          Patient left up in chair with all lines intact, call button in reach and wife present..    GOALS:   Multidisciplinary Problems     Physical Therapy Goals        Problem: Physical  Therapy    Goal Priority Disciplines Outcome Goal Variances Interventions   Physical Therapy Goal     PT, PT/OT Ongoing, Progressing     Description: Goals to be met by: 2022     Patient will increase functional independence with mobility by performin. Supine to sit with Contact Guard Assistance  2. Sit to supine with Contact Guard Assistance  3. Sit to stand transfer with Contact Guard Assistance  4. Gait  x 150 feet with Minimal Assistance using Rolling Walker vs LRAD.                      Time Tracking:     PT Received On: 22  PT Start Time: 1022     PT Stop Time: 1052  PT Total Time (min): 30 min     Billable Minutes: Gait Training 20 and Therapeutic Activity 10    Treatment Type: Treatment  PT/PTA: PTA     PTA Visit Number: 5     2022

## 2022-07-12 NOTE — H&P
[facility] ([facility])  Rehab Admission H&P    Patient Name: Ayaz Huggins  MRN: [id number]  Age: 77 y.o. Sex: male  : 1945  Hospital Length of Stay: 0 days  Date of Service: 2022   Chief Complaint:  Acute left hemispheric CVA s/p CPA and s/p left CEA on 2022    Subjective:   History of Present Illness   77-year-old white male presented to [facility] ED on 2022 complaining of right-sided weakness.  PMH significant for DM type 2, CAD s/p CABG x2 in , and HTN.  CT head negative for hemorrhagic bleeding.  Received tPA and transferred to ICU for post tPA monitoring and treatment.  TTE with negative bubble study, but significant for grade 1 diastolic dysfunction.  Carotid ultrasound significant for left ICA stenosis.  MRI significant for last left hemispheric CVA and 3 other small punctate CVAs on the right hemisphere.  Tolerated left CEA on  without periprocedural complications.  Tolerated transfer to [facility] inpatient rehab unit on  without incident.  Sitting comfortably in chair with wife at bedside.  Reports fair sleep.  Appetite is good.  Last BM .  BP moderately controlled.  Mild bradycardia in the 50s.  Recent lab work on  stable.  Recent imaging report below.    Past Medical History:  Bilateral CVA with right-sided weakness, Carotid artery stenosis s/p left CEA on 2022, HTN, DM type 2, Normocytic anemia  Procedure history: Left CEA on 2022, CABG x2 in   Family History:  Mother:  CHF + at age 79.  Father:  CHF + at age 72.   Social History:   (-) TOB  (-) ETOH  (-) Illicit drug use  Completed high school and some college.  No  history.  He retired as a  for [employer].   to Pooja.  Pooja is retired.  She drives, cooks, cleans, does laundry, shops, manage finances, and can physically assist. She will be home with patient after discharge from rehab.  She is also checking into  their long-term care policy.  Prior level of function:  Independent ADLs, drives, cooks, does not recover grocery shops, manages his own medications, and does yd work.  Residence:  Lives with wife in a single story home with 1 step to enter the residents.  He uses a walk-in shower with a threshold, Bill tendons, and hand-held shower.  No grab bars adjacent.  He does not have an elevated toilet.  He does not have grab bars adjacent to the toilet.  DME:  Glucometer, hand-held shower, blood pressure machine  Anticipated discharge destination: Home with     Review of patient's allergies indicates:   Allergen Reactions    Penicillins       No current facility-administered medications for this encounter.    Current Outpatient Medications:     [START ON 7/13/2022] amLODIPine (NORVASC) 10 MG tablet, Take 1 tablet (10 mg total) by mouth once daily., Disp: 30 tablet, Rfl: 11    [START ON 7/13/2022] aspirin 325 MG tablet, Take 1 tablet (325 mg total) by mouth once daily., Disp: , Rfl: 0    atorvastatin (LIPITOR) 80 MG tablet, Take 1 tablet (80 mg total) by mouth every evening., Disp: 90 tablet, Rfl: 3    lisinopriL (PRINIVIL,ZESTRIL) 5 MG tablet, Take 5 mg by mouth once daily., Disp: , Rfl:     metFORMIN (GLUCOPHAGE) 1000 MG tablet, Take 1,500 mg by mouth., Disp: , Rfl:     metoprolol succinate (TOPROL-XL) 50 MG 24 hr tablet, Take 50 mg by mouth., Disp: , Rfl:     mupirocin (BACTROBAN) 2 % ointment, by Nasal route 2 (two) times daily., Disp: , Rfl:     Facility-Administered Medications Ordered in Other Encounters:     0.9%  NaCl infusion, 50 mL, Intravenous, Once, Dread Lama MD    0.9%  NaCl infusion, 50 mL, Intravenous, Once, Dread Lama MD    amLODIPine tablet 10 mg, 10 mg, Oral, Daily, Nikko Murphy MD, 10 mg at 07/12/22 0821    aspirin tablet 325 mg, 325 mg, Oral, Daily, Dread Lama MD, 325 mg at 07/12/22 0821    atorvastatin tablet 80 mg, 80 mg, Oral, QHS, Dread Lama MD, 80 mg at 07/11/22 9824     bisacodyL suppository 10 mg, 10 mg, Rectal, Daily PRN, Dread Lama MD, 10 mg at 07/06/22 0106    clotrimazole-betamethasone 1-0.05% cream, , Topical (Top), BID, Nikko Murphy MD, Given at 07/12/22 0822    dextrose 10% bolus 125 mL, 12.5 g, Intravenous, PRN, Dread Lama MD    dextrose 10% bolus 250 mL, 25 g, Intravenous, PRN, Dread Lama MD    dextrose 50% injection 12.5 g, 12.5 g, Intravenous, PRN, Dread Lama MD    docusate sodium capsule 100 mg, 100 mg, Oral, BID, Dread Lama MD, 100 mg at 07/12/22 0821    glucagon (human recombinant) injection 1 mg, 1 mg, Intramuscular, PRN, Dread Lama MD    heparin (porcine) injection 5,000 Units, 5,000 Units, Subcutaneous, Q8H, Dread Lama MD, 5,000 Units at 07/12/22 0641    hydrALAZINE injection 10 mg, 10 mg, Intravenous, Q6H PRN, Dread Lama MD, 10 mg at 07/12/22 0024    HYDROcodone-acetaminophen 5-325 mg per tablet 1 tablet, 1 tablet, Oral, Q4H PRN, Dread Lama MD    insulin aspart U-100 injection 0-5 Units, 0-5 Units, Subcutaneous, Q6H PRN, Dread Lama MD, 2 Units at 07/12/22 1214    labetaloL injection 10 mg, 10 mg, Intravenous, Q15 Min PRN, Dread Lama MD, 10 mg at 07/07/22 0433    lisinopriL tablet 20 mg, 20 mg, Oral, Daily, Dread Lama MD, 20 mg at 07/12/22 0821    metoprolol succinate (TOPROL-XL) 24 hr tablet 50 mg, 50 mg, Oral, Daily, Dread Lama MD, 50 mg at 07/12/22 0822    mupirocin 2 % ointment, , Nasal, BID, Dread Lama MD, Given at 07/11/22 2152    ondansetron injection 4 mg, 4 mg, Intravenous, Q6H PRN, Dread Lama MD    ondansetron injection 4 mg, 4 mg, Intravenous, Q12H PRN, Dread Lama MD    polyethylene glycol packet 17 g, 17 g, Oral, Daily, Dread Lama MD, 17 g at 07/10/22 0855    promethazine injection 25 mg, 25 mg, Intramuscular, Q6H PRN, Dread Lama MD    senna-docusate 8.6-50 mg per tablet 1 tablet, 1 tablet, Oral, Daily, Dread Lama MD, 1 tablet at 07/12/22 0822    sodium chloride 0.9% flush 10 mL, 10 mL,  "Intravenous, PRN, Dread RITU Lama MD     Review of Systems   Complete 12-point review of symptoms negative except for what's mentioned in HPI     Objective:     BP (!) 157/69   Pulse 90   Temp 97.9 °F (36.6 °C)   Resp 20   Ht 5' 11" (1.803 m)   Wt 89.2 kg (196 lb 10.4 oz)   SpO2 98%   BMI 27.43 kg/m²     No intake or output data in the 24 hours ending 07/12/22 1646    Physical Exam  Constitutional:       Appearance: Normal appearance.   HENT:      Head: Normocephalic.      Mouth/Throat:      Mouth: Mucous membranes are moist.   Eyes:      Pupils: Pupils are equal, round, and reactive to light.   Neck:      Comments: Left CEA incision clean intact with mild postsurgical swelling  Cardiovascular:      Rate and Rhythm: Normal rate and regular rhythm.      Heart sounds: Normal heart sounds.   Pulmonary:      Effort: Pulmonary effort is normal.      Breath sounds: Normal breath sounds.   Abdominal:      General: Bowel sounds are normal.      Palpations: Abdomen is soft.   Musculoskeletal:      Cervical back: Neck supple.      Comments: Generalized weakness and muscle atrophy.  Right upper extremity dependent swelling   Skin:     General: Skin is warm and dry.   Neurological:      Mental Status: He is alert and oriented to person, place, and time.      Comments: Right-sided hemiplegia.  Can move RUE against gravity.  LLE flaccid.   Psychiatric:         Mood and Affect: Mood normal.         Behavior: Behavior normal.         Thought Content: Thought content normal.         Judgment: Judgment normal.     *MD performed and documented physical examination       Lines/Drains/Airways     Peripheral Intravenous Line  Duration                Peripheral IV - Single Lumen 07/02/22 0923 20 G Left Wrist 10 days         Peripheral IV - Single Lumen 07/08/22 1652 20 G Left;Anterior Hand 3 days              Labs  Recent Results (from the past 24 hour(s))   POCT glucose    Collection Time: 07/11/22  4:42 PM   Result Value Ref Range "    POCT Glucose 180 (H) 70 - 110 mg/dL   POCT glucose    Collection Time: 07/11/22  9:51 PM   Result Value Ref Range    POCT Glucose 163 (H) 70 - 110 mg/dL   COVID-19 Rapid Screening    Collection Time: 07/12/22 11:50 AM   Result Value Ref Range    SARS COV-2 MOLECULAR Negative Negative   POCT glucose    Collection Time: 07/12/22 11:54 AM   Result Value Ref Range    POCT Glucose 204 (H) 70 - 110 mg/dL     Radiology  MRI brain without contrast on 07/03/2022 at 2:09 p.m., IMPRESSION: Small acute infarcts supratentorially, the largest is a left posterior frontal 3 cm cortical based infarct with petechial hemorrhage.  No mass effect or shift.  Discussed with Dr. Duncan. Otherwise no acute intracranial abnormalities.  Radiology  Transthoracic echo on 07/02/2022 at 2:40 p.m., IMPRESSION: The left ventricle is  with normal systolic function. The estimated ejection fraction is 60%. There is grade I diastolic dysfunction consistent with impaired relaxation. Left atrial pressure is normal.  Assessment/Plan:     77 y.o. WM admitted on 7/12/2022    Acute left hemispheric CVA   - s/p tPA and s/p left CEA on 7/8/2022  - MRI with 3 other small punctate CVAs on the right hemisphere  - continue   Aspirin 325 mg daily   Lipitor 80 mg at bedtime   Xarelto 20 mg daily  - Consult physiatry for rehab and pain management  - PT/OT/RT/ST to eval and treat     Carotid artery stenosis  - s/p left CEA on 07/08/2022  - continue   Aspirin 325 mg daily   Lipitor 80 mg at bedtime   Xarelto 20 mg daily   Norco 5 mg/325 mg q.6 hours p.r.n.  - to follow up with vascular surgery outpatient    HTN  - at goal!!  - continue   Metoprolol succinate 50 mg daily   Norvasc 10 mg daily   Lisinopril 20 mg daily    Hydralazine 10 mg every 2 hours as needed for BP > 160/90   Labetalol 10 mg every 2 hours as needed for BP > 160/90    DM type II  - HgA1c with next labs  - continue   Metformin 1000 mg twice daily   ISS   - CBGs AC/HS    Normocytic anemia  -  asymptomatic  - H/H stable   - no evidence of active bleeds  - will closely monitor and transfuse if needed     Constipation  - stable  - continue    Colace 100 mg b.i.d.     MiraLax 17 g daily    MEDICAL PLAN:  - Admit to [facility]  - Medical reconciliation completed and included in the electronic health record  - Draw admit labs including CBC, CMP, and Prealbumin  - Maintain weightbearing status as ordered  - Monitor postoperative surgical incision changing dressing daily  - Teach skin protection and wound prevention techniques  - Initiate fall precaution  - Initiate bowel training program  - Initiate bladder training as indicated  - Pain management  - IS q2 hours while awake    THERAPEUTIC PLAN:  - Physical therapy 60-90 minutes 5 to week to increase functional mobility, maintain weightbearing precautions, increase lower extremity restrengthening, increase overall activity tolerance, teach balance and safety measures as well as fall precautions, make equipment and orthotic recommendations, be involved in family training and discharge planning, monitor pain levels and vital signs during therapy adjusting treatment accordingly  - Occupational therapy 60-90 minutes 5 times a week to increase functional independence with activities of daily living, maintain weightbearing precautions, teach use of adaptive equipment, increase upper extremity restrengthening, increase overall activity tolerance, teach balance and safety measures as well as fall precautions, make equipment and orthotic recommendations, be involved in family training and discharge planning, monitor pain levels and vital signs during therapy adjusting treatment accordingly  - Speech and language pathology will do an in-depth evaluation of patient's cognition, phonation, and swallowing. They will initiate therapy 30- 60 minutes 5 times a week as indicated  - Recreational therapy will incorporate all patient's  functional abilities  into recreational activities that will require visual, spatial, and perceptual abilities; gross and fine motor coordination skills; the cognition to follow multistep commands; dynamic and static balance and reaching abilities. They will also incorporate animal assisted therapy into their weekly program  - Rehabilitation nursing will act as patient family physician liaison. They will integrate patient's functional abilities, after discussions with therapist, into everyday activities with the CNA's,  LPN's and RNs that will include but are not limited to dressing, bathing, feeding, grooming, toileting, transfers, hygiene etc. They will administer medications watching for wanted as well as unwanted effects. They will initiate DVT/VTE prophylaxis as ordered. Will monitor vital signs, lab values, and pain levels, making appropriate physician notification. They will monitor skin integrity including postop incision, making daily dressing changes. They will teach skin protection and wound prevention techniques. They will initiate bowel training They will initiate bladder training as indicated. They will initiate fall precautions  - Rehabilitation counseling/case management will act as patient and family liaison. They will set up family conferences, family training, aid in discharge planning, and aid in the procurement of assistive devices  - Patient will have 24 hour availability to physiatric care  - Patient will have nutritional services involved, monitoring oral input and visceral protein stores. They will make dietary and supplement recommendations and follow-up as necessary  - Patient will have weekly interdisciplinary team conferences  - Patient will have initial family conference and follow up conferences as indicated  - Patient will have family training prior to discharge     Estimated length of stay - 14-17 days  Anticipated discharge destination - Home with   Medical status - stabilizing  postoperatively; will need to monitor pain levels, postoperative skin integrity, watch for evidence of deep vein thrombosis, monitor postoperative H&H, monitor vital signs closely.  Medical prognosis - Good for post-op healing and functional improvement  Previous functional Status:  Independent  Present Functional Status:  Minimum to max assistance    VTE Prophylaxis:  Xarelto 20 mg daily  COVID-19 testing:  Negative on 07/12/2022  COVID-19 vaccination status:  Vaccinated (Moderna):  X3    POA: No  Living will: No  Contacts:  Pooja Huggins (wife) [phone]      Eleonora Manzanares (daughter) [phone]    CODE STATUS: Full Code  Internal Medicine (attending): Weston Rivera MD  Physiatry (consulting): Angel Em MD    OUTPATIENT PROVIDERS  Angel Parham MD  Neurology: Jacque Agee MD  Vascular surgery:  Roxi Lama MD  Cardiology: Dionisio Marin MD    DISPOSITION: Condition stable. Tolerated transfer.  Recent labs and imaging reviewed.  Rehab admission orders initiated.  Med reconciliation completed.  Consult physiatry for rehab and pain management.  PT/OT/RT/ST to eval and treat.  Obtain admission lab work morning.  Monitor closely.  Notify of acute changes.    PHYSICIAN ATTESTATION: I have been involved in the patient's rehabilitation prescreening process and I have now completed the post admission physician evaluation. Patient is in need of, appropriate for, and should tolerate the above outlined inpatient rehabilitation plan. I believe this is necessary to reach maximal postoperative healing and maximal functional abilities. I do not believe this would be possible in a timely fashion in a less intensive setting      Bakari Peralta NP conducted independent physical examination and assisted with medical documentation.    Total time spent on this encounter including chart review and direct MD + NP 1-on-1 patient interaction: 111 minutes   Over 50% of this time was spent in counseling and coordination  of care

## 2022-07-13 LAB
ALBUMIN SERPL-MCNC: 3 GM/DL (ref 3.4–4.8)
ALBUMIN/GLOB SERPL: 0.9 RATIO (ref 1.1–2)
ALP SERPL-CCNC: 92 UNIT/L (ref 40–150)
ALT SERPL-CCNC: 25 UNIT/L (ref 0–55)
AST SERPL-CCNC: 22 UNIT/L (ref 5–34)
BASOPHILS # BLD AUTO: 0.04 X10(3)/MCL (ref 0–0.2)
BASOPHILS NFR BLD AUTO: 0.4 %
BILIRUBIN DIRECT+TOT PNL SERPL-MCNC: 0.6 MG/DL
BUN SERPL-MCNC: 7.1 MG/DL (ref 8.4–25.7)
CALCIUM SERPL-MCNC: 9 MG/DL (ref 8.8–10)
CHLORIDE SERPL-SCNC: 109 MMOL/L (ref 98–107)
CO2 SERPL-SCNC: 25 MMOL/L (ref 23–31)
CREAT SERPL-MCNC: 0.64 MG/DL (ref 0.73–1.18)
EOSINOPHIL # BLD AUTO: 0.3 X10(3)/MCL (ref 0–0.9)
EOSINOPHIL NFR BLD AUTO: 3.3 %
ERYTHROCYTE [DISTWIDTH] IN BLOOD BY AUTOMATED COUNT: 13.2 % (ref 11.5–17)
FERRITIN SERPL-MCNC: 76.13 NG/ML (ref 21.81–274.66)
GLOBULIN SER-MCNC: 3.4 GM/DL (ref 2.4–3.5)
GLUCOSE SERPL-MCNC: 107 MG/DL (ref 82–115)
HCT VFR BLD AUTO: 34.2 % (ref 42–52)
HGB BLD-MCNC: 11.6 GM/DL (ref 14–18)
IMM GRANULOCYTES # BLD AUTO: 0.02 X10(3)/MCL (ref 0–0.04)
IMM GRANULOCYTES NFR BLD AUTO: 0.2 %
IRON SATN MFR SERPL: 25 % (ref 20–50)
IRON SERPL-MCNC: 56 UG/DL (ref 65–175)
LYMPHOCYTES # BLD AUTO: 2.79 X10(3)/MCL (ref 0.6–4.6)
LYMPHOCYTES NFR BLD AUTO: 30.5 %
MAGNESIUM SERPL-MCNC: 1.8 MG/DL (ref 1.6–2.6)
MCH RBC QN AUTO: 29.7 PG (ref 27–31)
MCHC RBC AUTO-ENTMCNC: 33.9 MG/DL (ref 33–36)
MCV RBC AUTO: 87.5 FL (ref 80–94)
MONOCYTES # BLD AUTO: 0.97 X10(3)/MCL (ref 0.1–1.3)
MONOCYTES NFR BLD AUTO: 10.6 %
NEUTROPHILS # BLD AUTO: 5 X10(3)/MCL (ref 2.1–9.2)
NEUTROPHILS NFR BLD AUTO: 55 %
NRBC BLD AUTO-RTO: 0 %
PHOSPHATE SERPL-MCNC: 2.9 MG/DL (ref 2.3–4.7)
PLATELET # BLD AUTO: 241 X10(3)/MCL (ref 130–400)
PMV BLD AUTO: 10.1 FL (ref 7.4–10.4)
POCT GLUCOSE: 127 MG/DL (ref 70–110)
POCT GLUCOSE: 157 MG/DL (ref 70–110)
POCT GLUCOSE: 166 MG/DL (ref 70–110)
POTASSIUM SERPL-SCNC: 3.6 MMOL/L (ref 3.5–5.1)
PREALB SERPL-MCNC: 11.7 MG/DL (ref 16–42)
PROT SERPL-MCNC: 6.4 GM/DL (ref 5.8–7.6)
RBC # BLD AUTO: 3.91 X10(6)/MCL (ref 4.7–6.1)
SODIUM SERPL-SCNC: 141 MMOL/L (ref 136–145)
TIBC SERPL-MCNC: 170 UG/DL (ref 69–240)
TIBC SERPL-MCNC: 226 UG/DL (ref 250–450)
WBC # SPEC AUTO: 9.2 X10(3)/MCL (ref 4.5–11.5)

## 2022-07-13 PROCEDURE — 83540 ASSAY OF IRON: CPT | Performed by: NURSE PRACTITIONER

## 2022-07-13 PROCEDURE — 99223 1ST HOSP IP/OBS HIGH 75: CPT | Mod: ,,, | Performed by: PHYSICAL MEDICINE & REHABILITATION

## 2022-07-13 PROCEDURE — 25000003 PHARM REV CODE 250: Performed by: NURSE PRACTITIONER

## 2022-07-13 PROCEDURE — 99223 PR INITIAL HOSPITAL CARE,LEVL III: ICD-10-PCS | Mod: ,,, | Performed by: PHYSICAL MEDICINE & REHABILITATION

## 2022-07-13 PROCEDURE — 84134 ASSAY OF PREALBUMIN: CPT | Performed by: NURSE PRACTITIONER

## 2022-07-13 PROCEDURE — 85025 COMPLETE CBC W/AUTO DIFF WBC: CPT | Performed by: NURSE PRACTITIONER

## 2022-07-13 PROCEDURE — 97112 NEUROMUSCULAR REEDUCATION: CPT

## 2022-07-13 PROCEDURE — 36415 COLL VENOUS BLD VENIPUNCTURE: CPT | Performed by: NURSE PRACTITIONER

## 2022-07-13 PROCEDURE — 82728 ASSAY OF FERRITIN: CPT | Performed by: NURSE PRACTITIONER

## 2022-07-13 PROCEDURE — 97535 SELF CARE MNGMENT TRAINING: CPT

## 2022-07-13 PROCEDURE — 92523 SPEECH SOUND LANG COMPREHEN: CPT

## 2022-07-13 PROCEDURE — 80053 COMPREHEN METABOLIC PANEL: CPT | Performed by: NURSE PRACTITIONER

## 2022-07-13 PROCEDURE — 63600175 PHARM REV CODE 636 W HCPCS: Performed by: NURSE PRACTITIONER

## 2022-07-13 PROCEDURE — 11800000 HC REHAB PRIVATE ROOM

## 2022-07-13 PROCEDURE — 94799 UNLISTED PULMONARY SVC/PX: CPT

## 2022-07-13 PROCEDURE — 83735 ASSAY OF MAGNESIUM: CPT | Performed by: NURSE PRACTITIONER

## 2022-07-13 PROCEDURE — 97116 GAIT TRAINING THERAPY: CPT | Mod: CQ

## 2022-07-13 PROCEDURE — 84100 ASSAY OF PHOSPHORUS: CPT | Performed by: NURSE PRACTITIONER

## 2022-07-13 RX ORDER — CARVEDILOL 6.25 MG/1
12.5 TABLET ORAL 2 TIMES DAILY WITH MEALS
Status: DISCONTINUED | OUTPATIENT
Start: 2022-07-13 | End: 2022-07-14

## 2022-07-13 RX ADMIN — LISINOPRIL 5 MG: 5 TABLET ORAL at 08:07

## 2022-07-13 RX ADMIN — CARVEDILOL 12.5 MG: 6.25 TABLET, FILM COATED ORAL at 05:07

## 2022-07-13 RX ADMIN — METOPROLOL SUCCINATE 50 MG: 50 TABLET, EXTENDED RELEASE ORAL at 08:07

## 2022-07-13 RX ADMIN — DOCUSATE SODIUM 100 MG: 100 CAPSULE, LIQUID FILLED ORAL at 08:07

## 2022-07-13 RX ADMIN — ATORVASTATIN CALCIUM 80 MG: 40 TABLET, FILM COATED ORAL at 08:07

## 2022-07-13 RX ADMIN — METFORMIN HYDROCHLORIDE 1000 MG: 500 TABLET, FILM COATED ORAL at 05:07

## 2022-07-13 RX ADMIN — METFORMIN HYDROCHLORIDE 1000 MG: 500 TABLET, FILM COATED ORAL at 08:07

## 2022-07-13 RX ADMIN — HYDROXYZINE PAMOATE 50 MG: 50 CAPSULE ORAL at 08:07

## 2022-07-13 RX ADMIN — LABETALOL HYDROCHLORIDE 10 MG: 5 INJECTION, SOLUTION INTRAVENOUS at 09:07

## 2022-07-13 RX ADMIN — INSULIN ASPART 2 UNITS: 100 INJECTION, SOLUTION INTRAVENOUS; SUBCUTANEOUS at 12:07

## 2022-07-13 NOTE — PLAN OF CARE
julio Huggins age 77 *Right sided weakness     Dx: Right sided weakness s/p TPA, acute left hemispheric ischemic cva. CEA 7/8/2022. Pt has a history of DM, CAD, HTN, and HLD. *See chart for full and complete medical history*     Hx mental health/substance abuse: Pt. Denies history of mental health. He quit smoking 40 years ago.      Insurance: Medicare/[employer]s      Education//Work Hx: Pt. Completed high school and some college.  He has no  history. He is a retired  for [employer].     Pt. Is  to Pooja ([phone]). Pooja is retired.  She drives, cooks, cleans, does laundry, grocery shops, manages finances, and can physically assist pt. She will be home with patient after discharge from rehab.  She is also checking into their long-term care policy.     Children (2) Friends/Family: 1) Pooja Huggins, spouse ([phone]) 2) Eleonora Manzanares, daughter ([phone])     Power of  (yes-Pooja)/Living Will (yes)     Prior Level of Fx: Independent ADLs, drives, cooks, does laundry, grocery shops, manages his own meds, and does yard work.  He does not have a medic alert.      Residence: Pt. Lives with Pooja in [place] in a single-story home with 1 step to enter the residence. He uses a walk-in shower with a threshold, built-in bench, and HHS. No grab bars. He does not have an elevated toilet. He does not have grab bars adjacent to the toilet.       DME: Glucometer, HHS, BP machine. He will use [facility] for DME.     HH/OP tx: He has no history of HH.  He will use [facility].     Pharmacy: [facility] in [place]/ (has prescription drug coverage)      FOC was obtained for [facility] for DME and [facility].     MD(s): PCP: Dr. Angel Dickson ([phone]) Follow-up in 1 week; Neurology: Dr. Jacque Wang ([phone]). Follow-up in 1 month; Vascular surgery: Dr. Dread Lama ([phone]) Follow-up in 6 weeks for surveillance ultrasound;  Cardiology: Dr. Marin

## 2022-07-13 NOTE — PROGRESS NOTES
[facility] ([facility])  Rehab Progress Note    Patient Name: Ayaz Huggins  MRN: [id number]  Age: 77 y.o. Sex: male  : 1945  Hospital Length of Stay: 1 days  Date of Service: 2022   Chief Complaint: Acute left hemispheric CVA s/p CPA and s/p left CEA on 2022    Subjective:     Basic Information  Admit Information: 77-year-old white male presented to [facility] ED on 2022 complaining of right-sided weakness.  PMH significant for DM type 2, CAD s/p CABG x2 in , and HTN.  CT head negative for hemorrhagic bleeding.  Received tPA and transferred to ICU for post tPA monitoring and treatment.  TTE with negative bubble study, but significant for grade 1 diastolic dysfunction.  Carotid ultrasound significant for left ICA stenosis.  MRI significant for last left hemispheric CVA and 3 other small punctate CVAs on the right hemisphere.  Tolerated left CEA on  without periprocedural complications.  Tolerated transfer to [facility] inpatient rehab unit on  without incident.  Today's Information: No acute events overnight.  Sitting up in wheelchair.  Reports good sleep and appetite.  Last BM .  BP moderately controlled.  Albumin 3.0.  Prealbumin 11 7.  Lab work otherwise unremarkable.  No imaging today.    Review of patient's allergies indicates:   Allergen Reactions    Penicillins         Current Facility-Administered Medications:     acetaminophen tablet 650 mg, 650 mg, Oral, Q4H PRN, Alexey Narvaezart, FNP    ALPRAZolam tablet 0.25 mg, 0.25 mg, Oral, TID PRN, Alexey Peralta, FNP    [START ON 2022] amLODIPine tablet 10 mg, 10 mg, Oral, Daily, Alexey Narvaezart, FNP    [START ON 2022] aspirin tablet 325 mg, 325 mg, Oral, Daily, Alexey Narvaezart, FNP    atorvastatin tablet 80 mg, 80 mg, Oral, QHS, Alexey Narvaezart, FNP, 80 mg at 22    benzonatate capsule 100 mg, 100 mg, Oral, TID PRN, Alexey Peralta, FNP    bisacodyL  suppository 10 mg, 10 mg, Rectal, Daily PRN, Alexey A Kathryn, FNP    dextrose 50 % in water (D50W) injection 12.5 g, 12.5 g, Intravenous, PRN, Alexey A Kathryn, FNP    dextrose 50 % in water (D50W) injection 25 g, 25 g, Intravenous, PRN, Alexey A Kathryn, FNP    docusate sodium capsule 100 mg, 100 mg, Oral, BID, Alexey A Kathryn, FNP, 100 mg at 07/13/22 0858    glucagon (human recombinant) injection 1 mg, 1 mg, Intramuscular, PRN, Alexey A Kathryn, FNP    glucose chewable tablet 16 g, 16 g, Oral, PRN, Alexey A Kathryn, FNP    glucose chewable tablet 24 g, 24 g, Oral, PRN, Alexey A Kathryn, FNP    hydrALAZINE injection 10 mg, 10 mg, Intravenous, Q4H PRN, Alexey A Kathryn, FNP    HYDROcodone-acetaminophen 5-325 mg per tablet 1 tablet, 1 tablet, Oral, Q6H PRN, Alexey A Kathryn, FNP    hydrOXYzine pamoate capsule 50 mg, 50 mg, Oral, QHS, Alexey A Kathryn, FNP, 50 mg at 07/12/22 2033    insulin aspart U-100 injection 1-10 Units, 1-10 Units, Subcutaneous, QID (AC + HS) PRN, Alexey A Kathryn, FNP, 2 Units at 07/13/22 1213    labetalol 20 mg/4 mL (5 mg/mL) IV syring, 10 mg, Intravenous, Q4H PRN, Alexey A Kathryn, FNP, 10 mg at 07/13/22 0904    lisinopriL tablet 5 mg, 5 mg, Oral, Daily, Alexey A Kathryn, FNP, 5 mg at 07/13/22 0858    metFORMIN tablet 1,000 mg, 1,000 mg, Oral, BID WM, Alexey A Kathryn, FNP, 1,000 mg at 07/13/22 0858    metoprolol injection 10 mg, 10 mg, Intravenous, Q2H PRN, Alexey A Kathryn, FNP    metoprolol succinate (TOPROL-XL) 24 hr tablet 50 mg, 50 mg, Oral, Daily, RAYMOND GarciaP, 50 mg at 07/13/22 0858    nitroGLYCERIN SL tablet 0.4 mg, 0.4 mg, Sublingual, Q5 Min PRN, ARVIND Garcia    ondansetron disintegrating tablet 8 mg, 8 mg, Oral, Q6H PRN, RAYMOND GarciaP    polyethylene glycol packet 17 g, 17 g, Oral, Daily, ARVIND Garcia    promethazine tablet 25 mg, 25 mg, Oral, Q6H PRN, Alexey ARMAS  "ARVIND Peralta     Review of Systems   Complete 12-point review of symptoms negative except for what's mentioned in HPI     Objective:     BP (!) 161/79   Pulse 97   Temp 97.9 °F (36.6 °C) (Oral)   Resp 20   Ht 5' 10.98" (1.803 m)   Wt 89.2 kg (196 lb 10.4 oz)   SpO2 96%   BMI 27.44 kg/m²        Intake/Output Summary (Last 24 hours) at 7/13/2022 1420  Last data filed at 7/13/2022 1200  Gross per 24 hour   Intake 720 ml   Output 950 ml   Net -230 ml       Physical Exam  Constitutional:       Appearance: Normal appearance.   HENT:      Head: Normocephalic.      Mouth/Throat:      Mouth: Mucous membranes are moist.   Eyes:      Pupils: Pupils are equal, round, and reactive to light.   Neck:      Comments: Left CEA incision clean intact with mild postsurgical swelling  Cardiovascular:      Rate and Rhythm: Normal rate and regular rhythm.      Heart sounds: Normal heart sounds.   Pulmonary:      Effort: Pulmonary effort is normal.      Breath sounds: Normal breath sounds.   Abdominal:      General: Bowel sounds are normal.      Palpations: Abdomen is soft.   Musculoskeletal:      Cervical back: Neck supple.      Comments: Generalized weakness and muscle atrophy.  Right upper extremity dependent swelling   Skin:     General: Skin is warm and dry.   Neurological:      Mental Status: He is alert and oriented to person, place, and time.      Comments: Right-sided hemiplegia.  Can move RUE against gravity.  LLE flaccid.    Psychiatric:         Mood and Affect: Mood normal.         Behavior: Behavior normal.         Thought Content: Thought content normal.         Judgment: Judgment normal.     *MD performed and documented physical examination       Lines/Drains/Airways     Peripheral Intravenous Line  Duration                Peripheral IV - Single Lumen 07/02/22 0923 20 G Left Wrist 11 days         Peripheral IV - Single Lumen 07/08/22 1652 20 G Left;Anterior Hand 4 days              Labs  Admission on 07/12/2022 "   Component Date Value Ref Range Status    POCT Glucose 07/12/2022 213 (A) 70 - 110 mg/dL Final    Sodium Level 07/13/2022 141  136 - 145 mmol/L Final    Potassium Level 07/13/2022 3.6  3.5 - 5.1 mmol/L Final    Chloride 07/13/2022 109 (A) 98 - 107 mmol/L Final    Carbon Dioxide 07/13/2022 25  23 - 31 mmol/L Final    Glucose Level 07/13/2022 107  82 - 115 mg/dL Final    Blood Urea Nitrogen 07/13/2022 7.1 (A) 8.4 - 25.7 mg/dL Final    Creatinine 07/13/2022 0.64 (A) 0.73 - 1.18 mg/dL Final    Calcium Level Total 07/13/2022 9.0  8.8 - 10.0 mg/dL Final    Protein Total 07/13/2022 6.4  5.8 - 7.6 gm/dL Final    Albumin Level 07/13/2022 3.0 (A) 3.4 - 4.8 gm/dL Final    Globulin 07/13/2022 3.4  2.4 - 3.5 gm/dL Final    Albumin/Globulin Ratio 07/13/2022 0.9 (A) 1.1 - 2.0 ratio Final    Bilirubin Total 07/13/2022 0.6  <=1.5 mg/dL Final    Alkaline Phosphatase 07/13/2022 92  40 - 150 unit/L Final    Alanine Aminotransferase 07/13/2022 25  0 - 55 unit/L Final    Aspartate Aminotransferase 07/13/2022 22  5 - 34 unit/L Final    Estimated GFR-Non  07/13/2022 >60  mls/min/1.73/m2 Final    Magnesium Level 07/13/2022 1.80  1.60 - 2.60 mg/dL Final    Phosphorus Level 07/13/2022 2.9  2.3 - 4.7 mg/dL Final    Prealbumin 07/13/2022 11.7 (A) 16.0 - 42.0 mg/dL Final    Ferritin Level 07/13/2022 76.13  21.81 - 274.66 ng/mL Final    Iron Binding Capacity Unsaturated 07/13/2022 170  69 - 240 ug/dL Final    Iron Level 07/13/2022 56 (A) 65 - 175 ug/dL Final    Iron Binding Capacity Total 07/13/2022 226 (A) 250 - 450 ug/dL Final    Iron Saturation 07/13/2022 25  20 - 50 % Final    WBC 07/13/2022 9.2  4.5 - 11.5 x10(3)/mcL Final    RBC 07/13/2022 3.91 (A) 4.70 - 6.10 x10(6)/mcL Final    Hgb 07/13/2022 11.6 (A) 14.0 - 18.0 gm/dL Final    Hct 07/13/2022 34.2 (A) 42.0 - 52.0 % Final    MCV 07/13/2022 87.5  80.0 - 94.0 fL Final    MCH 07/13/2022 29.7  27.0 - 31.0 pg Final    MCHC 07/13/2022 33.9  33.0  - 36.0 mg/dL Final    RDW 07/13/2022 13.2  11.5 - 17.0 % Final    Platelet 07/13/2022 241  130 - 400 x10(3)/mcL Final    MPV 07/13/2022 10.1  7.4 - 10.4 fL Final    Neut % 07/13/2022 55.0  % Final    Lymph % 07/13/2022 30.5  % Final    Mono % 07/13/2022 10.6  % Final    Eos % 07/13/2022 3.3  % Final    Basophil % 07/13/2022 0.4  % Final    Lymph # 07/13/2022 2.79  0.6 - 4.6 x10(3)/mcL Final    Neut # 07/13/2022 5.0  2.1 - 9.2 x10(3)/mcL Final    Mono # 07/13/2022 0.97  0.1 - 1.3 x10(3)/mcL Final    Eos # 07/13/2022 0.30  0 - 0.9 x10(3)/mcL Final    Baso # 07/13/2022 0.04  0 - 0.2 x10(3)/mcL Final    IG# 07/13/2022 0.02  0 - 0.04 x10(3)/mcL Final    IG% 07/13/2022 0.2  % Final    NRBC% 07/13/2022 0.0  % Final    POCT Glucose 07/13/2022 166 (A) 70 - 110 mg/dL Final     Radiology  MRI brain without contrast on 07/03/2022 at 2:09 p.m., IMPRESSION: Small acute infarcts supratentorially, the largest is a left posterior frontal 3 cm cortical based infarct with petechial hemorrhage.  No mass effect or shift.  Discussed with Dr. Duncan. Otherwise no acute intracranial abnormalities.  Radiology  Transthoracic echo on 07/02/2022 at 2:40 p.m., IMPRESSION: The left ventricle is  with normal systolic function. The estimated ejection fraction is 60%. There is grade I diastolic dysfunction consistent with impaired relaxation. Left atrial pressure is normal.    Assessment/Plan:     77 y.o. WM admitted on 7/12/2022    Acute left hemispheric CVA   - MRI with 3 other small punctate CVAs on the right hemisphere  - s/p tPA and s/p left CEA on 7/8/2022  - continue                Aspirin 325 mg daily                Lipitor 80 mg at bedtime                Xarelto 20 mg daily   - Obtain right knee brace and right AFO from orthotics  - defer to physiatry for rehab and pain management  - PT/OT/RT following      Carotid artery stenosis  - s/p left CEA on 07/08/2022  - continue                Aspirin 325 mg daily                 Lipitor 80 mg at bedtime                Xarelto 20 mg daily                Norco 5 mg/325 mg q.6 hours p.r.n.  - to follow up with vascular surgery outpatient     HTN  - BP mod control  - discontinued Metoprolol succinate 50 mg daily on 7/13  - initiate     Coreg 12.5 mg BID (initiated 7/13)  - continue                Norvasc 10 mg daily                Lisinopril 20 mg daily                 Hydralazine 10 mg every 2 hours as needed for BP > 160/90                Labetalol 10 mg every 2 hours as needed for BP > 160/90     DM type II  - HgA1c with next labs  - continue                Metformin 1000 mg twice daily                ISS   - CBGs AC/HS     Normocytic anemia  - asymptomatic  - H/H stable   - no evidence of active bleeds  - will closely monitor and transfuse if needed      Constipation  - stable  - continue                Colace 100 mg b.i.d.                 MiraLax 17 g daily     VTE Prophylaxis:  Xarelto 20 mg daily  COVID-19 testing:  Negative on 07/12/2022  COVID-19 vaccination status:  Vaccinated (Moderna):  X3     POA: No  Living will: No  Contacts:  Pooja Huggins (wife) [phone]                     Eleonora Manzanares (daughter) [phone]     CODE STATUS: Full Code  Internal Medicine (attending): Weston Rivera MD  Physiatry (consulting): Angel Em MD     OUTPATIENT PROVIDERS  Angel Parham MD  Neurology: Jacque Agee MD  Vascular surgery:  Roxi Lama MD  Cardiology: Dionisio Marin MD     DISPOSITION: Condition stable.  Sleep hygiene, bowel maintenance, and appetite at goal.  BP moderately controlled.  Lab work overall unremarkable.  Decreased metoprolol and initiate Coreg 12.5 mg b.i.d..  Physiatry requesting right knee brace and right AFO orthotics.  Continue aggressive mobilization as tolerated.  Monitor closely.  Notify of acute changes.     Bakari Peralta NP conducted independent physical examination and assisted with medical documentation.     Total time spent on this encounter  including chart review and direct MD + NP 1-on-1 patient interaction: 46 minutes   Over 50% of this time was spent in counseling and coordination of care

## 2022-07-13 NOTE — PLAN OF CARE
Problem: Physical Therapy  Goal: Physical Therapy Goal  Description: Bed Mobility:  Roll left and right with supervision/touching assist.  Sit to supine transfer with supervision/touching assist.  Supine to sit transfer with supervision/touching assist.    Transfers:  Sit to stand transfer with supervision/touching assist using RW or LRAD.  Bed to chair transfer with supervision/touching assist using RW or LRAD.  Car transfer with partial/moderate assist using RW or LRAD.    Mobility:  Ambulate 100 feet with partial/moderate assist using RW or LRAD. - revised    7/13/2022 1647 by Bonita Caldwell, GUZMAN  Outcome: Ongoing, Progressing

## 2022-07-13 NOTE — PT/OT/SLP EVAL
Speech Language Pathology  Evaluation    Ayaz Huggins   MRN: [id number]   Admitting Diagnosis: Stroke    Diet recommendations: Solid Diet Level: Regular Diet - IDDSI Level 7  Liquid Diet Level: Thin liquids - IDDSI Level 0     Therapy Minutes  SLP Treatment Date: 22  Speech Start Time: 930  Speech Stop Time: 950  Speech Total (min): 20 min  SLP Individual: 20    Billable Minutes:  Eval 20 minutes     Diagnosis: Stroke    Past Medical History:   Diagnosis Date    Diabetes mellitus     Hypertension     Mixed hyperlipidemia      Past Surgical History:   Procedure Laterality Date    CARDIAC SURGERY         Has the patient been evaluated by SLP for swallowing? : No  Keep patient NPO?: No   General Precautions: Standard    SUBJECTIVE:  General patient information:    Behavior: alert, appropriate and cooperative    OBJECTIVE:  Pain Level   Pain/Comfort  Pain Rating 1: 0/10     SPEECH PRODUCTION   Phoneme Production: Adequate   Voice Quality: Adequate   Voice Production: Adequate   Speech Rate: Adequate   Loudness: Acceptable   Respiration: Adequate   Resonance: Adequate   Prosody: Adequate   Speech Intelligibility  Known Context: Greater that 90%  Unknown Context: Greater that 90%    AUDITORY COMPREHENSION  Following Directions:   1-Step: 100%   2-Step: 100%    Yes/No Questions:   Biographical: 100%   Environmental: 100%   Simple: 100%   Complex: 100%    VERBAL EXPRESSION  Automatic Speech:   Days of the week: 100%   Months of the year: 100%   Countin%   Alphabet: 100%    Phrase Completion: 100%    Confrontation Naming   Body Parts: 100%   Objects: 100%    Wh- Questions:   Object name: 100%   Object function: 100%    COGNITION  Orientation:   Person: [facility]   Place: [facility]   Time: WFL   Situation: WFL    Attention:   Focused: WFL   Sustained: WFL    Memory:   Immediate: WFL   Delayed: /12 on four word memory task; baseline   Long Term: [facility]    Problem Solving   Functional simple:  WFL   Performs medication and money management at home; wife able to assist as needed    Organization:   Convergent thinking: WFL   Divergent thinking: WFL      ASSESSMENT:  Pt presents with functional speech and language skills and cognitive linguistic skills noted to be at baseline. Skilled SLP intervention is not indicated at this time. Please reconsult as warranted.    PLAN:  Recommendations: Speech therapy Is not warranted at this time.     Planned Interventions:  (SLP intervention not indicated)  Plan of Care reviewed with: patient  SLP Follow-up?: No         07/13/2022

## 2022-07-13 NOTE — PLAN OF CARE
Met with pt to discuss discharge planning/case management role.  Reviewed plans for continued therapy, DME arrangements, family training plans, involvement of his family, and his mood.    Depression screening completed.  Score=12 (negative).  He admits to feeling a little down sometimes (only because he does not wish to burden his wife with his care and also because of the unknown in terms of his recovery time/return).  He remains very motivated and hopeful.  Discussed how depression is sometimes experienced by stroke patients and encouraged him to self-monitor and report to staff if these feelings worsen.

## 2022-07-13 NOTE — PT/OT/SLP PROGRESS
"Physical Therapy         Treatment        Ayza Huggins   MRN: [id number]     Therapy Minutes  PT Received On: 07/13/22  PT Start Time: 1030  PT Stop Time: 1100  PT Total Time (min): 30 min  PT Individual: 30     Billable Minutes:  Gait [id number]      Treatment Type: Treatment  PT/PTA: PTA     PTA Visit Number: 1       General Precautions: Standard, fall  Orthopedic Precautions: Orthopedic Precautions : N/A   Braces: Braces: N/A    Patient found with: Other (comments) (in WC)         Subjective:  "Pt reports that he is very motivated and that he wants to get out of here walking.    Pain/Comfort  Pain Rating 1: 0/10    Objective:  Gait training performed to improve functional strength, to increase functional mobility, safety and independence.    VITALS:  BP: Start: 150/77 70HR End: 161/79 97HR           In sitting    Transfer Training:  Sit to stand:Moderate Assistance with Rolling Walker as well as // bar    Gait Training:  Patient ambulates 10ft, 24 feet using rolling walker with Total Assistance and 2 persons for safety.  ModA of 1, SBA of another with WC followed closely behind for safety. and moderate verbal/tactile cues.  ACE wrap to R foot for improved DF as well as to knee to prevent knee buckling.  VC for weight shifting and sequencing. Pt demonstrating a  reciprocal gait, swing to and swing through with decreased ute, increased time in double stance, decreased velocity of limb motion, decreased step length, decreased stride length, decreased toe-to-floor clearance and decreased weight-shifting ability.Impairments contributing to gait deviations include impaired balance, decreased flexibility, impaired motor control and impaired postural control.  VC for pt to readjust his R hand on RW as well as for upright posture.  maxA to advance RLE, however, pt able to self advance with modA at times with ambulation.     Additional Treatment:  Pre-gt performed inside // bar.  R knee blocked for safety.  ACE wrap to " foot for improved DF as well as to knee for increased knee stability and to reduce knee buckling.  Discussed with MARVA Shay in regards to ordering AFO and neoprene knee brace to RLE.    Activity Tolerance:  Patient tolerated treatment well    Patient left up in chair with call button in reach.    Education Provided: roles and goals of PT/PTA and gait training    Expected compliance:  High compliance        Assessment:  Ayaz Huggins is a 77 y.o. male with a medical diagnosis of Stroke. He presents with R sided weakness, impaired balance, impaired functional mobility. Pt to benefit from skilled PT services to address impairments with progression towards functional independence as tolerated.    Rehab potential is excellent.    Activity tolerance: Excellent    Discharge recommendations:       Equipment recommendations:       GOALS:   Multidisciplinary Problems     Physical Therapy Goals        Problem: Physical Therapy    Goal Priority Disciplines Outcome Goal Variances Interventions   Physical Therapy Goal     PT, PT/OT      Description: Bed Mobility:  Roll left and right with supervision/touching assist.  Sit to supine transfer with supervision/touching assist.  Supine to sit transfer with supervision/touching assist.    Transfers:  Sit to stand transfer with supervision/touching assist using RW or LRAD.  Bed to chair transfer with supervision/touching assist using RW or LRAD.  Car transfer with partial/moderate assist using RW or LRAD.    Mobility:  Ambulate 10 feet with partial/moderate assist using RW or LRAD.                     PLAN:    Patient to be seen 5 x/week  to address the above listed problems via gait training, therapeutic activities, therapeutic exercises, neuromuscular re-education, wheelchair management/training  Plan of Care expires: 07/18/22  Plan of Care reviewed with: patient, spouse    7/13/2022

## 2022-07-13 NOTE — PROGRESS NOTES
07/13/22 1330   Rec Therapy Time Calculation   Rec Start Time 1330   Rec Stop Time 1400   Rec Total Time (min) 30 min   General   Admit Date 07/12/22   Number of Children 2   Occupation Retired:    Precautions   General Precautions fall   Attendance   Activity   (Bocce Ball)   Participation Resting   Assessment   Assessment Dynamic sitting balance/reaching was mod/min. Verbal cues needed to sit away from back of w/c. Sitting tolerance was 2 minutes. RUE coordination/dexteriety was mod. Problem solving skills  and memory recall were supervision. Extremely motivated and determined   Time   Treatment time 2 units

## 2022-07-13 NOTE — PT/OT/SLP PROGRESS
"Physical Therapy         Treatment        Ayaz Huggins   MRN: [id number]     Therapy Minutes  PT Received On: 07/13/22  PT Start Time: 1430  PT Stop Time: 1500  PT Total Time (min): 30 min  PT Individual: 30     Billable Minutes:  Gait [id number]      Treatment Type: Treatment  PT/PTA: PTA     PTA Visit Number: 1       General Precautions: Standard, fall  Orthopedic Precautions: Orthopedic Precautions : N/A   Braces: Braces: N/A    Patient found with: Other (comments) (in wc)         Subjective:  "pt in wc ready to participate c/o feeling tired     Pain/Comfort  Pain Rating 1: 0/10    Objective:  Gait training performed to improve functional strength, to increase functional mobility, safety and independence.    VITALS:  BP:    Start 88 145/57 stop time 91 146/66        In sitting    Functional Mobility:  Bed Mobility:   Sit to supine: Standby Assistance with few cues rtle management    Scooting: Standby Assistance with few cues tech when in wc     Transfer Training:  Sit to stand:Minimal Assistance with Rolling Walker and gt belt  vc for tech and hand placement rtue   Bed <> Chair:  Step Transfer with Moderate Assistance with Rolling Walker        Gait Training:  Patient ambulates 30 feet then 130ft seated rest break b/t using rolling walker and rtle ace wrap assist for rtle dorsiflexion assist  with Total Assistance and mod a of one with sba of another wc close behind for safety  and mod a with rtle advancing/placement  verbal cues for ws to improve gt . Pt demonstrating a  swing to and swing through with decreased ute, decreased velocity of limb motion, decreased step length and decreased stride length.Impairments contributing to gait deviations include impaired balance, impaired coordination, impaired motor control, abnormal muscle tone, decreased ROM, decreased sensation and decreased strength    Additional Treatment:  Pt performed stand pivot wc >bed mob a with vc for safety and tech increased time for " problem solving with pivot direction     Activity Tolerance:  Patient tolerated treatment well and Patient limited by fatigue    Patient left HOB elevated with call button in reach, bed alarm on and rtue elevated on pillow .pt very motivated during tx     Education Provided: transfer training, bed mob, gait training and balance training    Expected compliance:  High compliance        Assessment:  Ayaz Huggins is a 77 y.o. male with a medical diagnosis of Stroke. He presents with weakness, impaired rom/strength , impaired mobility/bed mobility, decreased endurance poor safety awareness . Pt to benefit from skilled PT services to address impairments with progression towards functional independence as tolerated.    Rehab potential is good.    Activity tolerance: Good    GOALS:   Multidisciplinary Problems     Physical Therapy Goals        Problem: Physical Therapy    Goal Priority Disciplines Outcome Goal Variances Interventions   Physical Therapy Goal     PT, PT/OT      Description: Bed Mobility:  Roll left and right with supervision/touching assist.  Sit to supine transfer with supervision/touching assist.  Supine to sit transfer with supervision/touching assist.    Transfers:  Sit to stand transfer with supervision/touching assist using RW or LRAD.  Bed to chair transfer with supervision/touching assist using RW or LRAD.  Car transfer with partial/moderate assist using RW or LRAD.    Mobility:  Ambulate 10 feet with partial/moderate assist using RW or LRAD.                     PLAN:    Patient to be seen 5 x/week  to address the above listed problems via gait training, therapeutic activities, therapeutic exercises, neuromuscular re-education, wheelchair management/training  Plan of Care expires: 07/18/22  Plan of Care reviewed with: patient, spouse    7/13/2022

## 2022-07-13 NOTE — PT/OT/SLP PROGRESS
Occupational Therapy  Treatment      Ayaz Huggins   MRN: [id number]   Admitting Diagnosis: Stroke         Billable Minutes:  Self Care/Home Management 30 and Neuromuscular Re-education 30               General Precautions: Standard, fall  Orthopedic Precautions: N/A         Subjective:  Patient communicated no complaints.      Pain/Comfort  Pain Rating 1: 0/10  Objective:     Beginning of session: 161/79, HR: 97  End of session: 165/72, HR: 86    ADLs:    Current Status   Functional Area: Care Score:   Putting On/Taking Off Footwear 3   Pt doffed his R sock using his R hand @ w/c level to improve his RUE coordination and pinch strength. Pt then took off his shoe strings on his shoe and laced an elastic shoe string on his shoes to improve his functional independence when donning his shoes. Pt was instructed to mainly use his R hand during the task to promote fine motor skills and hand dexterity skills in his R hand. Pt then donned his shoes with partial-mod A due to pt needing physical assistance with donning his R shoe.    Neuromuscular reeducation  Pt performed ax @ w/c level sitting unsupported to engage his core muscles while retraining his RUE gross motor coordination skills. Pt used a clothes pin to  small pegs and place the peg in a cone with the corresponding color. Pt picked up the peg on his R side and crossed over midline to place the peg on his L side to reeducate his muscles to promote his coordination skills. Pt then picked up each cone and used the three jaw mason grasp to  each peg and place them back into the box crossing midline but in the opposite direction this time. Pt then stacked each cone and max vc improved motor activation.     Education Provided: Roles and goals of OT and ADLs    Expected compliance: High compliance    ASSESSMENT:  Ayaz Huggins is a 77 y.o. male with a medical diagnosis of Stroke performed neuro-kaushik training and ADL training to increase ADL independence and  safety. Pt is progressing towards OT goals      GOALS:   Multidisciplinary Problems       Occupational Therapy Goals          Problem: Occupational Therapy    Goal Priority Disciplines Outcome Interventions   Occupational Therapy Goal     OT, PT/OT Ongoing, Progressing    Description: LTG: Pt will be Ind with all ADLS.  Pt LTG: to return to bike riding    ADLs:  Pt to perform grooming tasks with [place] from seated position by d/c.  Pt to perform UB dressing with Setup using modified techniques by 1 week.   Pt to perform LB dressing with SPV using modified techniques in 1 week.    Pt to perform putting on/off footwear task with SPV by re-eval.  Pt to perform toileting with Partial A using BSC by re-eval.    Functional Transfers:  Pt to perform toilet transfers with Partial A by re-eval using BSC and LRAD for mobility.    Pt to perform a tub transfer with Max A by re-eval using TTB.   Pt to perform a walk-in shower transfer Max A using LRAD by re-eval.       Balance, Strengthening, Endurance, Balance:  Pt  will perform dynamic standing tasks using LRAD for 2 minutes with SPV assist by re-eval  Pt to demonstrate 4/5 R shoulder strength and 5/5 distal RUE strength during functional task by re-eval.                          Plan:  Patient to be seen daily to address the above listed problems via self-care/home management, community/work re-entry, therapeutic activities, therapeutic exercises, neuromuscular re-education, cognitive retraining  Plan of Care expires:    Plan of Care reviewed with: patient      07/13/2022

## 2022-07-13 NOTE — PROGRESS NOTES
History of present illness: 78 y/o male with a PMH of Type II DM, diabetic neuropathy, CAD s/p CABG x2 (), and HTN who presented to [facility] ED  On 22 with complaints of right sided upper and lower extremity weakness. Patient's story is unclear upon the time of last known normal. He states he woke up  with a cramp in the RLE and noticed the right sided weakness.The patient was admitted to the ICU with an ischemic CVA and administered tPA; he has had some improvement in his right-sided weakness although he is unable to purposefully move his right lower extremity.  Neurology has seen and evaluated the patient and has deemed this patient stable for transfer to the floor.  This patient was seen and evaluated in the intensive care unit.     Patient is neurologically stable.  Right arm is improving in strength and left leg is also showing improvement in passive motor strength  He denies having symptoms of abdominal pain nausea vomiting chest pain shortness of breath.      Past Medical History:  Bilateral CVA with right-sided weakness, Carotid artery stenosis s/p left CEA on 2022, HTN, DM type 2, Normocytic anemia  Procedure history: Left CEA on 2022, CABG x2 in   Family History:  Mother:  CHF + at age 79.  Father:  CHF + at age 72.   Social History:   (-) TOB  (-) ETOH  (-) Illicit drug use  Completed high school and some college.  No  history.  He retired as a  for [employer].   to [employer]oo[employer].  Pooja is retired.  She drives, cooks, cleans, does laundry, shops, manage finances, and can physically assist. She will be home with patient after discharge from rehab.  She is also checking into their long-term care policy.  Prior level of function:  Independent ADLs, drives, cooks, does not recover grocery shops, manages his own medications, and does yd work.  Residence:  Lives with wife in a single story home with 1 step to enter the residents.  He uses a  walk-in shower with a threshold, Bill tendons, and hand-held shower.  No grab bars adjacent.  He does not have an elevated toilet.  He does not have grab bars adjacent to the toilet.  DME:  Glucometer, hand-held shower, blood pressure machine  Anticipated discharge destination: Home with HH     Physical Exam  A&O NAD seen in room in wheel chair  HHENT-(L)CEA scar noted; CDI  CV-RRR w/o m/g  Chest-CTA BBS  Abd-NT/ND BSx4 no HSM  Neuro-(R)hemiparesis in (R)UE with antigravity movement; (R)LE with hemiplegia  Ext-no s/s DVT    A/P  Acute left hemispheric CVA   - s/p tPA and s/p left CEA on 7/8/2022  - MRI with 3 other small punctate CVAs on the right hemisphere  - continue                Aspirin 325 mg daily                Lipitor 80 mg at bedtime                Xarelto 20 mg daily  -  physiatry for rehab and pain management  - PT/OT/RT/ST to eval and treat     Carotid artery stenosis  - s/p left CEA on 07/08/2022  - continue                Aspirin 325 mg daily                Lipitor 80 mg at bedtime                Xarelto 20 mg daily                Norco 5 mg/325 mg q.6 hours p.r.n.  - to follow up with vascular surgery outpatient    DM type II  - HgA1c with next labs  - continue                Metformin 1000 mg twice daily                ISS   - CBGs AC/HS    HTN  - at goal!!  - continue                Metoprolol succinate 50 mg daily                Norvasc 10 mg daily                Lisinopril 20 mg daily                 Hydralazine 10 mg every 2 hours as needed for BP > 160/90                Labetalol 10 mg every 2 hours as needed for BP > 160/90      Patient requires acute inpatient rehab admission with 24-hour nursing and active physician oversight to monitor and manage acute medical comorbid conditions, labs, pain, and functional deficits. Patient/family will also require teaching and integration of improving functional skills into daily living. he will also require individualized, interdisciplinary approach to  her care, receiving PT, OT services 3 hours of therapy, at least 5 days per week. Required care cannot be provided at lower level of care. Patient is anticipated to require approximately 10-12-day LOS with expected DC home with spouse with HH or OP therapy services.

## 2022-07-13 NOTE — PROGRESS NOTES
"   07/13/22 1330   Rec Therapy Time Calculation   Date of Treatment 07/13/22   Rec Start Time 1330   Rec Stop Time 1400   Rec Total Time (min) 30 min   Time   Treatment time 2 units   Precautions   General Precautions fall   Vital Signs   Pulse 87   BP (!) 142/63   OTHER   Treatment Diagnosis R sided weakness s/p TPA, acute L hemispheric ischemic CVA, CEA, DM, CAD, HLD, HTN   Rehab identified problem list/impairments weakness;impaired balance;impaired coordination;impaired fine motor;decreased upper extremity function;decreased lower extremity function;decreased safety awareness   Values/Beliefs/Spiritual Care   Spiritual, Cultural Beliefs, Catholic Practices, Values that Affect Care no   Prior Living/ADLs   Lives With spouse   Name(s) of Who Lives With Patient Wife   Living Arrangements house   Patient responsibilites: Community mobility;;Financial management;Health and wellness;Laundry;Leisure/play/hobbies;Meal preparation;Retired;Shopping;Yard Work   Previous Hand Domiance Right   Current Hand Dominance Right  (R sided weakness)   Overall Level of Functioning   Activity Tolerance Standby Assist   Dynamic Sitting Balance/Reaching Min A   Right UE Coodination/Dexterity Mod A   Left UE Coordination/Dexterity Independent   Problem Solving/Sequencing Skills Independent   Memory Recall Independent   R/L Neglect/Inattention Does not occur   Attention Span Mod Indep  (5 minutes)   Patient Goals   Patient Goal 1 "I hope to walk out of here , take care of myself and ride my bike again   Recreational Therapy Short Term Goals   Short Term Goal 1 Will increase sit to stand to mod. Initial   Short Term Goal 2 Will increase RUE coordination/dexteriety to min. Initial   Short Term Goal 3 Will improve dynamic standing balance/reaching to min. Initial   Recreational Therapy Long Term Goals   Long Term Goal 1 Will increase standing tolerance to 5 minutes.  Initial   Long Term Goal 2 Will increase RUE coordination/dexteriety " to supervision. Initial   Long Term Goal 3 Will improve dynamic standing balance/reaching to supervision. Initial   Plan   Patient to be seen Daily   Planned Duration 3 weeks   Treatments Planned Balance training;Coordination;Energy conservation training;Fine motor;Safety education   Treatment plan/goals estblished with Patient/Caregiver Yes

## 2022-07-13 NOTE — PT/OT/SLP PROGRESS
Physical Therapy      Patient Name:  Ayaz Huggins   MRN:  [id number]    Patient not seen today secondary to Other (Comment) (BP out of parameters).  (195/84 96HR)  Laurita DURHAM notified of findings.    Amount of therapy minutes missed:  60 Minutes.    Will follow-up later this AM for therapy.    7/13/2022

## 2022-07-13 NOTE — CONSULTS
"[facility] - Rehab Inpatient Services  Adult Nutrition  Consult Note    SUMMARY     Recommendations    1. Continue current diet as tolerated    2. Monitor need for ONS        Goals: Maintain adequate nutrition >/=75% EEN/EPN throughout duration of stay  Nutrition Goal Status: new    Assessment and Plan    : Noted altered skin integrity in sacral region. Nsg reports wound care to eval today. Pt reports appetite is "not what it used to be", but std ate ~100% breakfast and ~75% lunch today (fish, roll, noodles). Discussed importance of adequate nutrition during intensive rehab therapy. Pt verbalized understanding. LBM on (). Reports UBW of 200# prior to initial hospitalization ~11 days prior. Noted 4# wt loss. Does not meet ASPEN criteria for significant wt loss. Tells me prior to admit, was weighing self everyday d/t trying to lose wt.       Reason for Assessment    Diagnosis:  (Acute left hemispheric CVA s/p CPA and s/p left CEA on 2022)    Relevant Medical History: Bilateral CVA with right-sided weakness, Carotid artery stenosis s/p left CEA on 2022, HTN, DM type 2, Normocytic anemia    Nutrition Risk Screen    Nutrition Risk Screen: no indicators present    Nutrition/Diet History    Spiritual, Cultural Beliefs, Mu-ism Practices, Values that Affect Care: no    Anthropometrics    Temp: 97.9 °F (36.6 °C)  Height: 5' 10.98" (180.3 cm)  Height (inches): 70.98 in  Weight Method: Estimated  Weight: 89.2 kg (196 lb 10.4 oz)  Weight (lb): 196.65 lb  Ideal Body Weight (IBW), Male: 171.88 lb  % Ideal Body Weight, Male (lb): 114.41 %  BMI (Calculated): 27.4  BMI Grade: 25 - 29.9 - overweight  Usual Body Weight (UBW), k.91 kg  % Usual Body Weight: 98.32  % Weight Change From Usual Weight: -1.88 %     Wt Readings from Last 5 Encounters:   22 89.2 kg (196 lb 10.4 oz)   22 98.1 kg (216 lb 4.3 oz) - likely inaccurate measurement. CBW on () aligns with pt reported UBW.  "       Lab/Procedures/Meds    Pertinent Labs Comments: 7/13: Alb 3.0(L), BUN 7.1(L), Crea .64(L), GFR >60, PAB 11.7(L), H/H 11.6/34.2(L)  Pertinent Medications Comments: statin, colace, SSI, miralax, metformin      Estimated/Assessed Needs    Weight Used For Calorie Calculations: 89.2 kg (196 lb 10.4 oz)  Energy Calorie Requirements (kcal): 2143  Energy Need Method: McColl-St Jeor  Protein Requirements: 116 g (1.3 g/kg)  Weight Used For Protein Calculations: 89.2 kg (196 lb 10.4 oz)   RDA Method (mL): 2143       Nutrition Prescription Ordered    Current Diet Order: Diabetic    Evaluation of Received Nutrient/Fluid Intake       % Intake of Estimated Energy Needs: 75 - 100 %  % Meal Intake: 75 - 100 %    Nutrition Risk    Level of Risk/Frequency of Follow-up: low       Monitor and Evaluation    Food and Nutrient Intake: energy intake, food and beverage intake  Anthropometric Measurements: weight, weight change  Biochemical Data, Medical Tests and Procedures: electrolyte and renal panel, gastrointestinal profile, glucose/endocrine profile, lipid profile, inflammatory profile  Nutrition-Focused Physical Findings: overall appearance       Nutrition Follow-Up    RD Follow-up?: Yes

## 2022-07-14 LAB
POCT GLUCOSE: 116 MG/DL (ref 70–110)
POCT GLUCOSE: 136 MG/DL (ref 70–110)
POCT GLUCOSE: 141 MG/DL (ref 70–110)

## 2022-07-14 PROCEDURE — 25000003 PHARM REV CODE 250: Performed by: NURSE PRACTITIONER

## 2022-07-14 PROCEDURE — 97116 GAIT TRAINING THERAPY: CPT

## 2022-07-14 PROCEDURE — 94761 N-INVAS EAR/PLS OXIMETRY MLT: CPT

## 2022-07-14 PROCEDURE — 97530 THERAPEUTIC ACTIVITIES: CPT | Mod: CQ

## 2022-07-14 PROCEDURE — 97110 THERAPEUTIC EXERCISES: CPT

## 2022-07-14 PROCEDURE — 97535 SELF CARE MNGMENT TRAINING: CPT

## 2022-07-14 PROCEDURE — 94799 UNLISTED PULMONARY SVC/PX: CPT

## 2022-07-14 PROCEDURE — 99233 SBSQ HOSP IP/OBS HIGH 50: CPT | Mod: ,,, | Performed by: PHYSICAL MEDICINE & REHABILITATION

## 2022-07-14 PROCEDURE — 99233 PR SUBSEQUENT HOSPITAL CARE,LEVL III: ICD-10-PCS | Mod: ,,, | Performed by: PHYSICAL MEDICINE & REHABILITATION

## 2022-07-14 PROCEDURE — 11800000 HC REHAB PRIVATE ROOM

## 2022-07-14 RX ORDER — CARVEDILOL 25 MG/1
25 TABLET ORAL 2 TIMES DAILY WITH MEALS
Status: DISCONTINUED | OUTPATIENT
Start: 2022-07-14 | End: 2022-08-02 | Stop reason: HOSPADM

## 2022-07-14 RX ADMIN — HYDROXYZINE PAMOATE 50 MG: 50 CAPSULE ORAL at 09:07

## 2022-07-14 RX ADMIN — ASPIRIN 325 MG ORAL TABLET 325 MG: 325 PILL ORAL at 08:07

## 2022-07-14 RX ADMIN — LISINOPRIL 5 MG: 5 TABLET ORAL at 08:07

## 2022-07-14 RX ADMIN — ATORVASTATIN CALCIUM 80 MG: 40 TABLET, FILM COATED ORAL at 09:07

## 2022-07-14 RX ADMIN — METFORMIN HYDROCHLORIDE 1000 MG: 500 TABLET, FILM COATED ORAL at 05:07

## 2022-07-14 RX ADMIN — CARVEDILOL 25 MG: 25 TABLET, FILM COATED ORAL at 05:07

## 2022-07-14 RX ADMIN — ZINC OXIDE TOPICAL OINT.: at 10:07

## 2022-07-14 RX ADMIN — CARVEDILOL 12.5 MG: 6.25 TABLET, FILM COATED ORAL at 09:07

## 2022-07-14 RX ADMIN — METFORMIN HYDROCHLORIDE 1000 MG: 500 TABLET, FILM COATED ORAL at 08:07

## 2022-07-14 RX ADMIN — DOCUSATE SODIUM 100 MG: 100 CAPSULE, LIQUID FILLED ORAL at 08:07

## 2022-07-14 RX ADMIN — AMLODIPINE BESYLATE 10 MG: 5 TABLET ORAL at 08:07

## 2022-07-14 RX ADMIN — ZINC OXIDE TOPICAL OINT.: at 12:07

## 2022-07-14 NOTE — PLAN OF CARE
Problem: Rehabilitation ([facility]) Plan of Care  Goal: Plan of Care Review  Outcome: Ongoing, Progressing  Goal: Patient-Specific Goal (Individualized)  Outcome: Ongoing, Progressing  Goal: Absence of New-Onset Illness or Injury  Outcome: Ongoing, Progressing  Intervention: Prevent Fall and Fall Injury  Flowsheets (Taken 7/14/2022 1225)  Safety Promotion/Fall Prevention:   Fall Risk reviewed with patient/family   chair alarm set   high risk medications identified   medications reviewed   instructed to call staff for mobility  Intervention: Prevent Skin Injury  Flowsheets (Taken 7/14/2022 1225)  Body Position:   heels elevated   upper extremity elevated  Skin Protection:   adhesive use limited   incontinence pads utilized   protective footwear used   skin-to-skin areas padded   skin-to-device areas padded  Intervention: Prevent Infection  Flowsheets (Taken 7/14/2022 1225)  Infection Prevention:   hand hygiene promoted   single patient room provided   equipment surfaces disinfected  Intervention: Prevent VTE (Venous Thromboembolism)  Flowsheets (Taken 7/14/2022 1225)  VTE Prevention/Management:   remove, assess skin, and reapply sequential compression device   ROM (passive) performed   dorsiflexion/plantar flexion performed  Goal: Optimal Comfort and Wellbeing  Outcome: Ongoing, Progressing  Intervention: Provide Person-Centered Care  Flowsheets (Taken 7/14/2022 1225)  Trust Relationship/Rapport:   care explained   choices provided   emotional support provided   empathic listening provided   thoughts/feelings acknowledged   reassurance provided   questions encouraged   questions answered  Intervention: Monitor Pain and Promote Comfort  Flowsheets (Taken 7/14/2022 1225)  Pain Management Interventions:   pillow support provided   position adjusted   quiet environment facilitated   relaxation techniques promoted   care clustered  Goal: Readiness for Transition of Care  Outcome: Ongoing, Progressing     Problem: Impaired  Wound Healing  Goal: Optimal Wound Healing  Outcome: Ongoing, Progressing  Intervention: Promote Wound Healing  Flowsheets (Taken 7/14/2022 1225)  Oral Nutrition Promotion:   social interaction promoted   physical activity promoted  Sleep/Rest Enhancement:   relaxation techniques promoted   therapeutic touch utilized   noise level reduced   natural light exposure provided  Activity Management: Up in chair - L3  Pain Management Interventions:   pillow support provided   position adjusted   quiet environment facilitated   relaxation techniques promoted   care clustered     Problem: Skin Injury Risk Increased  Goal: Skin Health and Integrity  Outcome: Ongoing, Progressing  Intervention: Optimize Skin Protection  Flowsheets (Taken 7/14/2022 1225)  Pressure Reduction Techniques:   pressure points protected   frequent weight shift encouraged  Pressure Reduction Devices:   elbow protectors utilized   feet on footrest/footstool   chair cushion utilized  Skin Protection:   adhesive use limited   incontinence pads utilized   protective footwear used   skin-to-skin areas padded   skin-to-device areas padded  Head of Bed (HOB) Positioning: HOB elevated  Intervention: Promote and Optimize Oral Intake  Flowsheets (Taken 7/14/2022 1225)  Oral Nutrition Promotion:   social interaction promoted   physical activity promoted

## 2022-07-14 NOTE — PT/OT/SLP PROGRESS
"Physical Therapy         Treatment        Ayaz Huggins   MRN: [id number]     Therapy Minutes  PT Received On: 22  PT Start Time: 1300  PT Stop Time: 1430  PT Total Time (min): 90 min  PT Individual: 90     Billable Minutes:  Therapeutic Activity 80 and Therapeutic Exercise 10      Treatment Type: Treatment  PT/PTA: PTA (student)     PTA Visit Number: 1       General Precautions: Standard, fall  Orthopedic Precautions: Orthopedic Precautions : N/A   Braces: Braces: N/A    Patient found with: peripheral IV         Subjective:  Pt reported that he was able to eat his lunch even though he wasn't that hungry.    Pain/Comfort  Pain Rating 1: 0/10    Objective:  Therapeutic activities, therapeutic exercise performed to increase muscle strength and motor recruitment so that patient may improve quality of functionional mobility and increase functional independence.    VITALS:  BP: Startin/58 78HR; Endin/60 80HR          In sitting    Functional Mobility:  Bed Mobility:   Sit to supine: Standby Assistance with no cues   Rolling: Minimal Assistance with no cues    Balance Training  Dynamic Sitting/Standing:  Patient performed dynamic standing on level surface using rolling walker with Dependent Assistance (modA x 2) for golf activity co-tx with JAMARCUS Bafrield. Pt able to stand for 4 trials with seated rest break in between each. Pt required cuing for upright posture and to try to shift his weight over center.     Transfer Training:  Sit to stand:Minimal Assistance with Rolling Walker w/ verbal cues for to lean forward, "nose over toes"  Bed <> Chair:  Stand Pivot with Maximum Assistance with No Assistive Device      Wheelchair Training:  Patient propels wheelchair 250 feet using LLE & LUE with  Stand-by Assistance and no verbal cues.    Additional Treatment:  -sit to stand practice x5 using rolling walker and modA; mirror place in front of pt for last two trials to provide visual feedback for weightshifting  -sit " "to stand practice x3 w/ 2" step box under RLE to promote weightbearing on the left using rolling walker and modA, mirror still placed in front of pt for visual feedback  -L quad NMES to facilitate quad activation x10 min    Activity Tolerance:  Patient tolerated treatment well    Patient left HOB elevated with call button in reach and bed alarm on.    Education Provided: roles and goals of PT/PTA, transfer training, bed mob, balance training, wheelchair management and strengthening exercises    Expected compliance:  High compliance        Assessment:  Ayaz Huggins is a 77 y.o. male with a medical diagnosis of Stroke. He presents with R sided weakness and imapaired balance. Pt to benefit from skilled PT services to address impairments with progression towards functional independence as tolerated.    Rehab potential is excellent.    Activity tolerance: Excellent    Discharge recommendations:       Equipment recommendations:       GOALS:   Multidisciplinary Problems     Physical Therapy Goals        Problem: Physical Therapy    Goal Priority Disciplines Outcome Goal Variances Interventions   Physical Therapy Goal     PT, PT/OT Ongoing, Progressing     Description: Bed Mobility:  Roll left and right with supervision/touching assist.  Sit to supine transfer with supervision/touching assist.  Supine to sit transfer with supervision/touching assist.    Transfers:  Sit to stand transfer with supervision/touching assist using RW or LRAD.  Bed to chair transfer with supervision/touching assist using RW or LRAD.  Car transfer with partial/moderate assist using RW or LRAD.    Mobility:  Ambulate 100 feet with partial/moderate assist using RW or LRAD. - revised                     PLAN:    Patient to be seen 5 x/week  to address the above listed problems via gait training, therapeutic activities, therapeutic exercises, neuromuscular re-education, wheelchair management/training  Plan of Care expires: 07/18/22  Plan of Care " reviewed with: patient, spouse    7/14/2022

## 2022-07-14 NOTE — PT/OT/SLP PROGRESS
Occupational Therapy  Treatment      Ayaz Huggins   MRN: [id number]   Admitting Diagnosis: Stroke    Therapy Minutes  OT Date of Treatment: 07/14/22  OT Start Time: 0750  OT Stop Time: 0850  OT Total Time (min): 60 min  OT Individual: 60    Billable Minutes:  Self Care/Home Management 60               General Precautions: Standard, fall  Orthopedic Precautions: N/A         Subjective:  Patient communicated he feels weaker today, but he could just be tired from yesterday.     Pain/Comfort  Pain Rating 1: 0/10  Objective:     Vitals:   Beginning of session: 156/85, HR: 83  End of session: 138/74, HR: 81    ADLs:    Current Status   Functional Area: Care Score:   Toileting Hygiene 1   Lower Body Dressing 3   Toilet Transfer 1     Pt performed a step t/f with RW from w/c>BSC with Total A. Pt needed OTS to help him stay balanced and OT to move his RLE during the t/f. Pt had min mvmt in RLE and was able to slide his foot ~ 1 inch, but OT had to move his RLE to allow the pt to perform a step t/f. Pt needed assistance with maintaining an upright posture when standing to pull down his pants, but pt was able to pull his pants down by himself. Pt then performed LB dressing @ BSC, but pt needed assistance with picking up his RLE to thread it into his pants, but this could be due to pt wearing shoes while dressing. When standing up to complete the third part of toileting hygiene, pt needed OTS to help the pt balance and he needed OT to pull up his pants. Pt then t/f from BSC>w/c with Mod A x 2 with the second assist present for safety. OTS also donned pt's neoprene brace on RLE.      Home management  Pt self-propelled himself from pt's room>OT gym using his LUE and LLE requiring increased time. Pt practiced a tub t/f via TTB to promote his functional independence and to practice before performing a shower gardenia. Pt required Max A x 2 when t/f from w/c>TTB on his R side (weaker side). Pt needed assistance standing up and with  guiding his hips to the TTB. Pt needed assistance with managing his RLE over the threshold of the tub. When t/f to his L from TTB>w/c pt needed Mod A x 2 with second assist present for safety.    Education Provided: Roles and goals of OT, ADLs and transfer training    Expected compliance: High compliance    ASSESSMENT:  Ayaz Huggins is a 77 y.o. male with a medical diagnosis of Stroke performed t/f and ADL training to increase ADL independence and safety. Pt is progressing towards OT goals      GOALS:   Multidisciplinary Problems       Occupational Therapy Goals          Problem: Occupational Therapy    Goal Priority Disciplines Outcome Interventions   Occupational Therapy Goal     OT, PT/OT Ongoing, Progressing    Description: LTG: Pt will be Ind with all ADLS.  Pt LTG: to return to bike riding    ADLs:  Pt to perform grooming tasks with [place] from seated position by d/c.  Pt to perform UB dressing with Setup using modified techniques by 1 week.   Pt to perform LB dressing with SPV using modified techniques in 1 week.    Pt to perform putting on/off footwear task with SPV by re-eval.  Pt to perform toileting with Partial A using BSC by re-eval.    Functional Transfers:  Pt to perform toilet transfers with Partial A by re-eval using BSC and LRAD for mobility.    Pt to perform a tub transfer with Max A by re-eval using TTB.   Pt to perform a walk-in shower transfer Max A using LRAD by re-eval.       Balance, Strengthening, Endurance, Balance:  Pt  will perform dynamic standing tasks using LRAD for 2 minutes with SPV assist by re-eval  Pt to demonstrate 4/5 R shoulder strength and 5/5 distal RUE strength during functional task by re-eval.                          Plan:  Patient to be seen daily to address the above listed problems via self-care/home management, community/work re-entry, therapeutic activities, therapeutic exercises, neuromuscular re-education, cognitive retraining  Plan of Care expires:    Plan  of Care reviewed with: patient      07/14/2022

## 2022-07-14 NOTE — PROGRESS NOTES
Subjective  HPI: History of present illness: 78 y/o male with a PMH of Type II DM, diabetic neuropathy, CAD s/p CABG x2 (2007), and HTN who presented to [facility] ED  On 7/2/22 with complaints of right sided upper and lower extremity weakness. Patient's story is unclear upon the time of last known normal. He states he woke up  with a cramp in the RLE and noticed the right sided weakness.The patient was admitted to the ICU with an ischemic CVA and administered tPA; he has had some improvement in his right-sided weakness although he is unable to purposefully move his right lower extremity.  Neurology has seen and evaluated the patient and has deemed this patient stable for transfer to the floor.  This patient was seen and evaluated in the intensive care unit.    Review of Systems; sleeping well; B/B continent; appetite good; swallowing without difficulty   Wounds: none  Precautions: falls  Bracing: (R)knee brace  Swallowing: solid diet with thin liquids  Function: Tolerating therapy. Continue PT/OT  VTE Prophylaxis:   Code Status: FULL CODE   Discharge: to home with      Objective: having a good day; being treated well; optimistic about progress    Physical Exam  Physical Exam  A&O NAD seen in PT gym in wheel chair  HHENT-(L)CEA scar noted; CDI  CV-RRR w/o m/g  Chest-CTA BBS  Abd-NT/ND BSx4 no HSM  Neuro-(R)hemiparesis in (R)UE with antigravity movement; (R)LE with hemiplegia  Ext-no s/s DVT; knee brace (R) knee    Assessment/Plan   1-Acute left hemispheric CVA    2-Carotid artery stenosis  - s/p left CEA on 07/08/2022  3-DM type II  4-HTN  - at goal!!    I have personally evaluated the patient during therapy today. Have discussed progress and problems with patient. Have reviewed barriers to progress as well as response to therapies with therapists. I have reviewed medical issues and plan of care with IM team. I met with  to review d/c plans and barriers. I have discussed skin integrity and B/B status with  nursing. I am in agreement with present IRF plan of care.

## 2022-07-14 NOTE — PT/OT/SLP PROGRESS
Physical Therapy         Treatment        Ayaz Huggins   MRN: [id number]     Therapy Minutes  PT Received On: 22  PT Start Time: 1100  PT Stop Time: 1200  PT Total Time (min): 60 min  PT Individual: 60     Billable Minutes:  Gait [id number] and Therapeutic Activity 30      Treatment Type: Treatment  PT/PTA: PTA (student)     PTA Visit Number: 1       General Precautions: Standard, fall  Orthopedic Precautions: Orthopedic Precautions : N/A   Braces: Braces: N/A    Patient found with: peripheral IV     Subjective:  Pt with no complaints this morning.     Pain/Comfort  Pain Rating 1: 0/10    Objective:  Therapeutic activities, therapeutic exercise performed to increase muscle strength and motor recruitment so that patient may improve quality of functionional mobility and increase functional independence. and Gait training performed to improve functional strength, to increase functional mobility, safety and independence.    VITALS:  BP: Startin/58 71HR; Ending 113/57 73HR          In sitting    Transfer Training:  Sit to stand:Minimal Assistance with Rolling Walker and verbal cues for hand placement and upright posture  Chair <> Mat: Step Transfer with Moderate Assistance with  Rolling Walker      Gait Training:  Patient ambulates 80 feet and 50 feet with seated rest break between each trial using rolling walker with Total Assistance w/ wheelchair to follow for safety, Christiano of one, SBA of another with WC for safety.   Ace wrap used on pt's RLE to assist w/ dorsiflexion. Neoprene knee brace also worn on RLE to help support the knee. Pt required moderate assistance with advancement of RLE during ambulation.  Pt able to self-advance at times.  Pt demonstrating a  swing-to gait with decreased ute, decreased velocity of limb motion, decreased step length, decreased stride length, decreased toe-to-floor clearance and decreased weight-shifting ability.Impairments contributing to gait deviations include impaired  "balance, impaired coordination, impaired motor control, impaired sensory feedback and decreased strength     Additional Treatment:  -seated LLE step ups onto 2" step box to promote weightbearing on RLE with CGA 3x10  -isometric mini squat with emphasis on RLE weightbearing with CGA and R knee blocked for safety x10 for 5 second holds    Activity Tolerance:  Patient tolerated treatment well    Patient left up in chair with call button in reach and chair alarm on.    Education Provided: roles and goals of PT/PTA, transfer training, gait training, balance training and safety awareness    Expected compliance:  High compliance        Assessment:  Ayaz Huggins is a 77 y.o. male with a medical diagnosis of Stroke. He presents with R sided weakness and impaired balance. Pt to benefit from skilled PT services to address impairments with progression towards functional independence as tolerated.    Rehab potential is excellent.    Activity tolerance: Excellent    Discharge recommendations:       Equipment recommendations:       GOALS:   Multidisciplinary Problems     Physical Therapy Goals        Problem: Physical Therapy    Goal Priority Disciplines Outcome Goal Variances Interventions   Physical Therapy Goal     PT, PT/OT Ongoing, Progressing     Description: Bed Mobility:  Roll left and right with supervision/touching assist.  Sit to supine transfer with supervision/touching assist.  Supine to sit transfer with supervision/touching assist.    Transfers:  Sit to stand transfer with supervision/touching assist using RW or LRAD.  Bed to chair transfer with supervision/touching assist using RW or LRAD.  Car transfer with partial/moderate assist using RW or LRAD.    Mobility:  Ambulate 100 feet with partial/moderate assist using RW or LRAD. - revised                     PLAN:    Patient to be seen 5 x/week  to address the above listed problems via gait training, therapeutic activities, therapeutic exercises, neuromuscular " re-education, wheelchair management/training  Plan of Care expires: 07/18/22  Plan of Care reviewed with: patient, spouse    7/14/2022

## 2022-07-14 NOTE — PROGRESS NOTES
07/14/22 1330   Rec Therapy Time Calculation   Rec Start Time 1330   Rec Stop Time 1400   Rec Total Time (min) 30 min   Subjective   Patient states Pt did not voice any physical complaints   Precautions   General Precautions fall   Attendance   Activity   (Golf activity)   Participation Active participation   Assessment   Assessment Sit to stand was mod as was dynamic standing balance/reaching. Standing tolerance was 3 minutes. Using RUE as a helper during Golf activity. Sequencing skills were setup. Remains determined and motivated.   Plan   Planned Therapy Intervention Continue with current plan  (Golf activity)   Expected Length of Stay 3 weeks   PT Frequency Minimu of 5 visits per week;Other (see comments)  (Daily)   Time   Treatment time 2 units

## 2022-07-14 NOTE — PROGRESS NOTES
[facility] ([facility])  Rehab Progress Note    Patient Name: Ayaz Huggins  MRN: [id number]  Age: 77 y.o. Sex: male  : 1945  Hospital Length of Stay: 2 days  Date of Service: 2022   Chief Complaint: Acute left hemispheric CVA s/p CPA and s/p left CEA on 2022    Subjective:     Basic Information  Admit Information: 77-year-old white male presented to [facility] ED on 2022 complaining of right-sided weakness.  PMH significant for DM type 2, CAD s/p CABG x2 in , and HTN.  CT head negative for hemorrhagic bleeding.  Received tPA and transferred to ICU for post tPA monitoring and treatment.  TTE with negative bubble study, but significant for grade 1 diastolic dysfunction.  Carotid ultrasound significant for left ICA stenosis.  MRI significant for last left hemispheric CVA and 3 other small punctate CVAs on the right hemisphere.  Tolerated left CEA on  without periprocedural complications.  Tolerated transfer to [facility] inpatient rehab unit on  without incident.  Today's Information: No acute events overnight.  Sitting up in wheelchair.  Reports good sleep and appetite.  Last BM .  BP moderately controlled.  Albumin 3.0.  Prealbumin 11 7.  Lab work otherwise unremarkable.  No imaging today.    Review of patient's allergies indicates:   Allergen Reactions    Penicillins         Current Facility-Administered Medications:     acetaminophen tablet 650 mg, 650 mg, Oral, Q4H PRN, Alexey A Kathryn, FNP    ALPRAZolam tablet 0.25 mg, 0.25 mg, Oral, TID PRN, Alexey Avalosehart, FNP    amLODIPine tablet 10 mg, 10 mg, Oral, Daily, Alexey A Kathryn, FNP    aspirin tablet 325 mg, 325 mg, Oral, Daily, Alexey A Kathryn, FNP    atorvastatin tablet 80 mg, 80 mg, Oral, QHS, Alexey Narvaezart, FNP, 80 mg at 22    benzonatate capsule 100 mg, 100 mg, Oral, TID PRN, Alexey Avalosehart, FNP    bisacodyL suppository 10 mg, 10 mg, Rectal, Daily PRN,  Alexey A Kathryn, FNP    carvediloL tablet 12.5 mg, 12.5 mg, Oral, BID WM, Alexey A Kathryn, FNP, 12.5 mg at 07/13/22 1704    dextrose 50 % in water (D50W) injection 12.5 g, 12.5 g, Intravenous, PRN, Alexey A Kathryn, FNP    dextrose 50 % in water (D50W) injection 25 g, 25 g, Intravenous, PRN, Alexey A Kathryn, FNP    docusate sodium capsule 100 mg, 100 mg, Oral, BID, Alexey A Kathryn, FNP, 100 mg at 07/13/22 2046    glucagon (human recombinant) injection 1 mg, 1 mg, Intramuscular, PRN, Alexey A Kathryn, FNP    glucose chewable tablet 16 g, 16 g, Oral, PRN, Alexey A Kathryn, FNP    glucose chewable tablet 24 g, 24 g, Oral, PRN, Alexey A Kathryn, FNP    hydrALAZINE injection 10 mg, 10 mg, Intravenous, Q4H PRN, Alexey A Kathryn, FNP    HYDROcodone-acetaminophen 5-325 mg per tablet 1 tablet, 1 tablet, Oral, Q6H PRN, Alexey A Kathryn, FNP    hydrOXYzine pamoate capsule 50 mg, 50 mg, Oral, QHS, Alexey A Kathryn, FNP, 50 mg at 07/13/22 2046    insulin aspart U-100 injection 1-10 Units, 1-10 Units, Subcutaneous, QID (AC + HS) PRN, Alexey A Kathryn, FNP, 2 Units at 07/13/22 1213    labetalol 20 mg/4 mL (5 mg/mL) IV syring, 10 mg, Intravenous, Q4H PRN, Alexey A Kathryn, FNP, 10 mg at 07/13/22 0904    lisinopriL tablet 5 mg, 5 mg, Oral, Daily, Alexey A Kathryn, FNP, 5 mg at 07/13/22 0858    metFORMIN tablet 1,000 mg, 1,000 mg, Oral, BID WM, Alexey A Kathryn, FNP, 1,000 mg at 07/13/22 1705    metoprolol injection 10 mg, 10 mg, Intravenous, Q2H PRN, Alexey Peralta, RAYMONDP    nitroGLYCERIN SL tablet 0.4 mg, 0.4 mg, Sublingual, Q5 Min PRN, Alexey Peralta, RAYMONDP    ondansetron disintegrating tablet 8 mg, 8 mg, Oral, Q6H PRN, Alexey Peralta, RAYMONDP    polyethylene glycol packet 17 g, 17 g, Oral, Daily, Alexey Peralta, RAYMONDP    promethazine tablet 25 mg, 25 mg, Oral, Q6H PRN, Alexey Peralta, RAYMONDP     Review of Systems   Complete 12-point review of symptoms  "negative except for what's mentioned in HPI     Objective:     /63   Pulse 74   Temp 98.2 °F (36.8 °C) (Oral)   Resp 20   Ht 5' 10.98" (1.803 m)   Wt 89.2 kg (196 lb 10.4 oz)   SpO2 95%   BMI 27.44 kg/m²        Intake/Output Summary (Last 24 hours) at 7/14/2022 0836  Last data filed at 7/14/2022 0700  Gross per 24 hour   Intake 960 ml   Output 800 ml   Net 160 ml       Physical Exam  Constitutional:       Appearance: Normal appearance.   HENT:      Head: Normocephalic.      Mouth/Throat:      Mouth: Mucous membranes are moist.   Eyes:      Pupils: Pupils are equal, round, and reactive to light.   Neck:      Comments: Left CEA incision clean intact with mild postsurgical swelling  Cardiovascular:      Rate and Rhythm: Normal rate and regular rhythm.      Heart sounds: Normal heart sounds.   Pulmonary:      Effort: Pulmonary effort is normal.      Breath sounds: Normal breath sounds.   Abdominal:      General: Bowel sounds are normal.      Palpations: Abdomen is soft.   Musculoskeletal:      Cervical back: Neck supple.      Comments: Generalized weakness and muscle atrophy.  Right upper extremity dependent swelling   Skin:     General: Skin is warm and dry.   Neurological:      Mental Status: He is alert and oriented to person, place, and time.      Comments: Right-sided hemiplegia.  Can move RUE against gravity.  LLE flaccid.    Psychiatric:         Mood and Affect: Mood normal.         Behavior: Behavior normal.         Thought Content: Thought content normal.         Judgment: Judgment normal.     *MD performed and documented physical examination       Lines/Drains/Airways     Peripheral Intravenous Line  Duration                Peripheral IV - Single Lumen 07/02/22 0923 20 G Left Wrist 11 days         Peripheral IV - Single Lumen 07/08/22 1652 20 G Left;Anterior Hand 5 days              Labs  Admission on 07/12/2022   Component Date Value Ref Range Status    POCT Glucose 07/12/2022 213 (A) 70 - 110 " mg/dL Final    Sodium Level 07/13/2022 141  136 - 145 mmol/L Final    Potassium Level 07/13/2022 3.6  3.5 - 5.1 mmol/L Final    Chloride 07/13/2022 109 (A) 98 - 107 mmol/L Final    Carbon Dioxide 07/13/2022 25  23 - 31 mmol/L Final    Glucose Level 07/13/2022 107  82 - 115 mg/dL Final    Blood Urea Nitrogen 07/13/2022 7.1 (A) 8.4 - 25.7 mg/dL Final    Creatinine 07/13/2022 0.64 (A) 0.73 - 1.18 mg/dL Final    Calcium Level Total 07/13/2022 9.0  8.8 - 10.0 mg/dL Final    Protein Total 07/13/2022 6.4  5.8 - 7.6 gm/dL Final    Albumin Level 07/13/2022 3.0 (A) 3.4 - 4.8 gm/dL Final    Globulin 07/13/2022 3.4  2.4 - 3.5 gm/dL Final    Albumin/Globulin Ratio 07/13/2022 0.9 (A) 1.1 - 2.0 ratio Final    Bilirubin Total 07/13/2022 0.6  <=1.5 mg/dL Final    Alkaline Phosphatase 07/13/2022 92  40 - 150 unit/L Final    Alanine Aminotransferase 07/13/2022 25  0 - 55 unit/L Final    Aspartate Aminotransferase 07/13/2022 22  5 - 34 unit/L Final    Estimated GFR-Non  07/13/2022 >60  mls/min/1.73/m2 Final    Magnesium Level 07/13/2022 1.80  1.60 - 2.60 mg/dL Final    Phosphorus Level 07/13/2022 2.9  2.3 - 4.7 mg/dL Final    Prealbumin 07/13/2022 11.7 (A) 16.0 - 42.0 mg/dL Final    Ferritin Level 07/13/2022 76.13  21.81 - 274.66 ng/mL Final    Iron Binding Capacity Unsaturated 07/13/2022 170  69 - 240 ug/dL Final    Iron Level 07/13/2022 56 (A) 65 - 175 ug/dL Final    Iron Binding Capacity Total 07/13/2022 226 (A) 250 - 450 ug/dL Final    Iron Saturation 07/13/2022 25  20 - 50 % Final    WBC 07/13/2022 9.2  4.5 - 11.5 x10(3)/mcL Final    RBC 07/13/2022 3.91 (A) 4.70 - 6.10 x10(6)/mcL Final    Hgb 07/13/2022 11.6 (A) 14.0 - 18.0 gm/dL Final    Hct 07/13/2022 34.2 (A) 42.0 - 52.0 % Final    MCV 07/13/2022 87.5  80.0 - 94.0 fL Final    MCH 07/13/2022 29.7  27.0 - 31.0 pg Final    MCHC 07/13/2022 33.9  33.0 - 36.0 mg/dL Final    RDW 07/13/2022 13.2  11.5 - 17.0 % Final    Platelet  07/13/2022 241  130 - 400 x10(3)/mcL Final    MPV 07/13/2022 10.1  7.4 - 10.4 fL Final    Neut % 07/13/2022 55.0  % Final    Lymph % 07/13/2022 30.5  % Final    Mono % 07/13/2022 10.6  % Final    Eos % 07/13/2022 3.3  % Final    Basophil % 07/13/2022 0.4  % Final    Lymph # 07/13/2022 2.79  0.6 - 4.6 x10(3)/mcL Final    Neut # 07/13/2022 5.0  2.1 - 9.2 x10(3)/mcL Final    Mono # 07/13/2022 0.97  0.1 - 1.3 x10(3)/mcL Final    Eos # 07/13/2022 0.30  0 - 0.9 x10(3)/mcL Final    Baso # 07/13/2022 0.04  0 - 0.2 x10(3)/mcL Final    IG# 07/13/2022 0.02  0 - 0.04 x10(3)/mcL Final    IG% 07/13/2022 0.2  % Final    NRBC% 07/13/2022 0.0  % Final    POCT Glucose 07/13/2022 166 (A) 70 - 110 mg/dL Final    POCT Glucose 07/13/2022 127 (A) 70 - 110 mg/dL Final    POCT Glucose 07/13/2022 157 (A) 70 - 110 mg/dL Final    POCT Glucose 07/14/2022 116 (A) 70 - 110 mg/dL Final     Radiology  MRI brain without contrast on 07/03/2022 at 2:09 p.m., IMPRESSION: Small acute infarcts supratentorially, the largest is a left posterior frontal 3 cm cortical based infarct with petechial hemorrhage.  No mass effect or shift.  Discussed with Dr. Duncan. Otherwise no acute intracranial abnormalities.  Radiology  Transthoracic echo on 07/02/2022 at 2:40 p.m., IMPRESSION: The left ventricle is  with normal systolic function. The estimated ejection fraction is 60%. There is grade I diastolic dysfunction consistent with impaired relaxation. Left atrial pressure is normal.    Assessment/Plan:     77 y.o. WM admitted on 7/12/2022    Acute left hemispheric CVA   - MRI with 3 other small punctate CVAs on the right hemisphere  - s/p tPA and s/p left CEA on 7/8/2022  - continue                Aspirin 325 mg daily                Lipitor 80 mg at bedtime                Xarelto 20 mg daily   - right knee brace obtained on 7/13  - waiting for right AFO from orthotics  - defer to physiatry for rehab and pain management  - PT/OT/RT following       Carotid artery stenosis  - s/p left CEA on 07/08/2022  - continue                Aspirin 325 mg daily                Lipitor 80 mg at bedtime                Xarelto 20 mg daily                Norco 5 mg/325 mg q.6 hours p.r.n.  - to follow up with vascular surgery outpatient     HTN  - BP mod control  - discontinued Metoprolol succinate 50 mg daily on 7/13  - continue     Coreg 25 mg BID (initiated 7/13) (increased 7/14)                Norvasc 10 mg daily                Lisinopril 20 mg daily                 Hydralazine 10 mg every 2 hours as needed for BP > 160/90                Labetalol 10 mg every 2 hours as needed for BP > 160/90     DM type II  - HgA1c with next labs  - continue                Metformin 1000 mg twice daily                ISS   - CBGs AC/HS     Normocytic anemia  - asymptomatic  - H/H stable   - no evidence of active bleeds  - will closely monitor and transfuse if needed      Constipation  - stable  - continue                Colace 100 mg b.i.d.                 MiraLax 17 g daily     VTE Prophylaxis:  Xarelto 20 mg daily  COVID-19 testing:  Negative on 07/12/2022  COVID-19 vaccination status:  Vaccinated (Moderna):  X3     POA: No  Living will: No  Contacts:  Pooja Huggins (wife) [phone]                     Eleonora Manzanares (daughter) [phone]     CODE STATUS: Full Code  Internal Medicine (attending): Weston Rivera MD  Physiatry (consulting): Angel Em MD     OUTPATIENT PROVIDERS  Angel Parham MD  Neurology: Jacque Agee MD  Vascular surgery:  Roxi Lama MD  Cardiology: Dionisio Marin MD     DISPOSITION: Condition stable.  Sleep hygiene, bowel maintenance, and appetite at goal.  BP moderately controlled.  No labs or imaging today.  Increase Coreg 25 mg b.i.d..  Obtained right knee brace form orthotics on 07/12.  He was measured for right AFO.  Pending delivery. Continue aggressive mobilization as tolerated.  Monitor closely.  Notify of acute changes.     Bakari  SANDRA Peralta conducted independent physical examination and assisted with medical documentation.     Total time spent on this encounter including chart review and direct MD + NP 1-on-1 patient interaction: 39 minutes   Over 50% of this time was spent in counseling and coordination of care

## 2022-07-15 LAB
POCT GLUCOSE: 113 MG/DL (ref 70–110)
POCT GLUCOSE: 122 MG/DL (ref 70–110)
POCT GLUCOSE: 130 MG/DL (ref 70–110)
POCT GLUCOSE: 133 MG/DL (ref 70–110)

## 2022-07-15 PROCEDURE — 94761 N-INVAS EAR/PLS OXIMETRY MLT: CPT

## 2022-07-15 PROCEDURE — 97110 THERAPEUTIC EXERCISES: CPT | Mod: CQ

## 2022-07-15 PROCEDURE — 97535 SELF CARE MNGMENT TRAINING: CPT

## 2022-07-15 PROCEDURE — 25000003 PHARM REV CODE 250: Performed by: NURSE PRACTITIONER

## 2022-07-15 PROCEDURE — 94799 UNLISTED PULMONARY SVC/PX: CPT

## 2022-07-15 PROCEDURE — 97530 THERAPEUTIC ACTIVITIES: CPT | Mod: CQ

## 2022-07-15 PROCEDURE — 99900035 HC TECH TIME PER 15 MIN (STAT)

## 2022-07-15 PROCEDURE — 97530 THERAPEUTIC ACTIVITIES: CPT

## 2022-07-15 PROCEDURE — 97116 GAIT TRAINING THERAPY: CPT | Mod: CQ

## 2022-07-15 PROCEDURE — 11800000 HC REHAB PRIVATE ROOM

## 2022-07-15 RX ORDER — DOCUSATE SODIUM 100 MG/1
100 CAPSULE, LIQUID FILLED ORAL 2 TIMES DAILY PRN
Status: DISCONTINUED | OUTPATIENT
Start: 2022-07-15 | End: 2022-08-02 | Stop reason: HOSPADM

## 2022-07-15 RX ORDER — POLYETHYLENE GLYCOL 3350 17 G/17G
17 POWDER, FOR SOLUTION ORAL 2 TIMES DAILY PRN
Status: DISCONTINUED | OUTPATIENT
Start: 2022-07-15 | End: 2022-08-02 | Stop reason: HOSPADM

## 2022-07-15 RX ADMIN — CARVEDILOL 25 MG: 25 TABLET, FILM COATED ORAL at 09:07

## 2022-07-15 RX ADMIN — ZINC OXIDE TOPICAL OINT.: at 06:07

## 2022-07-15 RX ADMIN — ZINC OXIDE TOPICAL OINT.: at 02:07

## 2022-07-15 RX ADMIN — METFORMIN HYDROCHLORIDE 1000 MG: 500 TABLET, FILM COATED ORAL at 05:07

## 2022-07-15 RX ADMIN — HYDROXYZINE PAMOATE 50 MG: 50 CAPSULE ORAL at 09:07

## 2022-07-15 RX ADMIN — CARVEDILOL 25 MG: 25 TABLET, FILM COATED ORAL at 05:07

## 2022-07-15 RX ADMIN — AMLODIPINE BESYLATE 10 MG: 5 TABLET ORAL at 09:07

## 2022-07-15 RX ADMIN — LISINOPRIL 5 MG: 5 TABLET ORAL at 09:07

## 2022-07-15 RX ADMIN — ASPIRIN 325 MG ORAL TABLET 325 MG: 325 PILL ORAL at 09:07

## 2022-07-15 RX ADMIN — ZINC OXIDE TOPICAL OINT.: at 09:07

## 2022-07-15 RX ADMIN — METFORMIN HYDROCHLORIDE 1000 MG: 500 TABLET, FILM COATED ORAL at 09:07

## 2022-07-15 RX ADMIN — ATORVASTATIN CALCIUM 80 MG: 40 TABLET, FILM COATED ORAL at 09:07

## 2022-07-15 NOTE — PT/OT/SLP PROGRESS
Physical Therapy         Treatment        Ayaz Huggins   MRN: [id number]     Therapy Minutes  PT Received On: 07/15/22  PT Start Time: 1400  PT Stop Time: 1430  PT Total Time (min): 30 min  PT Individual: 30     Billable Minutes:  Gait [id number] and Therapeutic Exercise 15      Treatment Type: Treatment  PT/PTA: PTA (student)     PTA Visit Number: 2       General Precautions: Standard, fall  Orthopedic Precautions: Orthopedic Precautions : N/A   Braces: Braces: AFO (neoprene knee brace)    Patient found with: peripheral IV         Subjective:  Pt reported that he is feeling less tired this afternoon, but that the sore on his rear was bothering him a little bit.    Pain/Comfort  Pain Rating 1: 0/10    Objective:  Therapeutic activities, therapeutic exercise performed to increase muscle strength and motor recruitment so that patient may improve quality of functionional mobility and increase functional independence. and Gait training performed to improve functional strength, to increase functional mobility, safety and independence.    VITALS:  BP: Startin/51 65HR; Endin/53 59HR          In supine    Functional Mobility:  Bed Mobility:   Supine to sit: Standby Assistance with extra time required to complete   Sit to supine: Standby Assistance with extra time required to complete    Transfer Training:  Sit to stand:Minimal Assistance with Rolling Walker and verbal/tactile cues for hand placement and upright posture  Bed <> Chair:  Stand Pivot with Moderate Assistance with No Assistive Device      Gait Training:  Patient ambulates 35 feet to try out new AFO that was delivered at start of session using rolling walker with Total Assistance w/ Christiano x 1 and SBA x 1 for WC follow for safety and verbal cues throughout for proper sequencing and weight shifting. Pt wearing AFO and neoprene knee brace on RLE for improved dorsiflexion and knee support. Pt required moderate assistance to advance RLE. Pt demonstrating a   swing-to gait with decreased ute, decreased velocity of limb motion, decreased step length, decreased stride length, decreased toe-to-floor clearance and decreased weight-shifting ability.Impairments contributing to gait deviations include impaired balance, impaired coordination, impaired motor control and decreased strength.    Additional Treatment:  - NMES to R quad over bolster w/ maxA to perform SAQ during e-stim x 10 min    Activity Tolerance:  Patient tolerated treatment well    Patient left HOB elevated with call button in reach.    Education Provided: roles and goals of PT/PTA, transfer training, bed mob, gait training and strengthening exercises    Expected compliance:  High compliance        Assessment:  Ayaz Huggins is a 77 y.o. male with a medical diagnosis of Stroke. He presents with R sided weakness, decreased endurance, and impaired balance. Pt to benefit from skilled PT services to address impairments with progression towards functional independence as tolerated.    Rehab potential is excellent.    Activity tolerance: Good    Discharge recommendations:       Equipment recommendations:       GOALS:   Multidisciplinary Problems     Physical Therapy Goals        Problem: Physical Therapy    Goal Priority Disciplines Outcome Goal Variances Interventions   Physical Therapy Goal     PT, PT/OT Ongoing, Progressing     Description: Bed Mobility:  Roll left and right with supervision/touching assist.  Sit to supine transfer with supervision/touching assist.  Supine to sit transfer with supervision/touching assist.    Transfers:  Sit to stand transfer with supervision/touching assist using RW or LRAD.  Bed to chair transfer with supervision/touching assist using RW or LRAD.  Car transfer with partial/moderate assist using RW or LRAD.    Mobility:  Ambulate 100 feet with partial/moderate assist using RW or LRAD. - revised                     PLAN:    Patient to be seen 5 x/week  to address the above listed  problems via gait training, therapeutic activities, therapeutic exercises, neuromuscular re-education, wheelchair management/training  Plan of Care expires: 07/18/22  Plan of Care reviewed with: patient, spouse    7/15/2022

## 2022-07-15 NOTE — PT/OT/SLP PROGRESS
Occupational Therapy  Treatment      Ayaz Huggins   MRN: [id number]   Admitting Diagnosis: Stroke         Billable Minutes:  Self Care/Home Management 75 and Therapeutic Activity 15               General Precautions: Standard, fall  Orthopedic Precautions: N/A         Subjective:  Patient communicated he feels weaker and tired today, but it could be due to pt still being in bed upon OT and OTS entering room.    Pain/Comfort  Pain Rating 1: 0/10  Objective:     Vitals:  Beginning of session: 139/61, HR: 75  End of session: 145/65, HR: 83    Functional Mobility:  Transfer Training:  Pt went from supine> sitting EOB with Min A to pull his R hip to EOB and pt used bed rails to pull himself up. Pt performed a stand pivot t/f from bed > w/c with Max A and max vc to remember proper body mechanics during the t/f.    ADLs:    Current Status   Functional Area: Care Score:   Oral Hygiene 5   @ w/c level in front of the sink.   Shower/Bathe Self 3   Pt needed vc to wash his LUE. Pt needed OTS to clean his bottom outside of the shower when pt was pulling up his pants, due to safety concerns with standing up in the shower and pt's decreased shoulder internal ROM.   Upper Body Dressing 5   @ TTB with Bacterioscan tech for dressing.    Lower Body Dressing 3   Pt was given a reacher to promote his functional independence, but pt still needed help with threading his RLE into his pants. Pt needed assistance with balancing himself when standing to pull up his pants.   Putting On/Taking Off Footwear 3   Pt put on his L shoe and sock with set up assistance, but pt needed help putting on his R shoe. Pt was given a sock aid, but pt needed help pulling the sock aid to don his socks. This could be due to his feet still being damp from showering.     Pt performed a tub t/f via TTB with Mod A x 2 and LBQC. Pt t/f from w/c>TTB, but pt needed assistance with performing a sit to stand and to manage his RLE. Pt needed the OTS to steady him and the OT to  move his RLE to allow him to pivot. Pt used LBQC when t/f > R side (weaker side), but pt performed a stand pivot from TTB > w/c going to his L side with Min A for balance and safety. Pt performed grooming sitting edge of w/c for 10 min in front of the sink with set up assist.       Education Provided: Roles and goals of OT and ADLs    Expected compliance: High compliance    ASSESSMENT:  Ayaz Huggins is a 77 y.o. male with a medical diagnosis of Stroke performed ADL training to increase ADL independence and safety. Pt is progressing towards OT goals      GOALS:   Multidisciplinary Problems       Occupational Therapy Goals          Problem: Occupational Therapy    Goal Priority Disciplines Outcome Interventions   Occupational Therapy Goal     OT, PT/OT Ongoing, Progressing    Description: LTG: Pt will be Ind with all ADLS.  Pt LTG: to return to bike riding    ADLs:  Pt to perform grooming tasks with Foster from seated position by d/c.  Pt to perform UB dressing with Setup using modified techniques by 1 week.   Pt to perform LB dressing with SPV using modified techniques in 1 week.    Pt to perform putting on/off footwear task with SPV by re-eval.  Pt to perform toileting with Partial A using BSC by re-eval.    Functional Transfers:  Pt to perform toilet transfers with Partial A by re-eval using BSC and LRAD for mobility.    Pt to perform a tub transfer with Max A by re-eval using TTB.   Pt to perform a walk-in shower transfer Max A using LRAD by re-eval.       Balance, Strengthening, Endurance, Balance:  Pt  will perform dynamic standing tasks using LRAD for 2 minutes with SPV assist by re-eval  Pt to demonstrate 4/5 R shoulder strength and 5/5 distal RUE strength during functional task by re-eval.                          Plan:  Patient to be seen daily to address the above listed problems via self-care/home management, community/work re-entry, therapeutic activities, therapeutic exercises, neuromuscular  re-education, cognitive retraining  Plan of Care expires:    Plan of Care reviewed with: patient      07/15/2022

## 2022-07-15 NOTE — PROGRESS NOTES
07/15/22 1130   Rec Therapy Time Calculation   Rec Start Time 1130   Rec Stop Time 1200   Rec Total Time (min) 30 min   Subjective   Patient states Pt stated that he was having a hard time keeping his head up   Attendance   Activity   (Selwyn)   Participation Resting   Assessment   Assessment Dynamic sitting balance/reaching was min/supervision. Sitting tolerance was 3 minutes. Verbal cues needed to sit away from back of w/c. Hand over hand assist needed for motor planning with RUE. Sequencing skills were setup. Cooperative   Plan   Planned Therapy Intervention   (Selwyn)   Expected Length of Stay Pending   PT Frequency Minimu of 5 visits per week;Other (see comments)  (Daily)   Time   Treatment time 2 units

## 2022-07-15 NOTE — PROGRESS NOTES
[facility] ([facility])  Rehab Progress Note    Patient Name: Ayaz Huggins  MRN: [id number]  Age: 77 y.o. Sex: male  : 1945  Hospital Length of Stay: 3 days  Date of Service: 7/15/2022   Chief Complaint: Acute left hemispheric CVA s/p CPA and s/p left CEA on 2022    Subjective:     Basic Information  Admit Information: 77-year-old white male presented to [facility] ED on 2022 complaining of right-sided weakness.  PMH significant for DM type 2, CAD s/p CABG x2 in , and HTN.  CT head negative for hemorrhagic bleeding.  Received tPA and transferred to ICU for post tPA monitoring and treatment.  TTE with negative bubble study, but significant for grade 1 diastolic dysfunction.  Carotid ultrasound significant for left ICA stenosis.  MRI significant for last left hemispheric CVA and 3 other small punctate CVAs on the right hemisphere.  Tolerated left CEA on  without periprocedural complications.  Tolerated transfer to [facility] inpatient rehab unit on  without incident.  Today's Information: No acute events overnight.  Sitting up in bed.  Reports good sleep and appetite.  Last BM .  Reports loose stools.  Vital signs at goal with no reported fevers.  No labs or imaging today.  Review of patient's allergies indicates:   Allergen Reactions    Penicillins         Current Facility-Administered Medications:     acetaminophen tablet 650 mg, 650 mg, Oral, Q4H PRN, Alexey A Kathryn, FNP    ALPRAZolam tablet 0.25 mg, 0.25 mg, Oral, TID PRN, Alexey A Kathryn, FNP    amLODIPine tablet 10 mg, 10 mg, Oral, Daily, Alexey A Kathryn, FNP, 10 mg at 22    aspirin tablet 325 mg, 325 mg, Oral, Daily, Alexey A Kathryn, FNP, 325 mg at 22    atorvastatin tablet 80 mg, 80 mg, Oral, QHS, Alexey A Kathryn, FNP, 80 mg at 22    benzonatate capsule 100 mg, 100 mg, Oral, TID PRN, Alexey Narvaezart, FNP    bisacodyL suppository 10 mg, 10  mg, Rectal, Daily PRN, Alexey A Kathryn, FNP    carvediloL tablet 25 mg, 25 mg, Oral, BID WM, Alexey A Kathryn, FNP, 25 mg at 07/14/22 1731    dextrose 50 % in water (D50W) injection 12.5 g, 12.5 g, Intravenous, PRN, Alexey A Kathryn, FNP    dextrose 50 % in water (D50W) injection 25 g, 25 g, Intravenous, PRN, Alexey A Kathryn, FNP    docusate sodium capsule 100 mg, 100 mg, Oral, BID, Alexey A Kathryn, FNP, 100 mg at 07/14/22 2000    glucagon (human recombinant) injection 1 mg, 1 mg, Intramuscular, PRN, Alexey A Kathryn, FNP    glucose chewable tablet 16 g, 16 g, Oral, PRN, Alexey A Kathryn, FNP    glucose chewable tablet 24 g, 24 g, Oral, PRN, Alexey A Kathryn, FNP    hydrALAZINE injection 10 mg, 10 mg, Intravenous, Q4H PRN, Alexey A Kathryn, FNP    HYDROcodone-acetaminophen 5-325 mg per tablet 1 tablet, 1 tablet, Oral, Q6H PRN, Alexey A Kathryn, FNP    hydrOXYzine pamoate capsule 50 mg, 50 mg, Oral, QHS, Alexey A Kathryn, FNP, 50 mg at 07/14/22 2123    insulin aspart U-100 injection 1-10 Units, 1-10 Units, Subcutaneous, QID (AC + HS) PRN, Alexey A Kathryn, FNP, 2 Units at 07/13/22 1213    labetalol 20 mg/4 mL (5 mg/mL) IV syring, 10 mg, Intravenous, Q4H PRN, Alexey A Kathryn, FNP, 10 mg at 07/13/22 0904    lisinopriL tablet 5 mg, 5 mg, Oral, Daily, Alexey A Kathryn, FNP, 5 mg at 07/14/22 0856    metFORMIN tablet 1,000 mg, 1,000 mg, Oral, BID WM, Alexey A Kathryn, FNP, 1,000 mg at 07/14/22 1731    metoprolol injection 10 mg, 10 mg, Intravenous, Q2H PRN, Alexey Peralta, FNP    nitroGLYCERIN SL tablet 0.4 mg, 0.4 mg, Sublingual, Q5 Min PRN, Alexey Peralta, FNP    ondansetron disintegrating tablet 8 mg, 8 mg, Oral, Q6H PRN, Alexey Narvaezart, FNP    polyethylene glycol packet 17 g, 17 g, Oral, Daily, Alexey Peralta, FNP    promethazine tablet 25 mg, 25 mg, Oral, Q6H PRN, Alexey Peralta, FNP    zinc oxide 16% (WIL'S BUTT PASTE)  "topical ointment, , Topical (Top), TID, Alexey Peralta, FNP, Given at 07/14/22 2200     Review of Systems   Complete 12-point review of symptoms negative except for what's mentioned in HPI     Objective:     BP (!) 114/52   Pulse 66   Temp 97.7 °F (36.5 °C)   Resp 18   Ht 5' 10.98" (1.803 m)   Wt 89.2 kg (196 lb 10.4 oz)   SpO2 (!) 92%   BMI 27.44 kg/m²        Intake/Output Summary (Last 24 hours) at 7/15/2022 0443  Last data filed at 7/14/2022 1700  Gross per 24 hour   Intake 720 ml   Output 650 ml   Net 70 ml       Physical Exam  Constitutional:       Appearance: Normal appearance.   HENT:      Head: Normocephalic.      Mouth/Throat:      Mouth: Mucous membranes are moist.   Eyes:      Pupils: Pupils are equal, round, and reactive to light.   Neck:      Comments: Left CEA incision clean intact with mild postsurgical swelling  Cardiovascular:      Rate and Rhythm: Normal rate and regular rhythm.      Heart sounds: Normal heart sounds.   Pulmonary:      Effort: Pulmonary effort is normal.      Breath sounds: Normal breath sounds.   Abdominal:      General: Bowel sounds are normal.      Palpations: Abdomen is soft.   Musculoskeletal:      Cervical back: Neck supple.      Comments: Generalized weakness and muscle atrophy.  Right upper extremity dependent swelling   Skin:     General: Skin is warm and dry.   Neurological:      Mental Status: He is alert and oriented to person, place, and time.      Comments: Right-sided hemiplegia.  Can move RUE against gravity.  LLE flaccid.    Psychiatric:         Mood and Affect: Mood normal.         Behavior: Behavior normal.         Thought Content: Thought content normal.         Judgment: Judgment normal.     *MD performed and documented physical examination       Lines/Drains/Airways     Peripheral Intravenous Line  Duration                Peripheral IV - Single Lumen 07/02/22 0923 20 G Left Wrist 12 days         Peripheral IV - Single Lumen 07/08/22 1652 20 G " Left;Anterior Hand 6 days              Labs  Recent Results (from the past 24 hour(s))   POCT glucose    Collection Time: 07/14/22  5:28 AM   Result Value Ref Range    POCT Glucose 116 (H) 70 - 110 mg/dL   POCT glucose    Collection Time: 07/14/22 11:58 AM   Result Value Ref Range    POCT Glucose 136 (H) 70 - 110 mg/dL   POCT glucose    Collection Time: 07/14/22  4:56 PM   Result Value Ref Range    POCT Glucose 141 (H) 70 - 110 mg/dL     Radiology  MRI brain without contrast on 07/03/2022 at 2:09 p.m., IMPRESSION: Small acute infarcts supratentorially, the largest is a left posterior frontal 3 cm cortical based infarct with petechial hemorrhage.  No mass effect or shift.  Discussed with Dr. Duncan. Otherwise no acute intracranial abnormalities.  Radiology  Transthoracic echo on 07/02/2022 at 2:40 p.m., IMPRESSION: The left ventricle is  with normal systolic function. The estimated ejection fraction is 60%. There is grade I diastolic dysfunction consistent with impaired relaxation. Left atrial pressure is normal.    Assessment/Plan:     77 y.o. WM admitted on 7/12/2022    Acute left hemispheric CVA   - MRI with 3 other small punctate CVAs on the right hemisphere  - s/p tPA and s/p left CEA on 7/8/2022  - continue                Aspirin 325 mg daily                Lipitor 80 mg at bedtime                Xarelto 20 mg daily   - right knee brace obtained on 7/13  - waiting for right AFO from orthotics  - defer to physiatry for rehab and pain management  - PT/OT/RT following      Carotid artery stenosis  - s/p left CEA on 07/08/2022  - continue                Aspirin 325 mg daily                Lipitor 80 mg at bedtime                Xarelto 20 mg daily                Norco 5 mg/325 mg q.6 hours p.r.n.  - to follow up with vascular surgery outpatient     HTN  - BP at goal!!  - discontinued Metoprolol succinate 50 mg daily on 7/13  - continue     Coreg 25 mg BID (initiated 7/13) (increased 7/14)                Norvasc 10  mg daily                Lisinopril 20 mg daily                 Hydralazine 10 mg every 2 hours as needed for BP > 160/90                Labetalol 10 mg every 2 hours as needed for BP > 160/90     DM type II  - HgA1c with next labs  - continue                Metformin 1000 mg twice daily                ISS   - CBGs AC/HS     Normocytic anemia  - asymptomatic  - H/H stable   - no evidence of active bleeds  - will closely monitor and transfuse if needed      Constipation  - stable, loose stools reported on 7/15  - continue                Colace 100 mg b.i.d. PRN                MiraLax 17 g daily PRN     VTE Prophylaxis:  Xarelto 20 mg daily  COVID-19 testing:  Negative on 07/12/2022  COVID-19 vaccination status:  Vaccinated (Moderna):  x 3     POA: No  Living will: No  Contacts:  Pooja Huggins (wife) [phone]                     Eleonora Manzanares (daughter) [phone]     CODE STATUS: Full Code  Internal Medicine (attending): Weston Rivera MD  Physiatry (consulting): Angel Em MD     OUTPATIENT PROVIDERS  Angel Parham MD  Neurology: Jacque Agee MD  Vascular surgery:  Roxi Lama MD  Cardiology: Dionisio Marin MD     DISPOSITION: Condition stable.  Sleep hygiene, bowel maintenance, and appetite at goal.  Reports loose stools overnight.  Vital signs at goal with no recorded fevers.  No labs or imaging today.  Change stool softeners to p.r.n..  Continue aggressive mobilization as tolerated.  Monitor closely.  Notify of acute changes.     Bakari Peralta NP conducted independent physical examination and assisted with medical documentation.     Total time spent on this encounter including chart review and direct MD + NP 1-on-1 patient interaction: 39 minutes   Over 50% of this time was spent in counseling and coordination of care

## 2022-07-15 NOTE — PT/OT/SLP PROGRESS
Physical Therapy         Treatment        Ayaz Huggins   MRN: [id number]        Start: 930  End: 1030  Total: 60 min    Billable Minutes:  Gait [id number] and Therapeutic Activity 20      Treatment Type: Treatment  PT/PTA: PTA (student)     PTA Visit Number: 2       General Precautions: Standard, fall  Orthopedic Precautions: Orthopedic Precautions : N/A   Braces: Braces: Neoprene knee brace    Patient found with: peripheral IV         Subjective:  Pt reported that he was feeling a little tired today and that he didn't sleep as well as he has been.     Pain/Comfort  Pain Rating 1: 0/10    Objective:  Therapeutic activities, therapeutic exercise performed to increase muscle strength and motor recruitment so that patient may improve quality of functionional mobility and increase functional independence. and Gait training performed to improve functional strength, to increase functional mobility, safety and independence.    VITALS:  BP: Startin/70 83HR; Endin/61 76HR           In sitting    Balance Training  Dynamic Sitting/Standing:  Patient performed dynamic standing on level surface at [facility] for toileting hygeine using rolling walker with Moderate Assistance with verbal cues for upright posture.    Transfer Training:  Sit to stand:Minimal Assistance with Rolling Walker with verbal cues for upright posture and tactile cues to manage RUE  Toilet Transfer:  Pt Stand Pivot from  to [facility] with Moderate Assistance with No Assistive Device and extra time necessary to complete tansfer. Pt required cuing to stand up tall during transfer.  +void & +BM while on [facility].     Wheelchair Training:  Patient propels wheelchair 150 feet using LLE & LUE with  Stand-by Assistance and no verbal cues.    Gait Training:  Patient ambulates 40 feet x 2 and 70 ft with seated rest break between each trial using rolling walker with Total Assistance at Christiano x 1 SBA x 1 for  follow for safety. 2 trials attempted with bariatric walker as  pt has a tendency to excessively externally rotate R hip and hit regular sized rolling walker. Bariatric walker gave pt more space while ambulating. 1 trial also attempted with 2# ankle weight on RLE for proprioception, but patient noted littler difference. Ace warp used on RLE to improved pt's dorsiflexion. Neoprene knee brace also worn to help support R knee. Pt required moderate assistance to advance RLE. Verbal cues provided for upright posture and walker sequencing. Pt demonstrating a  swing-to gait with decreased ute, decreased step length, decreased stride length, decreased toe-to-floor clearance and decreased weight-shifting ability.Impairments contributing to gait deviations include impaired balance, impaired coordination, impaired motor control and decreased strength    Activity Tolerance:  Patient tolerated treatment well    Patient left up in chair with call button in reach and chair alarm on.    Education Provided: roles and goals of PT/PTA, transfer training, gait training, balance training and wheelchair management    Expected compliance:  High compliance        Assessment:  Ayaz Huggins is a 77 y.o. male with a medical diagnosis of Stroke. He presents with R sided weakness, imapired balance, and impaired balance. Pt to benefit from skilled PT services to address impairments with progression towards functional independence as tolerated.    Rehab potential is excellent.    Activity tolerance: Good    Discharge recommendations:       Equipment recommendations:       GOALS:   Multidisciplinary Problems       Physical Therapy Goals          Problem: Physical Therapy    Goal Priority Disciplines Outcome Goal Variances Interventions   Physical Therapy Goal     PT, PT/OT Ongoing, Progressing     Description: Bed Mobility:  Roll left and right with supervision/touching assist.  Sit to supine transfer with supervision/touching assist.  Supine to sit transfer with supervision/touching  assist.    Transfers:  Sit to stand transfer with supervision/touching assist using RW or LRAD.  Bed to chair transfer with supervision/touching assist using RW or LRAD.  Car transfer with partial/moderate assist using RW or LRAD.    Mobility:  Ambulate 100 feet with partial/moderate assist using RW or LRAD. - revised                         PLAN:    Patient to be seen 5 x/week  to address the above listed problems via gait training, therapeutic activities, therapeutic exercises, neuromuscular re-education, wheelchair management/training  Plan of Care expires: 07/18/22  Plan of Care reviewed with: patient, spouse    7/15/2022    I certify that I was present in the room directing the student in service delivery and guiding them using my skilled judgement. As the co-signing physical therapist assistant, I have reviewed the student's documentation and am responsible for the assessment, treatment, and plan.

## 2022-07-16 LAB
POCT GLUCOSE: 102 MG/DL (ref 70–110)
POCT GLUCOSE: 107 MG/DL (ref 70–110)
POCT GLUCOSE: 129 MG/DL (ref 70–110)
POCT GLUCOSE: 147 MG/DL (ref 70–110)

## 2022-07-16 PROCEDURE — 25000003 PHARM REV CODE 250: Performed by: NURSE PRACTITIONER

## 2022-07-16 PROCEDURE — 94761 N-INVAS EAR/PLS OXIMETRY MLT: CPT

## 2022-07-16 PROCEDURE — 11800000 HC REHAB PRIVATE ROOM

## 2022-07-16 PROCEDURE — 97116 GAIT TRAINING THERAPY: CPT | Mod: CQ

## 2022-07-16 PROCEDURE — 94799 UNLISTED PULMONARY SVC/PX: CPT

## 2022-07-16 RX ADMIN — METFORMIN HYDROCHLORIDE 1000 MG: 500 TABLET, FILM COATED ORAL at 05:07

## 2022-07-16 RX ADMIN — METFORMIN HYDROCHLORIDE 1000 MG: 500 TABLET, FILM COATED ORAL at 09:07

## 2022-07-16 RX ADMIN — LISINOPRIL 5 MG: 5 TABLET ORAL at 09:07

## 2022-07-16 RX ADMIN — ATORVASTATIN CALCIUM 80 MG: 40 TABLET, FILM COATED ORAL at 08:07

## 2022-07-16 RX ADMIN — AMLODIPINE BESYLATE 10 MG: 5 TABLET ORAL at 09:07

## 2022-07-16 RX ADMIN — CARVEDILOL 25 MG: 25 TABLET, FILM COATED ORAL at 09:07

## 2022-07-16 RX ADMIN — HYDROXYZINE PAMOATE 50 MG: 50 CAPSULE ORAL at 08:07

## 2022-07-16 RX ADMIN — CARVEDILOL 25 MG: 25 TABLET, FILM COATED ORAL at 05:07

## 2022-07-16 RX ADMIN — ASPIRIN 325 MG ORAL TABLET 325 MG: 325 PILL ORAL at 09:07

## 2022-07-16 NOTE — PROGRESS NOTES
[facility] - Rehab Inpatient Services  Progress Note    Patient Name: Ayaz Huggins  MRN: [id number]  Patient Class: IP- Rehab   Admission Date: 7/12/2022  Length of Stay: 4 days  Attending Physician: Weston Rivera MD  Primary Care Provider: Angel Parham Jr, MD    Subjective:    77-year-old white male presented to [facility] ED on 07/02/2022 complaining of right-sided weakness.  PMH significant for DM type 2, CAD s/p CABG x2 in 2007, and HTN.  CT head negative for hemorrhagic bleeding.  Received tPA and transferred to ICU for post tPA monitoring and treatment.  TTE with negative bubble study, but significant for grade 1 diastolic dysfunction.  Carotid ultrasound significant for left ICA stenosis.  MRI significant for last left hemispheric CVA and 3 other small punctate CVAs on the right hemisphere.  Tolerated left CEA on 07/08 without periprocedural complications.  Tolerated transfer to [facility] inpatient rehab unit on 07/12 without incident.    Tolerating therapy well. Denies any complaints at time of rounding.   Past Medical History:   Diagnosis Date    Diabetes mellitus     Hypertension     Mixed hyperlipidemia         Review of Systems   Constitutional: Negative for chills, fatigue and fever.   Eyes: Negative for visual disturbance.   Respiratory: Negative for cough, chest tightness and shortness of breath.    Gastrointestinal: Negative for abdominal pain, blood in stool, constipation, diarrhea, nausea and vomiting.   Musculoskeletal: Negative for back pain, joint swelling and leg pain.   Neurological: Negative for dizziness and headaches.   Psychiatric/Behavioral: Negative for agitation, behavioral problems and hallucinations. The patient is not nervous/anxious.         Physical Exam  HENT:      Head: Atraumatic.      Nose: Nose normal.      Mouth/Throat:      Mouth: Mucous membranes are moist.      Pharynx: Oropharynx is clear.   Eyes:      Extraocular Movements: Extraocular movements intact.  "     Conjunctiva/sclera: Conjunctivae normal.      Pupils: Pupils are equal, round, and reactive to light.   Cardiovascular:      Rate and Rhythm: Normal rate and regular rhythm.   Pulmonary:      Effort: Pulmonary effort is normal.      Breath sounds: Normal breath sounds.   Abdominal:      General: Abdomen is flat. Bowel sounds are normal.      Palpations: Abdomen is soft.   Musculoskeletal:         General: Normal range of motion.   Skin:     General: Skin is warm and dry.      Capillary Refill: Capillary refill takes less than 2 seconds.   Neurological:      Mental Status: He is alert. Mental status is at baseline.   Psychiatric:         Mood and Affect: Mood normal.          BP (!) 127/51   Pulse 66   Temp 97.9 °F (36.6 °C) (Oral)   Resp 16   Ht 5' 10.98" (1.803 m)   Wt 87.9 kg (193 lb 12.6 oz)   SpO2 95%   BMI 27.04 kg/m²      Admission on 07/12/2022   Component Date Value Ref Range Status    POCT Glucose 07/12/2022 213 (A) 70 - 110 mg/dL Final    Sodium Level 07/13/2022 141  136 - 145 mmol/L Final    Potassium Level 07/13/2022 3.6  3.5 - 5.1 mmol/L Final    Chloride 07/13/2022 109 (A) 98 - 107 mmol/L Final    Carbon Dioxide 07/13/2022 25  23 - 31 mmol/L Final    Glucose Level 07/13/2022 107  82 - 115 mg/dL Final    Blood Urea Nitrogen 07/13/2022 7.1 (A) 8.4 - 25.7 mg/dL Final    Creatinine 07/13/2022 0.64 (A) 0.73 - 1.18 mg/dL Final    Calcium Level Total 07/13/2022 9.0  8.8 - 10.0 mg/dL Final    Protein Total 07/13/2022 6.4  5.8 - 7.6 gm/dL Final    Albumin Level 07/13/2022 3.0 (A) 3.4 - 4.8 gm/dL Final    Globulin 07/13/2022 3.4  2.4 - 3.5 gm/dL Final    Albumin/Globulin Ratio 07/13/2022 0.9 (A) 1.1 - 2.0 ratio Final    Bilirubin Total 07/13/2022 0.6  <=1.5 mg/dL Final    Alkaline Phosphatase 07/13/2022 92  40 - 150 unit/L Final    Alanine Aminotransferase 07/13/2022 25  0 - 55 unit/L Final    Aspartate Aminotransferase 07/13/2022 22  5 - 34 unit/L Final    Estimated GFR-Non  " American 07/13/2022 >60  mls/min/1.73/m2 Final    Magnesium Level 07/13/2022 1.80  1.60 - 2.60 mg/dL Final    Phosphorus Level 07/13/2022 2.9  2.3 - 4.7 mg/dL Final    Prealbumin 07/13/2022 11.7 (A) 16.0 - 42.0 mg/dL Final    Ferritin Level 07/13/2022 76.13  21.81 - 274.66 ng/mL Final    Iron Binding Capacity Unsaturated 07/13/2022 170  69 - 240 ug/dL Final    Iron Level 07/13/2022 56 (A) 65 - 175 ug/dL Final    Iron Binding Capacity Total 07/13/2022 226 (A) 250 - 450 ug/dL Final    Iron Saturation 07/13/2022 25  20 - 50 % Final    WBC 07/13/2022 9.2  4.5 - 11.5 x10(3)/mcL Final    RBC 07/13/2022 3.91 (A) 4.70 - 6.10 x10(6)/mcL Final    Hgb 07/13/2022 11.6 (A) 14.0 - 18.0 gm/dL Final    Hct 07/13/2022 34.2 (A) 42.0 - 52.0 % Final    MCV 07/13/2022 87.5  80.0 - 94.0 fL Final    MCH 07/13/2022 29.7  27.0 - 31.0 pg Final    MCHC 07/13/2022 33.9  33.0 - 36.0 mg/dL Final    RDW 07/13/2022 13.2  11.5 - 17.0 % Final    Platelet 07/13/2022 241  130 - 400 x10(3)/mcL Final    MPV 07/13/2022 10.1  7.4 - 10.4 fL Final    Neut % 07/13/2022 55.0  % Final    Lymph % 07/13/2022 30.5  % Final    Mono % 07/13/2022 10.6  % Final    Eos % 07/13/2022 3.3  % Final    Basophil % 07/13/2022 0.4  % Final    Lymph # 07/13/2022 2.79  0.6 - 4.6 x10(3)/mcL Final    Neut # 07/13/2022 5.0  2.1 - 9.2 x10(3)/mcL Final    Mono # 07/13/2022 0.97  0.1 - 1.3 x10(3)/mcL Final    Eos # 07/13/2022 0.30  0 - 0.9 x10(3)/mcL Final    Baso # 07/13/2022 0.04  0 - 0.2 x10(3)/mcL Final    IG# 07/13/2022 0.02  0 - 0.04 x10(3)/mcL Final    IG% 07/13/2022 0.2  % Final    NRBC% 07/13/2022 0.0  % Final    POCT Glucose 07/13/2022 166 (A) 70 - 110 mg/dL Final    POCT Glucose 07/13/2022 127 (A) 70 - 110 mg/dL Final    POCT Glucose 07/13/2022 157 (A) 70 - 110 mg/dL Final    POCT Glucose 07/14/2022 116 (A) 70 - 110 mg/dL Final    POCT Glucose 07/14/2022 136 (A) 70 - 110 mg/dL Final    POCT Glucose 07/14/2022 141 (A) 70 - 110 mg/dL  Final    POCT Glucose 07/15/2022 113 (A) 70 - 110 mg/dL Final    POCT Glucose 07/15/2022 133 (A) 70 - 110 mg/dL Final    POCT Glucose 07/15/2022 130 (A) 70 - 110 mg/dL Final    POCT Glucose 07/15/2022 122 (A) 70 - 110 mg/dL Final    POCT Glucose 07/16/2022 107  70 - 110 mg/dL Final            Assessment/Plan:   1. Acute Left CVA  2. HTN  3. Type II DM  4. Anemia  5. Constipation    Progressing well.  Continue current plan of care      Patient Active Problem List   Diagnosis    Stroke    Carotid artery stenosis, symptomatic, left          ARVIND Hill  [facility] - Rehab Inpatient Services

## 2022-07-16 NOTE — PT/OT/SLP PROGRESS
"Physical Therapy         Treatment        Ayaz Huggins   MRN: [id number]           Billable Minutes:  Gait [id number]      Treatment Type: Treatment  PT/PTA: PTA     PTA Visit Number: 2       General Precautions: Standard, fall  Orthopedic Precautions: Orthopedic Precautions : N/A   Braces: Braces: AFO (neoprene Knee brace)        Subjective:  "Pt reports feeling stronger today".     Objective:  Gait training performed to improve functional strength, to increase functional mobility, safety and independence.    VITALS:  BP:            In sitting see flowsheet      Sit to stand:Moderate Assistance with Rolling Walker vc for hand placement    Gait Training:  Patient ambulates 18 feet +32 feet using bariatric RW 2/2 RLE externally rotated with Maximum Assistance and max verbal cues for upright posture, tactile/max assist to advance RLE. Ace wrap donned on RLE to promote dorsiflex. Mod assist at times to prevent R knee buckling. Tech closely behind with wc for safety.   Pt demonstrating a  step to with decreased ute, increased time in double stance, decreased step length, decreased toe-to-floor clearance and decreased weight-shifting ability.Impairments contributing to gait deviations include impaired balance, impaired coordination, impaired motor control, abnormal muscle tone, impaired sensory feedback and decreased strength      Additional Treatment:  Donned AFO, knee brace and ace wrap in sitting before tx.  Activity Tolerance:  Patient tolerated treatment well    Patient left up in chair with call button in reach, chair alarm on and family present.    Education Provided: transfer training, gait training, body mechanics, assistive device and fall prevention    Expected compliance:  Moderate compliance        Assessment:  Ayaz Huggins is a 77 y.o. male with a medical diagnosis of Stroke. Pt to benefit from skilled PT services to address impairments with progression towards functional independence as " tolerated.    Rehab potential is good.    Activity tolerance: Fair    Discharge recommendations:       Equipment recommendations:       GOALS:   Multidisciplinary Problems     Physical Therapy Goals        Problem: Physical Therapy    Goal Priority Disciplines Outcome Goal Variances Interventions   Physical Therapy Goal     PT, PT/OT Ongoing, Progressing     Description: Bed Mobility:  Roll left and right with supervision/touching assist.  Sit to supine transfer with supervision/touching assist.  Supine to sit transfer with supervision/touching assist.    Transfers:  Sit to stand transfer with supervision/touching assist using RW or LRAD.  Bed to chair transfer with supervision/touching assist using RW or LRAD.  Car transfer with partial/moderate assist using RW or LRAD.    Mobility:  Ambulate 100 feet with partial/moderate assist using RW or LRAD. - revised                     PLAN:    Patient to be seen 5 x/week  to address the above listed problems via gait training, therapeutic activities, therapeutic exercises, neuromuscular re-education, wheelchair management/training  Plan of Care expires: 07/18/22  Plan of Care reviewed with: patient, spouse    7/16/2022

## 2022-07-17 LAB
POCT GLUCOSE: 113 MG/DL (ref 70–110)
POCT GLUCOSE: 121 MG/DL (ref 70–110)
POCT GLUCOSE: 148 MG/DL (ref 70–110)
POCT GLUCOSE: 92 MG/DL (ref 70–110)

## 2022-07-17 PROCEDURE — 97530 THERAPEUTIC ACTIVITIES: CPT | Mod: CQ

## 2022-07-17 PROCEDURE — 97112 NEUROMUSCULAR REEDUCATION: CPT | Mod: CQ

## 2022-07-17 PROCEDURE — 94799 UNLISTED PULMONARY SVC/PX: CPT

## 2022-07-17 PROCEDURE — 25000003 PHARM REV CODE 250: Performed by: NURSE PRACTITIONER

## 2022-07-17 PROCEDURE — 11800000 HC REHAB PRIVATE ROOM

## 2022-07-17 PROCEDURE — 97110 THERAPEUTIC EXERCISES: CPT | Mod: CQ

## 2022-07-17 RX ADMIN — LISINOPRIL 5 MG: 5 TABLET ORAL at 09:07

## 2022-07-17 RX ADMIN — ASPIRIN 325 MG ORAL TABLET 325 MG: 325 PILL ORAL at 09:07

## 2022-07-17 RX ADMIN — METFORMIN HYDROCHLORIDE 1000 MG: 500 TABLET, FILM COATED ORAL at 04:07

## 2022-07-17 RX ADMIN — CARVEDILOL 25 MG: 25 TABLET, FILM COATED ORAL at 09:07

## 2022-07-17 RX ADMIN — HYDROXYZINE PAMOATE 50 MG: 50 CAPSULE ORAL at 08:07

## 2022-07-17 RX ADMIN — ATORVASTATIN CALCIUM 80 MG: 40 TABLET, FILM COATED ORAL at 08:07

## 2022-07-17 RX ADMIN — AMLODIPINE BESYLATE 10 MG: 5 TABLET ORAL at 09:07

## 2022-07-17 RX ADMIN — CARVEDILOL 25 MG: 25 TABLET, FILM COATED ORAL at 04:07

## 2022-07-17 RX ADMIN — METFORMIN HYDROCHLORIDE 1000 MG: 500 TABLET, FILM COATED ORAL at 09:07

## 2022-07-17 RX ADMIN — ZINC OXIDE TOPICAL OINT.: at 02:07

## 2022-07-17 NOTE — PROGRESS NOTES
[facility] - Rehab Inpatient Services  Progress Note    Patient Name: Ayaz Huggins  MRN: [id number]  Patient Class: IP- Rehab   Admission Date: 7/12/2022  Length of Stay: 5 days  Attending Physician: Weston Rivera MD  Primary Care Provider: Angel Parham Jr, MD    Subjective:    77-year-old white male presented to [facility] ED on 07/02/2022 complaining of right-sided weakness.  PMH significant for DM type 2, CAD s/p CABG x2 in 2007, and HTN.  CT head negative for hemorrhagic bleeding.  Received tPA and transferred to ICU for post tPA monitoring and treatment.  TTE with negative bubble study, but significant for grade 1 diastolic dysfunction.  Carotid ultrasound significant for left ICA stenosis.  MRI significant for last left hemispheric CVA and 3 other small punctate CVAs on the right hemisphere.  Tolerated left CEA on 07/08 without periprocedural complications.  Tolerated transfer to [facility] inpatient rehab unit on 07/12 without incident.    Tolerating therapy well. Denies any complaints at time of rounding.   Past Medical History:   Diagnosis Date    Diabetes mellitus     Hypertension     Mixed hyperlipidemia         Review of Systems   Constitutional: Negative for chills, fatigue and fever.   Eyes: Negative for visual disturbance.   Respiratory: Negative for cough, chest tightness and shortness of breath.    Gastrointestinal: Negative for abdominal pain, blood in stool, constipation, diarrhea, nausea and vomiting.   Musculoskeletal: Negative for back pain, joint swelling and leg pain.   Neurological: Negative for dizziness and headaches.   Psychiatric/Behavioral: Negative for agitation, behavioral problems and hallucinations. The patient is not nervous/anxious.         Physical Exam  HENT:      Head: Atraumatic.      Nose: Nose normal.      Mouth/Throat:      Mouth: Mucous membranes are moist.      Pharynx: Oropharynx is clear.   Eyes:      Extraocular Movements: Extraocular movements intact.  "     Conjunctiva/sclera: Conjunctivae normal.      Pupils: Pupils are equal, round, and reactive to light.   Cardiovascular:      Rate and Rhythm: Normal rate and regular rhythm.   Pulmonary:      Effort: Pulmonary effort is normal.      Breath sounds: Normal breath sounds.   Abdominal:      General: Abdomen is flat. Bowel sounds are normal.      Palpations: Abdomen is soft.   Musculoskeletal:         General: Normal range of motion.   Skin:     General: Skin is warm and dry.      Capillary Refill: Capillary refill takes less than 2 seconds.   Neurological:      Mental Status: He is alert. Mental status is at baseline.   Psychiatric:         Mood and Affect: Mood normal.          /64   Pulse 66   Temp 98.2 °F (36.8 °C) (Oral)   Resp 20   Ht 5' 10.98" (1.803 m)   Wt 87.9 kg (193 lb 12.6 oz)   SpO2 95%   BMI 27.04 kg/m²      Admission on 07/12/2022   Component Date Value Ref Range Status    POCT Glucose 07/12/2022 213 (A) 70 - 110 mg/dL Final    Sodium Level 07/13/2022 141  136 - 145 mmol/L Final    Potassium Level 07/13/2022 3.6  3.5 - 5.1 mmol/L Final    Chloride 07/13/2022 109 (A) 98 - 107 mmol/L Final    Carbon Dioxide 07/13/2022 25  23 - 31 mmol/L Final    Glucose Level 07/13/2022 107  82 - 115 mg/dL Final    Blood Urea Nitrogen 07/13/2022 7.1 (A) 8.4 - 25.7 mg/dL Final    Creatinine 07/13/2022 0.64 (A) 0.73 - 1.18 mg/dL Final    Calcium Level Total 07/13/2022 9.0  8.8 - 10.0 mg/dL Final    Protein Total 07/13/2022 6.4  5.8 - 7.6 gm/dL Final    Albumin Level 07/13/2022 3.0 (A) 3.4 - 4.8 gm/dL Final    Globulin 07/13/2022 3.4  2.4 - 3.5 gm/dL Final    Albumin/Globulin Ratio 07/13/2022 0.9 (A) 1.1 - 2.0 ratio Final    Bilirubin Total 07/13/2022 0.6  <=1.5 mg/dL Final    Alkaline Phosphatase 07/13/2022 92  40 - 150 unit/L Final    Alanine Aminotransferase 07/13/2022 25  0 - 55 unit/L Final    Aspartate Aminotransferase 07/13/2022 22  5 - 34 unit/L Final    Estimated GFR-Non  " American 07/13/2022 >60  mls/min/1.73/m2 Final    Magnesium Level 07/13/2022 1.80  1.60 - 2.60 mg/dL Final    Phosphorus Level 07/13/2022 2.9  2.3 - 4.7 mg/dL Final    Prealbumin 07/13/2022 11.7 (A) 16.0 - 42.0 mg/dL Final    Ferritin Level 07/13/2022 76.13  21.81 - 274.66 ng/mL Final    Iron Binding Capacity Unsaturated 07/13/2022 170  69 - 240 ug/dL Final    Iron Level 07/13/2022 56 (A) 65 - 175 ug/dL Final    Iron Binding Capacity Total 07/13/2022 226 (A) 250 - 450 ug/dL Final    Iron Saturation 07/13/2022 25  20 - 50 % Final    WBC 07/13/2022 9.2  4.5 - 11.5 x10(3)/mcL Final    RBC 07/13/2022 3.91 (A) 4.70 - 6.10 x10(6)/mcL Final    Hgb 07/13/2022 11.6 (A) 14.0 - 18.0 gm/dL Final    Hct 07/13/2022 34.2 (A) 42.0 - 52.0 % Final    MCV 07/13/2022 87.5  80.0 - 94.0 fL Final    MCH 07/13/2022 29.7  27.0 - 31.0 pg Final    MCHC 07/13/2022 33.9  33.0 - 36.0 mg/dL Final    RDW 07/13/2022 13.2  11.5 - 17.0 % Final    Platelet 07/13/2022 241  130 - 400 x10(3)/mcL Final    MPV 07/13/2022 10.1  7.4 - 10.4 fL Final    Neut % 07/13/2022 55.0  % Final    Lymph % 07/13/2022 30.5  % Final    Mono % 07/13/2022 10.6  % Final    Eos % 07/13/2022 3.3  % Final    Basophil % 07/13/2022 0.4  % Final    Lymph # 07/13/2022 2.79  0.6 - 4.6 x10(3)/mcL Final    Neut # 07/13/2022 5.0  2.1 - 9.2 x10(3)/mcL Final    Mono # 07/13/2022 0.97  0.1 - 1.3 x10(3)/mcL Final    Eos # 07/13/2022 0.30  0 - 0.9 x10(3)/mcL Final    Baso # 07/13/2022 0.04  0 - 0.2 x10(3)/mcL Final    IG# 07/13/2022 0.02  0 - 0.04 x10(3)/mcL Final    IG% 07/13/2022 0.2  % Final    NRBC% 07/13/2022 0.0  % Final    POCT Glucose 07/13/2022 166 (A) 70 - 110 mg/dL Final    POCT Glucose 07/13/2022 127 (A) 70 - 110 mg/dL Final    POCT Glucose 07/13/2022 157 (A) 70 - 110 mg/dL Final    POCT Glucose 07/14/2022 116 (A) 70 - 110 mg/dL Final    POCT Glucose 07/14/2022 136 (A) 70 - 110 mg/dL Final    POCT Glucose 07/14/2022 141 (A) 70 - 110 mg/dL  Final    POCT Glucose 07/15/2022 113 (A) 70 - 110 mg/dL Final    POCT Glucose 07/15/2022 133 (A) 70 - 110 mg/dL Final    POCT Glucose 07/15/2022 130 (A) 70 - 110 mg/dL Final    POCT Glucose 07/15/2022 122 (A) 70 - 110 mg/dL Final    POCT Glucose 07/16/2022 107  70 - 110 mg/dL Final    POCT Glucose 07/16/2022 147 (A) 70 - 110 mg/dL Final    POCT Glucose 07/16/2022 102  70 - 110 mg/dL Final    POCT Glucose 07/16/2022 129 (A) 70 - 110 mg/dL Final    POCT Glucose 07/17/2022 92  70 - 110 mg/dL Final            Assessment/Plan:   1. Acute Left CVA  2. HTN  3. Type II DM  4. Anemia  5. Constipation    Progressing well.  Continue current plan of care      Patient Active Problem List   Diagnosis    Stroke    Carotid artery stenosis, symptomatic, left          ARVIND Hill  [facility] - Rehab Inpatient Services

## 2022-07-17 NOTE — PT/OT/SLP PROGRESS
Physical Therapy         Treatment        Ayaz Huggins   MRN: [id number]           Billable Minutes:  Therapeutic Activity 15, Therapeutic Exercise 15 and Neuromuscular Re-education 30               PTA Visit Number: 2       General Precautions: Standard, fall  Orthopedic Precautions: Orthopedic Precautions : N/A   Braces:                Subjective:      Pain/Comfort  Pain Rating 1: 0/10    Objective:  Therapeutic activities, therapeutic exercise performed to increase muscle strength and motor recruitment so that patient may improve quality of functionional mobility and increase functional independence. and Neuromuscular re-education performed to improve muscle facilitation and increase motor control/muscle recruitment.    VITALS:  BP:  147/67 mmHg   In sitting           148/70 mmHg      Balance Training  Static Sitting/Standing:  Patient performed static standing on level surface  using rolling walker with Moderate Assistance and moderate verbal/tactile/visual cues.      Dynamic Sitting/Standing:  Patient performed dynamic standing on level surface using parallel bars with Moderate Assistance and minimal verbal cues      Transfer Training:  Sit to stand:Minimal Assistance with Rolling Walker and // bars 2 x 5 with cues for hand placement, midline orientation and correct technique    Wheelchair Training:  Patient propels wheelchair 150 feet using LUE/LLE with  Supervision or Set-up Assistance and minimal verbal cues.    Additional Treatment:  Seated LLE there-ex with GTB: LAQ, hams. Curls, marching 2 x 10  PROM RLE while seated: Knee ext./flex. and marching x 10   LLE there-ex while standing for RLE WB with mod.a.: marching, foot taps and hip abd. 2 x 10     Donned knee brace and AFO for sessiojn    Activity Tolerance:  Patient tolerated treatment well and Patient limited by fatigue    Patient left up in chair with call button in reach.    Education Provided: transfer training, balance training, body mechanics and  strengthening exercises    Expected compliance:  High compliance        Assessment:  Ayaz Huggins is a 77 y.o. male with a medical diagnosis of Stroke. He presents with weakness, impaired coordination, impaired balance, gait deviations, difficulty with functional transfers, R hemiparesis and decreased endurance. Pt to benefit from skilled PT services to address impairments with progression towards functional independence as tolerated.    Rehab potential is good.    Activity tolerance: Excellent    Discharge recommendations:       Equipment recommendations:       GOALS:   Multidisciplinary Problems     Physical Therapy Goals        Problem: Physical Therapy    Goal Priority Disciplines Outcome Goal Variances Interventions   Physical Therapy Goal     PT, PT/OT Ongoing, Progressing     Description: Bed Mobility:  Roll left and right with supervision/touching assist.  Sit to supine transfer with supervision/touching assist.  Supine to sit transfer with supervision/touching assist.    Transfers:  Sit to stand transfer with supervision/touching assist using RW or LRAD.  Bed to chair transfer with supervision/touching assist using RW or LRAD.  Car transfer with partial/moderate assist using RW or LRAD.    Mobility:  Ambulate 100 feet with partial/moderate assist using RW or LRAD. - revised                     PLAN:    Patient to be seen 5 x/week  to address the above listed problems via gait training, therapeutic activities, therapeutic exercises, neuromuscular re-education, wheelchair management/training  Plan of Care expires: 07/18/22  Plan of Care reviewed with: patient, spouse    7/17/2022

## 2022-07-18 PROBLEM — E11.9 TYPE 2 DIABETES MELLITUS: Status: ACTIVE | Noted: 2022-07-18

## 2022-07-18 PROBLEM — I51.9 HEART DISEASE: Status: ACTIVE | Noted: 2022-07-18

## 2022-07-18 PROBLEM — I10 HTN (HYPERTENSION): Chronic | Status: ACTIVE | Noted: 2022-07-18

## 2022-07-18 PROBLEM — Z98.890 S/P CAROTID ENDARTERECTOMY: Status: ACTIVE | Noted: 2022-07-18

## 2022-07-18 LAB
ALBUMIN SERPL-MCNC: 3.3 GM/DL (ref 3.4–4.8)
ALBUMIN/GLOB SERPL: 1 RATIO (ref 1.1–2)
ALP SERPL-CCNC: 79 UNIT/L (ref 40–150)
ALT SERPL-CCNC: 32 UNIT/L (ref 0–55)
AST SERPL-CCNC: 23 UNIT/L (ref 5–34)
BASOPHILS # BLD AUTO: 0.04 X10(3)/MCL (ref 0–0.2)
BASOPHILS NFR BLD AUTO: 0.5 %
BILIRUBIN DIRECT+TOT PNL SERPL-MCNC: 0.5 MG/DL
BUN SERPL-MCNC: 16.3 MG/DL (ref 8.4–25.7)
CALCIUM SERPL-MCNC: 9.6 MG/DL (ref 8.8–10)
CHLORIDE SERPL-SCNC: 104 MMOL/L (ref 98–107)
CO2 SERPL-SCNC: 26 MMOL/L (ref 23–31)
CREAT SERPL-MCNC: 0.72 MG/DL (ref 0.73–1.18)
EOSINOPHIL # BLD AUTO: 0.3 X10(3)/MCL (ref 0–0.9)
EOSINOPHIL NFR BLD AUTO: 4.1 %
ERYTHROCYTE [DISTWIDTH] IN BLOOD BY AUTOMATED COUNT: 12.9 % (ref 11.5–17)
GLOBULIN SER-MCNC: 3.4 GM/DL (ref 2.4–3.5)
GLUCOSE SERPL-MCNC: 113 MG/DL (ref 82–115)
HCT VFR BLD AUTO: 32.6 % (ref 42–52)
HGB BLD-MCNC: 11.2 GM/DL (ref 14–18)
IMM GRANULOCYTES # BLD AUTO: 0.02 X10(3)/MCL (ref 0–0.04)
IMM GRANULOCYTES NFR BLD AUTO: 0.3 %
LYMPHOCYTES # BLD AUTO: 2.44 X10(3)/MCL (ref 0.6–4.6)
LYMPHOCYTES NFR BLD AUTO: 33 %
MAGNESIUM SERPL-MCNC: 1.4 MG/DL (ref 1.6–2.6)
MCH RBC QN AUTO: 29.6 PG (ref 27–31)
MCHC RBC AUTO-ENTMCNC: 34.4 MG/DL (ref 33–36)
MCV RBC AUTO: 86.2 FL (ref 80–94)
MONOCYTES # BLD AUTO: 0.58 X10(3)/MCL (ref 0.1–1.3)
MONOCYTES NFR BLD AUTO: 7.8 %
NEUTROPHILS # BLD AUTO: 4 X10(3)/MCL (ref 2.1–9.2)
NEUTROPHILS NFR BLD AUTO: 54.3 %
NRBC BLD AUTO-RTO: 0 %
PHOSPHATE SERPL-MCNC: 3.2 MG/DL (ref 2.3–4.7)
PLATELET # BLD AUTO: 253 X10(3)/MCL (ref 130–400)
PMV BLD AUTO: 9.8 FL (ref 7.4–10.4)
POCT GLUCOSE: 119 MG/DL (ref 70–110)
POCT GLUCOSE: 125 MG/DL (ref 70–110)
POTASSIUM SERPL-SCNC: 4.6 MMOL/L (ref 3.5–5.1)
PREALB SERPL-MCNC: 16.6 MG/DL (ref 16–42)
PROT SERPL-MCNC: 6.7 GM/DL (ref 5.8–7.6)
RBC # BLD AUTO: 3.78 X10(6)/MCL (ref 4.7–6.1)
SODIUM SERPL-SCNC: 139 MMOL/L (ref 136–145)
WBC # SPEC AUTO: 7.4 X10(3)/MCL (ref 4.5–11.5)

## 2022-07-18 PROCEDURE — 99233 SBSQ HOSP IP/OBS HIGH 50: CPT | Mod: ,,, | Performed by: NURSE PRACTITIONER

## 2022-07-18 PROCEDURE — 97535 SELF CARE MNGMENT TRAINING: CPT

## 2022-07-18 PROCEDURE — 80053 COMPREHEN METABOLIC PANEL: CPT | Performed by: NURSE PRACTITIONER

## 2022-07-18 PROCEDURE — 84134 ASSAY OF PREALBUMIN: CPT | Performed by: NURSE PRACTITIONER

## 2022-07-18 PROCEDURE — 97530 THERAPEUTIC ACTIVITIES: CPT

## 2022-07-18 PROCEDURE — 83735 ASSAY OF MAGNESIUM: CPT | Performed by: NURSE PRACTITIONER

## 2022-07-18 PROCEDURE — 63600175 PHARM REV CODE 636 W HCPCS: Performed by: NURSE PRACTITIONER

## 2022-07-18 PROCEDURE — 97168 OT RE-EVAL EST PLAN CARE: CPT

## 2022-07-18 PROCEDURE — 97116 GAIT TRAINING THERAPY: CPT

## 2022-07-18 PROCEDURE — 25000003 PHARM REV CODE 250: Performed by: NURSE PRACTITIONER

## 2022-07-18 PROCEDURE — 99233 PR SUBSEQUENT HOSPITAL CARE,LEVL III: ICD-10-PCS | Mod: ,,, | Performed by: NURSE PRACTITIONER

## 2022-07-18 PROCEDURE — 11800000 HC REHAB PRIVATE ROOM

## 2022-07-18 PROCEDURE — 36415 COLL VENOUS BLD VENIPUNCTURE: CPT | Performed by: NURSE PRACTITIONER

## 2022-07-18 PROCEDURE — 84100 ASSAY OF PHOSPHORUS: CPT | Performed by: NURSE PRACTITIONER

## 2022-07-18 PROCEDURE — 94799 UNLISTED PULMONARY SVC/PX: CPT

## 2022-07-18 PROCEDURE — 85025 COMPLETE CBC W/AUTO DIFF WBC: CPT | Performed by: NURSE PRACTITIONER

## 2022-07-18 PROCEDURE — 97164 PT RE-EVAL EST PLAN CARE: CPT

## 2022-07-18 RX ORDER — MAGNESIUM SULFATE 1 G/100ML
2 INJECTION INTRAVENOUS ONCE
Status: COMPLETED | OUTPATIENT
Start: 2022-07-18 | End: 2022-07-18

## 2022-07-18 RX ADMIN — ZINC OXIDE TOPICAL OINT.: at 02:07

## 2022-07-18 RX ADMIN — ZINC OXIDE TOPICAL OINT.: at 08:07

## 2022-07-18 RX ADMIN — MAGNESIUM SULFATE 2 G: 1 INJECTION INTRAVENOUS at 04:07

## 2022-07-18 RX ADMIN — METFORMIN HYDROCHLORIDE 1000 MG: 500 TABLET, FILM COATED ORAL at 05:07

## 2022-07-18 RX ADMIN — HYDROXYZINE PAMOATE 50 MG: 50 CAPSULE ORAL at 08:07

## 2022-07-18 RX ADMIN — LISINOPRIL 5 MG: 5 TABLET ORAL at 09:07

## 2022-07-18 RX ADMIN — CARVEDILOL 25 MG: 25 TABLET, FILM COATED ORAL at 09:07

## 2022-07-18 RX ADMIN — AMLODIPINE BESYLATE 10 MG: 5 TABLET ORAL at 09:07

## 2022-07-18 RX ADMIN — ASPIRIN 325 MG ORAL TABLET 325 MG: 325 PILL ORAL at 09:07

## 2022-07-18 RX ADMIN — CARVEDILOL 25 MG: 25 TABLET, FILM COATED ORAL at 05:07

## 2022-07-18 RX ADMIN — METFORMIN HYDROCHLORIDE 1000 MG: 500 TABLET, FILM COATED ORAL at 09:07

## 2022-07-18 RX ADMIN — ATORVASTATIN CALCIUM 80 MG: 40 TABLET, FILM COATED ORAL at 08:07

## 2022-07-18 NOTE — PT/OT/SLP RE-EVAL
"      Occupational Therapy         Re-Evaluation     Ayaz Huggins   MRN: [id number]   Admitting Diagnosis: Stroke         Billable Minutes:   Re-eval 15, Self Care/Home Management 45 and Therapeutic Activity 30    Diagnosis: Stroke     07/18/22 1300 07/18/22 1430   BP: (!) 99/58 128/67   Patient Position:  Lying   Pulse: 73 69       Past Medical History:   Diagnosis Date    Diabetes mellitus     Hypertension     Mixed hyperlipidemia       Past Surgical History:   Procedure Laterality Date    CARDIAC SURGERY           General Precautions: Standard, fall  Orthopedic Precautions: N/A  Braces: AFO (neoprene knee brace)    Spiritual, Cultural Beliefs, Yarsani Practices, Values that Affect Care: no     Patient History:       Prior level of function:    Bed Mobility/Transfers: independent  Grooming: independent  Bathing: independent  Upper Body Dressing: independent  Lower Body Dressing: independent  Toileting: independent  Homemaking Responsibilities: Yes  Meal Prep Responsibility: Primary  Laundry Responsibility: Secondary  Bill Paying/Finance Responsibility: Primary  Driving License: Yes  Mode of Transportation: Car  Occupation: Retired  Leisure and Hobbies:  (riding bike)  IADL Comments: pt able to complete all IADL responsibilities Independently prior to admission     Dominant hand: right    Subjective:  Patient communicated "I feel like I'm getting stronger. And I can tell I am."    Pain/Comfort  Pain Rating 1: 0/10    Objective:       Cognitive Exam:  Oriented to: Person, Place, Time and Situation  Follows Commands/attention: Follows multistep  commands  Communication: clear/fluent  Memory:  No Deficits noted  Safety awareness/insight to disability: intact  Coping skills/emotional control: Appropriate to situation    Visual/perceptual:  Intact    Physical Exam:  Postural examination/scapula alignment:    -       Rounded shoulders  -       Forward head  Skin integrity: Visible skin intact  Edema: None noted  "     Sensation:      -       Intact    Upper Extremity Range of Motion:  Right Upper Extremity: WFL except sh flexion, sh extension, ER and IR  Left Upper Extremity: WFL    Upper Extremity Strength:  Right Upper Extremity: WFL except proximal joints - specifically shoulder and scapula  Left Upper Extremity: WFL   Strength: WFL EAGLEEs    Fine motor coordination:      -       Intact      Functional Mobility:  Bed Mobility:   Sit to supine: Supervision or Set-up Assistance with use of bedrails       Transfers:   Sit to stand:Contact Guard Assistance with Rolling Walker    Bed <> Chair:  Stand Pivot with Contact Guard Assistance with No Assistive Device from pt's L side  Toilet Transfer:  Pt Stand Pivot with Partial/Mod A with No Assistive Device from both L and R side  Patient tub bench transfer Stand Pivot with Moderate Assistance with No Assistive Device. - Assist required to lift RLE out of tub     Current Status   Functional Area: Care Score:   Eating 6   Oral Hygiene 6   From w/c level   Toileting Hygiene 3   Min A to maintain static and dynamic standing balance as pt performed kay hygiene and doffed/donned pants from hips   Shower/Bathe Self 3   Min A to maintain standing balance as pt bathed buttocks   Upper Body Dressing 4   One VC given to increase awareness to R shoulder to adjust shirt    Lower Body Dressing 3   Min A to maintain standing balance as pt doffed and donned pants from hips. Pt performed figure 4 technique independently to thread each LE into pantsleg   Putting On/Taking Off Footwear 4   *this score does not include donning AFO*  Pt donned socks and shoes via figure 4 technique   Toilet Transfer 3   Partial/Mod A to transfer from pt's R side       Pt reported feeling very fatigued on this day and requested to perform a sponge bath instead of a bath in the tub/sh combo. Pt still agreed to practice a tub transfer following completion of sponge bath. Pt noted to perform ADL tasks significantly  better on this date as compared to initial evaluation and ADL performed late last week. Biggest improvement noted is pt's standing balance, both static and dynamic, with one and no UE support. Pt voices feeling more confident and trusting of bearing weight through his RLE.    Following ADL, pt performed handwriting task with his RUE. Pt completed multiple patterns with a large Expo marker, drawing a straight and curved thickened line, while ensuring to not go outside of the line, which pt completed successfully. Pt then used a regular ink pen to write his full name and  x5, in which 2 of the 5 attempts were with ~90% legibility.       Assessment:  Ayaz Huggins is a great rehab candidate and would benefit from skilled OT treatment to increase ADL independence and safety.  Ayaz Huggins is a 77 y.o. male with a medical diagnosis of Stroke and presents with the following impairments, limitations, and capabilities.     Impairments: coordination, endurance, R/L UE weakness, R/L LE weakness, trunk weakness, decreased ROM and functional strength  Functional Limitations: ADLs, home mgmt, functional t/fs, bed mobility, sit balance and stand balance  Activity capabilities: Independent premorbid, family support, endurance, safety awareness and UE strength    Recommended length of stay: 14 days    Rehab potential is excellent    Activity tolerance: Excellent      GOALS:   Multidisciplinary Problems     Occupational Therapy Goals        Problem: Occupational Therapy    Goal Priority Disciplines Outcome Interventions   Occupational Therapy Goal     OT, PT/OT Ongoing, Progressing    Description: LTG: Pt will be Ind with all ADLS.  Pt LTG: to return to bike riding    ADLs:  Pt to perform grooming tasks with [place] from seated position by d/c.  Pt to perform UB dressing with Setup using modified techniques.  Pt to perform LB dressing with SPV using modified techniques.  Pt to perform putting on/off footwear task with  SPV.  Pt to perform toileting with Partial A using BSC. = GOAL MET  Pt to perform toileting with CGA using BSC and LRAD.    Functional Transfers:  Pt to perform toilet transfers with Partial A using BSC and LRAD for mobility.    Pt to perform a tub transfer with Max A by re-eval using TTB. = GOAL MET  Pt to perform a tub t/f with CGA via TTB and Gbs.      Balance, Strengthening, Endurance, Balance:  Pt  will perform dynamic standing tasks using LRAD for 2 minutes with SPV assist.  Pt to demonstrate 4/5 R shoulder strength and 5/5 distal RUE strength during functional task/                     PLAN: Patient to be seen 5 x/week to address the above listed problems via self-care/home management, therapeutic activities, therapeutic exercises, neuromuscular re-education  Plan of Care expires: 07/25/22  Plan of Care reviewed with: patient    07/18/2022

## 2022-07-18 NOTE — PROGRESS NOTES
Subjective  HPI:  78 y/o male with a PMH of Type II DM, diabetic neuropathy, CAD s/p CABG x2 (2007), and HTN who presented to [facility] ED  On 7/2/22 with complaints of right sided upper and lower extremity weakness. Patient's story is unclear upon the time of last known normal. He states he woke up  with a cramp in the RLE and noticed the right sided weakness.The patient was admitted to the ICU with an ischemic CVA and administered tPA; he has had some improvement in his right-sided weakness although he is unable to purposefully move his right lower extremity.  Neurology has seen and evaluated the patient and has deemed this patient stable for transfer to the floor.  This patient was seen and evaluated in the intensive care unit.  7/18: Seen in patient room, supine in bed with PT at bedside re-evaluating bed mobility. Reports RLE not moving like they would have hoped. Denies sensory loss and/or pain. Good return to RUE. Wound to right heel/ankle healing. Discussed need to wear PRAFO in bed for wound healing as well as stretching. PT reports some noted tightness, possible tone to RLE. External rotation of RLE with ambulation. Patient having some anxiety regarding discharging too soon. He states that his wife would not be able to care for him in his current state. Discussed staffing meeting tomorrow to include discharge planning. Progressing in therapy. VSSAF.       Review of Systems  Depression/Anxiety: noted anxiety with discharge planning     ALPRAZolam tablet 0.25 mg TID PRN Anxiety  Pain: denies     Bowels/Bladder: last BM 7/16 x 2    Appetite: good       Sleep: good          Physical Exam  General: well-developed, well-nourished, in no acute distress  Eye: EOMI, clear conjunctiva, eyelids normal  HENT: normocephalic, oropharynx and nasal mucosal surfaces moist  Neck: full range of motion, supple  Respiratory: equal chest rise, no SOB, no audible wheeze  Cardiovascular: regular rate and rhythm, no  edema  Gastrointestinal: soft, non-tender, non-distended   Musculoskeletal: right hemiplegia  Integumentary: no rashes or skin lesions present, redness to right heel/ankle, sacral redness, left neck incision-dressing-c/d/i  Neurologic: cranial nerves intact, right hemiplegia      Labs:   Latest Reference Range & Units 07/18/22 06:02   WBC 4.5 - 11.5 x10(3)/mcL 7.4   RBC 4.70 - 6.10 x10(6)/mcL 3.78 (L)   Hemoglobin 14.0 - 18.0 gm/dL 11.2 (L)   Hematocrit 42.0 - 52.0 % 32.6 (L)   MCV 80.0 - 94.0 fL 86.2   MCH 27.0 - 31.0 pg 29.6   MCHC 33.0 - 36.0 mg/dL 34.4   RDW 11.5 - 17.0 % 12.9   Platelets 130 - 400 x10(3)/mcL 253   MPV 7.4 - 10.4 fL 9.8   Neut % % 54.3   LYMPH % % 33.0   Mono % % 7.8   Eosinophil % % 4.1   Basophil % % 0.5   Immature Granulocytes % 0.3   Neut # 2.1 - 9.2 x10(3)/mcL 4.0   Lymph # 0.6 - 4.6 x10(3)/mcL 2.44   Mono # 0.1 - 1.3 x10(3)/mcL 0.58   Eos # 0 - 0.9 x10(3)/mcL 0.30   Baso # 0 - 0.2 x10(3)/mcL 0.04   Immature Grans (Abs) 0 - 0.04 x10(3)/mcL 0.02   nRBC % 0.0   Sodium 136 - 145 mmol/L 139   Potassium 3.5 - 5.1 mmol/L 4.6   Chloride 98 - 107 mmol/L 104   CO2 23 - 31 mmol/L 26   BUN 8.4 - 25.7 mg/dL 16.3   Creatinine 0.73 - 1.18 mg/dL 0.72 (L)   eGFR if non African American mls/min/1.73/m2 >60   Glucose 82 - 115 mg/dL 113   Calcium 8.8 - 10.0 mg/dL 9.6   Phosphorus 2.3 - 4.7 mg/dL 3.2   Magnesium 1.60 - 2.60 mg/dL 1.40 (L)   Alkaline Phosphatase 40 - 150 unit/L 79   PROTEIN TOTAL 5.8 - 7.6 gm/dL 6.7   Albumin 3.4 - 4.8 gm/dL 3.3 (L)   Albumin/Globulin Ratio 1.1 - 2.0 ratio 1.0 (L)   Prealbumin 16.0 - 42.0 mg/dL 16.6   BILIRUBIN TOTAL <=1.5 mg/dL 0.5   AST 5 - 34 unit/L 23   ALT 0 - 55 unit/L 32   Globulin, Total 2.4 - 3.5 gm/dL 3.4   (L): Data is abnormally low      Assessment/Plan  HTN (hypertension) (Chronic)   Stroke   Carotid artery stenosis, symptomatic, left   Type 2 diabetes mellitus   Heart disease   S/P carotid endarterectomy         Wounds:redness to right heel/ankle, sacral redness,  left neck incision-dressing-c/d/i  S/p s/p left CEA on 7/8/2022 (VICENTE Lama MD)  Precautions: fall risk  Bracing/AD: QC  Swallowing: Diabetic Diet  Function: Tolerating therapy. Continue PT/OT  VTE Prophylaxis:aspirin tablet 325 mg qd   Code Status: FULL CODE   Discharge:Pt. Lives with wife in [place] in a single-story home with 1 step to enter the residence. Pt. completed high school and some college.  He has no  history. He is a retired  for [employer]. Wife is retired.  She drives, cooks, cleans, does laundry, grocery shops, manages finances, and can physically assist pt. She will be home with patient after discharge from rehab.  She is also checking into their long-term care policy. Children (2). Date pending.

## 2022-07-18 NOTE — PLAN OF CARE
Problem: Rehabilitation (IRF) Plan of Care  Goal: Plan of Care Review  Outcome: Ongoing, Progressing  Goal: Patient-Specific Goal (Individualized)  Outcome: Ongoing, Progressing  Goal: Absence of New-Onset Illness or Injury  Outcome: Ongoing, Progressing  Goal: Optimal Comfort and Wellbeing  Outcome: Ongoing, Progressing  Goal: Readiness for Transition of Care  Outcome: Ongoing, Progressing     Problem: Impaired Wound Healing  Goal: Optimal Wound Healing  Outcome: Ongoing, Progressing     Problem: Skin Injury Risk Increased  Goal: Skin Health and Integrity  Outcome: Ongoing, Progressing      Other

## 2022-07-18 NOTE — PROGRESS NOTES
07/18/22 1130   Rec Therapy Time Calculation   Rec Start Time 1130   Rec Stop Time 1200   Rec Total Time (min) 30 min   Subjective   Patient states Pt stated that he can see his progress   Attendance   Activity   (Bean Bag Toss)   Participation Active participation   Assessment   Assessment Sit to stand was supervision. Dynamic standing balance/reaching was min/contact guard. Standing tolerance increased to 5 minutes. Using RUE as a helper.  RUE coordination was mod. Problem solving and memory were I. Motivated and determined   Plan   Planned Therapy Intervention Continue with current plan  (Bean Bag Toss)   Expected Length of Stay Pending   PT Frequency Minimu of 5 visits per week;Other (see comments)  (Daily)   Time   Treatment time 2 units

## 2022-07-18 NOTE — PT/OT/SLP RE-EVAL
"        Physical Therapy          Inpatient Rehab Re-Evaluation    Ayaz Huggins   MRN: [id number]          Billable Minutes:   Re-eval 30, Gait [id number] and Therapeutic Activity 45    Diagnosis: Stroke - L ischemic CVA s/p tPA, R sided weakness  Past Medical History:   Diagnosis Date    Diabetes mellitus     Hypertension     Mixed hyperlipidemia       Past Surgical History:   Procedure Laterality Date    CARDIAC SURGERY         General Precautions: Standard, fall  Orthopedic Precautions: N/A   Braces: AFO (neoprene Knee brace)     Spiritual, Cultural Beliefs, Buddhism Practices, Values that Affect Care: yes    Patient History:  Lives With: spouse (2 college-aged grandchildren)  Home Accessibility: other (see comments) ([place] to enter)  Home Layout: Able to live on 1st floor  Stair Railings at Home: none  Transportation Anticipated: car, drives self, family or friend will provide  Equipment Currently Used at Home: none    DME owned (not currently used): none    Previous Level of Function:  Ambulation Skills: independent  Transfer Skills: independent  ADL Skills: independent  Work/Leisure Activity: independent    Subjective:  Chief Complaint: R sided weakness  Patient goals: "To walk" and "To not burden my wife"  Family goals: not present upon evaluation    Objective:    BP start: 126/70, 78bpm        In sitting  BP end: 120/68, 78bpm    Patient found Seated in WC;      Cognitive Exam:  Oriented to: Person, Place, Time and Situation  Follows Commands/attention: Follows two-step commands  Communication: clear/fluent  Safety awareness/insight to disability: intact    Physical Exam:  Postural examination/scapula alignment:    -       Forward head    Skin integrity: Visible skin intact - Redness on R heel much less than on initial eval. Encouraged pt to continue to wear PRAFO boot at night.   Edema: None noted      Sensation:      -       Intact    Upper Extremity Range of Motion:  Refer to OT evaluation for UE " "ROM.    Upper Extremity Strength:  Refer to OT evaluation for UE strength.    Lower Extremity Range of Motion:  Right Lower Extremity: PROM WFL, however hip External Rotators very tight  Left Lower Extremity: WFL    Lower Extremity Strength:  Right Lower Extremity: Trace, inconsistent movements in RLE. Pt able to add/abd in gravity eliminated position.  Left Lower Extremity: WFL    Functional Mobility:   Current   Status   Functional Area: Care Score:   Roll Left and Right 4   Sit to Lying 3   Lying to Sitting on Side of Bed 4   Sit to Stand 4   Chair/Bed-to-Chair Transfer 3   Walk 10 Feet 1   Walk 50 Feet with Two Turns 1   Walk 150 Feet 88   Walk 10 Feet Uneven Surface 88   1 Step (Curb) 88   4 Steps 88   12 Steps 88   Picking Up Object 3   Wheel 50 Feet with Two Turns 5   Wheel 150 Feet 5       Bed Mobility :   Supine to sit: Contact Guard Assistance, increased time required to increase independence level, VC for technique.   Sit to supine: Minimal Assistance, possibly d/t pt wearing shoes and AFO, pt reports he is normally able to lift the RLE into the bed by using his LLE to "scoop" the leg.   Rolling: Standby Assistance   Scooting: Standby Assistance    Transfers:  Sit to stand:Contact Guard Assistance with Rolling Walker VC for set-up and to WB more through RLE  Bed <> Chair:  Stand Pivot and Step Transfer with Contact Guard Assistance and Minimal Assistance with No Assistive Device and Rolling Walker . First trial (WC to bed), pt performed step transfer with RW, requiring Min A for RW management and placement of RLE. Second trial (Bed to WC), pt performed a stand pivot transfer with no AD and CGA. VC provided for technique.    Wheelchair:  Patient propels wheelchair 150 feet using LUE and LLE with  Supervision or Set-up Assistance and no verbal cues.    Gait:  Patient ambulates 20 feet + 55 feet using rolling walker with Total Assistance, d/t requiring 2 person assist for safety. PT providing Min A to assist " "with advancement/placement of RLE and for balance, tech following closely behind with WC, moderate verbal cues for technique. VC for pt to "throw" R hip forward to use momentum to help advance RLE, activating core and hip flexor. Pt presents with extreme RLE external rotation, causing R foot to sometimes get caught on back leg of RW. Pt presents with improved balance and proper weightshifting. Pt able to advance RLE forward ~75% of the time, but requires assist to prevent ER.      Balance Training:  Pt participated in Recreational Therapy by playing bag toss while standing with RW. PT assisted on RLE to help prevent buckling of R knee and increase WB on R side. Pt performed 2 rounds by throwing with LUE, 1 round throwing with RUE.   - Pt performed picking up an object from a standing position with a RW and reacher. Min-Mod A provided to maintain balance, performed once using R hand with reacher, once with L hand.     Endurance deficit:  Noted with muscular endurance on RLE.    Additional Treatment:  Seated in WC, performed stretching of hip external rotators (internally rotating RLE), ~5 min. Performed between the 2 bouts of ambulation.    Education Provided: roles and goals of PT/PTA, transfer training, bed mob, gait training, balance training, safety awareness, body mechanics, assistive device, wheelchair management, strengthening exercises and fall prevention    Expected compliance: High compliance        Patient left up in chair with call button in reach.    Assessment:  Ayaz Huggins is a 77 y.o. male with a medical diagnosis of Stroke. He presents with R sided weakness   limiting tolerance and safety during functional mobility tasks.  Pt to benefit from skilled PT services to address impairments with progression towards functional independence as tolerated.      IMPAIRMENTS  endurance, R/L UE weakness, R/L LE weakness, trunk weakness and functional strength    FUNCTIONAL LIMITATIONS  Bed mob, static standing " balance, dynamic standing balance, gait impairments and wheelchair mobility    ACTIVITY LIMITATIONS  functional mobility and insight    ACTIVITY CAPABILITIES  Independent premorbid, family support and safety awareness      Patient agrees with need for treatment: yes    QI Scores:   Admission Assessment   Functional Area: Care Score:    Roll Left and Right 3   Sit to Lying 3   Lying to Sitting on Side of Bed 3   Sit to Stand 3   Chair/Bed-to-Chair Transfer 3   Walk 10 Feet 88   Walk 50 Feet with Two Turns 88   Walk 150 Feet 88   1 Step (Curb) 88   4 Steps 88   12 Steps 88   Wheel 50 Feet with Two Turns 3   Wheel 150 Feet 2     Rehab potential is excellent.    Activity tolerance: Excellent and Good    Plan: continue with current plan, gait training, balance training, ROM, stretching, strengthening, neuromuscular re-education, motor coordination training, postural re-education, manual therapy techniques, wheelchair management/propulsion and electrical stimulation    Discharge recommendations:  (Pending progress)     Equipment recommendations: wheelchair (Pending progress), RW    GOALS:   Multidisciplinary Problems     Physical Therapy Goals        Problem: Physical Therapy    Goal Priority Disciplines Outcome Goal Variances Interventions   Physical Therapy Goal     PT, PT/OT Ongoing, Progressing     Description: Bed Mobility:  Roll left and right with supervision/touching assist - MET 7/18/22  Sit to supine transfer with supervision/touching assist. - progressing, cont.  Supine to sit transfer with supervision/touching assist. - progressing, cont.    Transfers:  Sit to stand transfer with supervision/touching assist using RW or LRAD. - MET 7/18/22  Bed to chair transfer with supervision/touching assist using RW or LRAD. - progressing, cont.  Car transfer with partial/moderate assist using RW or LRAD. - progressing, cont.  Sit to stand transfer with set-up assist with RW - initial    Mobility:  Ambulate 100 feet with  partial/moderate assist using RW or LRAD. - progressing, cont.    WC mobility:   150ft with LUE/LLE with independent. - initial                     PLAN:    Patient to be seen 5 x/week to address the above listed problems via gait training, therapeutic activities, therapeutic exercises, neuromuscular re-education, wheelchair management/training  Plan of Care expires: 07/25/22  Plan of Care reviewed with: patient    7/18/2022

## 2022-07-18 NOTE — PLAN OF CARE
Problem: Rehabilitation (IRF) Plan of Care  Goal: Plan of Care Review  Outcome: Ongoing, Progressing  Goal: Patient-Specific Goal (Individualized)  Outcome: Ongoing, Progressing  Goal: Absence of New-Onset Illness or Injury  Outcome: Ongoing, Progressing  Goal: Optimal Comfort and Wellbeing  Outcome: Ongoing, Progressing  Goal: Readiness for Transition of Care  Outcome: Ongoing, Progressing     Problem: Impaired Wound Healing  Goal: Optimal Wound Healing  Outcome: Ongoing, Progressing     Problem: Skin Injury Risk Increased  Goal: Skin Health and Integrity  Outcome: Ongoing, Progressing     Problem: Diabetes Comorbidity  Goal: Blood Glucose Level Within Targeted Range  Outcome: Ongoing, Progressing

## 2022-07-19 LAB — POCT GLUCOSE: 123 MG/DL (ref 70–110)

## 2022-07-19 PROCEDURE — 97535 SELF CARE MNGMENT TRAINING: CPT

## 2022-07-19 PROCEDURE — 99233 SBSQ HOSP IP/OBS HIGH 50: CPT | Mod: ,,, | Performed by: NURSE PRACTITIONER

## 2022-07-19 PROCEDURE — 97112 NEUROMUSCULAR REEDUCATION: CPT

## 2022-07-19 PROCEDURE — 97116 GAIT TRAINING THERAPY: CPT | Mod: CQ

## 2022-07-19 PROCEDURE — 97110 THERAPEUTIC EXERCISES: CPT | Mod: CQ

## 2022-07-19 PROCEDURE — 99233 PR SUBSEQUENT HOSPITAL CARE,LEVL III: ICD-10-PCS | Mod: ,,, | Performed by: NURSE PRACTITIONER

## 2022-07-19 PROCEDURE — 94799 UNLISTED PULMONARY SVC/PX: CPT

## 2022-07-19 PROCEDURE — 11800000 HC REHAB PRIVATE ROOM

## 2022-07-19 PROCEDURE — 97530 THERAPEUTIC ACTIVITIES: CPT | Mod: CQ

## 2022-07-19 PROCEDURE — 94761 N-INVAS EAR/PLS OXIMETRY MLT: CPT

## 2022-07-19 PROCEDURE — 25000003 PHARM REV CODE 250: Performed by: NURSE PRACTITIONER

## 2022-07-19 RX ADMIN — ASPIRIN 325 MG ORAL TABLET 325 MG: 325 PILL ORAL at 08:07

## 2022-07-19 RX ADMIN — ZINC OXIDE TOPICAL OINT.: at 08:07

## 2022-07-19 RX ADMIN — CARVEDILOL 25 MG: 25 TABLET, FILM COATED ORAL at 05:07

## 2022-07-19 RX ADMIN — HYDROXYZINE PAMOATE 50 MG: 50 CAPSULE ORAL at 08:07

## 2022-07-19 RX ADMIN — ATORVASTATIN CALCIUM 80 MG: 40 TABLET, FILM COATED ORAL at 08:07

## 2022-07-19 RX ADMIN — LISINOPRIL 5 MG: 5 TABLET ORAL at 08:07

## 2022-07-19 RX ADMIN — ZINC OXIDE TOPICAL OINT.: at 02:07

## 2022-07-19 RX ADMIN — METFORMIN HYDROCHLORIDE 1000 MG: 500 TABLET, FILM COATED ORAL at 05:07

## 2022-07-19 RX ADMIN — AMLODIPINE BESYLATE 10 MG: 5 TABLET ORAL at 08:07

## 2022-07-19 RX ADMIN — METFORMIN HYDROCHLORIDE 1000 MG: 500 TABLET, FILM COATED ORAL at 08:07

## 2022-07-19 RX ADMIN — CARVEDILOL 25 MG: 25 TABLET, FILM COATED ORAL at 08:07

## 2022-07-19 RX ADMIN — ZINC OXIDE TOPICAL OINT.: at 05:07

## 2022-07-19 NOTE — PROGRESS NOTES
07/19/22 1130   Rec Therapy Time Calculation   Date of Treatment 07/19/22   Rec Start Time 1130   Rec Stop Time 1200   Rec Total Time (min) 30 min   Time   Treatment time 2 units   Precautions   General Precautions fall   Pain/Comfort   Pain Rating 1 no pain   Vital Signs   Pulse 76   BP (!) 104/56   OTHER   Rehab identified problem list/impairments weakness;impaired balance;decreased upper extremity function;decreased lower extremity function;impaired coordination;impaired fine motor   Values/Beliefs/Spiritual Care   Spiritual, Cultural Beliefs, Judaism Practices, Values that Affect Care no   Overall Level of Functioning   Activity Tolerance Mod Indep   Dynamic Sitting Balance/Reaching   (Dynamic standing balance/reaching was contact guard)   Right UE Coodination/Dexterity Mod A   Left UE Coordination/Dexterity Independent   Problem Solving/Sequencing Skills Independent   Memory Recall Independent   Attention Span Independent  (30 minutes)   Recreational Therapy Short Term Goals   Short Term Goal 1 Progression Progressing   Short Term Goal 2 Progression Progressing   Short Term Goal 3 Progression Progressing   Recreational Therapy Long Term Goals   Long Term Goal 1 Progression Progressing   Long Term Goal 2 Progression Progressing   Long Term Goal 3 Progression Progressing   Plan   Patient to be seen Daily   Planned Duration 2 weeks   Treatments Planned Balance training;Coordination;Energy conservation training;Fine motor;Safety education   Treatment plan/goals estblished with Patient/Caregiver Yes

## 2022-07-19 NOTE — PROGRESS NOTES
07/19/22 1130   Rec Therapy Time Calculation   Rec Start Time 1130   Rec Stop Time 1200   Rec Total Time (min) 30 min   Precautions   General Precautions fall   Attendance   Activity   (Washer Toss)   Participation Active participation   Assessment   Assessment Sit to stand was contact guard as was dynamic standing balance/reaching. Standing tolerance was 7 minutes. RUE coordination/dexteriety  improved to mod/min. Direction following and sequencing skills were I. Remains motivated and determined.   Plan   Planned Therapy Intervention Continue with current plan  (Washer Toss)   Time   Treatment time 2 units

## 2022-07-19 NOTE — PLAN OF CARE
Problem: Impaired Wound Healing  Goal: Optimal Wound Healing  Outcome: Ongoing, Progressing     Problem: Skin Injury Risk Increased  Goal: Skin Health and Integrity  Outcome: Ongoing, Progressing  Intervention: Optimize Skin Protection  Flowsheets (Taken 7/19/2022 0951)  Pressure Reduction Techniques: frequent weight shift encouraged  Pressure Reduction Devices:   chair cushion utilized   positioning supports utilized  Skin Protection: skin sealant/moisture barrier applied  Intervention: Promote and Optimize Oral Intake  Flowsheets (Taken 7/19/2022 0951)  Oral Nutrition Promotion: physical activity promoted     Problem: Diabetes Comorbidity  Goal: Blood Glucose Level Within Targeted Range  Outcome: Ongoing, Progressing  Intervention: Monitor and Manage Glycemia  Flowsheets (Taken 7/19/2022 0951)  Glycemic Management:   blood glucose monitored   oral hydration promoted

## 2022-07-19 NOTE — PROGRESS NOTES
"[facility] - Rehab Inpatient Services  Adult Nutrition  Progress Note    SUMMARY       Recommendations    1. Continue current diabetic diet as tolerated     2. Boost GC (190 kcal, 16 g pro/svg) with meals to provide additional nourishment     Goals: Maintain adequate nutrition >/=75% EEN/EPN throughout duration of stay  Nutrition Goal Status: Progressing     Assessment and Plan    7/19: Pt reports fair appetite. Per EMR is averaging ~60% po intake at meals x last 3 days. Added Boost GC this am. Pt std he "loves the Boost" and would Like to continue. Denies any N/V/D/C. Tells me kitchen has been coming by to review menu. No new nutrition related issues noted at this time.     7/13: Noted altered skin integrity in sacral region. Nsg reports wound care to eval today. Pt reports appetite is "not what it used to be", but std ate ~100% breakfast and ~75% lunch today (fish, roll, noodles). Discussed importance of adequate nutrition during intensive rehab therapy. Pt verbalized understanding. LBM on (7/11). Reports UBW of 200# prior to initial hospitalization ~11 days prior. Noted 4# wt loss. Does not meet ASPEN criteria for significant wt loss. Tells me prior to admit, was weighing self everyday d/t trying to lose wt.      Reason for Assessment    Diagnosis:  (Acute left hemispheric CVA s/p CPA and s/p left CEA on 07/08/2022)  Relevant Medical History: Bilateral CVA with right-sided weakness, Carotid artery stenosis s/p left CEA on 07/08/2022, HTN, DM type 2, Normocytic anemia    Nutrition Risk Screen    Nutrition Risk Screen: no indicators present    Nutrition/Diet History    Spiritual, Cultural Beliefs, Taoist Practices, Values that Affect Care: no    Anthropometrics    Temp: 98 °F (36.7 °C)  Height: 5' 10.98" (180.3 cm)  Height (inches): 70.98 in  Weight Method: Estimated  Weight: 87.9 kg (193 lb 12.6 oz)  Weight (lb): 193.79 lb  Ideal Body Weight (IBW), Male: 171.88 lb  % Ideal Body Weight, Male " (lb): 114.41 %  BMI (Calculated): 27  BMI Grade: 25 - 29.9 - overweight  Usual Body Weight (UBW), k.91 kg  % Usual Body Weight: 98.32  % Weight Change From Usual Weight: -1.88 %     Wt Readings from Last 5 Encounters:   22 87.9 kg (193 lb 12.6 oz)   22 98.1 kg (216 lb 4.3 oz)       Lab/Procedures/Meds    Pertinent Labs Comments: Alb 3.3(L), GFR >60, Mg 1.4(L), PAB 16.6 WNL  Pertinent Medications Comments: statin, colace, lisinopril, metformin, miralax    Physical Findings/Assessment      Estimated/Assessed Needs    Weight Used For Calorie Calculations: 89.2 kg (196 lb 10.4 oz)  Energy Calorie Requirements (kcal):   Energy Need Method: [place]  Protein Requirements: 116 g (1.3 g/kg)  Weight Used For Protein Calculations: 89.2 kg (196 lb 10.4 oz)  RDA Method (mL):          Nutrition Prescription Ordered    Current Diet Order: Diabetic    Evaluation of Received Nutrient/Fluid Intake       % Intake of Estimated Energy Needs: 50 - 75 %  % Meal Intake: 50 - 75 %    Nutrition Risk    Level of Risk/Frequency of Follow-up: low     Monitor and Evaluation    Food and Nutrient Intake: energy intake, food and beverage intake  Anthropometric Measurements: weight, weight change  Biochemical Data, Medical Tests and Procedures: electrolyte and renal panel, gastrointestinal profile, glucose/endocrine profile, lipid profile, inflammatory profile  Nutrition-Focused Physical Findings: overall appearance     Nutrition Follow-Up    RD Follow-up?: Yes

## 2022-07-19 NOTE — PT/OT/SLP PROGRESS
"Occupational Therapy  Treatment      Ayaz Huggins   MRN: [id number]   Admitting Diagnosis: Stroke    Therapy Minutes  OT Date of Treatment: 07/19/22  OT Start Time: 1330  OT Stop Time: 1430  OT Total Time (min): 60 min  OT Individual: 60    Billable Minutes:  Self Care/Home Management 60          Vitals:    07/19/22 1330   BP: (!) 100/52   BP Location: Left arm   Patient Position: Sitting   Pulse: 76   *ending vitals not taken, as pt was asymptomatic and handed off to OT Henny.       General Precautions: Standard, fall  Orthopedic Precautions: N/A    Spiritual, Cultural Beliefs, Catholic Practices, Values that Affect Care: no    Subjective:  Patient communicated "I'm a little tired today, but ready for the shower."    Pain/Comfort  Pain Rating 1: 0/10  Objective:       Functional Mobility:  Transfer Training:   Sit to stand:Minimal Assistance with Rolling Walker    Patient tub bench transfer Stand Pivot with Moderate Assistance with No Assistive Device. Pt required assist to lift RLE out of tub and assist with re-positioning RLE at edge of TTB prior to stand pivot t/f TTB > w/c.    ADLs:    Current Status   Functional Area: Care Score:   Shower/Bathe Self 4   Supervision provided to bathe body parts, excluding buttocks. CGA provided when standing outside of tub/sh combo to bathe buttocks   Upper Body Dressing 5   Sitting unsupported on TTB   Lower Body Dressing 4   Pt doffed shorts standing at edge of TTB. Pt donned shorts via figure 4 technique in w/c.    Putting On/Taking Off Footwear 4   Pt doffed neoprene knee brace, AFO, and socks/shoes from w/c level.  Pt chose to don footwear at EOB as it is lower to the ground in comparison to the w/c. Pt able to don all footwear in figure 4 technique     Education Provided: Roles and goals of OT, ADLs, transfer training, body mechanics and sequencing    Expected compliance: High compliance    ASSESSMENT:  Ayaz Huggins is a 77 y.o. male with a medical diagnosis of Stroke " performed ADL tasks and functional transfers to increase ADL independence and safety. Pt is progressing towards OT goals      GOALS:   Multidisciplinary Problems     Occupational Therapy Goals        Problem: Occupational Therapy    Goal Priority Disciplines Outcome Interventions   Occupational Therapy Goal     OT, PT/OT Ongoing, Progressing    Description: LTG: Pt will be Ind with all ADLS.  Pt LTG: to return to bike riding    ADLs:  Pt to perform grooming tasks with [place] from seated position by d/c.  Pt to perform UB dressing with Setup using modified techniques.  Pt to perform LB dressing with SPV using modified techniques.  Pt to perform putting on/off footwear task with SPV.  Pt to perform toileting with Partial A using BSC. = GOAL MET  Pt to perform toileting with CGA using BSC and LRAD.    Functional Transfers:  Pt to perform toilet transfers with Partial A using BSC and LRAD for mobility.    Pt to perform a tub transfer with Max A by re-eval using TTB. = GOAL MET  Pt to perform a tub t/f with CGA via TTB and Gbs.      Balance, Strengthening, Endurance, Balance:  Pt  will perform dynamic standing tasks using LRAD for 2 minutes with SPV assist.  Pt to demonstrate 4/5 R shoulder strength and 5/5 distal RUE strength during functional task/                     Plan:  Patient to be seen 5 x/week to address the above listed problems via self-care/home management, therapeutic activities, therapeutic exercises, neuromuscular re-education  Plan of Care expires: 07/25/22  Plan of Care reviewed with: patient      07/19/2022

## 2022-07-19 NOTE — PT/OT/SLP PROGRESS
Occupational Therapy  Treatment      Ayaz Huggins   MRN: [id number]   Admitting Diagnosis: Stroke    Therapy Minutes  OT Date of Treatment: 07/19/22  OT Start Time: 1430  OT Stop Time: 1500  OT Total Time (min): 30 min  OT Individual: 30    Billable Minutes:  Neuromuscular Re-education 30               General Precautions: Standard, fall  Orthopedic Precautions: N/A    Spiritual, Cultural Beliefs, Latter day Practices, Values that Affect Care: no    Subjective:  Patient communicated willingness to participate in today's session.        Objective:       Functional Mobility:  Transfer Training:   Sit to stand:Minimal Assistance with Rolling Walker Mod A initially progressing to Min A then SBA the last 2 trials of sit>stand  Bed <> Chair:  Step Transfer with Maximum Assistance with Rolling Walker assist for stability and RLE advancement   ADL: able to doff footwear including AFO without assist    Neuro re-ed/strengthening:     Pt participated in multi dynamic standing and seated activities with focus on motor control of proximal RUE including control during graphomotor on vertical surface, functional reaching and weight bearing at therapy ball in vertical plane and AAROM of R shoulder and scapula. Pt demo tightness of proximal UE therefore, OT assist with active stretch and scap mobs with apparent improvement of ROM. Instructed patient to perform scap retraction and AAROM of R shoulder flex 10x/day. Pt able to retract/protract/elevate scap fair, but noted poor rotational movement.        Education Provided: Roles and goals of OT, ADLs, transfer training and body mechanics    Expected compliance: High compliance    ASSESSMENT:  Ayaz Huggins is a 77 y.o. male with a medical diagnosis of Stroke performed BUE therex, neuro re-ed and dynamic standing ax to increase ADL independence and safety. Pt is progressing towards OT goals      GOALS:   Multidisciplinary Problems     Occupational Therapy Goals        Problem:  Occupational Therapy    Goal Priority Disciplines Outcome Interventions   Occupational Therapy Goal     OT, PT/OT Ongoing, Progressing    Description: LTG: Pt will be Ind with all ADLS.  Pt LTG: to return to bike riding    ADLs:  Pt to perform grooming tasks with [place] from seated position by d/c.  Pt to perform UB dressing with Setup using modified techniques.  Pt to perform LB dressing with SPV using modified techniques.  Pt to perform putting on/off footwear task with SPV.  Pt to perform toileting with Partial A using BSC. = GOAL MET  Pt to perform toileting with CGA using BSC and LRAD.    Functional Transfers:  Pt to perform toilet transfers with Partial A using BSC and LRAD for mobility.    Pt to perform a tub transfer with Max A by re-eval using TTB. = GOAL MET  Pt to perform a tub t/f with CGA via TTB and Gbs.      Balance, Strengthening, Endurance, Balance:  Pt  will perform dynamic standing tasks using LRAD for 2 minutes with SPV assist.  Pt to demonstrate 4/5 R shoulder strength and 5/5 distal RUE strength during functional task/                     Plan:  Patient to be seen 5 x/week to address the above listed problems via self-care/home management, therapeutic activities, therapeutic exercises, neuromuscular re-education  Plan of Care expires: 07/25/22  Plan of Care reviewed with: patient      07/19/2022

## 2022-07-19 NOTE — PT/OT/SLP RE-EVAL
Re-Evaluation    Pt was scheduled from [id number]    Pt progress, plan of care and discharge plans were discussed during staffing at 0900

## 2022-07-19 NOTE — PT/OT/SLP PROGRESS
"Physical Therapy         Treatment        Ayaz Huggins   MRN: [id number]     Therapy Minutes  PT Received On: 07/19/22  PT Start Time: 1030  PT Stop Time: 1200  PT Total Time (min): 90 min  PT Individual: 90     Billable Minutes:  Gait [id number], Therapeutic Activity 30 and Therapeutic Exercise 30      Treatment Type: Treatment  PT/PTA: PTA     PTA Visit Number: 1       General Precautions: Standard, fall  Orthopedic Precautions: Orthopedic Precautions : N/A   Braces: Braces: AFO (Neoprene knee brace)    Patient found with: peripheral IV         Subjective:  "Pt reports that he is very motivated and wants to get stronger.    Pain/Comfort  Pain Rating 1: 0/10    Objective:  Therapeutic activities, therapeutic exercise performed to increase muscle strength and motor recruitment so that patient may improve quality of functionional mobility and increase functional independence. and Gait training performed to improve functional strength, to increase functional mobility, safety and independence.    VITALS:  BP: Start: 115/65 73HR End: 120/57 72HR           In sitting    Functional Mobility:  Bed Mobility:   Supine to sit: Moderate Assistance with trunk only.  Extra time required to transition to sitting with BLE.   Sit to supine: Standby Assistance with no cues    Balance Training  Dynamic Sitting/Standing:  Patient performed dynamic sitting on level surface using rolling walker with Moderate Assistance and minimal verbal cues during minimal excursions.  Washer toss activity performed during cotx session with TR.  Use of BUE to perform activity.      Transfer Training:  Sit to stand:Contact Guard Assistance with Rolling Walker    Chair <> Mat: Step Transfer with Minimal Assistance with  Rolling Walker      Gait Training:  Pt ambulates TotalA with use of Rolling walker.  Pt requires occasional modA, overall maxA to advance RLE.  AFO and knee brace donned.  Excessive ER RLE throughout ambulation.  70ft first trial, 80ft " second trial.  Distance limited by fatigue.  Occasional VC required for sequencing.    Additional Treatment:  Inhibition/facilitation tech performed with resisted hip ext exercise with AROM hip flexion.  Bridging performed with ball b/t knees for neuro re-ed.  Hip adductor stretching.    Pt continues to require gravity eliminated position to perform therex.    Discussed with pt about possible getting a lighter shoe so that it may be easier for pt to advance his RLE.    Activity Tolerance:  Patient tolerated treatment well and Patient limited by fatigue    Patient left up in chair with call button in reach.    Education Provided: roles and goals of PT/PTA, transfer training, bed mob, gait training, balance training, body mechanics and strengthening exercises    Expected compliance:  High compliance        Assessment:  Ayaz Huggins is a 77 y.o. male with a medical diagnosis of Stroke. He presents with R sided weakness. Pt to benefit from skilled PT services to address impairments with progression towards functional independence as tolerated.    Rehab potential is excellent.    Activity tolerance: Good    Discharge recommendations:       Equipment recommendations:       GOALS:   Multidisciplinary Problems     Physical Therapy Goals        Problem: Physical Therapy    Goal Priority Disciplines Outcome Goal Variances Interventions   Physical Therapy Goal     PT, PT/OT Ongoing, Progressing     Description: Bed Mobility:  Roll left and right with supervision/touching assist - MET 7/18/22  Sit to supine transfer with supervision/touching assist. - progressing, cont.  Supine to sit transfer with supervision/touching assist. - progressing, cont.    Transfers:  Sit to stand transfer with supervision/touching assist using RW or LRAD. - MET 7/18/22  Bed to chair transfer with supervision/touching assist using RW or LRAD. - progressing, cont.  Car transfer with partial/moderate assist using RW or LRAD. - progressing, cont.  Sit  to stand transfer with set-up assist with RW - initial    Mobility:  Ambulate 100 feet with partial/moderate assist using RW or LRAD. - progressing, cont.    WC mobility:   150ft with LUE/LLE with independent. - initial                     PLAN:    Patient to be seen 5 x/week  to address the above listed problems via gait training, therapeutic activities, therapeutic exercises, neuromuscular re-education, wheelchair management/training  Plan of Care expires: 07/25/22  Plan of Care reviewed with: patient    7/19/2022

## 2022-07-19 NOTE — PROGRESS NOTES
[facility] ([facility])  Rehab Progress Note    Patient Name: Ayaz Huggins  MRN: [id number]  Age: 77 y.o. Sex: male  : 1945  Hospital Length of Stay: 7 days  Date of Service: 2022   Chief Complaint: Acute left hemispheric CVA s/p CPA and s/p left CEA on 2022    Subjective:     Basic Information  Admit Information: 77-year-old white male presented to [facility] ED on 2022 complaining of right-sided weakness.  PMH significant for DM type 2, CAD s/p CABG x2 in , and HTN.  CT head negative for hemorrhagic bleeding.  Received tPA and transferred to ICU for post tPA monitoring and treatment.  TTE with negative bubble study, but significant for grade 1 diastolic dysfunction.  Carotid ultrasound significant for left ICA stenosis.  MRI significant for last left hemispheric CVA and 3 other small punctate CVAs on the right hemisphere.  Tolerated left CEA on  without periprocedural complications.  Tolerated transfer to [facility] inpatient rehab unit on  without incident.  Today's Information: No acute events overnight.  Sitting in wheelchair comfortably.  Reports good sleep and appetite.  Last BM .  Vital signs at goal with no recent recorded fevers.  No labs or imaging today.  CBG is at goal.    Review of patient's allergies indicates:   Allergen Reactions    Penicillins         Current Facility-Administered Medications:     acetaminophen tablet 650 mg, 650 mg, Oral, Q4H PRN, Alexey A Kathryn, FNP    ALPRAZolam tablet 0.25 mg, 0.25 mg, Oral, TID PRN, Alexey A Kathryn, FNP    amLODIPine tablet 10 mg, 10 mg, Oral, Daily, Alexey A Kathryn, FNP, 10 mg at 22    aspirin tablet 325 mg, 325 mg, Oral, Daily, Alexey A Kathryn, FNP, 325 mg at 22    atorvastatin tablet 80 mg, 80 mg, Oral, QHS, Alexey A Kathryn, FNP, 80 mg at 22    benzonatate capsule 100 mg, 100 mg, Oral, TID PRN, Alexey A Kathryn, FNP    bisacodyL  suppository 10 mg, 10 mg, Rectal, Daily PRN, Alexey A Kathryn, FNP    carvediloL tablet 25 mg, 25 mg, Oral, BID WM, Alexey A Kathryn, FNP, 25 mg at 07/19/22 0814    dextrose 50 % in water (D50W) injection 12.5 g, 12.5 g, Intravenous, PRN, Alexey A Kathryn, FNP    dextrose 50 % in water (D50W) injection 25 g, 25 g, Intravenous, PRN, Alexey A Kathryn, FNP    docusate sodium capsule 100 mg, 100 mg, Oral, BID PRN, Alexey A Kathryn, FNP    glucagon (human recombinant) injection 1 mg, 1 mg, Intramuscular, PRN, Alexey A Kathryn, FNP    glucose chewable tablet 16 g, 16 g, Oral, PRN, Alexey A Kathryn, FNP    glucose chewable tablet 24 g, 24 g, Oral, PRN, Alexey A Kathryn, FNP    hydrALAZINE injection 10 mg, 10 mg, Intravenous, Q4H PRN, Alexey A Kathryn, FNP    HYDROcodone-acetaminophen 5-325 mg per tablet 1 tablet, 1 tablet, Oral, Q6H PRN, Alexey A Kathryn, FNP    hydrOXYzine pamoate capsule 50 mg, 50 mg, Oral, QHS, Alexey A Kathryn, FNP, 50 mg at 07/18/22 2006    insulin aspart U-100 injection 1-10 Units, 1-10 Units, Subcutaneous, QID (AC + HS) PRN, Alexey A Kathryn, FNP, 2 Units at 07/13/22 1213    labetalol 20 mg/4 mL (5 mg/mL) IV syring, 10 mg, Intravenous, Q4H PRN, Alexey A Kathryn, FNP, 10 mg at 07/13/22 0904    lisinopriL tablet 5 mg, 5 mg, Oral, Daily, Alexey A Kathryn, FNP, 5 mg at 07/19/22 0814    metFORMIN tablet 1,000 mg, 1,000 mg, Oral, BID WM, Alexey A Kathryn, FNP, 1,000 mg at 07/19/22 0814    metoprolol injection 10 mg, 10 mg, Intravenous, Q2H PRN, Alexey Narvaezart, FNP    nitroGLYCERIN SL tablet 0.4 mg, 0.4 mg, Sublingual, Q5 Min PRN, Alexeysnow Narvaezart, FNP    ondansetron disintegrating tablet 8 mg, 8 mg, Oral, Q6H PRN, Alexey A Kathryn, FNP    polyethylene glycol packet 17 g, 17 g, Oral, BID PRN, Alexey Narvaezart, FNP    promethazine tablet 25 mg, 25 mg, Oral, Q6H PRN, Alexey A Kathryn, FNP    zinc oxide 16% (WIL'S BUTT  "PASTE) topical ointment, , Topical (Top), TID, Alexey Peralta, FNP, Given at 07/19/22 0540     Review of Systems   Complete 12-point review of symptoms negative except for what's mentioned in HPI     Objective:     /64   Pulse 71   Temp 98 °F (36.7 °C) (Oral)   Resp 18   Ht 5' 10.98" (1.803 m)   Wt 87.9 kg (193 lb 12.6 oz)   SpO2 96%   BMI 27.04 kg/m²        Intake/Output Summary (Last 24 hours) at 7/19/2022 0944  Last data filed at 7/19/2022 0900  Gross per 24 hour   Intake 840 ml   Output 1700 ml   Net -860 ml       Physical Exam  Constitutional:       Appearance: Normal appearance.   HENT:      Head: Normocephalic.      Mouth/Throat:      Mouth: Mucous membranes are moist.   Eyes:      Pupils: Pupils are equal, round, and reactive to light.   Neck:      Comments: Left CEA incision clean intact with mild postsurgical swelling  Cardiovascular:      Rate and Rhythm: Normal rate and regular rhythm.      Heart sounds: Normal heart sounds.   Pulmonary:      Effort: Pulmonary effort is normal.      Breath sounds: Normal breath sounds.   Abdominal:      General: Bowel sounds are normal.      Palpations: Abdomen is soft.   Musculoskeletal:      Cervical back: Neck supple.      Comments: Generalized weakness and muscle atrophy.  Right upper extremity dependent swelling   Skin:     General: Skin is warm and dry.   Neurological:      Mental Status: He is alert and oriented to person, place, and time.      Comments: Right-sided hemiplegia.  Can move RUE against gravity.  LLE flaccid.    Psychiatric:         Mood and Affect: Mood normal.         Behavior: Behavior normal.         Thought Content: Thought content normal.         Judgment: Judgment normal.     *MD performed and documented physical examination       Lines/Drains/Airways     Peripheral Intravenous Line  Duration                Peripheral IV - Single Lumen 07/18/22 1600 22 G Anterior;Distal;Left Forearm <1 day              Labs  Recent Results " (from the past 24 hour(s))   POCT glucose    Collection Time: 07/18/22  4:31 PM   Result Value Ref Range    POCT Glucose 125 (H) 70 - 110 mg/dL   POCT glucose    Collection Time: 07/19/22  5:38 AM   Result Value Ref Range    POCT Glucose 123 (H) 70 - 110 mg/dL     Radiology  MRI brain without contrast on 07/03/2022 at 2:09 p.m., IMPRESSION: Small acute infarcts supratentorially, the largest is a left posterior frontal 3 cm cortical based infarct with petechial hemorrhage.  No mass effect or shift.  Discussed with Dr. Duncan. Otherwise no acute intracranial abnormalities.  Radiology  Transthoracic echo on 07/02/2022 at 2:40 p.m., IMPRESSION: The left ventricle is  with normal systolic function. The estimated ejection fraction is 60%. There is grade I diastolic dysfunction consistent with impaired relaxation. Left atrial pressure is normal.    Assessment/Plan:     77 y.o. WM admitted on 7/12/2022    Acute left hemispheric CVA   - MRI with 3 other small punctate CVAs on the right hemisphere  - s/p tPA and s/p left CEA on 7/8/2022  - continue                Aspirin 325 mg daily                Lipitor 80 mg at bedtime                Xarelto 20 mg daily   - right knee brace obtained on 7/13  - waiting for right AFO from orthotics  - defer to physiatry for rehab and pain management  - PT/OT/RT following      Carotid artery stenosis  - s/p left CEA on 07/08/2022  - continue                Aspirin 325 mg daily                Lipitor 80 mg at bedtime                Xarelto 20 mg daily                Norco 5 mg/325 mg q.6 hours p.r.n.  - to follow up with vascular surgery outpatient     HTN  - BP at goal!!  - discontinued Metoprolol succinate 50 mg daily on 7/13  - continue     Coreg 25 mg BID (initiated 7/13) (increased 7/14)                Norvasc 10 mg daily                Lisinopril 20 mg daily                 Hydralazine 10 mg every 2 hours as needed for BP > 160/90                Labetalol 10 mg every 2 hours as needed  for BP > 160/90     DM type II  - HgA1c with next labs  - continue                Metformin 1000 mg twice daily                ISS   - CBGs AC/HS     Normocytic anemia  - asymptomatic  - H/H stable   - no evidence of active bleeds  - will closely monitor and transfuse if needed      Constipation  - stable, loose stools reported on 7/15  - continue                Colace 100 mg b.i.d. PRN                MiraLax 17 g daily PRN     VTE Prophylaxis:  Xarelto 20 mg daily  COVID-19 testing:  Negative on 07/12/2022  COVID-19 vaccination status:  Vaccinated (Moderna):  x 3     POA: No  Living will: No  Contacts:  Pooja Huggins (wife) [phone]                     Eleonora Manzanares (daughter) [phone]     CODE STATUS: Full Code  Internal Medicine (attending): Weston Rivera MD  Physiatry (consulting): Angel Em MD     OUTPATIENT PROVIDERS  Angel Parham MD  Neurology: Jacque Agee MD  Vascular surgery:  Roxi Lama MD  Cardiology: Dionisio Marin MD     DISPOSITION: Condition stable.  Vital signs at goal.  No labs today.  Good glycemic control.  Bowel management, sleep hygiene, and appetite at goal.  Continues to progress well with therapies.  Monitor closely.  Notify of any acute changes. Staffing below.    Staffing 7/19: Continent of bowel and bladder. PT: supervision to mod assist overall. RT: overall mod to min. Fair appetite. Drinking Boost. OT: overall partial mod. Projected discharge 7/29.      Dorothea Calvert NP conducted independent physical examination and assisted with medical documentation.    Total time spent on this encounter including chart review and direct MD + NP 1-on-1 patient interaction: 41 minutes   Over 50% of this time was spent in counseling and coordination of care

## 2022-07-19 NOTE — PROGRESS NOTES
Subjective  HPI:  78 y/o male with a PMH of Type II DM, diabetic neuropathy, CAD s/p CABG x2 (2007), and HTN who presented to [facility] ED  On 7/2/22 with complaints of right sided upper and lower extremity weakness. Patient's story is unclear upon the time of last known normal. He states he woke up  with a cramp in the RLE and noticed the right sided weakness.The patient was admitted to the ICU with an ischemic CVA and administered tPA; he has had some improvement in his right-sided weakness although he is unable to purposefully move his right lower extremity.  Neurology has seen and evaluated the patient and has deemed this patient stable for transfer to the floor.  This patient was seen and evaluated in the intensive care unit.  7/19: Seen with PT, Amb w/QC about 80ft per bout. Continuing to need assistance to advance RLE especially when getting tired. Motivated and determined. Progressing in therapy without current complaint. VSSAF.     Review of Systems  Depression/Anxiety: noted anxiety with discharge planning     ALPRAZolam tablet 0.25 mg TID PRN Anxiety  Pain: denies     Bowels/Bladder: last BM 7/19    Appetite: good       Sleep: good          Physical Exam  General: well-developed, well-nourished, in no acute distress  Respiratory: equal chest rise, no SOB, no audible wheeze  Cardiovascular: regular rate and rhythm, no edema  Gastrointestinal: soft, non-tender, non-distended   Musculoskeletal: right hemiplegia  Integumentary: no rashes or skin lesions present, redness to right heel/ankle, sacral redness, left neck incision-dressing-c/d/i  Neurologic: cranial nerves intact, right hemiplegia        Assessment/Plan  HTN (hypertension) (Chronic)   Stroke   Carotid artery stenosis, symptomatic, left   Type 2 diabetes mellitus   Heart disease   S/P carotid endarterectomy         Wounds:redness to right heel/ankle, sacral redness, left neck incision-dressing-c/d/i  S/p s/p left CEA on 7/8/2022 (VICENTE Lama MD)  Precautions:  fall risk  Bracing/AD: QC  Swallowing: Diabetic Diet  Function: Tolerating therapy. Continue PT/OT  VTE Prophylaxis:aspirin tablet 325 mg qd   Code Status: FULL CODE   Discharge:Pt. Lives with wife in [place] in a single-story home with 1 step to enter the residence. Pt. completed high school and some college.  He has no  history. He is a retired  for [employer]. Wife is retired.  She drives, cooks, cleans, does laundry, grocery shops, manages finances, and can physically assist pt. She will be home with patient after discharge from rehab.  She is also checking into their long-term care policy. Children (2). Date 7/29 Friday.

## 2022-07-20 LAB
POCT GLUCOSE: 108 MG/DL (ref 70–110)
POCT GLUCOSE: 134 MG/DL (ref 70–110)

## 2022-07-20 PROCEDURE — 94799 UNLISTED PULMONARY SVC/PX: CPT

## 2022-07-20 PROCEDURE — 94761 N-INVAS EAR/PLS OXIMETRY MLT: CPT

## 2022-07-20 PROCEDURE — 11800000 HC REHAB PRIVATE ROOM

## 2022-07-20 PROCEDURE — 97116 GAIT TRAINING THERAPY: CPT | Mod: CQ

## 2022-07-20 PROCEDURE — 99233 PR SUBSEQUENT HOSPITAL CARE,LEVL III: ICD-10-PCS | Mod: ,,, | Performed by: NURSE PRACTITIONER

## 2022-07-20 PROCEDURE — 25000003 PHARM REV CODE 250: Performed by: NURSE PRACTITIONER

## 2022-07-20 PROCEDURE — 99233 SBSQ HOSP IP/OBS HIGH 50: CPT | Mod: ,,, | Performed by: NURSE PRACTITIONER

## 2022-07-20 PROCEDURE — 97530 THERAPEUTIC ACTIVITIES: CPT

## 2022-07-20 PROCEDURE — 97110 THERAPEUTIC EXERCISES: CPT

## 2022-07-20 PROCEDURE — 97535 SELF CARE MNGMENT TRAINING: CPT

## 2022-07-20 RX ORDER — BACLOFEN 5 MG/1
5 TABLET ORAL NIGHTLY
Status: DISCONTINUED | OUTPATIENT
Start: 2022-07-20 | End: 2022-07-26

## 2022-07-20 RX ADMIN — CARVEDILOL 25 MG: 25 TABLET, FILM COATED ORAL at 09:07

## 2022-07-20 RX ADMIN — LISINOPRIL 5 MG: 5 TABLET ORAL at 09:07

## 2022-07-20 RX ADMIN — BACLOFEN 5 MG: 5 TABLET ORAL at 09:07

## 2022-07-20 RX ADMIN — ASPIRIN 325 MG ORAL TABLET 325 MG: 325 PILL ORAL at 09:07

## 2022-07-20 RX ADMIN — ZINC OXIDE TOPICAL OINT.: at 05:07

## 2022-07-20 RX ADMIN — ZINC OXIDE TOPICAL OINT.: at 02:07

## 2022-07-20 RX ADMIN — CARVEDILOL 25 MG: 25 TABLET, FILM COATED ORAL at 05:07

## 2022-07-20 RX ADMIN — ATORVASTATIN CALCIUM 80 MG: 40 TABLET, FILM COATED ORAL at 09:07

## 2022-07-20 RX ADMIN — HYDROXYZINE PAMOATE 50 MG: 50 CAPSULE ORAL at 09:07

## 2022-07-20 RX ADMIN — METFORMIN HYDROCHLORIDE 1000 MG: 500 TABLET, FILM COATED ORAL at 05:07

## 2022-07-20 RX ADMIN — AMLODIPINE BESYLATE 10 MG: 5 TABLET ORAL at 09:07

## 2022-07-20 RX ADMIN — METFORMIN HYDROCHLORIDE 1000 MG: 500 TABLET, FILM COATED ORAL at 09:07

## 2022-07-20 NOTE — PT/OT/SLP PROGRESS
"Physical Therapy         Treatment        Ayaz Huggins   MRN: [id number]     Therapy Minutes  PT Received On: 07/20/22  PT Start Time: 1430  PT Stop Time: 1500  PT Total Time (min): 30 min  PT Individual: 30     Billable Minutes:  Gait [id number]      Treatment Type: Treatment  PT/PTA: PTA     PTA Visit Number: 0       General Precautions: Standard, fall  Orthopedic Precautions: Orthopedic Precautions : N/A   Braces: Braces: AFO (neoprene knee brace)    Patient found with: peripheral IV         Subjective:  "Pt reports that he is ready to try to ambulate again.    Pain/Comfort  Pain Rating 1: 0/10    Objective:  Gait training performed to improve functional strength, to increase functional mobility, safety and independence.    VITALS:  BP: Start: 145/66 77HR End: 136/64 79HR           In sitting    Functional Mobility:  Bed Mobility:   Supine to sit: Standby Assistance with no cues   Sit to supine: Standby Assistance with no cues    Transfer Training:  Sit to stand:Contact Guard Assistance with Hemiwalker      Gait Training:  Patient ambulates 70 feet x2 using Ward-walker with Moderate Assistance and no verbal cues. Pt demonstrating a  reciprocal gait and swing to with decreased ute, decreased step length, decreased stride length, decreased swing-to-stance ratio and decreased weight-shifting ability.Impairments contributing to gait deviations include impaired balance, impaired motor control and impaired postural control.  Pt exhibits increased RLE ER throughout.  Therapist blocking foot in stance to prevent increased ER.  Distance limited by fatigue. Pt able to self advance for the majority of ambulation.  Sx shoe donned throughout.  Activity Tolerance:  Patient tolerated treatment well and Patient limited by fatigue    Patient left HOB elevated with call button in reach.    Education Provided: roles and goals of PT/PTA    Expected compliance:  High compliance        Assessment:  Ayaz Huggins is a 77 y.o. male " with a medical diagnosis of Stroke. He presents with decreased endurance, R sided weakness. Pt to benefit from skilled PT services to address impairments with progression towards functional independence as tolerated.    Rehab potential is excellent.    Activity tolerance: Excellent    Discharge recommendations:       Equipment recommendations:       GOALS:   Multidisciplinary Problems     Physical Therapy Goals        Problem: Physical Therapy    Goal Priority Disciplines Outcome Goal Variances Interventions   Physical Therapy Goal     PT, PT/OT Ongoing, Progressing     Description: Bed Mobility:  Roll left and right with supervision/touching assist - MET 7/18/22  Sit to supine transfer with supervision/touching assist. - progressing, cont.  Supine to sit transfer with supervision/touching assist. - progressing, cont.    Transfers:  Sit to stand transfer with supervision/touching assist using RW or LRAD. - MET 7/18/22  Bed to chair transfer with supervision/touching assist using RW or LRAD. - progressing, cont.  Car transfer with partial/moderate assist using RW or LRAD. - progressing, cont.  Sit to stand transfer with set-up assist with RW - initial    Mobility:  Ambulate 100 feet with partial/moderate assist using RW or LRAD. - progressing, cont.    WC mobility:   150ft with LUE/LLE with independent. - initial                     PLAN:    Patient to be seen 5 x/week  to address the above listed problems via gait training, therapeutic activities, therapeutic exercises, neuromuscular re-education, wheelchair management/training  Plan of Care expires: 07/25/22  Plan of Care reviewed with: patient    7/20/2022

## 2022-07-20 NOTE — PT/OT/SLP PROGRESS
Occupational Therapy  Treatment      Ayaz Huggins   MRN: [id number]   Admitting Diagnosis: Stroke    Therapy Minutes  OT Date of Treatment: 07/20/22  OT Start Time: 0830  OT Stop Time: 0930  OT Total Time (min): 60 min  OT Individual: 60    Billable Minutes:  Self Care/Home Management 40 and Therapeutic Activity 20           General Precautions: Standard, fall  Orthopedic Precautions: N/A    Spiritual, Cultural Beliefs, Muslim Practices, Values that Affect Care: no    Subjective:  Patient communicated he feels like he is moving slower today. (he does better in the afternoons after he has time to wake up)    Pain/Comfort  Pain Rating 1: 0/10  Objective:     Vitals:  Beginning of session: 136/67, HR: 77  End of session: 128/64, HR: 80    Functional Mobility:  Transfer Training:  Pt required Min A and use of bed rails to go from supine>sitting EOB. Pt required assistance with picking up his torso  Bed <> Chair:  Stand Pivot with Moderate Assistance with Rolling Walker    Chair <> Mat: Stand Pivot with Minimal Assistance with  No Assistive Device    Pt required assistance with managing his RLE during all t/f. Pt needed Mod A to stand up from the bed.   ADLs:    Current Status   Functional Area: Care Score:   Oral Hygiene 5   Pt performed @ w/c level.     Lower Body Dressing 3   Pt threaded his legs without assistance sitting unsupported @ EOB. Pt required assistance with standing up to pull up his pants and Min A to maintain an upright position when standing. Pt needed min vc to pull up the right side of his pants. Pt was educated on how to don his R neoprene knee brace. Pt donned the knee brace @ w/c level sitting unsupported with mod vc.   Putting On/Taking Off Footwear 5   Pt don/doff socks @ EOB using the figure 4 position. Pt was able to don his AFO and shoes without any assistance.         Endurance and Strengthening  Pt went from sitting>supine @ mat indep and rolled to his R to lay prone on the mat. Pt needed  Mod A to  his torso to allow his forearms to lay flat on the mat. Pt then pushed into his forearms to perform BUE weightbearing exercises. Pt performed 12 reps to strengthen BUE and enhance his core muscles. Pt also performed pressure relief by laying prone. Pt then rolled to his L and performed a log roll to sit @ EOM. Pt required Min A to  his torso to sit in an erect position @ EOM.    Education Provided: Roles and goals of OT and transfer training    Expected compliance: High compliance    ASSESSMENT:  Ayaz Huggins is a 77 y.o. male with a medical diagnosis of Stroke performed ADL training and BUE strengthening ax to increase ADL independence and safety. Pt is progressing towards OT goals      GOALS:   Multidisciplinary Problems       Occupational Therapy Goals          Problem: Occupational Therapy    Goal Priority Disciplines Outcome Interventions   Occupational Therapy Goal     OT, PT/OT Ongoing, Progressing    Description: LTG: Pt will be Ind with all ADLS.  Pt LTG: to return to bike riding    ADLs:  Pt to perform grooming tasks with [place] from seated position by d/c.  Pt to perform UB dressing with Setup using modified techniques.  Pt to perform LB dressing with SPV using modified techniques.  Pt to perform putting on/off footwear task with SPV.  Pt to perform toileting with Partial A using BSC. = GOAL MET  Pt to perform toileting with CGA using BSC and LRAD.    Functional Transfers:  Pt to perform toilet transfers with Partial A using BSC and LRAD for mobility.    Pt to perform a tub transfer with Max A by re-eval using TTB. = GOAL MET  Pt to perform a tub t/f with CGA via TTB and Gbs.      Balance, Strengthening, Endurance, Balance:  Pt  will perform dynamic standing tasks using LRAD for 2 minutes with SPV assist.  Pt to demonstrate 4/5 R shoulder strength and 5/5 distal RUE strength during functional task/                         Plan:  Patient to be seen daily to address the above  listed problems via self-care/home management, community/work re-entry, therapeutic activities, therapeutic exercises, neuromuscular re-education, cognitive retraining  Plan of Care expires: 07/25/22  Plan of Care reviewed with: patient      07/20/2022

## 2022-07-20 NOTE — PROGRESS NOTES
07/20/22 1130   Rec Therapy Time Calculation   Rec Start Time 1130   Rec Stop Time 1200   Rec Total Time (min) 30 min   Subjective   Patient states Pt stated that he felt stronger today   Attendance   Activity   (Junior Hankins)   Participation Active participation   Assessment   Assessment Sit to stand was contact guard as was dynamic standing balance/reaching. Standing tolerance was 2-3 minutes. RUE coordinaiton/dexteriety was mod. Problem solving skills and memory were I. Determined and motivated   Plan   Planned Therapy Intervention Continue with current plan  (Junior Hankins)   Expected Length of Stay Pending   PT Frequency Minimu of 5 visits per week;Other (see comments)  (Daily)   Time   Treatment time 2 units

## 2022-07-20 NOTE — PT/OT/SLP PROGRESS
"Physical Therapy         Treatment        Ayaz Huggins   MRN: [id number]      [id number]     Billable Minutes:  Gait [id number] and Therapeutic Exercise 45      Treatment Type: Treatment  PT/PTA: PT     PTA Visit Number: 1       General Precautions: Standard, fall  Orthopedic Precautions: Orthopedic Precautions : N/A   Braces: Braces: AFO      Subjective:  "I would like to practice walking again, and to do the exercises I did with Angel Luis yesterday"    Pain/Comfort  Pain Rating 1: 0/10    Objective:  Therapeutic activities, therapeutic exercise performed to increase muscle strength and motor recruitment so that patient may improve quality of functionional mobility and increase functional independence. and Gait training performed to improve functional strength, to increase functional mobility, safety and independence.    VITALS:  BP start: 134/67, 80bpm         In sitting  BP end: 119/65, 76bpm      Functional Mobility:  Bed Mobility:   Supine to sit: Minimal Assistance with Mod cues   Sit to supine: Minimal Assistance with Min cues    Transfer Training:  Sit to stand:Contact Guard Assistance with Rolling Walker and Hemiwalker    Chair <> Mat: Stand Pivot with Contact Guard Assistance with  No Assistive Device Going toward pt's L side    Wheelchair Training:  Patient propels wheelchair 150 feet using LUE and LLE with  Stand-by Assistance and no verbal cues.    Gait Training:  - Patient ambulated 40 feet + 65 feet using rolling walker with Total Assistance d/t requiring 2 people for safety and Moderate verbal cues. Min-Mod A from PT to assist with advancing RLE, frequent VC for sequencing and to take larger steps with LLE. 2nd person following closely behind with WC.  - Pt then ambulated 55 feet + 50 feet using a Ward-walker instead of RW. Pt with better advancement of RLE. Gait belt placed around pt's R foot to allow for PT to guide foot placement and prevent external rotation, but pt able to initiate advancing the foot " forward, to a step-to pattern. VC required for sequencing with HW. 2nd person following closely behind with WC, however pt demonstrated good balance.     Additional Treatment:  - Seated in WC, performed RLE: hamstring, hip external rotators/abductors, and calf stretches. Also performed nerve glides on RLE.  - Seated in WC, 2x10 RLE hip/knee flexion and extension AAROM.   - Supine on therapy mat: Bridges 2x10, R hip add with bent knees 2x10, hip/knee flexion AROM 3x7 (pt fatigued quickly), hip extension 3x7      Activity Tolerance:  Patient tolerated treatment well    Patient left up in chair with JAMARCUS Barfield present for next treatment session    Education Provided: transfer training, bed mob, gait training, balance training, safety awareness, body mechanics, assistive device, wheelchair management and strengthening exercises    Expected compliance:  High compliance        Assessment:  Ayaz Huggins is a 77 y.o. male with a medical diagnosis of Stroke. He presents with R sided weakness and increased tone in R hip external rotators. Pt to benefit from skilled PT services to address impairments with progression towards functional independence as tolerated.    Rehab potential is excellent.    Activity tolerance: Good     Discharge recommendations: Discharge Facility/Level of Care Needs:  (Pending progress)     Equipment recommendations: Equipment Needed After Discharge: wheelchair (Pending progress)     GOALS:   Multidisciplinary Problems     Physical Therapy Goals        Problem: Physical Therapy    Goal Priority Disciplines Outcome Goal Variances Interventions   Physical Therapy Goal     PT, PT/OT Ongoing, Progressing     Description: Bed Mobility:  Roll left and right with supervision/touching assist - MET 7/18/22  Sit to supine transfer with supervision/touching assist. - progressing, cont.  Supine to sit transfer with supervision/touching assist. - progressing, cont.    Transfers:  Sit to stand transfer with  supervision/touching assist using RW or LRAD. - MET 7/18/22  Bed to chair transfer with supervision/touching assist using RW or LRAD. - progressing, cont.  Car transfer with partial/moderate assist using RW or LRAD. - progressing, cont.  Sit to stand transfer with set-up assist with RW - initial    Mobility:  Ambulate 100 feet with partial/moderate assist using RW or LRAD. - progressing, cont.    WC mobility:   150ft with LUE/LLE with independent. - initial                   Additional Info:   - Discussed with SANDRA Hu about starting pt on a small dose of Baclofen to help reduce hip ER tone. Also requested to re-consult  to obtain a smaller knee brace and to re-fit the pt's AFO, as both are currently too large on him.   - Discussed with Bret OT about changing pt's primary AD from a RW to a HW or LBQC, to which they agreed. Continue to practice transfers and ambulation with HW/LQBC.     PLAN:    Patient to be seen 5 x/week  to address the above listed problems via gait training, therapeutic activities, therapeutic exercises, neuromuscular re-education, wheelchair management/training  Plan of Care expires: 07/25/22  Plan of Care reviewed with: patient    7/20/2022

## 2022-07-20 NOTE — PROGRESS NOTES
Subjective  HPI:  78 y/o male with a PMH of Type II DM, diabetic neuropathy, CAD s/p CABG x2 (2007), and HTN who presented to [facility] ED  On 7/2/22 with complaints of right sided upper and lower extremity weakness. Patient's story is unclear upon the time of last known normal. He states he woke up  with a cramp in the RLE and noticed the right sided weakness.The patient was admitted to the ICU with an ischemic CVA and administered tPA; he has had some improvement in his right-sided weakness although he is unable to purposefully move his right lower extremity.  Neurology has seen and evaluated the patient and has deemed this patient stable for transfer to the floor.  This patient was seen and evaluated in the intensive care unit.  7/20: Seen with PT, aKren dempsey/herrera. Continued external rotation to RLE. PT reports increase in tone and strength. Trial of baclofen 5mg nightly. PT reports knee brace and AFO too large and not supplying proper support. Orthotic company consulted for adjustments. Tolerating therapy without complaint. VSSAF.     Review of Systems  Depression/Anxiety: noted anxiety with discharge planning     ALPRAZolam tablet 0.25 mg TID PRN Anxiety  Pain: denies     Bowels/Bladder: last BM 7/19 x 2  Appetite: good       Sleep: good          Physical Exam  General: well-developed, well-nourished, in no acute distress  Respiratory: equal chest rise, no SOB, no audible wheeze  Cardiovascular: regular rate and rhythm, no edema  Gastrointestinal: soft, non-tender, non-distended   Musculoskeletal: right hemiplegia  Integumentary: no rashes or skin lesions present, redness to right heel/ankle, sacral redness, left neck incision-dressing-c/d/i  Neurologic: cranial nerves intact, right hemiplegia        Assessment/Plan  HTN (hypertension) (Chronic)   Stroke   Carotid artery stenosis, symptomatic, left   Type 2 diabetes mellitus   Heart disease   S/P carotid endarterectomy         Wounds:redness to right heel/ankle,  sacral redness, left neck incision-dressing-c/d/i  S/p s/p left CEA on 7/8/2022 (VICENTE Lama MD)  Precautions: fall risk  Bracing/AD: QC  Swallowing: Diabetic Diet  Function: Tolerating therapy. Continue PT/OT  VTE Prophylaxis:aspirin tablet 325 mg qd   Code Status: FULL CODE   Discharge:Pt. Lives with wife in [place] in a single-story home with 1 step to enter the residence. Pt. completed high school and some college.  He has no  history. He is a retired  for [employer]. Wife is retired.  She drives, cooks, cleans, does laundry, grocery shops, manages finances, and can physically assist pt. She will be home with patient after discharge from rehab.  She is also checking into their long-term care policy. Children (2). Date 7/29 Friday.

## 2022-07-20 NOTE — PROGRESS NOTES
[facility] ([facility])  Rehab Progress Note    Patient Name: Ayaz Huggins  MRN: [id number]  Age: 77 y.o. Sex: male  : 1945  Hospital Length of Stay: 8 days  Date of Service: 2022   Chief Complaint: Acute left hemispheric CVA s/p CPA and s/p left CEA on 2022    Subjective:     Basic Information  Admit Information: 77-year-old white male presented to [facility] ED on 2022 complaining of right-sided weakness.  PMH significant for DM type 2, CAD s/p CABG x2 in , and HTN.  CT head negative for hemorrhagic bleeding.  Received tPA and transferred to ICU for post tPA monitoring and treatment.  TTE with negative bubble study, but significant for grade 1 diastolic dysfunction.  Carotid ultrasound significant for left ICA stenosis.  MRI significant for last left hemispheric CVA and 3 other small punctate CVAs on the right hemisphere.  Tolerated left CEA on  without periprocedural complications.  Tolerated transfer to [facility] inpatient rehab unit on  without incident.  Today's Information: No acute events overnight.  Laying in bed comfortably.  Reports good sleep and appetite.  Last BM .  Vital signs at goal with no recent recorded fevers.  No labs or imaging today.  CBG is at goal.    Review of patient's allergies indicates:   Allergen Reactions    Penicillins         Current Facility-Administered Medications:     acetaminophen tablet 650 mg, 650 mg, Oral, Q4H PRN, Alexey A Kathryn, FNP    ALPRAZolam tablet 0.25 mg, 0.25 mg, Oral, TID PRN, Alexey A Kathryn, FNP    amLODIPine tablet 10 mg, 10 mg, Oral, Daily, Alexey A Kathryn, FNP, 10 mg at 22    aspirin tablet 325 mg, 325 mg, Oral, Daily, Alexey A Kathryn, FNP, 325 mg at 22    atorvastatin tablet 80 mg, 80 mg, Oral, QHS, Alexey A Kathryn, FNP, 80 mg at 22    benzonatate capsule 100 mg, 100 mg, Oral, TID PRN, Alexey A Kathryn, FNP    bisacodyL suppository  10 mg, 10 mg, Rectal, Daily PRN, Alexey A Kathryn, FNP    carvediloL tablet 25 mg, 25 mg, Oral, BID WM, Alexey A Kathryn, FNP, 25 mg at 07/20/22 0929    dextrose 50 % in water (D50W) injection 12.5 g, 12.5 g, Intravenous, PRN, Alexey A Kathryn, FNP    dextrose 50 % in water (D50W) injection 25 g, 25 g, Intravenous, PRN, Alexey A Kathryn, FNP    docusate sodium capsule 100 mg, 100 mg, Oral, BID PRN, Alexey A Kathryn, FNP    glucagon (human recombinant) injection 1 mg, 1 mg, Intramuscular, PRN, Alexey A Kathryn, FNP    glucose chewable tablet 16 g, 16 g, Oral, PRN, Alexey A Kathryn, FNP    glucose chewable tablet 24 g, 24 g, Oral, PRN, Alexey A Kathryn, FNP    hydrALAZINE injection 10 mg, 10 mg, Intravenous, Q4H PRN, Alexey A Kathryn, FNP    HYDROcodone-acetaminophen 5-325 mg per tablet 1 tablet, 1 tablet, Oral, Q6H PRN, Alexey A Kathryn, FNP    hydrOXYzine pamoate capsule 50 mg, 50 mg, Oral, QHS, Alexey A Kathryn, FNP, 50 mg at 07/19/22 2011    insulin aspart U-100 injection 1-10 Units, 1-10 Units, Subcutaneous, QID (AC + HS) PRN, Alexey A Kathryn, FNP, 2 Units at 07/13/22 1213    labetalol 20 mg/4 mL (5 mg/mL) IV syring, 10 mg, Intravenous, Q4H PRN, Alexey A Kathryn, FNP, 10 mg at 07/13/22 0904    lisinopriL tablet 5 mg, 5 mg, Oral, Daily, Alexey A Kathryn, FNP, 5 mg at 07/20/22 0929    metFORMIN tablet 1,000 mg, 1,000 mg, Oral, BID WM, Alexey A Kathryn, FNP, 1,000 mg at 07/20/22 0929    metoprolol injection 10 mg, 10 mg, Intravenous, Q2H PRN, Alexey A Kathryn, FNP    nitroGLYCERIN SL tablet 0.4 mg, 0.4 mg, Sublingual, Q5 Min PRN, Alexey A Kathryn, FNP    ondansetron disintegrating tablet 8 mg, 8 mg, Oral, Q6H PRN, Alexey A Kathryn, FNP    polyethylene glycol packet 17 g, 17 g, Oral, BID PRN, Alexey A Kathryn, FNP    promethazine tablet 25 mg, 25 mg, Oral, Q6H PRN, Alexey A Kathryn, FNP    zinc oxide 16% (WIL'S BUTT PASTE) topical  "ointment, , Topical (Top), TID, Alexey ARMAS Kathryn, FNP, Given at 07/20/22 0550     Review of Systems   Complete 12-point review of symptoms negative except for what's mentioned in HPI     Objective:     /67   Pulse 77   Temp 98.4 °F (36.9 °C) (Oral)   Resp 20   Ht 5' 10.98" (1.803 m)   Wt 87.9 kg (193 lb 12.6 oz)   SpO2 96%   BMI 27.04 kg/m²        Intake/Output Summary (Last 24 hours) at 7/20/2022 1033  Last data filed at 7/20/2022 0800  Gross per 24 hour   Intake 1080 ml   Output 750 ml   Net 330 ml       Physical Exam  Constitutional:       Appearance: Normal appearance.   HENT:      Head: Normocephalic.      Mouth/Throat:      Mouth: Mucous membranes are moist.   Eyes:      Pupils: Pupils are equal, round, and reactive to light.   Neck:      Comments: Left CEA incision clean intact with mild postsurgical swelling  Cardiovascular:      Rate and Rhythm: Normal rate and regular rhythm.      Heart sounds: Normal heart sounds.   Pulmonary:      Effort: Pulmonary effort is normal.      Breath sounds: Normal breath sounds.   Abdominal:      General: Bowel sounds are normal.      Palpations: Abdomen is soft.   Musculoskeletal:      Cervical back: Neck supple.      Comments: Generalized weakness and muscle atrophy.  Right upper extremity dependent swelling   Skin:     General: Skin is warm and dry.   Neurological:      Mental Status: He is alert and oriented to person, place, and time.      Comments: Right-sided hemiplegia.  Can move RUE against gravity.  LLE flaccid.    Psychiatric:         Mood and Affect: Mood normal.         Behavior: Behavior normal.         Thought Content: Thought content normal.         Judgment: Judgment normal.     *MD performed and documented physical examination       Lines/Drains/Airways     Peripheral Intravenous Line  Duration                Peripheral IV - Single Lumen 07/18/22 1600 22 G Anterior;Distal;Left Forearm 1 day              Labs  Recent Results (from the past 24 " hour(s))   POCT glucose    Collection Time: 07/20/22  5:48 AM   Result Value Ref Range    POCT Glucose 108 70 - 110 mg/dL     Radiology  MRI brain without contrast on 07/03/2022 at 2:09 p.m., IMPRESSION: Small acute infarcts supratentorially, the largest is a left posterior frontal 3 cm cortical based infarct with petechial hemorrhage.  No mass effect or shift.  Discussed with Dr. Duncan. Otherwise no acute intracranial abnormalities.  Radiology  Transthoracic echo on 07/02/2022 at 2:40 p.m., IMPRESSION: The left ventricle is  with normal systolic function. The estimated ejection fraction is 60%. There is grade I diastolic dysfunction consistent with impaired relaxation. Left atrial pressure is normal.    Assessment/Plan:     77 y.o. WM admitted on 7/12/2022    Acute left hemispheric CVA   - MRI with 3 other small punctate CVAs on the right hemisphere  - s/p tPA and s/p left CEA on 7/8/2022  - continue                Aspirin 325 mg daily                Lipitor 80 mg at bedtime                Xarelto 20 mg daily   - right knee brace obtained on 7/13  - waiting for right AFO from orthotics  - defer to physiatry for rehab and pain management  - PT/OT/RT following      Carotid artery stenosis  - s/p left CEA on 07/08/2022  - continue                Aspirin 325 mg daily                Lipitor 80 mg at bedtime                Xarelto 20 mg daily                Norco 5 mg/325 mg q.6 hours p.r.n.  - to follow up with vascular surgery outpatient     HTN  - BP at goal!!  - discontinued Metoprolol succinate 50 mg daily on 7/13  - continue     Coreg 25 mg BID (initiated 7/13) (increased 7/14)                Norvasc 10 mg daily                Lisinopril 20 mg daily                 Hydralazine 10 mg every 2 hours as needed for BP > 160/90                Labetalol 10 mg every 2 hours as needed for BP > 160/90     DM type II  - HgA1c with next labs  - continue                Metformin 1000 mg twice daily                ISS   - CBGs  AC/HS     Normocytic anemia  - asymptomatic  - H/H stable   - no evidence of active bleeds  - will closely monitor and transfuse if needed      Constipation  - stable, loose stools reported on 7/15  - continue                Colace 100 mg b.i.d. PRN                MiraLax 17 g daily PRN     VTE Prophylaxis:  Xarelto 20 mg daily  COVID-19 testing:  Negative on 07/12/2022  COVID-19 vaccination status:  Vaccinated (Moderna):  x 3     POA: No  Living will: No  Contacts:  Pooja Huggins (wife) [phone]                     Eleonora Manzanares (daughter) [phone]     CODE STATUS: Full Code  Internal Medicine (attending): Weston Rivera MD  Physiatry (consulting): Angel Em MD     OUTPATIENT PROVIDERS  Angel Parham MD  Neurology: Jacque Agee MD  Vascular surgery:  Roxi Lama MD  Cardiology: Dionisio Marin MD     DISPOSITION: Condition stable.  Vital signs at goal.  No labs today.  Good glycemic control.  Bowel management, sleep hygiene, and appetite at goal.  Continues to progress well with therapies.  Monitor closely.  Notify of any acute changes.     Staffing 7/19: Continent of bowel and bladder. PT: supervision to mod assist overall. RT: overall mod to min. Fair appetite. Drinking Boost. OT: overall partial mod. Projected discharge 7/29.      Dorothea Calvert NP conducted independent physical examination and assisted with medical documentation.    Total time spent on this encounter including chart review and direct MD + NP 1-on-1 patient interaction: 36 minutes   Over 50% of this time was spent in counseling and coordination of care

## 2022-07-21 LAB
POCT GLUCOSE: 108 MG/DL (ref 70–110)
POCT GLUCOSE: 140 MG/DL (ref 70–110)
POCT GLUCOSE: 144 MG/DL (ref 70–110)
POCT GLUCOSE: 86 MG/DL (ref 70–110)

## 2022-07-21 PROCEDURE — 25000003 PHARM REV CODE 250: Performed by: NURSE PRACTITIONER

## 2022-07-21 PROCEDURE — 11800000 HC REHAB PRIVATE ROOM

## 2022-07-21 PROCEDURE — 94799 UNLISTED PULMONARY SVC/PX: CPT

## 2022-07-21 PROCEDURE — 97112 NEUROMUSCULAR REEDUCATION: CPT

## 2022-07-21 PROCEDURE — 94761 N-INVAS EAR/PLS OXIMETRY MLT: CPT

## 2022-07-21 PROCEDURE — 97116 GAIT TRAINING THERAPY: CPT

## 2022-07-21 PROCEDURE — 97110 THERAPEUTIC EXERCISES: CPT

## 2022-07-21 PROCEDURE — 97530 THERAPEUTIC ACTIVITIES: CPT

## 2022-07-21 RX ADMIN — LISINOPRIL 5 MG: 5 TABLET ORAL at 08:07

## 2022-07-21 RX ADMIN — CARVEDILOL 25 MG: 25 TABLET, FILM COATED ORAL at 08:07

## 2022-07-21 RX ADMIN — ATORVASTATIN CALCIUM 80 MG: 40 TABLET, FILM COATED ORAL at 09:07

## 2022-07-21 RX ADMIN — CARVEDILOL 25 MG: 25 TABLET, FILM COATED ORAL at 05:07

## 2022-07-21 RX ADMIN — BACLOFEN 5 MG: 5 TABLET ORAL at 09:07

## 2022-07-21 RX ADMIN — HYDROXYZINE PAMOATE 50 MG: 50 CAPSULE ORAL at 09:07

## 2022-07-21 RX ADMIN — METFORMIN HYDROCHLORIDE 1000 MG: 500 TABLET, FILM COATED ORAL at 04:07

## 2022-07-21 RX ADMIN — METFORMIN HYDROCHLORIDE 1000 MG: 500 TABLET, FILM COATED ORAL at 08:07

## 2022-07-21 RX ADMIN — ZINC OXIDE TOPICAL OINT.: at 02:07

## 2022-07-21 RX ADMIN — ASPIRIN 325 MG ORAL TABLET 325 MG: 325 PILL ORAL at 08:07

## 2022-07-21 RX ADMIN — AMLODIPINE BESYLATE 10 MG: 5 TABLET ORAL at 08:07

## 2022-07-21 NOTE — PT/OT/SLP PROGRESS
"Physical Therapy         Treatment        Ayaz Huggins   MRN: [id number]      [id number]     Billable Minutes:  Gait [id number], Therapeutic Activity 10 and Therapeutic Exercise 15      Treatment Type: Treatment  PT/PTA: PT     PTA Visit Number: 0       General Precautions: Standard, fall  Orthopedic Precautions: Orthopedic Precautions : N/A   Braces:  Ward-sling RUE, AFO, neoprene knee brace         Subjective:  "I'm ready to walk"     Objective:  Therapeutic activities, therapeutic exercise performed to increase muscle strength and motor recruitment so that patient may improve quality of functionional mobility and increase functional independence. and Gait training performed to improve functional strength, to increase functional mobility, safety and independence.    VITALS:  BP start: 138/71, 76bpm      In sitting  BP end: 120/57, 72bpm    Functional Mobility:  Bed Mobility:   Supine to sit: Moderate Assistance with Min cues   Sit to supine: Contact Guard Assistance with no cues    Transfer Training:  Sit to stand:Contact Guard Assistance with Quad Cane and Hemiwalker    Chair <> Mat: Stand Pivot with Contact Guard Assistance with  No Assistive Device   toward pt's L side    Gait Training:  - Patient ambulated 105 feet + 50ft using Hemiwalker with Total Assistance d/t requiring 2 people for safety and Mod verbal cues. Min-Mod A x1 plus another following closely behind with WC.  - Patient then attempted ambulation with LBQC, 80ft. One moderate LOB at end of distance. Pt reports that he prefers LBQC over HW. Will continue to practice with this AD.   During all bouts of ambulation, pt was able to advance the RLE with only occasional assistance. AFO, knee brace, shoe cover, and gait belt around foot.     Additional Treatment:  - Supine on therapy mat: Bridges 1x10 without ball, 1x10 with ball to increase hip add. Bent knee RLE hip add/abd AROM 2x8. Hip ER/abd stretch.     Activity Tolerance:  Patient tolerated treatment " well    Patient left up in chair with call button in reach and chair alarm on.    Education Provided: transfer training, bed mob, gait training, body mechanics, assistive device, wheelchair management and strengthening exercises    Expected compliance:  High compliance        Assessment:  Ayaz Huggins is a 77 y.o. male with a medical diagnosis of Stroke. He presents with R sided weakness. Pt to benefit from skilled PT services to address impairments with progression towards functional independence as tolerated.    Rehab potential is excellent.    Activity tolerance: Good    Discharge recommendations: Discharge Facility/Level of Care Needs:  (Pending progress)     Equipment recommendations: Equipment Needed After Discharge: wheelchair (Pending progress)     GOALS:   Multidisciplinary Problems     Physical Therapy Goals        Problem: Physical Therapy    Goal Priority Disciplines Outcome Goal Variances Interventions   Physical Therapy Goal     PT, PT/OT Ongoing, Progressing     Description: Bed Mobility:  Roll left and right with supervision/touching assist - MET 7/18/22  Sit to supine transfer with supervision/touching assist. - progressing, cont.  Supine to sit transfer with supervision/touching assist. - progressing, cont.    Transfers:  Sit to stand transfer with supervision/touching assist using RW or LRAD. - MET 7/18/22  Bed to chair transfer with supervision/touching assist using RW or LRAD. - progressing, cont.  Car transfer with partial/moderate assist using RW or LRAD. - progressing, cont.  Sit to stand transfer with set-up assist with RW - initial    Mobility:  Ambulate 100 feet with partial/moderate assist using RW or LRAD. - progressing, cont.    WC mobility:   150ft with LUE/LLE with independent. - initial                     PLAN:    Patient to be seen 5 x/week  to address the above listed problems via gait training, therapeutic activities, therapeutic exercises, neuromuscular re-education, wheelchair  management/training  Plan of Care expires: 07/25/22  Plan of Care reviewed with: patient    7/21/2022

## 2022-07-21 NOTE — PT/OT/SLP PROGRESS
Occupational Therapy  Treatment      Ayaz Huggins   MRN: [id number]   Admitting Diagnosis: Stroke         Billable Minutes:  Therapeutic Exercise 40 and Neuromuscular Re-education 20               General Precautions: Standard, fall  Orthopedic Precautions: N/A    Spiritual, Cultural Beliefs, Restoration Practices, Values that Affect Care: no    Subjective:  Patient communicated no new complaints.    Pain/Comfort  Pain Rating 1: 0/10  Objective:     Vitals:  Beginning of session: 109/59, HR: 73  End of session: 114/61, HR: 71      Endurance and Strengthening  Pt performed a stand step t/f w/c <> mat using LBQC and CGA. Pt needed min vc to initiate motor movements when stepping during the t/f. Pt then performed core strengthening exercises with a 4.4 lb ball @ EOM. Pt twisted with the ball to each side while holding the ball in front of him and then performed Russian twist x 10 reps for 2 sets. Pt then pushed the ball up to his head x 10 reps, but pt couldn't raise the ball above his head due to poor RUE shoulder flexor strength.    Pt performed modified sit ups with a wedge and one pillow sitting EOM to promote his core strength x 10 reps of 4 sets. Pt needed min vc to perform the sit ups slowly and controlled to maximize his core muscles.    Pt retracted B scapula for 3-5 seconds x 10 reps for 3 rounds to promote better posture. Pt was sitting EOM and needed vc to look up (extend cervical muscles) and maintain an erect posture. Verbal and tactile cues improved his motor activation during the ax. Pt was educated on performing these exercises while sitting in his w/c to promote his trunk strength to enhance his functional independence with ADLs and ambulating.     Neuromuscular re-ed  Pt performed ax sitting EOM to promote weightbearing into his LE and enhance his trunk control during the reaching ax. 6 cones were placed on a table on his R side in front of him. Pt used his RUE to reach for the cones individually and  then cross midline to place the cones on his L side. Pt had to anteriorly lean to grab the cone and weightbear into his RLE to promote strengthening in his LE and his trunk control. Pt performed the ax x 2 rounds.    Home management  Pt used BUE to put a pillow into a pillow case x 2 @ w/c level to increase his BUE strength with functional tasks and BUE ROM.    Education Provided: Roles and goals of OT, transfer training and body mechanics    Expected compliance: High compliance    ASSESSMENT:  Ayaz Huggins is a 77 y.o. male with a medical diagnosis of Stroke performed BUE therex core and training to increase ADL independence and safety. Pt is progressing towards OT goals      GOALS:   Multidisciplinary Problems       Occupational Therapy Goals          Problem: Occupational Therapy    Goal Priority Disciplines Outcome Interventions   Occupational Therapy Goal     OT, PT/OT Ongoing, Progressing    Description: LTG: Pt will be Ind with all ADLS.  Pt LTG: to return to bike riding    ADLs:  Pt to perform grooming tasks with [place] from seated position by d/c.  Pt to perform UB dressing with Setup using modified techniques.  Pt to perform LB dressing with SPV using modified techniques.  Pt to perform putting on/off footwear task with SPV.  Pt to perform toileting with Partial A using BSC. = GOAL MET  Pt to perform toileting with CGA using BSC and LRAD.    Functional Transfers:  Pt to perform toilet transfers with Partial A using BSC and LRAD for mobility.    Pt to perform a tub transfer with Max A by re-eval using TTB. = GOAL MET  Pt to perform a tub t/f with CGA via TTB and Gbs.      Balance, Strengthening, Endurance, Balance:  Pt  will perform dynamic standing tasks using LRAD for 2 minutes with SPV assist.  Pt to demonstrate 4/5 R shoulder strength and 5/5 distal RUE strength during functional task/                         Plan:  Patient to be seen daily to address the above listed problems via self-care/home  management, community/work re-entry, therapeutic activities, therapeutic exercises, neuromuscular re-education, cognitive retraining  Plan of Care expires: 07/25/22  Plan of Care reviewed with: patient      07/21/2022

## 2022-07-21 NOTE — PT/OT/SLP PROGRESS
"Physical Therapy         Treatment        Ayaz Huggins   MRN: [id number]     Therapy Minutes  PT Start Time: 1400  PT Stop Time: 1500  PT Total Time (min): 60 min  PT Individual: 60     Billable Minutes:  Therapeutic Activity 15, Therapeutic Exercise 15 and Neuromuscular Re-education 30      Treatment Type: Treatment  PT/PTA: PT     PTA Visit Number: 0       General Precautions: Standard, fall  Orthopedic Precautions: Orthopedic Precautions : N/A   Braces: Braces: AFO        Subjective:  "My left leg is tired"     Objective:  Therapeutic activities, therapeutic exercise performed to increase muscle strength and motor recruitment so that patient may improve quality of functionional mobility and increase functional independence.    VITALS:  BP start: 114/61, 71bpm       In sitting  BP end: 133/69, 76bpm in supine    Functional Mobility:  Bed Mobility:   Sit to supine: Standby Assistance with no cues    Transfer Training:  Sit to stand:Contact Guard Assistance with Quad Cane    Bed <> Chair:  Stand Pivot with Contact Guard Assistance with No Assistive Device      Gait Training:  Patient ambulates 30 feet using LBQC with Total Assistance and mod verbal cues. Distance limited by fatigue, especially muscle fatigue in LLE.    Additional Treatment:  - Seated in WC with knee brace/AFO/shoe taken off, pt performed: hip flexion 2x5, hip add against manual resistance 3x5, hip abd against manual resistance 2x5, knee flex/ext with foot on skate 2x10  - NMES estim on RLE quad x10 min and on RLE ankle DF's x8 min. VC for pt to attempt to extend knee/DF foot during "on" times.     Activity Tolerance:  Patient tolerated treatment well and Patient limited by fatigue    Patient left HOB elevated with call button in reach.    Education Provided: gait training, body mechanics and strengthening exercises    Expected compliance:  High compliance        Assessment:  Ayaz Huggins is a 77 y.o. male with a medical diagnosis of Stroke. He " presents with R sided weakness. Pt to benefit from skilled PT services to address impairments with progression towards functional independence as tolerated.    Rehab potential is good.    Activity tolerance: Good    Discharge recommendations: Discharge Facility/Level of Care Needs:  (Pending progress)     Equipment recommendations: Equipment Needed After Discharge: wheelchair (Pending progress)     GOALS:   Multidisciplinary Problems     Physical Therapy Goals        Problem: Physical Therapy    Goal Priority Disciplines Outcome Goal Variances Interventions   Physical Therapy Goal     PT, PT/OT Ongoing, Progressing     Description: Bed Mobility:  Roll left and right with supervision/touching assist - MET 7/18/22  Sit to supine transfer with supervision/touching assist. - progressing, cont.  Supine to sit transfer with supervision/touching assist. - progressing, cont.    Transfers:  Sit to stand transfer with supervision/touching assist using RW or LRAD. - MET 7/18/22  Bed to chair transfer with supervision/touching assist using RW or LRAD. - progressing, cont.  Car transfer with partial/moderate assist using RW or LRAD. - progressing, cont.  Sit to stand transfer with set-up assist with RW - initial    Mobility:  Ambulate 100 feet with partial/moderate assist using RW or LRAD. - progressing, cont.    WC mobility:   150ft with LUE/LLE with independent. - initial                     PLAN:    Patient to be seen 5 x/week  to address the above listed problems via gait training, therapeutic activities, therapeutic exercises, neuromuscular re-education, wheelchair management/training  Plan of Care expires: 07/25/22  Plan of Care reviewed with: patient    7/21/2022

## 2022-07-21 NOTE — PROGRESS NOTES
[facility] ([facility])  Rehab Progress Note    Patient Name: Ayaz Huggins  MRN: [id number]  Age: 77 y.o. Sex: male  : 1945  Hospital Length of Stay: 9 days  Date of Service: 2022   Chief Complaint: Acute left hemispheric CVA s/p CPA and s/p left CEA on 2022    Subjective:     Basic Information  Admit Information: 77-year-old white male presented to [facility] ED on 2022 complaining of right-sided weakness.  PMH significant for DM type 2, CAD s/p CABG x2 in , and HTN.  CT head negative for hemorrhagic bleeding.  Received tPA and transferred to ICU for post tPA monitoring and treatment.  TTE with negative bubble study, but significant for grade 1 diastolic dysfunction.  Carotid ultrasound significant for left ICA stenosis.  MRI significant for last left hemispheric CVA and 3 other small punctate CVAs on the right hemisphere.  Tolerated left CEA on  without periprocedural complications.  Tolerated transfer to [facility] inpatient rehab unit on  without incident.  Today's Information: No acute events overnight.  Sitting in chair comfortably eating breakfast.  Reports good sleep and appetite.  Last BM .  Vital signs at goal with no recent recorded fevers.  No labs or imaging today.  CBGs at goal.    Review of patient's allergies indicates:   Allergen Reactions    Penicillins         Current Facility-Administered Medications:     acetaminophen tablet 650 mg, 650 mg, Oral, Q4H PRN, Alexey A Kathryn, FNP    ALPRAZolam tablet 0.25 mg, 0.25 mg, Oral, TID PRN, Alexey A Kathryn, FNP    amLODIPine tablet 10 mg, 10 mg, Oral, Daily, Alexey A Kathryn, FNP, 10 mg at 22 0849    aspirin tablet 325 mg, 325 mg, Oral, Daily, Alexey A Kathryn, FNP, 325 mg at 22 0849    atorvastatin tablet 80 mg, 80 mg, Oral, QHS, Alexey A Kathryn, FNP, 80 mg at 22 210    baclofen tablet 5 mg, 5 mg, Oral, QHS, Meghan Wolf, FNP, 5 mg at 22  2102    benzonatate capsule 100 mg, 100 mg, Oral, TID PRN, Alexey A Kathryn, FNP    bisacodyL suppository 10 mg, 10 mg, Rectal, Daily PRN, Alexey A Kathryn, FNP    carvediloL tablet 25 mg, 25 mg, Oral, BID WM, Alexey A Kathryn, FNP, 25 mg at 07/21/22 0849    dextrose 50 % in water (D50W) injection 12.5 g, 12.5 g, Intravenous, PRN, Alexey A Kathryn, FNP    dextrose 50 % in water (D50W) injection 25 g, 25 g, Intravenous, PRN, Alexey A Kathryn, FNP    docusate sodium capsule 100 mg, 100 mg, Oral, BID PRN, Alexey A Kathryn, FNP    glucagon (human recombinant) injection 1 mg, 1 mg, Intramuscular, PRN, Alexey A Kathryn, FNP    glucose chewable tablet 16 g, 16 g, Oral, PRN, Alexey A Kathryn, FNP    glucose chewable tablet 24 g, 24 g, Oral, PRN, Alexey A Kathryn, FNP    hydrALAZINE injection 10 mg, 10 mg, Intravenous, Q4H PRN, Alexey A Kathryn, FNP    HYDROcodone-acetaminophen 5-325 mg per tablet 1 tablet, 1 tablet, Oral, Q6H PRN, Alexey A Kathryn, FNP    hydrOXYzine pamoate capsule 50 mg, 50 mg, Oral, QHS, Alexey A Kathryn, FNP, 50 mg at 07/20/22 2101    insulin aspart U-100 injection 1-10 Units, 1-10 Units, Subcutaneous, QID (AC + HS) PRN, Alexey A Kathryn, FNP, 2 Units at 07/13/22 1213    labetalol 20 mg/4 mL (5 mg/mL) IV syring, 10 mg, Intravenous, Q4H PRN, Alexey A Kathryn, FNP, 10 mg at 07/13/22 0904    lisinopriL tablet 5 mg, 5 mg, Oral, Daily, Alexey A Kathryn, FNP, 5 mg at 07/21/22 0849    metFORMIN tablet 1,000 mg, 1,000 mg, Oral, BID WM, Alexey A Kathryn, FNP, 1,000 mg at 07/21/22 0849    metoprolol injection 10 mg, 10 mg, Intravenous, Q2H PRN, ARVIND Garcia    nitroGLYCERIN SL tablet 0.4 mg, 0.4 mg, Sublingual, Q5 Min PRN, ARVIND Garcia    ondansetron disintegrating tablet 8 mg, 8 mg, Oral, Q6H PRN, ARVIND Garcia    polyethylene glycol packet 17 g, 17 g, Oral, BID PRN, ARVIND Garcia    promethazine  "tablet 25 mg, 25 mg, Oral, Q6H PRN, Alexey ARMAS Kathryn, FNP    zinc oxide 16% (WIL'S BUTT PASTE) topical ointment, , Topical (Top), TID, Alexey ARMAS Kathryn, FNP, Given at 07/20/22 1400     Review of Systems   Complete 12-point review of symptoms negative except for what's mentioned in HPI     Objective:     BP (!) 147/72   Pulse 66   Temp 98.1 °F (36.7 °C)   Resp 20   Ht 5' 10.98" (1.803 m)   Wt 87.9 kg (193 lb 12.6 oz)   SpO2 95%   BMI 27.04 kg/m²        Intake/Output Summary (Last 24 hours) at 7/21/2022 0907  Last data filed at 7/21/2022 0457  Gross per 24 hour   Intake 260 ml   Output 1800 ml   Net -1540 ml       Physical Exam  Constitutional:       Appearance: Normal appearance.   HENT:      Head: Normocephalic.      Mouth/Throat:      Mouth: Mucous membranes are moist.   Eyes:      Pupils: Pupils are equal, round, and reactive to light.   Neck:      Comments: Left CEA incision clean intact with mild postsurgical swelling  Cardiovascular:      Rate and Rhythm: Normal rate and regular rhythm.      Heart sounds: Normal heart sounds.   Pulmonary:      Effort: Pulmonary effort is normal.      Breath sounds: Normal breath sounds.   Abdominal:      General: Bowel sounds are normal.      Palpations: Abdomen is soft.   Musculoskeletal:      Cervical back: Neck supple.      Comments: Generalized weakness and muscle atrophy.  Right upper extremity dependent swelling   Skin:     General: Skin is warm and dry.   Neurological:      Mental Status: He is alert and oriented to person, place, and time.      Comments: Right-sided hemiplegia.  Can move RUE against gravity.  LLE flaccid.    Psychiatric:         Mood and Affect: Mood normal.         Behavior: Behavior normal.         Thought Content: Thought content normal.         Judgment: Judgment normal.     *MD performed and documented physical examination       Lines/Drains/Airways     Peripheral Intravenous Line  Duration                Peripheral IV - Single " Lumen 07/18/22 1600 22 G Anterior;Distal;Left Forearm 2 days              Labs  Recent Results (from the past 24 hour(s))   POCT glucose    Collection Time: 07/20/22  4:33 PM   Result Value Ref Range    POCT Glucose 134 (H) 70 - 110 mg/dL     Radiology  MRI brain without contrast on 07/03/2022 at 2:09 p.m., IMPRESSION: Small acute infarcts supratentorially, the largest is a left posterior frontal 3 cm cortical based infarct with petechial hemorrhage.  No mass effect or shift.  Discussed with Dr. Duncan. Otherwise no acute intracranial abnormalities.  Radiology  Transthoracic echo on 07/02/2022 at 2:40 p.m., IMPRESSION: The left ventricle is  with normal systolic function. The estimated ejection fraction is 60%. There is grade I diastolic dysfunction consistent with impaired relaxation. Left atrial pressure is normal.    Assessment/Plan:     77 y.o. WM admitted on 7/12/2022    Acute left hemispheric CVA   - MRI with 3 other small punctate CVAs on the right hemisphere  - s/p tPA and s/p left CEA on 7/8/2022  - continue                Aspirin 325 mg daily                Lipitor 80 mg at bedtime                Xarelto 20 mg daily   - right knee brace obtained on 7/13  - waiting for right AFO from orthotics  - defer to physiatry for rehab and pain management  - PT/OT/RT following      Carotid artery stenosis  - s/p left CEA on 07/08/2022  - continue                Aspirin 325 mg daily                Lipitor 80 mg at bedtime                Xarelto 20 mg daily                Norco 5 mg/325 mg q.6 hours p.r.n.  - to follow up with vascular surgery outpatient     HTN  - BP at goal!!  - discontinued Metoprolol succinate 50 mg daily on 7/13  - continue     Coreg 25 mg BID (initiated 7/13) (increased 7/14)                Norvasc 10 mg daily                Lisinopril 20 mg daily                 Hydralazine 10 mg every 2 hours as needed for BP > 160/90                Labetalol 10 mg every 2 hours as needed for BP > 160/90     DM  type II  - HgA1c with next labs  - continue                Metformin 1000 mg twice daily                ISS   - CBGs AC/HS     Normocytic anemia  - asymptomatic  - H/H stable   - no evidence of active bleeds  - will closely monitor and transfuse if needed      Constipation  - stable, loose stools reported on 7/15  - continue                Colace 100 mg b.i.d. PRN                MiraLax 17 g daily PRN     VTE Prophylaxis:  Xarelto 20 mg daily  COVID-19 testing:  Negative on 07/12/2022  COVID-19 vaccination status:  Vaccinated (Moderna):  x 3     POA: No  Living will: No  Contacts:  Pooja Huggins (wife) [phone]                     Eleonora Manzanares (daughter) [phone]     CODE STATUS: Full Code  Internal Medicine (attending): Weston Rivera MD  Physiatry (consulting): Angel Em MD     OUTPATIENT PROVIDERS  Angel Parham MD  Neurology: Jacque Agee MD  Vascular surgery:  Roxi Lama MD  Cardiology: Dionisio Marin MD     DISPOSITION: Condition stable.  Vital signs at goal.  No labs today.  Good glycemic control.  Bowel management, sleep hygiene, and appetite at goal.  Continues to progress well with therapies.  Monitor closely.  Notify of any acute changes.     Staffing 7/19: Continent of bowel and bladder. PT: supervision to mod assist overall. RT: overall mod to min. Fair appetite. Drinking Boost. OT: overall partial mod. Projected discharge 7/29.      Dorothea Calvert NP conducted independent physical examination and assisted with medical documentation.    Total time spent on this encounter including chart review and direct MD + NP 1-on-1 patient interaction: 37 minutes   Over 50% of this time was spent in counseling and coordination of care

## 2022-07-21 NOTE — PROGRESS NOTES
07/21/22 1130   Rec Therapy Time Calculation   Rec Start Time 1130   Rec Stop Time 1200   Rec Total Time (min) 30 min   Subjective   Patient states Pt stated that his goal for rhe day was to work on strengthening his LE.   Precautions   General Precautions fall   Attendance   Activity   ([place])   Participation Active participation   Assessment   Assessment Sit to stand was min/supervision as was dynamic standing balance/reaching. Standing tolerance was 3 minutes. Using RUE as a helper. LUE coordination was I as were sequencing skills.  Weight bearing and shifting weight to RUE and RLE   Plan   Planned Therapy Intervention Continue with current plan  (Kaitlynn)   Expected Length of Stay 1 week   PT Frequency Minimu of 5 visits per week;Other (see comments)  (daily)   Time   Treatment time 2 units

## 2022-07-22 LAB
POCT GLUCOSE: 139 MG/DL (ref 70–110)
POCT GLUCOSE: 98 MG/DL (ref 70–110)

## 2022-07-22 PROCEDURE — 97112 NEUROMUSCULAR REEDUCATION: CPT

## 2022-07-22 PROCEDURE — 97110 THERAPEUTIC EXERCISES: CPT

## 2022-07-22 PROCEDURE — 97116 GAIT TRAINING THERAPY: CPT | Mod: CQ

## 2022-07-22 PROCEDURE — 97110 THERAPEUTIC EXERCISES: CPT | Mod: CQ

## 2022-07-22 PROCEDURE — 25000003 PHARM REV CODE 250: Performed by: NURSE PRACTITIONER

## 2022-07-22 PROCEDURE — 97530 THERAPEUTIC ACTIVITIES: CPT

## 2022-07-22 PROCEDURE — 11800000 HC REHAB PRIVATE ROOM

## 2022-07-22 PROCEDURE — 97530 THERAPEUTIC ACTIVITIES: CPT | Mod: CQ

## 2022-07-22 PROCEDURE — 97164 PT RE-EVAL EST PLAN CARE: CPT

## 2022-07-22 PROCEDURE — 94799 UNLISTED PULMONARY SVC/PX: CPT

## 2022-07-22 RX ORDER — BISACODYL 10 MG
10 SUPPOSITORY, RECTAL RECTAL ONCE
Status: DISCONTINUED | OUTPATIENT
Start: 2022-07-22 | End: 2022-08-02 | Stop reason: HOSPADM

## 2022-07-22 RX ADMIN — CARVEDILOL 25 MG: 25 TABLET, FILM COATED ORAL at 08:07

## 2022-07-22 RX ADMIN — BACLOFEN 5 MG: 5 TABLET ORAL at 08:07

## 2022-07-22 RX ADMIN — HYDROXYZINE PAMOATE 50 MG: 50 CAPSULE ORAL at 08:07

## 2022-07-22 RX ADMIN — CARVEDILOL 25 MG: 25 TABLET, FILM COATED ORAL at 04:07

## 2022-07-22 RX ADMIN — ASPIRIN 325 MG ORAL TABLET 325 MG: 325 PILL ORAL at 08:07

## 2022-07-22 RX ADMIN — ZINC OXIDE TOPICAL OINT.: at 02:07

## 2022-07-22 RX ADMIN — AMLODIPINE BESYLATE 10 MG: 5 TABLET ORAL at 08:07

## 2022-07-22 RX ADMIN — LISINOPRIL 5 MG: 5 TABLET ORAL at 08:07

## 2022-07-22 RX ADMIN — METFORMIN HYDROCHLORIDE 1000 MG: 500 TABLET, FILM COATED ORAL at 04:07

## 2022-07-22 RX ADMIN — ATORVASTATIN CALCIUM 80 MG: 40 TABLET, FILM COATED ORAL at 08:07

## 2022-07-22 RX ADMIN — METFORMIN HYDROCHLORIDE 1000 MG: 500 TABLET, FILM COATED ORAL at 08:07

## 2022-07-22 NOTE — PROGRESS NOTES
[facility] ([facility])  Rehab Progress Note    Patient Name: Ayaz Huggins  MRN: [id number]  Age: 77 y.o. Sex: male  : 1945  Hospital Length of Stay: 10 days  Date of Service: 2022   Chief Complaint: Acute left hemispheric CVA s/p CPA and s/p left CEA on 2022    Subjective:     Basic Information  Admit Information: 77-year-old white male presented to [facility] ED on 2022 complaining of right-sided weakness.  PMH significant for DM type 2, CAD s/p CABG x2 in , and HTN.  CT head negative for hemorrhagic bleeding.  Received tPA and transferred to ICU for post tPA monitoring and treatment.  TTE with negative bubble study, but significant for grade 1 diastolic dysfunction.  Carotid ultrasound significant for left ICA stenosis.  MRI significant for last left hemispheric CVA and 3 other small punctate CVAs on the right hemisphere.  Tolerated left CEA on  without periprocedural complications.  Tolerated transfer to [facility] inpatient rehab unit on  without incident.  Today's Information: No acute events overnight.  Sitting in chair comfortably eating breakfast.  Reports good sleep and appetite.  Last BM .  Vital signs at goal with no recent recorded fevers.  No labs or imaging today.  CBGs at goal.    Review of patient's allergies indicates:   Allergen Reactions    Penicillins         Current Facility-Administered Medications:     acetaminophen tablet 650 mg, 650 mg, Oral, Q4H PRN, Alexey A Kathryn, FNP    ALPRAZolam tablet 0.25 mg, 0.25 mg, Oral, TID PRN, Alexey A Kathryn, FNP    amLODIPine tablet 10 mg, 10 mg, Oral, Daily, Alexey A Kathryn, FNP, 10 mg at 22    aspirin tablet 325 mg, 325 mg, Oral, Daily, Alexey A Kathryn, FNP, 325 mg at 22    atorvastatin tablet 80 mg, 80 mg, Oral, QHS, Alexey A Kathryn, FNP, 80 mg at 22    baclofen tablet 5 mg, 5 mg, Oral, QHS, Meghan Wolf, FNP, 5 mg at 22  2113    benzonatate capsule 100 mg, 100 mg, Oral, TID PRN, Alexey A Kathryn, FNP    bisacodyL suppository 10 mg, 10 mg, Rectal, Daily PRN, Alexey A Kathryn, FNP    carvediloL tablet 25 mg, 25 mg, Oral, BID WM, Alexey A Kathryn, FNP, 25 mg at 07/22/22 0814    dextrose 50 % in water (D50W) injection 12.5 g, 12.5 g, Intravenous, PRN, Alexey A Kathryn, FNP    dextrose 50 % in water (D50W) injection 25 g, 25 g, Intravenous, PRN, Alexey A Kathryn, FNP    docusate sodium capsule 100 mg, 100 mg, Oral, BID PRN, Alexey A Kathryn, FNP    glucagon (human recombinant) injection 1 mg, 1 mg, Intramuscular, PRN, Alexey A Kathryn, FNP    glucose chewable tablet 16 g, 16 g, Oral, PRN, Alexey A Kathryn, FNP    glucose chewable tablet 24 g, 24 g, Oral, PRN, Alexey A Kathryn, FNP    hydrALAZINE injection 10 mg, 10 mg, Intravenous, Q4H PRN, Alexey A Kathryn, FNP    HYDROcodone-acetaminophen 5-325 mg per tablet 1 tablet, 1 tablet, Oral, Q6H PRN, Alexey A Kathryn, FNP    hydrOXYzine pamoate capsule 50 mg, 50 mg, Oral, QHS, Alexey A Kathryn, FNP, 50 mg at 07/21/22 2113    insulin aspart U-100 injection 1-10 Units, 1-10 Units, Subcutaneous, QID (AC + HS) PRN, Alexey A Kathryn, FNP, 2 Units at 07/13/22 1213    labetalol 20 mg/4 mL (5 mg/mL) IV syring, 10 mg, Intravenous, Q4H PRN, Alexey A Kathryn, FNP, 10 mg at 07/13/22 0904    lisinopriL tablet 5 mg, 5 mg, Oral, Daily, Alexey A Kathryn, FNP, 5 mg at 07/22/22 0814    metFORMIN tablet 1,000 mg, 1,000 mg, Oral, BID WM, Alexey A Kathryn, FNP, 1,000 mg at 07/22/22 0814    metoprolol injection 10 mg, 10 mg, Intravenous, Q2H PRN, ARVIND Garcia    nitroGLYCERIN SL tablet 0.4 mg, 0.4 mg, Sublingual, Q5 Min PRN, ARVIND Garcia    ondansetron disintegrating tablet 8 mg, 8 mg, Oral, Q6H PRN, ARVIND Garcia    polyethylene glycol packet 17 g, 17 g, Oral, BID PRN, ARVIND Garcia    promethazine  "tablet 25 mg, 25 mg, Oral, Q6H PRN, Alexey ARMAS Kathryn, FNP    zinc oxide 16% (WIL'S BUTT PASTE) topical ointment, , Topical (Top), TID, Alexey ARMAS Kathryn, FNP, Given at 07/21/22 1400     Review of Systems   Complete 12-point review of symptoms negative except for what's mentioned in HPI     Objective:     BP (!) 133/55   Pulse 74   Temp 98.4 °F (36.9 °C) (Oral)   Resp 16   Ht 5' 10.98" (1.803 m)   Wt 87.9 kg (193 lb 12.6 oz)   SpO2 95%   BMI 27.04 kg/m²        Intake/Output Summary (Last 24 hours) at 7/22/2022 0902  Last data filed at 7/21/2022 1837  Gross per 24 hour   Intake 360 ml   Output 301 ml   Net 59 ml       Physical Exam  Constitutional:       Appearance: Normal appearance.   HENT:      Head: Normocephalic.      Mouth/Throat:      Mouth: Mucous membranes are moist.   Eyes:      Pupils: Pupils are equal, round, and reactive to light.   Neck:      Comments: Left CEA incision clean intact with mild postsurgical swelling  Cardiovascular:      Rate and Rhythm: Normal rate and regular rhythm.      Heart sounds: Normal heart sounds.   Pulmonary:      Effort: Pulmonary effort is normal.      Breath sounds: Normal breath sounds.   Abdominal:      General: Bowel sounds are normal.      Palpations: Abdomen is soft.   Musculoskeletal:      Cervical back: Neck supple.      Comments: Generalized weakness and muscle atrophy.  Right upper extremity dependent swelling   Skin:     General: Skin is warm and dry.   Neurological:      Mental Status: He is alert and oriented to person, place, and time.      Comments: Right-sided hemiplegia.  Can move RUE against gravity.  LLE flaccid.    Psychiatric:         Mood and Affect: Mood normal.         Behavior: Behavior normal.         Thought Content: Thought content normal.         Judgment: Judgment normal.     *MD performed and documented physical examination       Lines/Drains/Airways     Peripheral Intravenous Line  Duration                Peripheral IV - " Single Lumen 07/18/22 1600 22 G Anterior;Distal;Left Forearm 3 days              Labs  Recent Results (from the past 24 hour(s))   POCT glucose    Collection Time: 07/21/22 11:30 AM   Result Value Ref Range    POCT Glucose 144 (H) 70 - 110 mg/dL   POCT glucose    Collection Time: 07/21/22  4:15 PM   Result Value Ref Range    POCT Glucose 140 (H) 70 - 110 mg/dL   POCT glucose    Collection Time: 07/22/22  5:54 AM   Result Value Ref Range    POCT Glucose 98 70 - 110 mg/dL     Radiology  MRI brain without contrast on 07/03/2022 at 2:09 p.m., IMPRESSION: Small acute infarcts supratentorially, the largest is a left posterior frontal 3 cm cortical based infarct with petechial hemorrhage.  No mass effect or shift.  Discussed with Dr. Duncan. Otherwise no acute intracranial abnormalities.  Radiology  Transthoracic echo on 07/02/2022 at 2:40 p.m., IMPRESSION: The left ventricle is  with normal systolic function. The estimated ejection fraction is 60%. There is grade I diastolic dysfunction consistent with impaired relaxation. Left atrial pressure is normal.    Assessment/Plan:     77 y.o. WM admitted on 7/12/2022    Acute left hemispheric CVA   - MRI with 3 other small punctate CVAs on the right hemisphere  - s/p tPA and s/p left CEA on 7/8/2022  - continue                Aspirin 325 mg daily                Lipitor 80 mg at bedtime                Xarelto 20 mg daily   - right knee brace obtained on 7/13  - waiting for right AFO from orthotics  - defer to physiatry for rehab and pain management  - PT/OT/RT following      Carotid artery stenosis  - s/p left CEA on 07/08/2022  - continue                Aspirin 325 mg daily                Lipitor 80 mg at bedtime                Xarelto 20 mg daily                Norco 5 mg/325 mg q.6 hours p.r.n.  - to follow up with vascular surgery outpatient     HTN  - BP at goal!!  - discontinued Metoprolol succinate 50 mg daily on 7/13  - continue     Coreg 25 mg BID (initiated 7/13)  (increased 7/14)                Norvasc 10 mg daily                Lisinopril 20 mg daily                 Hydralazine 10 mg every 2 hours as needed for BP > 160/90                Labetalol 10 mg every 2 hours as needed for BP > 160/90     DM type II  - HgA1c with next labs  - continue                Metformin 1000 mg twice daily                ISS   - CBGs AC/HS     Normocytic anemia  - asymptomatic  - H/H stable   - no evidence of active bleeds  - will closely monitor and transfuse if needed      Constipation  - stable, loose stools reported on 7/15  - continue                Colace 100 mg b.i.d. PRN                MiraLax 17 g daily PRN     VTE Prophylaxis:  Xarelto 20 mg daily  COVID-19 testing:  Negative on 07/12/2022  COVID-19 vaccination status:  Vaccinated (Moderna):  x 3     POA: No  Living will: No  Contacts:  Pooja Huggins (wife) [phone]                     Eleonora Manzanares (daughter) [phone]     CODE STATUS: Full Code  Internal Medicine (attending): Weston Rivera MD  Physiatry (consulting): Angel Em MD     OUTPATIENT PROVIDERS  Angel Parham MD  Neurology: Jacque Agee MD  Vascular surgery:  Roxi Lama MD  Cardiology: Dionisio Marin MD     DISPOSITION: Condition stable.  Vital signs at goal.  No labs today.  Good glycemic control.  Sleep hygiene and appetite at goal.  Last BM 7/19.  Initiate Dulcolax suppository once now, if no BM by 1400 give soapsuds enema.  Continues to progress well with therapies.  Monitor closely.  Notify of any acute changes.     Staffing 7/19: Continent of bowel and bladder. PT: supervision to mod assist overall. RT: overall mod to min. Fair appetite. Drinking Boost. OT: overall partial mod. Projected discharge 7/29.      Dorothea Calvert NP conducted independent physical examination and assisted with medical documentation.    Total time spent on this encounter including chart review and direct MD + NP 1-on-1 patient interaction: 38 minutes   Over 50% of  this time was spent in counseling and coordination of care

## 2022-07-22 NOTE — PT/OT/SLP PROGRESS
"Physical Therapy         Treatment        Ayaz Huggins   MRN: [id number]     Therapy Minutes  PT Received On: 07/22/22  PT Start Time: 1300  PT Stop Time: 1400  PT Total Time (min): 60 min  PT Individual: 60     Billable Minutes:  Gait [id number], Therapeutic Activity 15min and Therapeutic Exercise 15min      Treatment Type: Treatment  PT/PTA: PTA     PTA Visit Number: 0       General Precautions: Standard, fall  Orthopedic Precautions: Orthopedic Precautions : N/A   Braces: Braces: AFO (Ward-sling, neoprene knee brace)    Patient found with: Other (comments) (in bed)         Subjective:  "pt in bed with no c/o states ready for tx     Pain/Comfort  Pain Rating 1: 0/10    Objective:  Therapeutic activities, therapeutic exercise performed to increase muscle strength and motor recruitment so that patient may improve quality of functionional mobility and increase functional independence. and Gait training performed to improve functional strength, to increase functional mobility, safety and independence.    VITALS:  BP:         Start 71 118/55 ending 73 120/59   In supine     Functional Mobility:  Bed Mobility:   Supine to sit: Standby Assistance with use of bed rails    Sit to supine: Standby Assistance with use of bed rails     Scooting: Standby Assistance     Balance Training  Static Sitting/Standing:  Patient performed static standing on level surface  using grab bars in restroom  with Minimal Assistance and minimal verbal cues standing posture while therapist assisted with cleaning pt after bm pt attempted unable to self clean.    Transfer Training:  Sit to stand:Contact Guard Assistance with Quad Cane lbqc  Bed <> Chair:  Step Transfer with Contact Guard Assistance with Quad Cane lbqc  Toilet Transfer:  Pt Step Transfer with Minimal Assistance and pt required cga with few vc one LOB min a to correct for safety when backing up to toilet  with Quad Cane lbqc with post void and bm on toilet in bathroom. pt stated " first time able to use toilet verse [facility]     Gait Training:  Patient ambulates 80 feet 2 times using lbqc with Contact Guard Assistance and with increased time secondary to slow ute to allow for pt to self advance rtle with vc for correction of rtle external rotation  seated rest break b/t times.pt with rt knee brace rt afo and shoe cover on rt foot to improve gt  Pt demonstrating a  swing-to gait and swing-through gait with decreased ute, decreased step length, decreased stride length, decreased weight-shifting ability and decreased rt knee flexion with swing phase .Impairments contributing to gait deviations include impaired balance, impaired coordination, impaired motor control, abnormal muscle tone, impaired postural control, decreased ROM, decreased sensation, impaired sensory feedback and decreased strength    Additional Treatment:  Pt performed AAROM supine exercises rtle with tapping to rt thigh to improve active movements 2 sets 15reps including single leg bridges and B leg bridges vc for focus on tech and right le positioning to decrease external rotation     Activity Tolerance:  Patient tolerated treatment well and Patient limited by fatigue    Patient left HOB elevated with call button in reach and cna aware pt in bed .pt very motivated with activities during tx and willing to attempt activity without assist to improve strength/functional independence for self management.     Education Provided: transfer training, bed mob, gait training, balance training, assistive device, strengthening exercises and toilet t/f     Expected compliance:  High compliance        Assessment:  Ayaz Huggins is a 77 y.o. male with a medical diagnosis of Stroke. He presents with rtle/ue weakness, impaired mobility/bed mobility/rom/strength safety awareness def. . Pt to benefit from skilled PT services to address impairments with progression towards functional independence as tolerated.    Rehab potential is  good.    Activity tolerance: Good    GOALS:   Multidisciplinary Problems     Physical Therapy Goals        Problem: Physical Therapy    Goal Priority Disciplines Outcome Goal Variances Interventions   Physical Therapy Goal     PT, PT/OT Ongoing, Progressing     Description: Bed Mobility:  Roll left and right with supervision/touching assist - MET 7/18/22  Sit to supine transfer with supervision/touching assist. - MET 7/22/22  Supine to sit transfer with supervision/touching assist. - MET 7/22/22  Sit <> supine transfer, independent - initial    Transfers:  Sit to stand transfer with supervision/touching assist using RW or LRAD. - MET 7/18/22  Bed to chair transfer with supervision/touching assist using RW or LRAD. - MET 7/22/22  Car transfer with partial/moderate assist using RW or LRAD. - MET 7/22/22  Sit to stand transfer with set-up assist with RW - MET 7/22/22  Sit to stand transfer, independent with LBQC or LRAD - initial  Bed to chair transfer with set-up assist using LBQC or LRAD - initial  Car transfer with supervision assist using LBQC or LRAD - initial    Mobility:  Ambulate 100 feet with partial/moderate assist using RW or LRAD. - progressing, cont.    WC mobility:   150ft with LUE/LLE with independent. - progressing, cont.                     PLAN:    Patient to be seen 5 x/week  to address the above listed problems via gait training, therapeutic activities, therapeutic exercises, neuromuscular re-education, wheelchair management/training  Plan of Care expires: 07/28/22  Plan of Care reviewed with: patient    7/22/2022

## 2022-07-22 NOTE — PT/OT/SLP PROGRESS
Occupational Therapy  Treatment      Ayaz Huggins   MRN: [id number]   Admitting Diagnosis: Stroke         Billable Minutes:  Therapeutic Activity 15, Therapeutic Exercise 15 and Neuromuscular Re-education 30               General Precautions: Standard, fall  Orthopedic Precautions: N/A    Spiritual, Cultural Beliefs, Bahai Practices, Values that Affect Care: no    Subjective:  Patient communicated no complaints    Pain/Comfort  Pain Rating 1: 0/10  Objective:    Neuro re-educ: Pt performed W/C push-up with RUE X 10 reps, 2 sets to provide wt bearing to increase proprioception in the RUE. Pt used RUE with grasping/releasing and reaching large pegs to vertical peg board while standing for 5+ mins X2 trials with SBA. Pt also performed graded clothes pin activity on table top with RUE in seated position to simulate reaching for food on plate for eating.    Endurance and Strengthening  Bilateral upper extremity ergometer for 5 minutes for 2 sets with Independent    Education Provided: Roles and goals of OT, ADLs and transfer training    Expected compliance: High compliance    ASSESSMENT:  Ayaz Huggins is a 77 y.o. male with a medical diagnosis of Stroke performed BUE therex, FM activities to increase ADL independence and safety. Pt is progressing towards OT goals      GOALS:   Multidisciplinary Problems     Occupational Therapy Goals        Problem: Occupational Therapy    Goal Priority Disciplines Outcome Interventions   Occupational Therapy Goal     OT, PT/OT Ongoing, Progressing    Description: LTG: Pt will be Ind with all ADLS.  Pt LTG: to return to bike riding    ADLs:  Pt to perform grooming tasks with [place] from seated position by d/c.  Pt to perform UB dressing with Setup using modified techniques.  Pt to perform LB dressing with SPV using modified techniques.  Pt to perform putting on/off footwear task with SPV.  Pt to perform toileting with Partial A using BSC. = GOAL MET  Pt to perform toileting  with CGA using BSC and LRAD.    Functional Transfers:  Pt to perform toilet transfers with Partial A using BSC and LRAD for mobility.    Pt to perform a tub transfer with Max A by re-eval using TTB. = GOAL MET  Pt to perform a tub t/f with CGA via TTB and Gbs.      Balance, Strengthening, Endurance, Balance:  Pt  will perform dynamic standing tasks using LRAD for 2 minutes with SPV assist.  Pt to demonstrate 4/5 R shoulder strength and 5/5 distal RUE strength during functional task/                     Plan:  Patient to be seen daily to address the above listed problems via self-care/home management, community/work re-entry, therapeutic activities, therapeutic exercises, neuromuscular re-education, cognitive retraining  Plan of Care expires: 07/25/22  Plan of Care reviewed with: patient      07/22/2022

## 2022-07-22 NOTE — PROGRESS NOTES
07/22/22 0830   Rec Therapy Time Calculation   Rec Start Time 0830   Rec Stop Time 0900   Rec Total Time (min) 30 min   Subjective   Patient states Pt stated that he wanted to work on increasing the strength in his RUE   Attendance   Activity   ([place])   Participation Active participation   Assessment   Assessment Sit to stand was supervision as was dynamic standing balance/reaching. Standing tolerance was 3 minutes. RUE coordination/dextereity was mod/min. He remains I with sequencing skills and problem solving skills. Determined   Plan   Planned Therapy Intervention Continue with current plan  (Selwyn)   Expected Length of Stay 1 week   PT Frequency Minimu of 5 visits per week;Other (see comments)  (Daily)   Time   Treatment time 2 units

## 2022-07-22 NOTE — PT/OT/SLP RE-EVAL
"        Physical Therapy          Inpatient Rehab Re-Evaluation    Ayaz Huggins   MRN: [id number]      [id number]     Billable Minutes:   Re-eval 30, Gait [id number] and Therapeutic Activity 15    Diagnosis: Stroke  Past Medical History:   Diagnosis Date    Diabetes mellitus     Hypertension     Mixed hyperlipidemia       Past Surgical History:   Procedure Laterality Date    CARDIAC SURGERY     LINQ recorder     General Precautions: Standard, fall  Orthopedic Precautions: N/A   Braces: AFO (Ward-sling, neoprene knee brace)     Spiritual, Cultural Beliefs, Restorationism Practices, Values that Affect Care: no    Patient History:  Lives With: spouse (2 college-aged grandchildren)  Home Accessibility: other (see comments) (Threshold to enter)  Home Layout: Able to live on 1st floor  Stair Railings at Home: none  Transportation Anticipated: car, drives self, family or friend will provide  Equipment Currently Used at Home: none    DME owned (not currently used): none    Previous Level of Function:  Ambulation Skills: independent  Transfer Skills: independent  ADL Skills: independent  Work/Leisure Activity: independent    Subjective:  Chief Complaint: R sided weakness  Patient goals: "to walk"  Family goals: not present upon evaluation    Pain/Comfort  Pain Rating 1: 0/10    Objective:    BP start: 110/56, 73bpm        In sitting  BP end: 105/61, 67bpm    Patient found seated in WC;      Cognitive Exam:  Oriented to: Person, Place, Time and Situation  Follows Commands/attention: Follows two-step commands  Communication: clear/fluent  Safety awareness/insight to disability: intact    Physical Exam:  Postural examination/scapula alignment:    -       Forward head    Skin integrity: Visible skin intact  Edema: None noted      Sensation:      -       Intact    Upper Extremity Range of Motion:  Refer to OT evaluation for UE ROM.    Upper Extremity Strength:  Refer to OT evaluation for UE strength.    Lower Extremity Range of " Motion:  Right Lower Extremity: PROM WFL  Left Lower Extremity: WFL    Lower Extremity Strength:  Right Lower Extremity: Deficits: 1-2/5 overall  Left Lower Extremity: WFL    Functional Mobility:   Current   Status   Functional Area: Care Score:   Roll Left and Right 4   Sit to Lying 4   Lying to Sitting on Side of Bed 4   Sit to Stand 5   Chair/Bed-to-Chair Transfer 4   Car Transfer 3   Walk 10 Feet 3   Walk 50 Feet with Two Turns 3   Walk 150 Feet 88   Walk 10 Feet Uneven Surface 88   1 Step (Curb) 88   4 Steps 88   12 Steps 88   Picking Up Object 4   Wheel 50 Feet with Two Turns 4   Wheel 150 Feet 4     Bed Mobility :   Supine to sit: Standby Assistance, increased time, LUE underneath pt.   Sit to supine: Standby Assistance   Rolling: Standby Assistance   Scooting: Standby Assistance    Transfers:  Sit to stand: Set-up Assistance with Quad Cane    Bed <> Chair:  Stand Pivot with Contact Guard Assistance with No Assistive Device and Quad Cane Pt requires no AD when going toward his L side, requires the LBQC with going toward R side  Car Transfer:  Stand Pivot with Minimal Assistance with Quad Cane      Wheelchair:  Patient propels wheelchair 150 feet using LUE and LLE with  Supervision or Set-up Assistance and no verbal cues.    Gait:  Patient ambulates 50 feet + 84 feet using LBQC with Minimal Assistance and Min verbal cues. Pt able to advance RLE with mostly step-to pattern, occasional step-through. Min A for balance, slow pace, distance limited by fatigue in LLE.     Picking up object:  From a standing position, patient picks up an object from the floor with a reacher  using LBQC with Contact Guard Assistance.      Endurance deficit:  LLE muscular endurance      Education Provided: roles and goals of PT/PTA, transfer training, bed mob, gait training, balance training, safety awareness, body mechanics, assistive device, wheelchair management, strengthening exercises and fall prevention    Expected compliance:  High compliance        Patient left up in chair with call button in reach.    Assessment:  Ayaz Huggins is a 77 y.o. male with a medical diagnosis of Stroke. He presents with R sided weakness   limiting tolerance and safety during functional mobility tasks.  Pt to benefit from skilled PT services to address impairments with progression towards functional independence as tolerated.      IMPAIRMENTS  endurance, R/L UE weakness, R/L LE weakness, trunk weakness and functional strength    FUNCTIONAL LIMITATIONS  Bed mob, dynamic standing balance, gait impairments and wheelchair mobility    ACTIVITY LIMITATIONS  endurance and functional mobility    ACTIVITY CAPABILITIES  Independent premorbid, family support and safety awareness      Patient agrees with need for treatment: yes    QI Scores:   Admission Assessment   Functional Area: Care Score:    Roll Left and Right 3   Sit to Lying 3   Lying to Sitting on Side of Bed 3   Sit to Stand 3   Chair/Bed-to-Chair Transfer 3   Walk 10 Feet 88   Walk 50 Feet with Two Turns 88   Walk 150 Feet 88   1 Step (Curb) 88   4 Steps 88   12 Steps 88   Wheel 50 Feet with Two Turns 3   Wheel 150 Feet 2     Rehab potential is good.    Activity tolerance: Good    Plan: continue with current plan, gait training, balance training, ROM, stretching, strengthening, neuromuscular re-education, motor coordination training, postural re-education, orthotic fitting/training and wheelchair management/propulsion    Discharge recommendations: outpatient PT, home with home health (Pending progress)     Equipment recommendations: cane, quad, wheelchair    GOALS:   Multidisciplinary Problems     Physical Therapy Goals        Problem: Physical Therapy    Goal Priority Disciplines Outcome Goal Variances Interventions   Physical Therapy Goal     PT, PT/OT Ongoing, Progressing     Description: Bed Mobility:  Roll left and right with supervision/touching assist - MET 7/18/22  Sit to supine transfer with  supervision/touching assist. - MET 7/22/22  Supine to sit transfer with supervision/touching assist. - MET 7/22/22  Sit <> supine transfer, independent - initial    Transfers:  Sit to stand transfer with supervision/touching assist using RW or LRAD. - MET 7/18/22  Bed to chair transfer with supervision/touching assist using RW or LRAD. - MET 7/22/22  Car transfer with partial/moderate assist using RW or LRAD. - MET 7/22/22  Sit to stand transfer with set-up assist with RW - MET 7/22/22  Sit to stand transfer, independent with LBQC or LRAD - initial  Bed to chair transfer with set-up assist using LBQC or LRAD - initial  Car transfer with supervision assist using LBQC or LRAD - initial    Mobility:  Ambulate 100 feet with partial/moderate assist using RW or LRAD. - progressing, cont.    WC mobility:   150ft with LUE/LLE with independent. - progressing, cont.                     PLAN:    Patient to be seen 5 x/week to address the above listed problems via gait training, therapeutic activities, therapeutic exercises, neuromuscular re-education, wheelchair management/training  Plan of Care expires: 07/28/22  Plan of Care reviewed with: patient    7/22/2022

## 2022-07-23 LAB
POCT GLUCOSE: 119 MG/DL (ref 70–110)
POCT GLUCOSE: 99 MG/DL (ref 70–110)

## 2022-07-23 PROCEDURE — 99233 PR SUBSEQUENT HOSPITAL CARE,LEVL III: ICD-10-PCS | Mod: ,,, | Performed by: NURSE PRACTITIONER

## 2022-07-23 PROCEDURE — 94799 UNLISTED PULMONARY SVC/PX: CPT

## 2022-07-23 PROCEDURE — 25000003 PHARM REV CODE 250: Performed by: NURSE PRACTITIONER

## 2022-07-23 PROCEDURE — 11800000 HC REHAB PRIVATE ROOM

## 2022-07-23 PROCEDURE — 99233 SBSQ HOSP IP/OBS HIGH 50: CPT | Mod: ,,, | Performed by: NURSE PRACTITIONER

## 2022-07-23 RX ADMIN — METFORMIN HYDROCHLORIDE 1000 MG: 500 TABLET, FILM COATED ORAL at 05:07

## 2022-07-23 RX ADMIN — ASPIRIN 325 MG ORAL TABLET 325 MG: 325 PILL ORAL at 08:07

## 2022-07-23 RX ADMIN — HYDROXYZINE PAMOATE 50 MG: 50 CAPSULE ORAL at 08:07

## 2022-07-23 RX ADMIN — BACLOFEN 5 MG: 5 TABLET ORAL at 08:07

## 2022-07-23 RX ADMIN — ATORVASTATIN CALCIUM 80 MG: 40 TABLET, FILM COATED ORAL at 08:07

## 2022-07-23 RX ADMIN — ZINC OXIDE TOPICAL OINT.: at 02:07

## 2022-07-23 RX ADMIN — CARVEDILOL 25 MG: 25 TABLET, FILM COATED ORAL at 08:07

## 2022-07-23 RX ADMIN — AMLODIPINE BESYLATE 10 MG: 5 TABLET ORAL at 08:07

## 2022-07-23 RX ADMIN — LISINOPRIL 5 MG: 5 TABLET ORAL at 08:07

## 2022-07-23 RX ADMIN — METFORMIN HYDROCHLORIDE 1000 MG: 500 TABLET, FILM COATED ORAL at 08:07

## 2022-07-23 RX ADMIN — CARVEDILOL 25 MG: 25 TABLET, FILM COATED ORAL at 05:07

## 2022-07-23 NOTE — PLAN OF CARE
Problem: Impaired Wound Healing  Goal: Optimal Wound Healing  Outcome: Ongoing, Progressing  Intervention: Promote Wound Healing  Flowsheets (Taken 7/23/2022 1114)  Oral Nutrition Promotion: rest periods promoted  Activity Management: Walk with assistive devise and /or staff member - L3     Problem: Skin Injury Risk Increased  Goal: Skin Health and Integrity  Outcome: Ongoing, Progressing  Intervention: Promote and Optimize Oral Intake  Flowsheets (Taken 7/23/2022 1114)  Oral Nutrition Promotion: rest periods promoted     Problem: Diabetes Comorbidity  Goal: Blood Glucose Level Within Targeted Range  Outcome: Ongoing, Progressing  Intervention: Monitor and Manage Glycemia  Flowsheets (Taken 7/23/2022 1114)  Glycemic Management: blood glucose monitored

## 2022-07-23 NOTE — PROGRESS NOTES
Subjective  HPI:  76 y/o male with a PMH of Type II DM, diabetic neuropathy, CAD s/p CABG x2 (2007), and HTN who presented to [facility] ED  On 7/2/22 with complaints of right sided upper and lower extremity weakness. Patient's story is unclear upon the time of last known normal. He states he woke up  with a cramp in the RLE and noticed the right sided weakness.The patient was admitted to the ICU with an ischemic CVA and administered tPA; he has had some improvement in his right-sided weakness although he is unable to purposefully move his right lower extremity.  Neurology has seen and evaluated the patient and has deemed this patient stable for transfer to the floor.  This patient was seen and evaluated in the intensive care unit.  7/23: Seen in patient room, seated in bed. Reports having eaten all of his breakfast. Slept well after adjustment made to his boot as it was too tight. No complaints. VSSAF.     Review of Systems  Depression/Anxiety: noted anxiety with discharge planning     ALPRAZolam tablet 0.25 mg TID PRN Anxiety  Pain: denies     Bowels/Bladder: last BM 7/19 x 2  Appetite: good       Sleep: good          Physical Exam  General: well-developed, well-nourished, in no acute distress  Respiratory: equal chest rise, no SOB, no audible wheeze  Cardiovascular: regular rate and rhythm, no edema  Gastrointestinal: soft, non-tender, non-distended   Musculoskeletal: right hemiplegia  Integumentary: no rashes or skin lesions present, redness to right heel/ankle, sacral redness, left neck incision-dressing-c/d/i  Neurologic: cranial nerves intact, right hemiplegia        Assessment/Plan  HTN (hypertension) (Chronic)   Stroke   Carotid artery stenosis, symptomatic, left   Type 2 diabetes mellitus   Heart disease   S/P carotid endarterectomy         Wounds:redness to right heel/ankle, sacral redness, left neck incision-dressing-c/d/i  S/p s/p left CEA on 7/8/2022 (VICENTE Lama MD)  Precautions: fall risk  Bracing/AD:  QC  Swallowing: Diabetic Diet  Function: Tolerating therapy. Continue PT/OT  VTE Prophylaxis:aspirin tablet 325 mg qd   Code Status: FULL CODE   Discharge:Pt. Lives with wife in [place] in a single-story home with 1 step to enter the residence. Pt. completed high school and some college.  He has no  history. He is a retired  for [employer]. Wife is retired.  She drives, cooks, cleans, does laundry, grocery shops, manages finances, and can physically assist pt. She will be home with patient after discharge from rehab.  She is also checking into their long-term care policy. Children (2). Date 7/29 Friday.

## 2022-07-24 LAB
POCT GLUCOSE: 114 MG/DL (ref 70–110)
POCT GLUCOSE: 126 MG/DL (ref 70–110)

## 2022-07-24 PROCEDURE — 97535 SELF CARE MNGMENT TRAINING: CPT | Mod: CO

## 2022-07-24 PROCEDURE — 94799 UNLISTED PULMONARY SVC/PX: CPT

## 2022-07-24 PROCEDURE — 97112 NEUROMUSCULAR REEDUCATION: CPT | Mod: CQ

## 2022-07-24 PROCEDURE — 25000003 PHARM REV CODE 250: Performed by: NURSE PRACTITIONER

## 2022-07-24 PROCEDURE — 11800000 HC REHAB PRIVATE ROOM

## 2022-07-24 PROCEDURE — 99233 SBSQ HOSP IP/OBS HIGH 50: CPT | Mod: ,,, | Performed by: NURSE PRACTITIONER

## 2022-07-24 PROCEDURE — 97110 THERAPEUTIC EXERCISES: CPT | Mod: CO

## 2022-07-24 PROCEDURE — 97116 GAIT TRAINING THERAPY: CPT | Mod: CQ

## 2022-07-24 PROCEDURE — 99233 PR SUBSEQUENT HOSPITAL CARE,LEVL III: ICD-10-PCS | Mod: ,,, | Performed by: NURSE PRACTITIONER

## 2022-07-24 RX ORDER — METHOCARBAMOL 500 MG/1
500 TABLET, FILM COATED ORAL 3 TIMES DAILY PRN
Status: DISCONTINUED | OUTPATIENT
Start: 2022-07-24 | End: 2022-08-02 | Stop reason: HOSPADM

## 2022-07-24 RX ADMIN — ATORVASTATIN CALCIUM 80 MG: 40 TABLET, FILM COATED ORAL at 08:07

## 2022-07-24 RX ADMIN — CARVEDILOL 25 MG: 25 TABLET, FILM COATED ORAL at 09:07

## 2022-07-24 RX ADMIN — ZINC OXIDE TOPICAL OINT.: at 02:07

## 2022-07-24 RX ADMIN — LISINOPRIL 5 MG: 5 TABLET ORAL at 09:07

## 2022-07-24 RX ADMIN — METFORMIN HYDROCHLORIDE 1000 MG: 500 TABLET, FILM COATED ORAL at 04:07

## 2022-07-24 RX ADMIN — CARVEDILOL 25 MG: 25 TABLET, FILM COATED ORAL at 04:07

## 2022-07-24 RX ADMIN — METFORMIN HYDROCHLORIDE 1000 MG: 500 TABLET, FILM COATED ORAL at 09:07

## 2022-07-24 RX ADMIN — AMLODIPINE BESYLATE 10 MG: 5 TABLET ORAL at 09:07

## 2022-07-24 RX ADMIN — HYDROXYZINE PAMOATE 50 MG: 50 CAPSULE ORAL at 08:07

## 2022-07-24 RX ADMIN — BACLOFEN 5 MG: 5 TABLET ORAL at 08:07

## 2022-07-24 RX ADMIN — ASPIRIN 325 MG ORAL TABLET 325 MG: 325 PILL ORAL at 09:07

## 2022-07-24 NOTE — PLAN OF CARE
Problem: Impaired Wound Healing  Goal: Optimal Wound Healing  Outcome: Ongoing, Progressing  Intervention: Promote Wound Healing  Flowsheets (Taken 7/24/2022 1123)  Sleep/Rest Enhancement: regular sleep/rest pattern promoted  Activity Management:   Up in chair - L3   Walk with assistive devise and /or staff member - L3     Problem: Skin Injury Risk Increased  Goal: Skin Health and Integrity  Outcome: Ongoing, Progressing     Problem: Diabetes Comorbidity  Goal: Blood Glucose Level Within Targeted Range  Outcome: Ongoing, Progressing  Intervention: Monitor and Manage Glycemia  Flowsheets (Taken 7/24/2022 1123)  Glycemic Management: blood glucose monitored

## 2022-07-24 NOTE — PROGRESS NOTES
Subjective  HPI:  78 y/o male with a PMH of Type II DM, diabetic neuropathy, CAD s/p CABG x2 (2007), and HTN who presented to [facility] ED  On 7/2/22 with complaints of right sided upper and lower extremity weakness. Patient's story is unclear upon the time of last known normal. He states he woke up  with a cramp in the RLE and noticed the right sided weakness.The patient was admitted to the ICU with an ischemic CVA and administered tPA; he has had some improvement in his right-sided weakness although he is unable to purposefully move his right lower extremity.  Neurology has seen and evaluated the patient and has deemed this patient stable for transfer to the floor.  This patient was seen and evaluated in the intensive care unit.  7/24: Seen in patient room, seated in WC at sink and shaving his face. States that he was having some muscle spasms last night until they removed the boot, then he was able to sleep. Discussed PRN muscle relaxer. No current complaints. VSSAF.     Review of Systems  Depression/Anxiety: noted anxiety with discharge planning     ALPRAZolam tablet 0.25 mg TID PRN Anxiety  Pain: denies     baclofen tablet 5 mg qHS  acetaminophen tablet 650 mg q4h PRN pain  methocarbamoL tablet 500 mg TID PRN spasm  Bowels/Bladder: last BM 7/19 x 2  Appetite: good       Sleep: good          Physical Exam  General: well-developed, well-nourished, in no acute distress  Respiratory: equal chest rise, no SOB, no audible wheeze  Cardiovascular: regular rate and rhythm, no edema  Gastrointestinal: soft, non-tender, non-distended   Musculoskeletal: right hemiplegia  Integumentary: no rashes or skin lesions present, redness to right heel/ankle, sacral redness, left neck incision-dressing-c/d/i  Neurologic: cranial nerves intact, right hemiplegia        Assessment/Plan  HTN (hypertension) (Chronic)   Stroke   Carotid artery stenosis, symptomatic, left   Type 2 diabetes mellitus   Heart disease   S/P carotid endarterectomy          Wounds:redness to right heel/ankle, sacral redness, left neck incision-dressing-c/d/i  S/p s/p left CEA on 7/8/2022 (VICENTE Lama MD)  Precautions: fall risk  Bracing/AD: QC  Swallowing: Diabetic Diet  Function: Tolerating therapy. Continue PT/OT  VTE Prophylaxis:aspirin tablet 325 mg qd   Code Status: FULL CODE   Discharge:Pt. Lives with wife in [place] in a single-story home with 1 step to enter the residence. Pt. completed high school and some college.  He has no  history. He is a retired  for [employer]. Wife is retired.  She drives, cooks, cleans, does laundry, grocery shops, manages finances, and can physically assist pt. She will be home with patient after discharge from rehab.  She is also checking into their long-term care policy. Children (2). Date 7/29 Friday.

## 2022-07-24 NOTE — PT/OT/SLP PROGRESS
Occupational Therapy  Treatment      Ayaz Huggins   MRN: [id number]   Admitting Diagnosis: Stroke      Billable Minutes:  Self Care/Home Management 30 minutes and Therapeutic Exercise 30 minutes      General Precautions: Standard, fall  Orthopedic Precautions:     Spiritual, Cultural Beliefs, Synagogue Practices, Values that Affect Care: no      Functional Mobility:  Transfer Training: CGA to t/f from w/c<>EOB for safety d/t decreased balance. VC required for increased safety when performing stand pivot t/f using RW.        ADLs:  UE Dressing:  Patient performed UE Dressing with Minimal Assistance with no AD at Wheelchair.    LE Dressing:  Patient don/doffed shoes with Supervision or Set-up Assistance and Patient performed don/doffed pants with Moderate Assistance      Endurance and Strengthening  Bilateral upper extremity ergometer for 5 minutes for 2 sets with Independent, Bilateral upper extremity strengthening with 2 LB dowels with Independent for 10 reps with 2 sets for reach movement: shoulder flex/ext, wrist flex/ext, shoulder horizontal ABD/ADD, shoulder protraction/retraction and Wheelchair pushup with bilateral upper extremities for 10 reps with 2 sets with Contact Guard Assistance  Min A required to support RUE when performing there ex using 2# dumbbell d/t decreased strength.     Expected compliance: High compliance    ASSESSMENT:  Ayaz Huggins is a 77 y.o. male with a medical diagnosis of Stroke performed BUE therex, to increase ADL independence and safety.       GOALS:   Multidisciplinary Problems     Occupational Therapy Goals        Problem: Occupational Therapy    Goal Priority Disciplines Outcome Interventions   Occupational Therapy Goal     OT, PT/OT Ongoing, Progressing    Description: LTG: Pt will be Ind with all ADLS.  Pt LTG: to return to bike riding    ADLs:  Pt to perform grooming tasks with Center from seated position by d/c.  Pt to perform UB dressing with Setup using modified  techniques.  Pt to perform LB dressing with SPV using modified techniques.  Pt to perform putting on/off footwear task with SPV.  Pt to perform toileting with Partial A using BSC. = GOAL MET  Pt to perform toileting with CGA using BSC and LRAD.    Functional Transfers:  Pt to perform toilet transfers with Partial A using BSC and LRAD for mobility.    Pt to perform a tub transfer with Max A by re-eval using TTB. = GOAL MET  Pt to perform a tub t/f with CGA via TTB and Gbs.      Balance, Strengthening, Endurance, Balance:  Pt  will perform dynamic standing tasks using LRAD for 2 minutes with SPV assist.  Pt to demonstrate 4/5 R shoulder strength and 5/5 distal RUE strength during functional task/                     Plan:  Patient to be seen daily to address the above listed problems via self-care/home management, community/work re-entry, therapeutic activities, therapeutic exercises, neuromuscular re-education, cognitive retraining  Plan of Care expires: 07/25/22  Plan of Care reviewed with: patient      07/24/2022

## 2022-07-24 NOTE — PLAN OF CARE
Problem: Rehabilitation ([facility]) Plan of Care  Goal: Plan of Care Review  Outcome: Ongoing, Progressing  Goal: Patient-Specific Goal (Individualized)  Outcome: Ongoing, Progressing  Goal: Absence of New-Onset Illness or Injury  Outcome: Ongoing, Progressing  Goal: Optimal Comfort and Wellbeing  Outcome: Ongoing, Progressing  Goal: Readiness for Transition of Care  Outcome: Ongoing, Progressing     Problem: Skin Injury Risk Increased  Goal: Skin Health and Integrity  Outcome: Ongoing, Progressing     Problem: Diabetes Comorbidity  Goal: Blood Glucose Level Within Targeted Range  Outcome: Ongoing, Progressing

## 2022-07-24 NOTE — PT/OT/SLP PROGRESS
"Physical Therapy         Treatment        Ayaz Huggins   MRN: [id number]     Therapy Minutes  PT Start Time: 730  PT Stop Time: 830  PT Total Time (min): 60 min     Billable Minutes:  Gait [id number] and Neuromuscular Re-education 15      Treatment Type: Treatment  PT/PTA: PTA     PTA Visit Number: 2       General Precautions: Standard, fall  Orthopedic Precautions: Orthopedic Precautions : N/A   Braces: Braces: AFO (Neoprene Knee brace)      Subjective:  "Pt states that he is feeling good with no complaints of pain"    Pain/Comfort  Pain Rating 1: 0/10    Objective:  Therapeutic activities, therapeutic exercise performed to increase muscle strength and motor recruitment so that patient may improve quality of functionional mobility and increase functional independence. and Gait training performed to improve functional strength, to increase functional mobility, safety and independence.    VITALS:  BP beginnin/62 mmHg    BP endin/60 mmHg    Oxygen Saturation:       at rest: HR 68 bpm       with Activity: HR 72 bpm      Gait Training:  Patient ambulates 76 ft + 62 ft + 136 ft + 50 ft using LBQC with Contact Guard Assistance and minimal verbal cues. Pt demonstrating a  swing-to gait with decreased step length, decreased toe-to-floor clearance, decreased weight-shifting ability and increased hip external rotation.Impairments contributing to gait deviations include impaired motor control, decreased sensation and decreased strength      Additional Treatment:  NMES applied to R quad to assist with increased quad firing and muscle contraction during SAQ for 10 minutes.     Activity Tolerance:  Patient tolerated treatment well and Patient limited by fatigue    Patient left up in chair with call button in reach.    Education Provided: gait training and strengthening exercises    Expected compliance:  High compliance        Assessment:  Ayaz Huggins is a 77 y.o. male with a medical diagnosis of Stroke. He presents " with decreased motor control, decreased strength, and decreased stability. Pt to benefit from skilled PT services to address impairments with progression towards functional independence as tolerated.    Rehab potential is good.    Activity tolerance: Good    GOALS:   Multidisciplinary Problems     Physical Therapy Goals        Problem: Physical Therapy    Goal Priority Disciplines Outcome Goal Variances Interventions   Physical Therapy Goal     PT, PT/OT Ongoing, Progressing     Description: Bed Mobility:  Roll left and right with supervision/touching assist - MET 7/18/22  Sit to supine transfer with supervision/touching assist. - MET 7/22/22  Supine to sit transfer with supervision/touching assist. - MET 7/22/22  Sit <> supine transfer, independent - initial    Transfers:  Sit to stand transfer with supervision/touching assist using RW or LRAD. - MET 7/18/22  Bed to chair transfer with supervision/touching assist using RW or LRAD. - MET 7/22/22  Car transfer with partial/moderate assist using RW or LRAD. - MET 7/22/22  Sit to stand transfer with set-up assist with RW - MET 7/22/22  Sit to stand transfer, independent with LBQC or LRAD - initial  Bed to chair transfer with set-up assist using LBQC or LRAD - initial  Car transfer with supervision assist using LBQC or LRAD - initial    Mobility:  Ambulate 100 feet with partial/moderate assist using RW or LRAD. - progressing, cont.    WC mobility:   150ft with LUE/LLE with independent. - progressing, cont.                     PLAN:    Patient to be seen 5 x/week  to address the above listed problems via gait training, therapeutic activities, therapeutic exercises, neuromuscular re-education, wheelchair management/training  Plan of Care expires: 07/28/22  Plan of Care reviewed with: patient    7/24/2022

## 2022-07-25 LAB
ALBUMIN SERPL-MCNC: 3.5 GM/DL (ref 3.4–4.8)
ALBUMIN/GLOB SERPL: 1.1 RATIO (ref 1.1–2)
ALP SERPL-CCNC: 76 UNIT/L (ref 40–150)
ALT SERPL-CCNC: 30 UNIT/L (ref 0–55)
AST SERPL-CCNC: 21 UNIT/L (ref 5–34)
BASOPHILS # BLD AUTO: 0.05 X10(3)/MCL (ref 0–0.2)
BASOPHILS NFR BLD AUTO: 0.6 %
BILIRUBIN DIRECT+TOT PNL SERPL-MCNC: 0.6 MG/DL
BUN SERPL-MCNC: 27.8 MG/DL (ref 8.4–25.7)
CALCIUM SERPL-MCNC: 9.7 MG/DL (ref 8.8–10)
CHLORIDE SERPL-SCNC: 101 MMOL/L (ref 98–107)
CO2 SERPL-SCNC: 24 MMOL/L (ref 23–31)
CREAT SERPL-MCNC: 0.62 MG/DL (ref 0.73–1.18)
EOSINOPHIL # BLD AUTO: 0.33 X10(3)/MCL (ref 0–0.9)
EOSINOPHIL NFR BLD AUTO: 4 %
ERYTHROCYTE [DISTWIDTH] IN BLOOD BY AUTOMATED COUNT: 12.7 % (ref 11.5–17)
GLOBULIN SER-MCNC: 3.3 GM/DL (ref 2.4–3.5)
GLUCOSE SERPL-MCNC: 105 MG/DL (ref 82–115)
HCT VFR BLD AUTO: 33 % (ref 42–52)
HGB BLD-MCNC: 11.5 GM/DL (ref 14–18)
IMM GRANULOCYTES # BLD AUTO: 0.03 X10(3)/MCL (ref 0–0.04)
IMM GRANULOCYTES NFR BLD AUTO: 0.4 %
LYMPHOCYTES # BLD AUTO: 2.51 X10(3)/MCL (ref 0.6–4.6)
LYMPHOCYTES NFR BLD AUTO: 30.6 %
MAGNESIUM SERPL-MCNC: 1.5 MG/DL (ref 1.6–2.6)
MCH RBC QN AUTO: 30 PG (ref 27–31)
MCHC RBC AUTO-ENTMCNC: 34.8 MG/DL (ref 33–36)
MCV RBC AUTO: 86.2 FL (ref 80–94)
MONOCYTES # BLD AUTO: 0.73 X10(3)/MCL (ref 0.1–1.3)
MONOCYTES NFR BLD AUTO: 8.9 %
NEUTROPHILS # BLD AUTO: 4.6 X10(3)/MCL (ref 2.1–9.2)
NEUTROPHILS NFR BLD AUTO: 55.5 %
NRBC BLD AUTO-RTO: 0 %
PHOSPHATE SERPL-MCNC: 3.3 MG/DL (ref 2.3–4.7)
PLATELET # BLD AUTO: 247 X10(3)/MCL (ref 130–400)
PMV BLD AUTO: 10.3 FL (ref 7.4–10.4)
POCT GLUCOSE: 96 MG/DL (ref 70–110)
POCT GLUCOSE: 96 MG/DL (ref 70–110)
POTASSIUM SERPL-SCNC: 4.6 MMOL/L (ref 3.5–5.1)
PREALB SERPL-MCNC: 19.9 MG/DL (ref 16–42)
PROT SERPL-MCNC: 6.8 GM/DL (ref 5.8–7.6)
RBC # BLD AUTO: 3.83 X10(6)/MCL (ref 4.7–6.1)
SODIUM SERPL-SCNC: 135 MMOL/L (ref 136–145)
WBC # SPEC AUTO: 8.2 X10(3)/MCL (ref 4.5–11.5)

## 2022-07-25 PROCEDURE — 99233 PR SUBSEQUENT HOSPITAL CARE,LEVL III: ICD-10-PCS | Mod: ,,, | Performed by: PHYSICAL MEDICINE & REHABILITATION

## 2022-07-25 PROCEDURE — 36415 COLL VENOUS BLD VENIPUNCTURE: CPT | Performed by: NURSE PRACTITIONER

## 2022-07-25 PROCEDURE — 25000003 PHARM REV CODE 250: Performed by: NURSE PRACTITIONER

## 2022-07-25 PROCEDURE — 97535 SELF CARE MNGMENT TRAINING: CPT

## 2022-07-25 PROCEDURE — 94799 UNLISTED PULMONARY SVC/PX: CPT

## 2022-07-25 PROCEDURE — 84134 ASSAY OF PREALBUMIN: CPT | Performed by: NURSE PRACTITIONER

## 2022-07-25 PROCEDURE — 97110 THERAPEUTIC EXERCISES: CPT | Mod: CQ

## 2022-07-25 PROCEDURE — 84100 ASSAY OF PHOSPHORUS: CPT | Performed by: NURSE PRACTITIONER

## 2022-07-25 PROCEDURE — 11800000 HC REHAB PRIVATE ROOM

## 2022-07-25 PROCEDURE — 99233 SBSQ HOSP IP/OBS HIGH 50: CPT | Mod: ,,, | Performed by: PHYSICAL MEDICINE & REHABILITATION

## 2022-07-25 PROCEDURE — 97530 THERAPEUTIC ACTIVITIES: CPT | Mod: CQ

## 2022-07-25 PROCEDURE — 97168 OT RE-EVAL EST PLAN CARE: CPT

## 2022-07-25 PROCEDURE — 83735 ASSAY OF MAGNESIUM: CPT | Performed by: NURSE PRACTITIONER

## 2022-07-25 PROCEDURE — 85025 COMPLETE CBC W/AUTO DIFF WBC: CPT | Performed by: NURSE PRACTITIONER

## 2022-07-25 PROCEDURE — 80053 COMPREHEN METABOLIC PANEL: CPT | Performed by: NURSE PRACTITIONER

## 2022-07-25 RX ADMIN — LISINOPRIL 5 MG: 5 TABLET ORAL at 08:07

## 2022-07-25 RX ADMIN — CARVEDILOL 25 MG: 25 TABLET, FILM COATED ORAL at 06:07

## 2022-07-25 RX ADMIN — ATORVASTATIN CALCIUM 80 MG: 40 TABLET, FILM COATED ORAL at 09:07

## 2022-07-25 RX ADMIN — BACLOFEN 5 MG: 5 TABLET ORAL at 09:07

## 2022-07-25 RX ADMIN — HYDROXYZINE PAMOATE 50 MG: 50 CAPSULE ORAL at 09:07

## 2022-07-25 RX ADMIN — AMLODIPINE BESYLATE 10 MG: 5 TABLET ORAL at 08:07

## 2022-07-25 RX ADMIN — ASPIRIN 325 MG ORAL TABLET 325 MG: 325 PILL ORAL at 08:07

## 2022-07-25 RX ADMIN — ZINC OXIDE TOPICAL OINT.: at 02:07

## 2022-07-25 RX ADMIN — METFORMIN HYDROCHLORIDE 1000 MG: 500 TABLET, FILM COATED ORAL at 08:07

## 2022-07-25 RX ADMIN — METFORMIN HYDROCHLORIDE 1000 MG: 500 TABLET, FILM COATED ORAL at 06:07

## 2022-07-25 RX ADMIN — CARVEDILOL 25 MG: 25 TABLET, FILM COATED ORAL at 08:07

## 2022-07-25 NOTE — PT/OT/SLP PROGRESS
"Physical Therapy         Treatment        Ayaz Huggins   MRN: [id number]     Therapy Minutes  PT Received On: 07/25/22  PT Start Time: 0830  PT Stop Time: 1000  PT Total Time (min): 90 min  PT Individual: 90     Billable Minutes:  Therapeutic Activity 45 and Therapeutic Exercise 45      Treatment Type: Treatment  PT/PTA: PTA     PTA Visit Number: 3       General Precautions: Standard, fall  Orthopedic Precautions: Orthopedic Precautions : N/A   Braces: Braces: AFO (neoprene knee brace)    Patient found with: peripheral IV     Subjective:  "Pt reports that he slept well last night, however, feels like he is weaker today than last week.    Pain/Comfort  Pain Rating 1: 0/10    Objective:  Therapeutic activities, therapeutic exercise performed to increase muscle strength and motor recruitment so that patient may improve quality of functionional mobility and increase functional independence. and Gait training performed to improve functional strength, to increase functional mobility, safety and independence.    VITALS:  BP: Start: 144/63 73 End: 127/63 73           In sitting    Functional Mobility:  Bed Mobility:   Supine to sit: Minimal Assistance with no cues   Sit to supine: Standby Assistance with no cues   Rolling: Independent with no cues.  Extra time required to complete rolling.    Balance Training  Dynamic Sitting/Standing:  Patient performed dynamic standing on level surface using rolling walker with Contact Guard Assistance and minimal verbal cues during minimal excursions.  Cotx session with TR for bean bag toss activity.  Dysen placed under R foot to keep foot in place to prevent increased RLE ER.  Some TC provided to R arm to bring back further.    Transfer Training:  Sit to stand:Contact Guard Assistance with SBQC VC for hand placements  Chair <> Mat: Step Transfer with Contact Guard Assistance with  SBQC      Wheelchair Training:  Patient propels wheelchair 150 feet using LLE/LUE with  Independent and no " verbal cues.    Additional Treatment:  Recumbent bike 15minutes with emphasis on Active knee flexion/knee ext with cycling motion.  Some difficulties with pulling motion, which requires LLE to assist.  Initially started at 4.6 lvl of resis for 5mins f/b 3.0 lvl of resis for 5mins f/b 2.0 lvl of resis for 8mins f/b 1.5 lvl of resis last 2mins with greater emphasis on using RLE more than LLE.    Hamstring, calf, IR stretching performed on mat table in full extension, IR stretching performed with knee flexion within pain free range.    Powder board utilized in L sidelying position in gravity eliminated position for AAROM knee flexion/extension.  NMES also applied to hamstrings in this position to promote increased muscle activation.  Noted improvements post NMES, however, pt compensates with hip flexion.    Activity Tolerance:  Patient tolerated treatment well and Patient limited by fatigue    Patient left up in chair with call button in reach.    Education Provided: roles and goals of PT/PTA, balance training, strengthening exercises and NDT    Expected compliance:  High compliance        Assessment:  Ayaz Huggins is a 77 y.o. male with a medical diagnosis of Stroke. He presents with R sided weakness. Pt to benefit from skilled PT services to address impairments with progression towards functional independence as tolerated.    Rehab potential is excellent.    Activity tolerance: Excellent    Discharge recommendations:       Equipment recommendations:       GOALS:   Multidisciplinary Problems     Physical Therapy Goals        Problem: Physical Therapy    Goal Priority Disciplines Outcome Goal Variances Interventions   Physical Therapy Goal     PT, PT/OT Ongoing, Progressing     Description: Bed Mobility:  Roll left and right with supervision/touching assist - MET 7/18/22  Sit to supine transfer with supervision/touching assist. - MET 7/22/22  Supine to sit transfer with supervision/touching assist. - MET 7/22/22  Sit  <> supine transfer, independent - initial    Transfers:  Sit to stand transfer with supervision/touching assist using RW or LRAD. - MET 7/18/22  Bed to chair transfer with supervision/touching assist using RW or LRAD. - MET 7/22/22  Car transfer with partial/moderate assist using RW or LRAD. - MET 7/22/22  Sit to stand transfer with set-up assist with RW - MET 7/22/22  Sit to stand transfer, independent with LBQC or LRAD - initial  Bed to chair transfer with set-up assist using LBQC or LRAD - initial  Car transfer with supervision assist using LBQC or LRAD - initial    Mobility:  Ambulate 100 feet with partial/moderate assist using RW or LRAD. - progressing, cont.    WC mobility:   150ft with LUE/LLE with independent. - progressing, cont.                     PLAN:    Patient to be seen 5 x/week  to address the above listed problems via gait training, therapeutic activities, therapeutic exercises, neuromuscular re-education, wheelchair management/training  Plan of Care expires: 07/28/22  Plan of Care reviewed with: patient    7/25/2022

## 2022-07-25 NOTE — PT/OT/SLP RE-EVAL
Occupational Therapy Inpatient Rehab Re-Evaluation    Name: Ayaz Huggins  MRN: [id number]    Recommendations:     Discharge Recommendations:  home with home health   Discharge Equipment Recommendations: bath bench, bedside commode   Barriers to discharge: decreased functional independence, env barriers (needs TTB and grab bars), decreased RUE gross motor coordination, and RLE strength    Assessment:  Ayaz Huggins is a 77 y.o. male admitted with a medical diagnosis of Stroke.  He presents with the following impairments/functional limitations:  weakness, impaired endurance, impaired self care skills, impaired functional mobility, gait instability, impaired balance, decreased coordination, decreased upper extremity function, decreased lower extremity function, impaired fine motor .    Rehab Diagnosis: stroke    General Precautions: Standard, fall     Orthopedic Precautions:N/A   chair  Braces:  (wes sling, neoprene knee brace, GivMohr sling)    Rehab Prognosis: Good; patient would benefit from acute skilled OT services to address these deficits and reach maximum level of function.      History:     Past Medical History:   Diagnosis Date    Diabetes mellitus     Hypertension     Mixed hyperlipidemia        Past Surgical History:   Procedure Laterality Date    CARDIAC SURGERY         Subjective     Orientation: Oriented x4    Chief Complaint: decreased R sided strength, RUE strength during functional tasks, and decreased functional independence     Patient/Family Comments/goals: get back to being independent and riding bikes    Vitals  Vitals at beginning of session  /61   HR 69   Pain Pain Rating 1: 0/10     Vitals end of session  /65   HR 79   Pain Pain Rating 1: 0/10     Respiratory Status: Room air     Living Environment   Living Environment  Lives With: spouse  Name(s) of Who Lives With Patient: Wife  Living Arrangements: house  Home Accessibility: other (see comments) (Threshold to enter)  Stair  Railings at Home: none  Home Layout: Able to live on 1st floor  Transportation Anticipated: car, drives self, family or friend will provide  Equipment Currently Used at Home: none  Shower Setup: Walk-in shower    Prior Level of Function  BADL: Independent    IADL: Independent    Equipment used at home, DME owned (not currently used): none.      Upon discharge, patient will have assistance from his wife and home health therapy.    Objective:     Communicated with Nursing prior to session.  Patient found supine upon OT entry to room.    Mobility   Patient completed:  Supine to Sit with independence and with side rail    ADLs     Current Status   Functional Area: Care Score:   Oral Hygiene 5   @ w/c level in supported short sit.   Toileting Hygiene 3   Pt requires CGA-Min A to maintain his balance when pulling up his pants and when performing kay care. Pt uses a LBQC when standing to perform functional ax.   Shower/Bathe Self 3   Pt requires Min A to clean his buttocks. Pt waited until he was done showering and cleaned his buttocks outside of the tub shower combo in front of the TTB. Pt requires assistance with maintaining his balance when standing.   Upper Body Dressing 5   Performed @ TTB using wes tech.   Lower Body Dressing 4   Pt undressed his LB @ BSC and dressed @ w/c level. Pt requires CGA to maintain his balance when standing and used a reacher when donning his pants to promote his functional independence. Pt also donned his neoprene knee brace @ w/c level.   Putting On/Taking Off Footwear 5   Pt don/doff his socks, AFO, and shoes @ w/c level using the figure 4 position.   Toilet Transfer 3   Pt t/f w/c<> BSC on toilet with LBQC with CGA-Min A for safety and balance impairments. When pt has a shoe covering he can drag his foot, but when he doesn't he needs assistance with managing his R foot.     Pt t/f from BSC>TTB with Min A and LBQC due to pt requiring assistance with managing his RLE. Pt required CGA  when t/f from TTB>w/c with LBQC.    Limiting Factors for ADLs: motor, endurance, balance, weakness and coordination    Exams     ROM:            L : WFL    R: shoulder flexors:90 degrees, other AROM joints WFL, he has poor gross motor coordination with functional ax    Hand Dominance: Right    Strength  Overall Strength:          -      LUE: WFL  RUE: shoulder flexors: 3+/5, elbow flex/ext: 4/5, other muscles are 5/5 but he has poor functional strength     Strength:   WFL    Sensation  Left:          -       WNL  Right:          -       WNL    Visual/Perceptual  Intact and wears glasses    Cognition:   WFL      Posture/Deviations  Rounded shoulders, neck flexion, slight lateral lean to the R     Additional Treatments   Practiced a WIS t/f with a TTB and LBQC to simulate what he will do at home. Pt t/f w/c<>TTB with a step t/f and min vc to slide his RLE.    LifeStyle Change and Education     Patient left supine with all lines intact and chair alarm on.    Education provided: Roles and goals of OT, ADLs, transfer training, equipment recommendations and home safety     Goals    None         Plan     During this hospitalization, patient to be seen daily to address the identified rehab impairments via self-care/home management, community/work re-entry, therapeutic activities, therapeutic exercises, cognitive retraining, neuromuscular re-education and progress toward the following goals:    Plan of Care Expires:  09/05/22    Time Tracking     OT Received On: 07/25/22  Time In 1030     Time Out 1200  Total Time 90 min  Therapy Time: OT Individual: 90    Billable Minutes: Re-eval 15 and Self care/home management 7    07/25/2022

## 2022-07-25 NOTE — PROGRESS NOTES
I have personally evaluated the patient during occupational therapy today. Have discussed progress and problems with patient. Have reviewed barriers to progress as well as response to therapies with therapists. I have reviewed medical issues and plan of care with IM team. I met with  to review d/c plans and barriers. I have discussed skin integrity and B/B status with nursing. I am in agreement with present IRF plan of care.  I have been involved in  formation of this note with Mayte Wolf.  Aftab Em MD     Subjective  HPI:  76 y/o male with a PMH of Type II DM, diabetic neuropathy, CAD s/p CABG x2 (2007), and HTN who presented to [facility] ED  On 7/2/22 with complaints of right sided upper and lower extremity weakness. Patient's story is unclear upon the time of last known normal. He states he woke up  with a cramp in the RLE and noticed the right sided weakness.The patient was admitted to the ICU with an ischemic CVA and administered tPA; he has had some improvement in his right-sided weakness although he is unable to purposefully move his right lower extremity.  Neurology has seen and evaluated the patient and has deemed this patient stable for transfer to the floor.  This patient was seen and evaluated in the intensive care unit.  7/25: Seen in patient room, seated in WC eating breakfast. States that he had a good night. Denies spasms. No complaints. VSSAF.     Review of Systems  Depression/Anxiety: noted anxiety with discharge planning:improving     ALPRAZolam tablet 0.25 mg TID PRN Anxiety  Pain: denies     baclofen tablet 5 mg qHS  acetaminophen tablet 650 mg q4h PRN pain  methocarbamoL tablet 500 mg TID PRN spasm  Bowels/Bladder: last BM 7/24  Appetite: good       Sleep: good          Physical Exam  General: well-developed, well-nourished, in no acute distress  Respiratory: equal chest rise, no SOB, no audible wheeze  Cardiovascular: regular rate and rhythm, no edema  Gastrointestinal: soft,  non-tender, non-distended   Musculoskeletal: right hemiplegia  Integumentary: no rashes or skin lesions present, redness to right heel/ankle, sacral redness, left neck incision-dressing-c/d/i  Neurologic: cranial nerves intact, right hemiplegia  *MD performed and documented physical examination       Labs:   Latest Reference Range & Units 07/25/22 05:30   WBC 4.5 - 11.5 x10(3)/mcL 8.2   RBC 4.70 - 6.10 x10(6)/mcL 3.83 (L)   Hemoglobin 14.0 - 18.0 gm/dL 11.5 (L)   Hematocrit 42.0 - 52.0 % 33.0 (L)   MCV 80.0 - 94.0 fL 86.2   MCH 27.0 - 31.0 pg 30.0   MCHC 33.0 - 36.0 mg/dL 34.8   RDW 11.5 - 17.0 % 12.7   Platelets 130 - 400 x10(3)/mcL 247   MPV 7.4 - 10.4 fL 10.3   Neut % % 55.5   LYMPH % % 30.6   Mono % % 8.9   Eosinophil % % 4.0   Basophil % % 0.6   Immature Granulocytes % 0.4   Neut # 2.1 - 9.2 x10(3)/mcL 4.6   Lymph # 0.6 - 4.6 x10(3)/mcL 2.51   Mono # 0.1 - 1.3 x10(3)/mcL 0.73   Eos # 0 - 0.9 x10(3)/mcL 0.33   Baso # 0 - 0.2 x10(3)/mcL 0.05   Immature Grans (Abs) 0 - 0.04 x10(3)/mcL 0.03   nRBC % 0.0   Sodium 136 - 145 mmol/L 135 (L)   Potassium 3.5 - 5.1 mmol/L 4.6   Chloride 98 - 107 mmol/L 101   CO2 23 - 31 mmol/L 24   BUN 8.4 - 25.7 mg/dL 27.8 (H)   Creatinine 0.73 - 1.18 mg/dL 0.62 (L)   eGFR if non African American mls/min/1.73/m2 >60   Glucose 82 - 115 mg/dL 105   Calcium 8.8 - 10.0 mg/dL 9.7   Phosphorus 2.3 - 4.7 mg/dL 3.3   Magnesium 1.60 - 2.60 mg/dL 1.50 (L)   Alkaline Phosphatase 40 - 150 unit/L 76   PROTEIN TOTAL 5.8 - 7.6 gm/dL 6.8   Albumin 3.4 - 4.8 gm/dL 3.5   Albumin/Globulin Ratio 1.1 - 2.0 ratio 1.1   Prealbumin 16.0 - 42.0 mg/dL 19.9   BILIRUBIN TOTAL <=1.5 mg/dL 0.6   AST 5 - 34 unit/L 21   ALT 0 - 55 unit/L 30   Globulin, Total 2.4 - 3.5 gm/dL 3.3   (L): Data is abnormally low  (H): Data is abnormally high      Assessment/Plan  HTN (hypertension) (Chronic)   Stroke   Carotid artery stenosis, symptomatic, left   Type 2 diabetes mellitus   Heart disease   S/P carotid endarterectomy          Wounds:redness to right heel/ankle, sacral redness, left neck incision-dressing-c/d/i  S/p s/p left CEA on 7/8/2022 (VICENTE Lama MD)  Precautions: fall risk  Bracing/AD: QC  Swallowing: Diabetic Diet  Function: Tolerating therapy. Continue PT/OT  VTE Prophylaxis:aspirin tablet 325 mg qd   Code Status: FULL CODE   Discharge:Pt. Lives with wife in [place] in a single-story home with 1 step to enter the residence. Pt. completed high school and some college.  He has no  history. He is a retired  for [employer]. Wife is retired.  She drives, cooks, cleans, does laundry, grocery shops, manages finances, and can physically assist pt. She will be home with patient after discharge from rehab.  She is also checking into their long-term care policy. Children (2). Date 7/29 Friday.                   Meghan Wolf NP, conducted additional independent physical examination and assisted with medical documentation.

## 2022-07-25 NOTE — PLAN OF CARE
Problem: Rehabilitation (IRF) Plan of Care  Goal: Plan of Care Review  Outcome: Ongoing, Progressing  Goal: Patient-Specific Goal (Individualized)  Outcome: Ongoing, Progressing  Goal: Absence of New-Onset Illness or Injury  Outcome: Ongoing, Progressing  Goal: Optimal Comfort and Wellbeing  Outcome: Ongoing, Progressing     Problem: Impaired Wound Healing  Goal: Optimal Wound Healing  Outcome: Ongoing, Progressing     Problem: Skin Injury Risk Increased  Goal: Skin Health and Integrity  Outcome: Ongoing, Progressing     Problem: Diabetes Comorbidity  Goal: Blood Glucose Level Within Targeted Range  Outcome: Ongoing, Progressing

## 2022-07-25 NOTE — PROGRESS NOTES
07/25/22 0830   Rec Therapy Time Calculation   Rec Start Time 0830   Rec Stop Time 0900   Rec Total Time (min) 30 min   Subjective   Patient states Pt stated that he is feeling less anxious about pending d/c   Precautions   General Precautions fall   Attendance   Activity   (Bean Bag Toss)   Participation Active participation   Assessment   Assessment Sit to stand was supervision as was dynamic standing balance/reaching. Standing tolerance was 3 minutes. RUE coordination/dexteriety improved to min. He remains I with sequencing skills. Motivated and determined   Plan   Planned Therapy Intervention Continue with current plan  (Bean Bag Toss)   Expected Length of Stay 1 week   PT Frequency Minimu of 5 visits per week;Other (see comments)  (Daily)   Time   Treatment time 2 units

## 2022-07-25 NOTE — PROGRESS NOTES
Nutrition Education:    Patient/Fly educated on: Diabetic Diet.      Teaching Method:  Explanation and Printed Materials    Patient's/Fly Understanding: Verbalizes understanding    Barriers to learning: None evident    Expected Compliance:  good    All questions were answered. Dietitian's contact information provided.

## 2022-07-25 NOTE — PLAN OF CARE
Problem: Occupational Therapy  Goal: Occupational Therapy Goal  Description: LTG: Pt will be Ind with all ADLS.  Pt LTG: to return to bike riding    ADLs:  MET: Pt to perform grooming tasks with [place] from seated position by d/c.  MET: Pt to perform UB dressing with Setup using modified techniques.  Pt to perform LB dressing with SPV using modified techniques.  Pt to perform putting on/off footwear task with SPV.  MET: Pt to perform toileting with Partial A using BSC.   Pt to perform toileting with CGA using BSC and LRAD.    Functional Transfers:  MET: Pt to perform toilet transfers with Partial A using BSC and LRAD for mobility.    Pt to perform toilet transfers with SPV using BSC and LRAD for mobility.    MET: Pt to perform a tub transfer with Max A by re-eval using TTB.   Pt to perform a tub t/f with CGA via TTB and Gbs.      Balance, Strengthening, Endurance, Balance:  Pt  will perform dynamic standing tasks using LRAD for 2 minutes with SPV assist.  Pt to demonstrate 4/5 R shoulder strength and 5/5 distal RUE strength during functional task/    Outcome: Ongoing, Progressing

## 2022-07-25 NOTE — PROGRESS NOTES
[facility] ([facility])  Rehab Progress Note    Patient Name: Ayaz Huggins  MRN: [id number]  Age: 77 y.o. Sex: male  : 1945  Hospital Length of Stay: 13 days  Date of Service: 2022   Chief Complaint: Acute left hemispheric CVA s/p CPA and s/p left CEA on 2022    Subjective:     Basic Information  Admit Information: 77-year-old white male presented to [facility] ED on 2022 complaining of right-sided weakness.  PMH significant for DM type 2, CAD s/p CABG x2 in , and HTN.  CT head negative for hemorrhagic bleeding.  Received tPA and transferred to ICU for post tPA monitoring and treatment.  TTE with negative bubble study, but significant for grade 1 diastolic dysfunction.  Carotid ultrasound significant for left ICA stenosis.  MRI significant for last left hemispheric CVA and 3 other small punctate CVAs on the right hemisphere.  Tolerated left CEA on  without periprocedural complications.  Tolerated transfer to [facility] inpatient rehab unit on  without incident.  Today's Information: No acute events overnight.  Sitting in chair comfortably eating breakfast.  Reports good sleep and appetite.  Last BM .  Vital signs at goal with no recent recorded fevers.  No labs or imaging today.  CBGs at goal.    Review of patient's allergies indicates:   Allergen Reactions    Penicillins         Current Facility-Administered Medications:     acetaminophen tablet 650 mg, 650 mg, Oral, Q4H PRN, Alexey A Kathryn, FNP    ALPRAZolam tablet 0.25 mg, 0.25 mg, Oral, TID PRN, Alexey A Kathryn, FNP    amLODIPine tablet 10 mg, 10 mg, Oral, Daily, Alexey A Kathryn, FNP, 10 mg at 22    aspirin tablet 325 mg, 325 mg, Oral, Daily, Alexey A Kathryn, FNP, 325 mg at 22    atorvastatin tablet 80 mg, 80 mg, Oral, QHS, Alexey A Kathryn, FNP, 80 mg at 22    baclofen tablet 5 mg, 5 mg, Oral, QHS, Meghan Wolf, FNP, 5 mg at 22  2012    benzonatate capsule 100 mg, 100 mg, Oral, TID PRN, Alexey A Kathryn, FNP    bisacodyL suppository 10 mg, 10 mg, Rectal, Daily PRN, Alexey A Kathryn, FNP    bisacodyL suppository 10 mg, 10 mg, Rectal, Once, Dorothea Calvert, ARVIND    carvediloL tablet 25 mg, 25 mg, Oral, BID WM, Alexey A Kathryn, FNP, 25 mg at 07/25/22 0824    dextrose 50 % in water (D50W) injection 12.5 g, 12.5 g, Intravenous, PRN, Alexey A Kathryn, FNP    dextrose 50 % in water (D50W) injection 25 g, 25 g, Intravenous, PRN, Alexey A Kathryn, FNP    docusate sodium capsule 100 mg, 100 mg, Oral, BID PRN, Alexey A Kathryn, FNP    glucagon (human recombinant) injection 1 mg, 1 mg, Intramuscular, PRN, Alexey A Kathryn, FNP    glucose chewable tablet 16 g, 16 g, Oral, PRN, Alexey A Kathryn, FNP    glucose chewable tablet 24 g, 24 g, Oral, PRN, Alexey A Kathryn, FNP    hydrALAZINE injection 10 mg, 10 mg, Intravenous, Q4H PRN, Alexey A Kathryn, FNP    HYDROcodone-acetaminophen 5-325 mg per tablet 1 tablet, 1 tablet, Oral, Q6H PRN, Alexey A Kathryn, FNP    hydrOXYzine pamoate capsule 50 mg, 50 mg, Oral, QHS, Alexey A Kathryn, FNP, 50 mg at 07/24/22 2012    insulin aspart U-100 injection 1-10 Units, 1-10 Units, Subcutaneous, QID (AC + HS) PRN, Alexey A Kathryn, FNP, 2 Units at 07/13/22 1213    labetalol 20 mg/4 mL (5 mg/mL) IV syring, 10 mg, Intravenous, Q4H PRN, Alexey A Kathryn, FNP, 10 mg at 07/13/22 0904    lisinopriL tablet 5 mg, 5 mg, Oral, Daily, Alexey A Kathryn, FNP, 5 mg at 07/25/22 0824    metFORMIN tablet 1,000 mg, 1,000 mg, Oral, BID WM, ARVIND Garcia, 1,000 mg at 07/25/22 0824    methocarbamoL tablet 500 mg, 500 mg, Oral, TID PRN, ARVIND Zhu    metoprolol injection 10 mg, 10 mg, Intravenous, Q2H PRN, ARVIND Garcia    nitroGLYCERIN SL tablet 0.4 mg, 0.4 mg, Sublingual, Q5 Min PRN, ARVIND Garcia    ondansetron disintegrating tablet  "8 mg, 8 mg, Oral, Q6H PRN, Alexey A Kathryn, FNP    polyethylene glycol packet 17 g, 17 g, Oral, BID PRN, Alexey A Kathryn, FNP    promethazine tablet 25 mg, 25 mg, Oral, Q6H PRN, Alexey A Kathryn, FNP    zinc oxide 16% (WIL'S BUTT PASTE) topical ointment, , Topical (Top), TID, Alexey A Kathryn, FNP, Given at 07/24/22 1400     Review of Systems   Complete 12-point review of symptoms negative except for what's mentioned in HPI     Objective:     BP (!) 145/65   Pulse 75   Temp 98 °F (36.7 °C) (Oral)   Resp 18   Ht 5' 10.98" (1.803 m)   Wt 85.5 kg (188 lb 7.9 oz)   SpO2 96%   BMI 26.30 kg/m²        Intake/Output Summary (Last 24 hours) at 7/25/2022 0855  Last data filed at 7/25/2022 0800  Gross per 24 hour   Intake 900 ml   Output 1050 ml   Net -150 ml       Physical Exam  Constitutional:       Appearance: Normal appearance.   HENT:      Head: Normocephalic.      Mouth/Throat:      Mouth: Mucous membranes are moist.   Eyes:      Pupils: Pupils are equal, round, and reactive to light.   Neck:      Comments: Left CEA incision clean intact with mild postsurgical swelling  Cardiovascular:      Rate and Rhythm: Normal rate and regular rhythm.      Heart sounds: Normal heart sounds.   Pulmonary:      Effort: Pulmonary effort is normal.      Breath sounds: Normal breath sounds.   Abdominal:      General: Bowel sounds are normal.      Palpations: Abdomen is soft.   Musculoskeletal:      Cervical back: Neck supple.      Comments: Generalized weakness and muscle atrophy.  Right upper extremity dependent swelling   Skin:     General: Skin is warm and dry.   Neurological:      Mental Status: He is alert and oriented to person, place, and time.      Comments: Right-sided hemiplegia.  Can move RUE against gravity.  LLE flaccid.    Psychiatric:         Mood and Affect: Mood normal.         Behavior: Behavior normal.         Thought Content: Thought content normal.         Judgment: Judgment normal. "     *MD performed and documented physical examination       Lines/Drains/Airways     None               Labs  Recent Results (from the past 24 hour(s))   POCT glucose    Collection Time: 07/24/22  4:23 PM   Result Value Ref Range    POCT Glucose 126 (H) 70 - 110 mg/dL   Prealbumin    Collection Time: 07/25/22  5:30 AM   Result Value Ref Range    Prealbumin 19.9 16.0 - 42.0 mg/dL   Comprehensive Metabolic Panel    Collection Time: 07/25/22  5:30 AM   Result Value Ref Range    Sodium Level 135 (L) 136 - 145 mmol/L    Potassium Level 4.6 3.5 - 5.1 mmol/L    Chloride 101 98 - 107 mmol/L    Carbon Dioxide 24 23 - 31 mmol/L    Glucose Level 105 82 - 115 mg/dL    Blood Urea Nitrogen 27.8 (H) 8.4 - 25.7 mg/dL    Creatinine 0.62 (L) 0.73 - 1.18 mg/dL    Calcium Level Total 9.7 8.8 - 10.0 mg/dL    Protein Total 6.8 5.8 - 7.6 gm/dL    Albumin Level 3.5 3.4 - 4.8 gm/dL    Globulin 3.3 2.4 - 3.5 gm/dL    Albumin/Globulin Ratio 1.1 1.1 - 2.0 ratio    Bilirubin Total 0.6 <=1.5 mg/dL    Alkaline Phosphatase 76 40 - 150 unit/L    Alanine Aminotransferase 30 0 - 55 unit/L    Aspartate Aminotransferase 21 5 - 34 unit/L    Estimated GFR-Non  >60 mls/min/1.73/m2   Magnesium    Collection Time: 07/25/22  5:30 AM   Result Value Ref Range    Magnesium Level 1.50 (L) 1.60 - 2.60 mg/dL   Phosphorus    Collection Time: 07/25/22  5:30 AM   Result Value Ref Range    Phosphorus Level 3.3 2.3 - 4.7 mg/dL   CBC with Differential    Collection Time: 07/25/22  5:30 AM   Result Value Ref Range    WBC 8.2 4.5 - 11.5 x10(3)/mcL    RBC 3.83 (L) 4.70 - 6.10 x10(6)/mcL    Hgb 11.5 (L) 14.0 - 18.0 gm/dL    Hct 33.0 (L) 42.0 - 52.0 %    MCV 86.2 80.0 - 94.0 fL    MCH 30.0 27.0 - 31.0 pg    MCHC 34.8 33.0 - 36.0 mg/dL    RDW 12.7 11.5 - 17.0 %    Platelet 247 130 - 400 x10(3)/mcL    MPV 10.3 7.4 - 10.4 fL    Neut % 55.5 %    Lymph % 30.6 %    Mono % 8.9 %    Eos % 4.0 %    Basophil % 0.6 %    Lymph # 2.51 0.6 - 4.6 x10(3)/mcL    Neut # 4.6  2.1 - 9.2 x10(3)/mcL    Mono # 0.73 0.1 - 1.3 x10(3)/mcL    Eos # 0.33 0 - 0.9 x10(3)/mcL    Baso # 0.05 0 - 0.2 x10(3)/mcL    IG# 0.03 0 - 0.04 x10(3)/mcL    IG% 0.4 %    NRBC% 0.0 %   POCT glucose    Collection Time: 07/25/22  6:09 AM   Result Value Ref Range    POCT Glucose 96 70 - 110 mg/dL     Radiology  MRI brain without contrast on 07/03/2022 at 2:09 p.m., IMPRESSION: Small acute infarcts supratentorially, the largest is a left posterior frontal 3 cm cortical based infarct with petechial hemorrhage.  No mass effect or shift.  Discussed with Dr. Duncan. Otherwise no acute intracranial abnormalities.  Radiology  Transthoracic echo on 07/02/2022 at 2:40 p.m., IMPRESSION: The left ventricle is  with normal systolic function. The estimated ejection fraction is 60%. There is grade I diastolic dysfunction consistent with impaired relaxation. Left atrial pressure is normal.    Assessment/Plan:     77 y.o. WM admitted on 7/12/2022    Acute left hemispheric CVA   - MRI with 3 other small punctate CVAs on the right hemisphere  - s/p tPA and s/p left CEA on 7/8/2022  - continue                Aspirin 325 mg daily                Lipitor 80 mg at bedtime                Xarelto 20 mg daily   - right knee brace obtained on 7/13  - waiting for right AFO from orthotics  - defer to physiatry for rehab and pain management  - PT/OT/RT following      Carotid artery stenosis  - s/p left CEA on 07/08/2022  - continue                Aspirin 325 mg daily                Lipitor 80 mg at bedtime                Xarelto 20 mg daily                Norco 5 mg/325 mg q.6 hours p.r.n.  - to follow up with vascular surgery outpatient     HTN  - BP at goal!!  - discontinued Metoprolol succinate 50 mg daily on 7/13  - continue     Coreg 25 mg BID (initiated 7/13) (increased 7/14)                Norvasc 10 mg daily                Lisinopril 20 mg daily                 Hydralazine 10 mg every 2 hours as needed for BP > 160/90                 Labetalol 10 mg every 2 hours as needed for BP > 160/90     DM type II  - HgA1c with next labs  - continue                Metformin 1000 mg twice daily                ISS   - CBGs AC/HS     Normocytic anemia  - asymptomatic  - H/H stable   - no evidence of active bleeds  - will closely monitor and transfuse if needed      Constipation  - stable, loose stools reported on 7/15  - continue                Colace 100 mg b.i.d. PRN                MiraLax 17 g daily PRN     VTE Prophylaxis:  Xarelto 20 mg daily  COVID-19 testing:  Negative on 07/12/2022  COVID-19 vaccination status:  Vaccinated (Moderna):  x 3     POA: No  Living will: No  Contacts:  Pooja Huggins (wife) [phone]                     Eleonora Manzanares (daughter) [phone]     CODE STATUS: Full Code  Internal Medicine (attending): Weston Rivera MD  Physiatry (consulting): Angel Em MD     OUTPATIENT PROVIDERS  Angel Parham MD  Neurology: Jacque Agee MD  Vascular surgery:  Roxi Lama MD  Cardiology: Dionisio Marin MD     DISPOSITION: Condition stable.  Vital signs at goal.  Labs reviewed.  H&H stable.  Slight hyponatremia.  Monitor closely.  Good glycemic control.  Sleep hygiene, bowel management, and appetite at goal.  Continues to progress well with therapies.  Monitor closely.  Notify of any acute changes.     Staffing 7/19: Continent of bowel and bladder. PT: supervision to mod assist overall. RT: overall mod to min. Fair appetite. Drinking Boost. OT: overall partial mod. Projected discharge 7/29.      Dorothea Calvert NP conducted independent physical examination and assisted with medical documentation.    Total time spent on this encounter including chart review and direct MD + NP 1-on-1 patient interaction: 42 minutes   Over 50% of this time was spent in counseling and coordination of care

## 2022-07-25 NOTE — PT/OT/SLP PROGRESS
Occupational Therapy Inpatient Rehab Treatment    Name: Ayaz Huggins  MRN: [id number]    Assessment:  Ayaz Huggins is a 77 y.o. male admitted with a medical diagnosis of Stroke.  He presents with the following impairments/functional limitations:  weakness, impaired endurance, impaired self care skills, impaired functional mobility, impaired balance, decreased lower extremity function, decreased upper extremity function, decreased coordination, impaired coordination.    Rehab Diagnosis: stroke    Recent Surgery: * No surgery found *      General Precautions: Standard, fall     Orthopedic Precautions:N/A     Braces: AFO (neoprene knee brace, wes sling)    Rehab Prognosis: Good; patient would benefit from acute skilled OT services to address these deficits and reach maximum level of function.        History:     Past Medical History:   Diagnosis Date    Diabetes mellitus     Hypertension     Mixed hyperlipidemia        Past Surgical History:   Procedure Laterality Date    CARDIAC SURGERY         Subjective     Orientation: Oriented x4    Chief Complaint: decreased balance, functional independence, and increased risk of falling    Patient/Family Comments/goals: pt's wife voices she feels scared pt will fall when they return home, pt's wife will start imersive FT to increase her confidence when pt is t/f and performing ADLs    Vitals   Vitals at Rest  /60   HR 71   Pain No pain     Vitals With Activity  /58   HR 71   Pain No pain     Respiratory Status: Room air    Patients cultural, spiritual, Taoism conflicts given the current situation: no       Objective:     Communicated with Nursing prior to session.  Patient found supine upon OT entry to room.    Mobility   Patient completed:  Supine to Sit with independence and with side rail      ADLs   Current Status   Functional Area: Care Score:   Toilet Transfer 4      Pt performed a toilet t/f w/c<>BSC with LBQC and CGA. Pt and his wife were educated on his  assist level and needing a BSC to promote his functional independence. Pt then performed a WIS t/f via TTB to simulate what he will do at home after d/c. Pt required CGA and min vc to remember proper body mechanics. Pt t/f w/c<>TTB by performing a step t/f with grab bars and LBQC. Pt was more succseful with t/f during the afternoon session so pt and his wife were educated on performing a shower t/f with both his neoprene knee brace and AFO to promote his safety. Pt and his wife were also educated on bathroom safety and proper AE in the bathroom. OTS also educated pt's wife on his assist level with ADLs. Pt's wife wants to start imersive FT to promote her confidence when her  is performing t/f and to prevent his risk of falling.    All questions/concerns were addressed during FT.       LifeStyle Change and Education:             Patient left up in chair with call button in reach and PT present.     Education provided: Roles and goals of OT, ADLs and transfer training            Occupational Therapy Goal       Start: 07/12/22   Expected End: 07/25/22       LTG: Pt will be Ind with all ADLS.  Pt LTG: to return to bike riding    ADLs:  MET: Pt to perform grooming tasks with [place] from seated position by d/c.  MET: Pt to perform UB dressing with Setup using modified techniques.  Pt to perform LB dressing with SPV using modified techniques.  Pt to perform putting on/off footwear task with SPV.  MET: Pt to perform toileting with Partial A using BSC.   Pt to perform toileting with CGA using BSC and LRAD.    Functional Transfers:  MET: Pt to perform toilet transfers with Partial A using BSC and LRAD for mobility.    Pt to perform toilet transfers with SPV using BSC and LRAD for mobility.    MET: Pt to perform a tub transfer with Max A by re-eval using TTB.   Pt to perform a tub t/f with CGA via TTB and Gbs.      Balance, Strengthening, Endurance, Balance:  Pt  will perform dynamic standing tasks using LRAD for  2 minutes with SPV assist.  Pt to demonstrate 4/5 R shoulder strength and 5/5 distal RUE strength during functional task/                Time Tracking     OT Received On: 07/25/22  Time In 1259     Time Out 1329  Total Time 30 min  Therapy Time: OT Individual: 30    Billable Minutes: Self care/ home management 30 07/25/2022

## 2022-07-25 NOTE — PT/OT/SLP PROGRESS
"Physical Therapy Inpatient Rehab Treatment    Patient Name:  Ayaz Huggins   MRN:  [id number]    Recommendations:     Discharge Recommendations:  home with home health, outpatient OT, outpatient PT (Pending progress)   Discharge Equipment Recommendations: wheelchair, cane, quad   Barriers to discharge: None    Assessment:     Ayaz Huggins is a 77 y.o. male admitted with a medical diagnosis of Stroke.  He presents with the following impairments/functional limitations:  weakness, impaired endurance, impaired functional mobility, gait instability, impaired balance, decreased upper extremity function, decreased lower extremity function, abnormal tone, decreased ROM, impaired coordination, impaired fine motor, impaired skin, impaired muscle length .    Rehab Diagnosis: CVA, R wes     Recent Surgery: * No surgery found *      General Precautions: Standard, fall     Orthopedic Precautions:N/A     Braces: AFO (neoprene knee brace, wes sling)    Rehab Prognosis: Good; patient would benefit from acute skilled PT services to address these deficits and reach maximum level of function.      History:     Past Medical History:   Diagnosis Date    Diabetes mellitus     Hypertension     Mixed hyperlipidemia        Past Surgical History:   Procedure Laterality Date    CARDIAC SURGERY         Subjective     Chief Complaint: Fatigue - pt reports not sleeping well last night.   Patient/Family Comments/goals: "To learn how to do things for myself.     Vitals   Vitals at Rest  /58   HR 71     Vitals With Activity  /58   HR 70     Respiratory Status: Room air    Patients cultural, spiritual, Nondenominational conflicts given the current situation: no      Objective:      Patient found up in chair with   wife present  upon PT entry to room.    Pt is Oriented x3 and Alert, Cooperative and Motivated.    Functional Mobility:   · Bed Mobility:     · Sit to Supine: independence  · Transfers:     · Sit to Stand:  independence with quad " cane  · Bed to Chair: supervision with  no AD and quad cane  using  Stand Pivot. No AD required when transferring toward L side, LBQC required when going toward R side.  · Gait: Pt ambulates 50ft with LBQC with Contact Guard Assistance.      Current   Status  Discharge   Goal   Functional Area: Care Score:    Sit to Lying 6 Independent   Sit to Stand 6 Independent   Chair/Bed-to-Chair Transfer 4 Independent   Walk 10 Feet 4 Supervision or touching assistance   Walk 50 Feet with Two Turns 4 Supervision or touching assistance     Therapeutic Activities and Exercises:  - Patient/family education provided to pt and wife about possible DC plans. Also discussed continuing with Immersive FT this week.    Activity Tolerance: good    Patient left supine with call button in reach and Wife and dietitian present.    Education provided: roles and goals of PT/PTA, transfer training, bed mob, gait training, balance training, safety awareness, body mechanics, assistive device, wheelchair management and fall prevention    Expected compliance: high    GOALS:   Multidisciplinary Problems     Physical Therapy Goals        Problem: Physical Therapy    Goal Priority Disciplines Outcome Goal Variances Interventions   Physical Therapy Goal     PT, PT/OT Ongoing, Progressing     Description: Bed Mobility:  Roll left and right with supervision/touching assist - MET 7/18/22  Sit to supine transfer with supervision/touching assist. - MET 7/22/22  Supine to sit transfer with supervision/touching assist. - MET 7/22/22  Sit <> supine transfer, independent - initial    Transfers:  Sit to stand transfer with supervision/touching assist using RW or LRAD. - MET 7/18/22  Bed to chair transfer with supervision/touching assist using RW or LRAD. - MET 7/22/22  Car transfer with partial/moderate assist using RW or LRAD. - MET 7/22/22  Sit to stand transfer with set-up assist with RW - MET 7/22/22  Sit to stand transfer, independent with LBQC or LRAD -  initial  Bed to chair transfer with set-up assist using LBQC or LRAD - initial  Car transfer with supervision assist using LBQC or LRAD - initial    Mobility:  Ambulate 100 feet with partial/moderate assist using RW or LRAD. - progressing, cont.    WC mobility:   150ft with LUE/LLE with independent. - progressing, cont.                     Plan:     During this hospitalization, patient to be seen 5 x/week to address the identified rehab impairments via gait training, therapeutic activities, therapeutic exercises, neuromuscular re-education, wheelchair management/training and progress toward the following goals:     Plan of Care Expires:  07/28/22    Additional Information:     Pt's wife, Pooja, present for initial FT session. She seemed nervous at first, but reported that she felt better at the end after seeing how well the pt is doing. She agreed to continue to come to therapy this week with pt to get more practice and gain confidence. Answered all other questions/concerns as appropriate at this time. Plan to practice the car t/f with their personal vehicle on a future day this week, but verbally reviewed technique with them.    Time Tracking:     PT Received On:    Time In 1330     Time Out 1400  Total Time 30 min  Therapy Time PT Individual: 30    Billable Minutes: Self-Care/Home management 30 min    07/25/2022

## 2022-07-26 LAB — POCT GLUCOSE: 96 MG/DL (ref 70–110)

## 2022-07-26 PROCEDURE — 97112 NEUROMUSCULAR REEDUCATION: CPT

## 2022-07-26 PROCEDURE — 97530 THERAPEUTIC ACTIVITIES: CPT

## 2022-07-26 PROCEDURE — 97535 SELF CARE MNGMENT TRAINING: CPT

## 2022-07-26 PROCEDURE — 99233 SBSQ HOSP IP/OBS HIGH 50: CPT | Mod: ,,, | Performed by: PHYSICAL MEDICINE & REHABILITATION

## 2022-07-26 PROCEDURE — 97116 GAIT TRAINING THERAPY: CPT | Mod: CQ

## 2022-07-26 PROCEDURE — 97110 THERAPEUTIC EXERCISES: CPT | Mod: CQ

## 2022-07-26 PROCEDURE — 99233 PR SUBSEQUENT HOSPITAL CARE,LEVL III: ICD-10-PCS | Mod: ,,, | Performed by: PHYSICAL MEDICINE & REHABILITATION

## 2022-07-26 PROCEDURE — 25000003 PHARM REV CODE 250: Performed by: NURSE PRACTITIONER

## 2022-07-26 PROCEDURE — 94799 UNLISTED PULMONARY SVC/PX: CPT

## 2022-07-26 PROCEDURE — 11800000 HC REHAB PRIVATE ROOM

## 2022-07-26 RX ORDER — CLOTRIMAZOLE 1 %
CREAM (GRAM) TOPICAL 2 TIMES DAILY
Status: DISCONTINUED | OUTPATIENT
Start: 2022-07-26 | End: 2022-08-02 | Stop reason: HOSPADM

## 2022-07-26 RX ORDER — DOXYLAMINE SUCCINATE 25 MG
TABLET ORAL 2 TIMES DAILY
Status: DISCONTINUED | OUTPATIENT
Start: 2022-07-26 | End: 2022-07-26

## 2022-07-26 RX ORDER — BACLOFEN 5 MG/1
5 TABLET ORAL 2 TIMES DAILY
Status: DISCONTINUED | OUTPATIENT
Start: 2022-07-26 | End: 2022-07-28

## 2022-07-26 RX ADMIN — CLOTRIMAZOLE: 1 CREAM TOPICAL at 09:07

## 2022-07-26 RX ADMIN — ATORVASTATIN CALCIUM 80 MG: 40 TABLET, FILM COATED ORAL at 09:07

## 2022-07-26 RX ADMIN — LISINOPRIL 5 MG: 5 TABLET ORAL at 08:07

## 2022-07-26 RX ADMIN — ASPIRIN 325 MG ORAL TABLET 325 MG: 325 PILL ORAL at 08:07

## 2022-07-26 RX ADMIN — METFORMIN HYDROCHLORIDE 1000 MG: 500 TABLET, FILM COATED ORAL at 08:07

## 2022-07-26 RX ADMIN — HYDROXYZINE PAMOATE 50 MG: 50 CAPSULE ORAL at 09:07

## 2022-07-26 RX ADMIN — CLOTRIMAZOLE: 1 CREAM TOPICAL at 12:07

## 2022-07-26 RX ADMIN — BACLOFEN 5 MG: 5 TABLET ORAL at 09:07

## 2022-07-26 RX ADMIN — METFORMIN HYDROCHLORIDE 1000 MG: 500 TABLET, FILM COATED ORAL at 05:07

## 2022-07-26 RX ADMIN — AMLODIPINE BESYLATE 10 MG: 5 TABLET ORAL at 08:07

## 2022-07-26 RX ADMIN — CARVEDILOL 25 MG: 25 TABLET, FILM COATED ORAL at 05:07

## 2022-07-26 RX ADMIN — CARVEDILOL 25 MG: 25 TABLET, FILM COATED ORAL at 08:07

## 2022-07-26 NOTE — PT/OT/SLP PROGRESS
Occupational Therapy Inpatient Rehab Treatment    MRN: [id number]    Assessment:  Ayaz Huggins is a 77 y.o. male admitted with a medical diagnosis of Stroke.  He presents with the following impairments/functional limitations:  weakness, impaired endurance, impaired functional mobility, gait instability, impaired self care skills, decreased lower extremity function, impaired balance, decreased upper extremity function, impaired coordination.    Rehab Diagnosis:     Recent Surgery: * No surgery found *      General Precautions: Standard, fall     Orthopedic Precautions:N/A     Braces: AFO    Rehab Prognosis: Good; patient would benefit from acute skilled OT services to address these deficits and reach maximum level of function.        History:     Past Medical History:   Diagnosis Date    Diabetes mellitus     Hypertension     Mixed hyperlipidemia        Past Surgical History:   Procedure Laterality Date    CARDIAC SURGERY         Subjective     Orientation: Oriented x4    Chief Complaint: I want to walk      Vitals   Vitals at Rest  /56   HR 72   O2 Sat    Pain none     Vitals With Activity  /59   HR 70   O2 Sat    Pain none     Respiratory Status: Room air    Patients cultural, spiritual, Spiritism conflicts given the current situation: no       Objective:     Communicated with nrsg prior to session.  Patient found up in chair with peripheral IV  upon OT entry to room.    Mobility   Patient completed:  Sit to Supine with modified independence  Sit to Stand Transfer with contact guard assistance with LBQC  Stand to Sit Transfer with supervision with LBQC    Functional Mobility  Pt mobilized in room ~20 ft with increased hip flex, requires partial A when negotiating turn to R side, but mostly Touch A.     ADLs  Putting On, Taking Off Footwear 5 Compensatory techniques and Unsupported short sit none     Limiting Factors for ADLs: motor and coordination       Neuromuscular Re-Education and  Therax  Performed multi rounds of dynamic standing using LBQC for LUE support and reaching with RUE c focus on motor control and coordination of proximal and distal extremity. Noted good digit dissociation to pick out targeted items in a cluttered box. SPV for balance t/o. Pt demo good planning and organization to compete in task completed at tabletop.     Additional Treatments: OT edu on pt mobility at home and ETHAN for turns. Addressed concerns of home accessbility d/t pt door threshold 24 inches to enter bathroom, current w/c width not accessible to this, but pt appears to have some room to decrease width to 16 inch chair allowing for space to enter area. OT setup pt with 16 in chair and cushion for comfort as a trial. edu pt on contraindications should pt have increased irritation of B hip area.     LifeStyle Change and Education:             Patient left with bed in chair position with all lines intact and call button in reach.     Education provided: Roles and goals of OT, ADLs, transfer training, body mechanics, assistive device, equipment recommendations and home safety     Goals    None         Time Tracking     OT Received On: 07/26/22  Time In 1000     Time Out 1100  Total Time 60 min  Therapy Time: OT Individual: 60  Missed Time:    Missed Time Reason:      Billable Minutes: Therapeutic Activity 30 and Neuromuscular Re-education 30    07/26/2022

## 2022-07-26 NOTE — PROGRESS NOTES
"[facility] - Rehab Inpatient Services  Adult Nutrition  Progress Note    SUMMARY       Recommendations    1. Liberalized diet to regular to improve po intake at meals     2. Continue to send Boost GC with meals to provide additional nourishment     Goals: Maintain adequate nutrition >/=75% EEN/EPN throughout duration of stay  Nutrition Goal Status: progressing     Assessment and Plan    7/26: Pt reports fair appetite. Per EMR is averaging ~60% po intake at meals. Does not like diabetic diet. Will liberalize diet d/t HgbA1C 5.9% (not in DM range) and to improve po intake at meals. Drinking Boost and meeting >/=75% est nutrition needs (current po intake +ONS).    7/19: Pt reports fair appetite. Per EMR is averaging ~60% po intake at meals x last 3 days. Added Boost GC this am. Pt std he "loves the Boost" and would Like to continue. Denies any N/V/D/C. Tells me kitchen has been coming by to review menu. No new nutrition related issues noted at this time.      7/13: Noted altered skin integrity in sacral region. Nsg reports wound care to eval today. Pt reports appetite is "not what it used to be", but std ate ~100% breakfast and ~75% lunch today (fish, roll, noodles). Discussed importance of adequate nutrition during intensive rehab therapy. Pt verbalized understanding. LBM on (7/11). Reports UBW of 200# prior to initial hospitalization ~11 days prior. Noted 4# wt loss. Does not meet ASPEN criteria for significant wt loss. Tells me prior to admit, was weighing self everyday d/t trying to lose wt.        Reason for Assessment    Diagnosis:  (Acute left hemispheric CVA s/p CPA and s/p left CEA on 07/08/2022)  Relevant Medical History: Bilateral CVA with right-sided weakness, Carotid artery stenosis s/p left CEA on 07/08/2022, HTN, DM type 2, Normocytic anemia    Nutrition Risk Screen    Nutrition Risk Screen: no indicators present    Nutrition/Diet History    Spiritual, Cultural Beliefs, Christian " "Practices, Values that Affect Care: no    Anthropometrics    Temp: 98.1 °F (36.7 °C)  Height: 5' 10.98" (180.3 cm)  Height (inches): 70.98 in  Weight Method: Estimated  Weight: 85.5 kg (188 lb 7.9 oz)  Weight (lb): 188.5 lb  Ideal Body Weight (IBW), Male: 171.88 lb  % Ideal Body Weight, Male (lb): 114.41 %  BMI (Calculated): 26.3  BMI Grade: 25 - 29.9 - overweight  Usual Body Weight (UBW), k.91 kg  % Usual Body Weight: 98.32  % Weight Change From Usual Weight: -1.88 %   : 85.5 kg (Noted 2.7% wt change in ~10 days. Will monitor.)  22 87.9 kg (193 lb 12.6 oz)         Lab/Procedures/Meds    Pertinent Labs Comments: Na 135(L), H/H 11.5/33.0(L), Mg 1.5(L), PAB 19.9 WNL, GFR >60  Pertinent Medications Comments: colace, miralax    Estimated/Assessed Needs    Weight Used For Calorie Calculations: 89.2 kg (196 lb 10.4 oz)  Energy Calorie Requirements (kcal):   Energy Need Method: [place]  Protein Requirements: 116 g (1.3 g/kg)  Weight Used For Protein Calculations: 89.2 kg (196 lb 10.4 oz)  RDA Method (mL):     Nutrition Prescription Ordered    Current Diet Order: Diabetic    Evaluation of Received Nutrient/Fluid Intake       % Intake of Estimated Energy Needs: 75 - 100 % Current po intake + ONS   % Meal Intake: 50 - 75 %    Nutrition Risk    Level of Risk/Frequency of Follow-up: low     Monitor and Evaluation    Food and Nutrient Intake: energy intake, food and beverage intake  Anthropometric Measurements: weight, weight change  Biochemical Data, Medical Tests and Procedures: electrolyte and renal panel, gastrointestinal profile, glucose/endocrine profile, lipid profile, inflammatory profile  Nutrition-Focused Physical Findings: overall appearance     Nutrition Follow-Up    RD Follow-up?: Yes    "

## 2022-07-26 NOTE — NURSING
"Called and spoke with patient's wife Pooja ([phone]) regarding updates from tem conference. Informed her that both therapy and Dr. Em feels that patient would benefit from more time on the rehab unit. Explained that the goal is for him to return home vs. SNF placement. Further explained that therapy is requesting her to come in for Hood Day this weekend if she is available. Educated Pooja regarding what an Hood Day entails. Explained that she would come in and stay the weekend, providing most of his care. Explained that she would basically be simulating what it would be like assisting her  at home with ADL's if it were just the two of them. Reassured Pooja that if needed the staff will still be here to assist; however, this time will be utilized to see if she is comfortable and capable of caring for her  on her own.     Pooja agreed to be here Friday evening to begin Hood Day. She also verbalized, " I feel he has made wonderful strides since my previous concerns about him returning home. I feel much better about him coming home."  "

## 2022-07-26 NOTE — PT/OT/SLP PROGRESS
Physical Therapy Inpatient Rehab Treatment    Patient Name:  Ayaz Huggins   MRN:  [id number]    Recommendations:     Discharge Recommendations:  home with home health, outpatient OT, outpatient PT (Pending progress)   Discharge Equipment Recommendations: wheelchair, cane, quad   Barriers to discharge: None    Assessment:     Ayaz Huggins is a 77 y.o. male admitted with a medical diagnosis of Stroke.  He presents with the following impairments/functional limitations:  weakness, impaired endurance, impaired functional mobility, gait instability, impaired balance, decreased upper extremity function, decreased lower extremity function, abnormal tone, decreased ROM, impaired coordination, impaired fine motor, impaired skin, impaired muscle length .    Rehab Diagnosis: CVA, R ward    Recent Surgery: * No surgery found *      General Precautions: Standard, fall     Orthopedic Precautions:N/A     Braces: AFO (Neoprene knee brace, Ward-sling)    Rehab Prognosis: Good; patient would benefit from acute skilled PT services to address these deficits and reach maximum level of function.      History:     Past Medical History:   Diagnosis Date    Diabetes mellitus     Hypertension     Mixed hyperlipidemia        Past Surgical History:   Procedure Laterality Date    CARDIAC SURGERY         Subjective     Chief Complaint: pt noted some fatigue, and that he felt cold.   Patient/Family Comments/goals: To practice getting in and out of their car    Vitals   Vitals at Rest  /55   HR 75   O2 Sat    Pain none     Vitals With Activity  /61   HR 79   O2 Sat    Pain none     Respiratory Status: Room air    Patients cultural, spiritual, Restorationist conflicts given the current situation: no      Objective:     Patient found supine with   wife present upon PT entry to room.    Pt is Oriented x3 and Alert, Cooperative and Motivated.    Functional Mobility:   · Bed Mobility:     · Supine to Sit: independence  · Sit to Supine:  "independence  · Transfers:     · Sit to Stand:  Set-up Assist with large base quad cane  · Bed to Chair: contact guard assistance with  no AD  using  Stand Pivot toward pt's strong side (left). Pt able to recall proper set-up of WC and able to educate wife on this.   · Car Transfer: contact guard assistance with  no AD and quad cane  using  Step Transfer.   · Using pt's personal vehicle. Pt does not require LBQC to get in vehicle. Placed WC slightly farther away from car to get out of car to allow for pt to have room to use the LBQC and take 2-3 steps toward WC, CGA provided by wife during entire transfer.  · Demonstrated to pt's wife on how to disassemble and reassemble WC, as well as techniques/body mechanics on how to lift WC into vehicle.   · 4" curb with quad cane with Minimal Assistance.   · 2 trials performed, VC for sequencing. CGA-Min A.   · Also demonstrated to wife how to perform WC wheelie to bring pt up threshold while pt is seated in WC. Pt's wife did not attempt either transfer at this time. Pt and wife both stated they preferred pt stepping up/down from step.    · WC Mobility with LUE and LLE, ~125ft, set-up assistance.      Current   Status  Discharge   Goal   Functional Area: Care Score:    Sit to Lying 6 Independent   Lying to Sitting on Side of Bed 6 Independent   Sit to Stand 5 Independent   Chair/Bed-to-Chair Transfer 4 Independent   Car Transfer 4 Supervision or touching assistance   1 Step (Curb) 3 Partial/moderate assistance   Wheel 50 Feet with Two Turns 5 Supervision or touching assistance       Therapeutic Activities and Exercises:  - Pt and wife able to work together to lise/doff AFO, shoes, and neoprene knee brace while pt sat EOB.    Activity Tolerance: good    Patient left supine with call button in reach and Wife present.    Education provided: roles and goals of PT/PTA, transfer training, bed mob, stair training, balance training, safety awareness, body mechanics, assistive device, " wheelchair management and fall prevention    Expected compliance: High    GOALS:   Multidisciplinary Problems     Physical Therapy Goals        Problem: Physical Therapy    Goal Priority Disciplines Outcome Goal Variances Interventions   Physical Therapy Goal     PT, PT/OT Ongoing, Progressing     Description: Bed Mobility:  Roll left and right with supervision/touching assist - MET 7/18/22  Sit to supine transfer with supervision/touching assist. - MET 7/22/22  Supine to sit transfer with supervision/touching assist. - MET 7/22/22  Sit <> supine transfer, independent - initial    Transfers:  Sit to stand transfer with supervision/touching assist using RW or LRAD. - MET 7/18/22  Bed to chair transfer with supervision/touching assist using RW or LRAD. - MET 7/22/22  Car transfer with partial/moderate assist using RW or LRAD. - MET 7/22/22  Sit to stand transfer with set-up assist with RW - MET 7/22/22  Sit to stand transfer, independent with LBQC or LRAD - initial  Bed to chair transfer with set-up assist using LBQC or LRAD - initial  Car transfer with supervision assist using LBQC or LRAD - initial    Mobility:  Ambulate 100 feet with partial/moderate assist using RW or LRAD. - progressing, cont.    WC mobility:   150ft with LUE/LLE with independent. - progressing, cont.                     Plan:     During this hospitalization, patient to be seen 5 x/week to address the identified rehab impairments via gait training, therapeutic activities, therapeutic exercises, neuromuscular re-education, wheelchair management/training and progress toward the following goals:     Plan of Care Expires:  07/28/22    Additional Information:     Pt's wife, Pooja, present for immersive Family Training. She participated well and just requires practice with equipment management/set-up. The patient and wife seem to have good communication between them.     Time Tracking:     PT Received On:    Time In 1300     Time Out 1410  Total Time  70 min  Therapy Time PT Individual: 70    Billable Minutes: Therapeutic Activity 30, Self-Care/Home Management 40 min    07/26/2022

## 2022-07-26 NOTE — PROGRESS NOTES
Dos 7/26/22-I have personally evaluated the patient during therapy today. Have discussed progress and problems with patient.Will be d/c with wife and HH therapies.  Have reviewed barriers to progress as well as response to therapies with therapists. I have reviewed medical issues and plan of care with IM team. I met with  to review d/c plans and barriers. I have discussed skin integrity and B/B status with nursing. I am in agreement with present IRF plan of care.  I have been involved in  formation of this note with Mayte Wolf.  Aftab Em MD Subjective  HPI:  78 y/o male with a PMH of Type II DM, diabetic neuropathy, CAD s/p CABG x2 (2007), and HTN who presented to [facility] ED  On 7/2/22 with complaints of right sided upper and lower extremity weakness. Patient's story is unclear upon the time of last known normal. He states he woke up  with a cramp in the RLE and noticed the right sided weakness.The patient was admitted to the ICU with an ischemic CVA and administered tPA; he has had some improvement in his right-sided weakness although he is unable to purposefully move his right lower extremity.  Neurology has seen and evaluated the patient and has deemed this patient stable for transfer to the floor.  This patient was seen and evaluated in the intensive care unit.  7/26: Seen with PT, supine on mat. Needs assistance for supine to sit. Amb w/LBQC, AFO, hemisling, and knee brace. Discussed increased tone, external rotation, and increasing medication with PT. Will increase baclofen to BID. Progressing in therapy without complaint. VSSAF with mild SBP elevation noted. IM following.     Review of Systems  Depression/Anxiety: noted anxiety with discharge planning:improving     ALPRAZolam tablet 0.25 mg TID PRN Anxiety  Pain: denies     baclofen tablet 5 mg qHS  acetaminophen tablet 650 mg q4h PRN pain  methocarbamoL tablet 500 mg TID PRN spasm  Bowels/Bladder: last BM 7/26  Appetite: good       Sleep: good           Physical Exam  General: well-developed, well-nourished, in no acute distress  Respiratory: equal chest rise, no SOB, no audible wheeze  Cardiovascular: regular rate and rhythm, no edema  Gastrointestinal: soft, non-tender, non-distended   Musculoskeletal: right hemiplegia  Integumentary: no rashes or skin lesions present, redness to right heel/ankle, sacral redness, left neck incision-dressing-c/d/i  Neurologic: cranial nerves intact, right hemiplegia, tone, external rotation to RLE with gait  *MD performed and documented physical examination           Assessment/Plan  HTN (hypertension) (Chronic)   Stroke   Carotid artery stenosis, symptomatic, left   Type 2 diabetes mellitus   Heart disease   S/P carotid endarterectomy         Wounds:redness to right heel/ankle, sacral redness, left neck incision-dressing-c/d/i  S/p s/p left CEA on 7/8/2022 (VICENTE Lama MD)  Precautions: fall risk  Bracing/AD: QC  Swallowing: Diabetic Diet  Function: Tolerating therapy. Continue PT/OT  VTE Prophylaxis:aspirin tablet 325 mg qd   Code Status: FULL CODE   Discharge:Pt. Lives with wife in [place] in a single-story home with 1 step to enter the residence. Pt. completed high school and some college.  He has no  history. He is a retired  for [employer]. Wife is retired.  She drives, cooks, cleans, does laundry, grocery shops, manages finances, and can physically assist pt. She will be home with patient after discharge from rehab.  She is also checking into their long-term care policy. Children (2). Date 8/1 Monday.                     Meghan Wolf NP, conducted additional independent physical examination and assisted with medical documentation.

## 2022-07-26 NOTE — PT/OT/SLP RE-EVAL
Recreational Therapy Re-Evaluation      Date of Treatment: 7/26/2022  Start Time: 0830  Stop Time: 0900  Total Time: 30 min  Missed Time:   due to      Assessment      Ayaz Huggins is a 77 y.o. male admitted with a medical diagnosis of Stroke.  He presents with the following impairments/functional limitations:  weakness, impaired balance, decreased upper extremity function, decreased lower extremity function, impaired coordination, impaired fine motor .    Rehab Diagnosis: L CVA, R wes    Recent Surgery:      General Precautions: Standard, fall     Orthopedic Precautions:N/A     Braces: AFO    Rehab Prognosis: Good; patient would benefit from acute skilled Recreational Therapy services to address these deficits and reach maximum level of function.      Impairments: Balance deficits, Coordination deficits, Endurance deficits, Safety awareness deficits and Strength deficits  Rehab Potential: Good  Treatment Recommendations: Continue with current plan of care   Treatment Diagnosis: R sided weakness s/p TPA, acute L hemispheric ischemic CVA, CEA, DM, CAD, HLD, HTN  Orientation: Oriented x4  Affect/Behavior: Appropriate  Safety/Judgement: intact   Basic Command Following: intact  Spiritual Cultural: no        History     Past Medical History:   Diagnosis Date    Diabetes mellitus     Hypertension     Mixed hyperlipidemia        Past Surgical History:   Procedure Laterality Date    CARDIAC SURGERY         Home Environment     Living Situation  Lives With: spouse  Name(s) of Who Lives With Patient: Wife  Lives in: house  Patients Responsibilities: Community mobility, , Financial management, Health and wellness, Laundry, Leisure/play/hobbies, Meal preparation, Retired, Shopping, Yard Work    Instrumental Activities of Daily Living     Previous Hand Dominance: Right Current Hand Dominance: Right (R sided weakness)     Other iADL Information: R sided weakness       Cognitive Skills Building               "  Comment:        Dynamic Activities      Activity Assist Position Equipment Response Comment   Activity 1 Washer toss modified independence Standing Rolling walker good             Comment: Sit to stand was supervision/setup as was dynamic standing balance/reaching. Standing tolerance was 5 minutes. He remains I and determined       Fine Motor Activities      Activity Assist Position Equipment Response Comment   Activity 1 Washer Toss minimum assistance Standing Rolling walker and Metal washers good             Comment: RUE coordination/dexteriety improved to min.  Improvement of dexterity in RUE noted. Increased ROM of RUE continues to improve         Goals     Patient Goals  Patient Goal 1: "I hope to walk out of here , take care of myself and ride my bike again    Short Term Goals    Goal  Goal Status   Will increase sit to stand to mod. Initial Met   Will increase RUE coordination/dexteriety to min. Initial Progressing   Will improve dynamic standing balance/reaching to min. Initial Met           Long Term Goals    Goal Goal Status   Will increase standing tolerance to 5 minutes.  Initial Progressing   Will increase RUE coordination/dexteriety to supervision. Initial Progressing   Will improve dynamic standing balance/reaching to supervision. Initial Progressing               Plan       Patient to be seen: Daily  Duration: 1 week  Treatments planned: Balance training, Coordination, Energy conservation training, Fine motor, Safety education  Treatment plan/goals established with Patient/Caregiver: Yes     "

## 2022-07-26 NOTE — PT/OT/SLP PROGRESS
Physical Therapy Inpatient Rehab Treatment    Patient Name:  Ayaz Huggins   MRN:  [id number]    Recommendations:     Discharge Recommendations:  home with home health, outpatient OT, outpatient PT (Pending progress)   Discharge Equipment Recommendations: wheelchair, cane, quad   Barriers to discharge: None    Assessment:     Ayaz Huggins is a 77 y.o. male admitted with a medical diagnosis of Stroke.  He presents with the following impairments/functional limitations:  weakness, impaired endurance, impaired functional mobility, decreased lower extremity function, decreased upper extremity function, impaired fine motor, impaired coordination.    Rehab Diagnosis:     Recent Surgery: * No surgery found *      General Precautions: Standard, fall     Orthopedic Precautions:N/A     Braces: AFO (Neoprene knee brace, Ward-sling)    Rehab Prognosis: Good; patient would benefit from acute skilled PT services to address these deficits and reach maximum level of function.      History:     Past Medical History:   Diagnosis Date    Diabetes mellitus     Hypertension     Mixed hyperlipidemia        Past Surgical History:   Procedure Laterality Date    CARDIAC SURGERY         Subjective     Chief Complaint: No c/o at this time.    Vitals   Vitals at Rest  /72   HR 77   O2 Sat    Pain      Vitals With Activity  /56   HR 72   O2 Sat    Pain      Respiratory Status: Room air    Patients cultural, spiritual, Uatsdin conflicts given the current situation: no      Objective:     Communicated with NSG prior to session.  Patient found up in chair with peripheral IV  upon PT entry to room.    Pt is Oriented x3 and Alert, Cooperative and Motivated.    Functional Mobility:   Bed Mobility:     Supine to Sit: contact guard assistance  Sit to Supine: independence  Transfers:     Sit to Stand:  contact guard assistance with rolling walker  Chair to mat: contact guard assistance with  rolling walker  using  Step Transfer  Gait:  Pt ambulates 65ft with LBQC with Contact Guard Assistance.   Impairments contributing to gait deviations include impaired balance, decreased flexibility, abnormal muscle tone, impaired postural control, decreased ROM and decreased strength.  Increased RLE ER throughout ambulation.  Pt able to self-advance throughout swing phase.  Distance limited by fatigue.  Balance: Dynamic Sitting/Standing  Patient performed dynamic standing on level surface using rolling walker with contact guard assistance and minimal verbal cues.  Performed during cotx with TR in standing with use of RUE only.    Therapeutic Activities and Exercises:  Recumbent bike at 1.5 lvl with emphasis on RLE hamstring eccentric/concentric activity.  YTB wrapped around knee to prevent ER.    Powder board utilized for neuro re-ed with pillow sheet.  modA-maxA for knee flexion/extension.  SBA with hip flexion.  Pt compensates with hip flexion a lot throughout.    RLE hamstring, calf, IR stretching performed.    Standing isometric minisquats with emphasis on eccentric hamstring control.    Activity Tolerance    Patient left up in chair with call button in reach.    Education provided: roles and goals of PT/PTA    Expected compliance:    GOALS:   Multidisciplinary Problems     Physical Therapy Goals        Problem: Physical Therapy    Goal Priority Disciplines Outcome Goal Variances Interventions   Physical Therapy Goal     PT, PT/OT Ongoing, Progressing     Description: Bed Mobility:  Roll left and right with supervision/touching assist - MET 7/18/22  Sit to supine transfer with supervision/touching assist. - MET 7/22/22  Supine to sit transfer with supervision/touching assist. - MET 7/22/22  Sit <> supine transfer, independent - initial    Transfers:  Sit to stand transfer with supervision/touching assist using RW or LRAD. - MET 7/18/22  Bed to chair transfer with supervision/touching assist using RW or LRAD. - MET 7/22/22  Car transfer with  partial/moderate assist using RW or LRAD. - MET 7/22/22  Sit to stand transfer with set-up assist with RW - MET 7/22/22  Sit to stand transfer, independent with LBQC or LRAD - initial  Bed to chair transfer with set-up assist using LBQC or LRAD - initial  Car transfer with supervision assist using LBQC or LRAD - initial    Mobility:  Ambulate 100 feet with partial/moderate assist using RW or LRAD. - progressing, cont.    WC mobility:   150ft with LUE/LLE with independent. - progressing, cont.                     Plan:     During this hospitalization, patient to be seen 5 x/week to address the identified rehab impairments via gait training, therapeutic activities, therapeutic exercises, neuromuscular re-education, wheelchair management/training and progress toward the following goals:     Plan of Care Expires:  07/28/22    Additional Information:         Time Tracking:     PT Received On: 07/26/22  Time In 0830     Time Out 1000  Total Time 90 min  Therapy Time PT Individual: 90  Missed Time:    Time Missed due to:      Billable Minutes: Gait Training 15, Therapeutic Activity 30 and Therapeutic Exercise 45    07/26/2022

## 2022-07-26 NOTE — PROGRESS NOTES
[facility] ([facility])  Rehab Progress Note    Patient Name: Ayaz Huggins  MRN: [id number]  Age: 77 y.o. Sex: male  : 1945  Hospital Length of Stay: 14 days  Date of Service: 2022   Chief Complaint: Acute left hemispheric CVA s/p CPA and s/p left CEA on 2022    Subjective:     Basic Information  Admit Information: 77-year-old white male presented to [facility] ED on 2022 complaining of right-sided weakness.  PMH significant for DM type 2, CAD s/p CABG x2 in , and HTN.  CT head negative for hemorrhagic bleeding.  Received tPA and transferred to ICU for post tPA monitoring and treatment.  TTE with negative bubble study, but significant for grade 1 diastolic dysfunction.  Carotid ultrasound significant for left ICA stenosis.  MRI significant for last left hemispheric CVA and 3 other small punctate CVAs on the right hemisphere.  Tolerated left CEA on  without periprocedural complications.  Tolerated transfer to [facility] inpatient rehab unit on  without incident.  Today's Information: No acute events overnight.  Sitting in chair comfortably eating breakfast.  Reports good sleep and appetite.  Last BM .  CBG is at goal.  Vital signs at goal with no recent recorded fevers.  No labs or imaging today.    Review of patient's allergies indicates:   Allergen Reactions    Penicillins         Current Facility-Administered Medications:     acetaminophen tablet 650 mg, 650 mg, Oral, Q4H PRN, Alexey A Kathryn, FNP    ALPRAZolam tablet 0.25 mg, 0.25 mg, Oral, TID PRN, Alexey A Kathryn, FNP    amLODIPine tablet 10 mg, 10 mg, Oral, Daily, Alexey A Kathryn, FNP, 10 mg at 22    aspirin tablet 325 mg, 325 mg, Oral, Daily, Alexey A Kathryn, FNP, 325 mg at 22    atorvastatin tablet 80 mg, 80 mg, Oral, QHS, Alexey A Kathryn, FNP, 80 mg at 22    baclofen tablet 5 mg, 5 mg, Oral, QHS, Meghan Wolf, FNP, 5 mg at 22  2125    benzonatate capsule 100 mg, 100 mg, Oral, TID PRN, Alexey A Kathryn, FNP    bisacodyL suppository 10 mg, 10 mg, Rectal, Daily PRN, Alexey A Kathryn, FNP    bisacodyL suppository 10 mg, 10 mg, Rectal, Once, Dorothea Calvert, FNKATHARINA    carvediloL tablet 25 mg, 25 mg, Oral, BID WM, Alexey A Kathryn, FNP, 25 mg at 07/26/22 0826    dextrose 50 % in water (D50W) injection 12.5 g, 12.5 g, Intravenous, PRN, Alexey A Kathryn, FNP    dextrose 50 % in water (D50W) injection 25 g, 25 g, Intravenous, PRN, Alexey A Kathryn, FNP    docusate sodium capsule 100 mg, 100 mg, Oral, BID PRN, Alexey A Kathryn, FNP    glucagon (human recombinant) injection 1 mg, 1 mg, Intramuscular, PRN, Alexey A Kathryn, FNP    glucose chewable tablet 16 g, 16 g, Oral, PRN, Alexey A Kathryn, FNP    glucose chewable tablet 24 g, 24 g, Oral, PRN, Alexey A Kathyrn, FNP    hydrALAZINE injection 10 mg, 10 mg, Intravenous, Q4H PRN, Alexey A Kathryn, FNP    HYDROcodone-acetaminophen 5-325 mg per tablet 1 tablet, 1 tablet, Oral, Q6H PRN, Alexey A Kathryn, FNP    hydrOXYzine pamoate capsule 50 mg, 50 mg, Oral, QHS, Alexey A Kathryn, FNP, 50 mg at 07/25/22 2125    insulin aspart U-100 injection 1-10 Units, 1-10 Units, Subcutaneous, QID (AC + HS) PRN, Alexey A Kathryn, FNP, 2 Units at 07/13/22 1213    labetalol 20 mg/4 mL (5 mg/mL) IV syring, 10 mg, Intravenous, Q4H PRN, Alexey A Kathryn, FNP, 10 mg at 07/13/22 0904    lisinopriL tablet 5 mg, 5 mg, Oral, Daily, Alexey A Kathryn, FNP, 5 mg at 07/26/22 0826    metFORMIN tablet 1,000 mg, 1,000 mg, Oral, BID WM, ARVIND Garcia, 1,000 mg at 07/26/22 0826    methocarbamoL tablet 500 mg, 500 mg, Oral, TID PRN, ARVIND Zhu    metoprolol injection 10 mg, 10 mg, Intravenous, Q2H PRN, ARVIND Garcia    nitroGLYCERIN SL tablet 0.4 mg, 0.4 mg, Sublingual, Q5 Min PRN, ARVIND Garcia    ondansetron disintegrating tablet  "8 mg, 8 mg, Oral, Q6H PRN, Alexey A Kathryn, FNP    polyethylene glycol packet 17 g, 17 g, Oral, BID PRN, Alexey A Kathryn, FNP    promethazine tablet 25 mg, 25 mg, Oral, Q6H PRN, Alexey A Kathryn, FNP    zinc oxide 16% (WIL'S BUTT PASTE) topical ointment, , Topical (Top), TID, Alexey ARMAS Kathryn, FNP, Given at 07/25/22 1400     Review of Systems   Complete 12-point review of symptoms negative except for what's mentioned in HPI     Objective:     BP (!) 144/62   Pulse 73   Temp 98.1 °F (36.7 °C)   Resp 20   Ht 5' 10.98" (1.803 m)   Wt 85.5 kg (188 lb 7.9 oz)   SpO2 96%   BMI 26.30 kg/m²        Intake/Output Summary (Last 24 hours) at 7/26/2022 0834  Last data filed at 7/25/2022 1900  Gross per 24 hour   Intake 600 ml   Output 300 ml   Net 300 ml       Physical Exam  Constitutional:       Appearance: Normal appearance.   HENT:      Head: Normocephalic.      Mouth/Throat:      Mouth: Mucous membranes are moist.   Eyes:      Pupils: Pupils are equal, round, and reactive to light.   Neck:      Comments: Left CEA incision clean intact with mild postsurgical swelling  Cardiovascular:      Rate and Rhythm: Normal rate and regular rhythm.      Heart sounds: Normal heart sounds.   Pulmonary:      Effort: Pulmonary effort is normal.      Breath sounds: Normal breath sounds.   Abdominal:      General: Bowel sounds are normal.      Palpations: Abdomen is soft.   Musculoskeletal:      Cervical back: Neck supple.      Comments: Generalized weakness and muscle atrophy.  Right upper extremity dependent swelling   Skin:     General: Skin is warm and dry.   Neurological:      Mental Status: He is alert and oriented to person, place, and time.      Comments: Right-sided hemiplegia.  Can move RUE against gravity.  LLE flaccid.    Psychiatric:         Mood and Affect: Mood normal.         Behavior: Behavior normal.         Thought Content: Thought content normal.         Judgment: Judgment normal.     *MD " performed and documented physical examination       Lines/Drains/Airways     None               Labs  Recent Results (from the past 24 hour(s))   POCT glucose    Collection Time: 07/25/22  5:13 PM   Result Value Ref Range    POCT Glucose 96 70 - 110 mg/dL   POCT glucose    Collection Time: 07/26/22  6:21 AM   Result Value Ref Range    POCT Glucose 96 70 - 110 mg/dL     Radiology  MRI brain without contrast on 07/03/2022 at 2:09 p.m., IMPRESSION: Small acute infarcts supratentorially, the largest is a left posterior frontal 3 cm cortical based infarct with petechial hemorrhage.  No mass effect or shift.  Discussed with Dr. Duncan. Otherwise no acute intracranial abnormalities.  Radiology  Transthoracic echo on 07/02/2022 at 2:40 p.m., IMPRESSION: The left ventricle is  with normal systolic function. The estimated ejection fraction is 60%. There is grade I diastolic dysfunction consistent with impaired relaxation. Left atrial pressure is normal.    Assessment/Plan:     77 y.o. WM admitted on 7/12/2022    Acute left hemispheric CVA   - MRI with 3 other small punctate CVAs on the right hemisphere  - s/p tPA and s/p left CEA on 7/8/2022  - continue                Aspirin 325 mg daily                Lipitor 80 mg at bedtime                Xarelto 20 mg daily   - right knee brace obtained on 7/13  - waiting for right AFO from orthotics  - defer to physiatry for rehab and pain management  - PT/OT/RT following      Carotid artery stenosis  - s/p left CEA on 07/08/2022  - continue                Aspirin 325 mg daily                Lipitor 80 mg at bedtime                Xarelto 20 mg daily                Norco 5 mg/325 mg q.6 hours p.r.n.  - to follow up with vascular surgery outpatient     HTN  - BP at goal!!  - discontinued Metoprolol succinate 50 mg daily on 7/13  - continue     Coreg 25 mg BID (initiated 7/13) (increased 7/14)                Norvasc 10 mg daily                Lisinopril 20 mg daily                  Hydralazine 10 mg every 2 hours as needed for BP > 160/90                Labetalol 10 mg every 2 hours as needed for BP > 160/90     DM type II  - HgA1c with next labs  - continue                Metformin 1000 mg twice daily                ISS   - CBGs AC/HS     Normocytic anemia  - asymptomatic  - H/H stable   - no evidence of active bleeds  - will closely monitor and transfuse if needed      Constipation  - stable, loose stools reported on 7/15  - continue                Colace 100 mg b.i.d. PRN                MiraLax 17 g daily PRN     VTE Prophylaxis:  Xarelto 20 mg daily  COVID-19 testing:  Negative on 07/12/2022  COVID-19 vaccination status:  Vaccinated (Moderna):  x 3     POA: No  Living will: No  Contacts:  Pooja Huggins (wife) [phone]                     Eleonora Manzanares (daughter) [phone]     CODE STATUS: Full Code  Internal Medicine (attending): Weston Rivera MD  Physiatry (consulting): Angel Em MD     OUTPATIENT PROVIDERS  Angel Parham MD  Neurology: Jacque Agee MD  Vascular surgery:  Roxi Lama MD  Cardiology: Dionisio Marin MD     DISPOSITION: Condition stable.  Vital signs at goal.  No labs today.  Good glycemic control.  Sleep hygiene, bowel management, and appetite at goal.  Continues to progress well with therapies.  Monitor closely.  Notify of any acute changes.  Staffing today documented below.    Staffing 7/26: Continent of bowel and bladder. PT: overall min to independent, difficulty with right leg, improving. Family training went well. RT: overall min to supervision, improving. Fair appetite. Drinking Boost. OT: overall partial mod. Projected discharge 8/1.      Dorothea Calvert NP conducted independent physical examination and assisted with medical documentation.    Total time spent on this encounter including chart review and direct MD + NP 1-on-1 patient interaction: 41 minutes   Over 50% of this time was spent in counseling and coordination of care

## 2022-07-27 PROCEDURE — 11800000 HC REHAB PRIVATE ROOM

## 2022-07-27 PROCEDURE — 97116 GAIT TRAINING THERAPY: CPT

## 2022-07-27 PROCEDURE — 25000003 PHARM REV CODE 250: Performed by: NURSE PRACTITIONER

## 2022-07-27 PROCEDURE — 97530 THERAPEUTIC ACTIVITIES: CPT

## 2022-07-27 PROCEDURE — 94799 UNLISTED PULMONARY SVC/PX: CPT

## 2022-07-27 PROCEDURE — 97535 SELF CARE MNGMENT TRAINING: CPT

## 2022-07-27 RX ORDER — BISACODYL 10 MG
10 SUPPOSITORY, RECTAL RECTAL ONCE
Status: DISCONTINUED | OUTPATIENT
Start: 2022-07-27 | End: 2022-08-02 | Stop reason: HOSPADM

## 2022-07-27 RX ADMIN — METFORMIN HYDROCHLORIDE 1000 MG: 500 TABLET, FILM COATED ORAL at 08:07

## 2022-07-27 RX ADMIN — CARVEDILOL 25 MG: 25 TABLET, FILM COATED ORAL at 08:07

## 2022-07-27 RX ADMIN — ATORVASTATIN CALCIUM 80 MG: 40 TABLET, FILM COATED ORAL at 08:07

## 2022-07-27 RX ADMIN — LISINOPRIL 5 MG: 5 TABLET ORAL at 08:07

## 2022-07-27 RX ADMIN — BACLOFEN 5 MG: 5 TABLET ORAL at 08:07

## 2022-07-27 RX ADMIN — AMLODIPINE BESYLATE 10 MG: 5 TABLET ORAL at 08:07

## 2022-07-27 RX ADMIN — CLOTRIMAZOLE: 1 CREAM TOPICAL at 08:07

## 2022-07-27 RX ADMIN — ASPIRIN 325 MG ORAL TABLET 325 MG: 325 PILL ORAL at 08:07

## 2022-07-27 RX ADMIN — CARVEDILOL 25 MG: 25 TABLET, FILM COATED ORAL at 04:07

## 2022-07-27 RX ADMIN — METFORMIN HYDROCHLORIDE 1000 MG: 500 TABLET, FILM COATED ORAL at 04:07

## 2022-07-27 RX ADMIN — HYDROXYZINE PAMOATE 50 MG: 50 CAPSULE ORAL at 08:07

## 2022-07-27 NOTE — PT/OT/SLP PROGRESS
Recreational Therapy Treatment    Date of Treatment: 7/27/2022  Start Time: 0830  Stop Time: 0900  Total Time: 30  Missed Time:     General Precautions: fall  Ortho Precautions: N/A  Braces: AFO (wes sling, neoprene knee brace, GivMohr sling)    Vitals   Vitals at Rest  /67   HR 77   O2 Sat    Pain None     Vitals With Activity  /67   HR 80   O2 Sat    Pain None       Treatment     Cognitive Skills Building   Cognitive Observation Activity Assist Position Equipment Response Comment             Comment:          Dynamic Activities   Activity Assist Position Equipment Response Comment   Activity 1 Bocce ball supervision Standing Cane, quad and Bocce balls good             Comment: Sit to stand was supervision/setup as was dynamic standing balance/reaching. Standing tolerance was 4 minutes. RUE coordination/dexteriety improved to min. He remains I with problem solving skills and sequencing skills. Wife attend and assisted with his care during Bocce Ball activity         Fine Motor Activities   Activity Assist Position Equipment Response Comment   Activity 1 Bocce Ball minimum assistance Standing Cane, quad, good             Comment: RUE coordination/dexteriety improved to min. Increased ROM of RUE and improved motor planning       Additional Info: Co-Treat with O.T.    Goals     Short Term Goals    Goal  Goal Status   Will increase sit to stand to mod. Initial  Met   Will increase RUE coordination/dexteriety to min. Initial  Met   Will improve dynamic standing balance/reaching to min. Initial  Met           Long Term Goals    Goal Goal Status   Will increase standing tolerance to 5 minutes.  Initial  Progrssing   Will increase RUE coordination/dexteriety to supervision. Initial  Progressing   Will improve dynamic standing balance/reaching to supervision. Initial  Progressing

## 2022-07-27 NOTE — PT/OT/SLP PROGRESS
"Physical Therapy Inpatient Rehab Treatment    Patient Name:  Ayaz Huggins   MRN:  [id number]    Recommendations:     Discharge Recommendations:  home with home health, outpatient OT, outpatient PT (Pending progress)   Discharge Equipment Recommendations: wheelchair, cane, quad   Barriers to discharge: None    Assessment:     Ayaz Huggins is a 77 y.o. male admitted with a medical diagnosis of Stroke.  He presents with the following impairments/functional limitations:  weakness, impaired endurance, impaired functional mobility, gait instability, impaired balance, decreased upper extremity function, decreased lower extremity function, abnormal tone, decreased ROM, impaired coordination, impaired fine motor, impaired skin, impaired muscle length .    Rehab Diagnosis: CVA, R ward    Recent Surgery: * No surgery found *      General Precautions: Standard, fall     Orthopedic Precautions:N/A     Braces: AFO (Neoprene knee brace, Ward-sling)    Rehab Prognosis: Good; patient would benefit from acute skilled PT services to address these deficits and reach maximum level of function.      History:     Past Medical History:   Diagnosis Date    Diabetes mellitus     Hypertension     Mixed hyperlipidemia        Past Surgical History:   Procedure Laterality Date    CARDIAC SURGERY         Subjective     Chief Complaint: fatigue  Patient/Family Comments/goals: "To practice getting into the car again, this time getting the WC into the car as well"    Vitals   Vitals at Rest  /62   HR 74     Vitals With Activity  /70   HR 75     Respiratory Status: Room air    Patients cultural, spiritual, Yarsani conflicts given the current situation: no      Objective:     Patient found supine with  wife present upon PT entry to room.    Pt is Oriented x3 and Alert, Cooperative and Motivated.       Current   Status Comments   Functional Area: Care Score:    Sit to Lying 6 Pt able to hook L foot under R, using bed rail   Lying to " "Sitting on Side of Bed 6 Pt able to hook L foot under R, using bed rail      Sit to Stand 6    Chair/Bed-to-Chair Transfer 5 Set-Up toward pt's L side, no AD   Car Transfer 4 Into/out of pt's personal vehicle, supervision. Pt's wife then practiced disassembling WC and putting it into the back seat, between the  seat and back seat. Pt's wife able to use "wheelbarrow" technique to lift it into back seat. Pt's wife able to get it out of vehicle and reassemble.   Walk 10 Feet 4 CGA with LBQC, pt able to advance RLE.   Walk 50 Feet with Two Turns 4 Pt ambulated 70ft with CGA provided by wife. Then pt ambulated 105ft with PT with cues to attempt quicker pace. Distances limited by fatigue of LLE.   1 Step (Curb) 3 4" curb with LBQC, Min A provided by wife.    Wheel 50 Feet with Two Turns 5 LUE and LLE propelling/steering, 150ft.    Wheel 150 Feet 5        Therapeutic Activities and Exercises:  - Seated edge of mat, pt performed sit-to-stands with emphasis on greater weightbearing through RLE. Mirror placed in front of pt to provide visual cues once pt was standing, as pt tends to lean heavily on LLE.     Activity Tolerance: good    Patient left HOB elevated with call button in reach and Wife present.    Education provided: transfer training, bed mob, gait training, stair training, balance training, safety awareness, body mechanics, wheelchair management, strengthening exercises and fall prevention    Expected compliance: High    GOALS:   Multidisciplinary Problems     Physical Therapy Goals        Problem: Physical Therapy    Goal Priority Disciplines Outcome Goal Variances Interventions   Physical Therapy Goal     PT, PT/OT Ongoing, Progressing     Description: Bed Mobility:  Roll left and right with supervision/touching assist - MET 7/18/22  Sit to supine transfer with supervision/touching assist. - MET 7/22/22  Supine to sit transfer with supervision/touching assist. - MET 7/22/22  Sit <> supine transfer, " independent - initial    Transfers:  Sit to stand transfer with supervision/touching assist using RW or LRAD. - MET 7/18/22  Bed to chair transfer with supervision/touching assist using RW or LRAD. - MET 7/22/22  Car transfer with partial/moderate assist using RW or LRAD. - MET 7/22/22  Sit to stand transfer with set-up assist with RW - MET 7/22/22  Sit to stand transfer, independent with LBQC or LRAD - initial  Bed to chair transfer with set-up assist using LBQC or LRAD - initial  Car transfer with supervision assist using LBQC or LRAD - initial    Mobility:  Ambulate 100 feet with partial/moderate assist using RW or LRAD. - progressing, cont.    WC mobility:   150ft with LUE/LLE with independent. - progressing, cont.                     Plan:     During this hospitalization, patient to be seen 5 x/week to address the identified rehab impairments via gait training, therapeutic activities, therapeutic exercises, neuromuscular re-education, wheelchair management/training and progress toward the following goals:     Plan of Care Expires:  07/28/22    Additional Information:     Pt's wife, Pooja, present for immersive family training and performed all set-up of equipment and assisted with all transfers safely. Pt and wife work well together and are able to accomplish all tasks safely. Discussed gait training at home and made suggestions to increase safety with this task.     Time Tracking:     PT Received On:    Time In 1030     Time Out 1200  Total Time 90 min  Therapy Time PT Individual: 90    Billable Minutes: Gait Training 20 and Therapeutic Activity 70    07/27/2022

## 2022-07-27 NOTE — PROGRESS NOTES
07/27/22 0830   Rec Therapy Time Calculation   Rec Start Time 0830   Rec Stop Time 0900   Rec Total Time (min) 30 min   Subjective   Patient states Pt's wife attended and participated in pt care   Precautions   General Precautions fall   Attendance   Activity   (Junior Hankins)   Participation Active participation   Plan   Planned Therapy Intervention Continue with current plan  (Junior Hankins)   Expected Length of Stay 5 days   PT Frequency Minimu of 5 visits per week;Other (see comments)  (Daily)   Time   Treatment time 2 units

## 2022-07-27 NOTE — PT/OT/SLP PROGRESS
Occupational Therapy Inpatient Rehab Treatment    Name: Ayaz Huggins  MRN: [id number]    Assessment:  Ayaz Huggins is a 77 y.o. male admitted with a medical diagnosis of Stroke.  He presents with the following impairments/functional limitations:  weakness, impaired endurance, impaired self care skills, impaired functional mobility, impaired balance, decreased lower extremity function, decreased upper extremity function, decreased coordination, impaired coordination.    Rehab Diagnosis: CVA    Recent Surgery: * No surgery found *      General Precautions: Standard, fall     Orthopedic Precautions:N/A     Braces: AFO (wes sling, neoprene knee brace, GivMohr sling)    Rehab Prognosis: Good; patient would benefit from acute skilled OT services to address these deficits and reach maximum level of function.        History:     Past Medical History:   Diagnosis Date    Diabetes mellitus     Hypertension     Mixed hyperlipidemia        Past Surgical History:   Procedure Laterality Date    CARDIAC SURGERY         Subjective     Orientation: Oriented x4    Chief Complaint: decreased R sided coordination and AROM, and functional independence  Patient/Family Comments: Pt's wife (romina) stated she feels more confident with helping her  with t/f and assisting him with his ADLs    Vitals   Vitals at Rest  /67   HR 77   O2 Sat    Pain nopain     Vitals With Activity  /67   HR 80   O2 Sat    Pain No pain     Vitals at the end of session  /61   HR 74   O2 Sat    Pain No pain     Respiratory Status: Room air    Patients cultural, spiritual, Moravian conflicts given the current situation: no       Objective:     Therapeutic Activities  Pt participated in a recreational game with his wife (Romina) to improve his motivation, dynamic balance, and RUE gross motor coordination. Pt stood with LBQC and SBA, while using his RUE to reach and grab a weighted ball and wb into his RLE. Pt would then swing his RUE  back and throw the ball at the target.Pt had to promote his RUE shoulder flexors/extensors, but also utilized gross motor coordination to throw the ball in the correct direction. Performed the ax x 5 rounds with a break in between each round.    ADLs   Care Score/ETHAN   Toileting Hygiene 3   Pt stood with LBQC, while pt's wife assisted him with wiping after bowel mvmt. Pt has AROM in his LUE internal rotators to be able to wipe, but his wife helped to make sure he thoroughly wiped.   Toilet Transfer 4   Pt performed a step t/f w/c<>BSC with LBQC while his wife was providing CGA. Pt turned to his L side (stronger side) to promote his functional independence with the toilet t/f.   Tub Transfer Contact Guard Assistance   Pt performed a tub/shower t/f via TTB. Pt performed a step t/f w/c<>TTB using a LBQC. Pt also had on his neoprene knee brace and AFO, which he feels enhanced his functional independence with the t/f.      OTS educated pt's wife on where to stand during the t/f and how much assistance to provide to her . Pt was able to recall steps and body mechanics to instruct his wife throughout the t/f. OTS also edu Romina on how to don/doff the wes sling and the neoprene knee brace. Romina was able to don/doff pt's braces and the pt was able to recall all of the steps required to perform the ax.    Limiting Factors for ADLs: motor, limited ROM and balance     Functional Mobility  OTS raised the hospital bed to the height of pt's bed at home to simulate what they will need to do after d/c. Pt and wife stated they will add a bed rail to his side of the bed to improve his functional independence. Pt t/f w/c<>bed with CGA provided by his wife (Romina). Pt instructed his wife on where to place the w/c to allow him to perform a squat pivot t/f, then he t/f sitting EOB<>supine inep with bed rails.      Education:     Patient left up in chair with all lines intact and call button in reach.     Education provided:  Roles and goals of OT, ADLs, transfer training, bed mobility and home safety  Encounter Problems     Encounter Problems (Active)     Occupational Therapy     Occupational Therapy Goal     Start: 07/12/22   Expected End: 07/25/22       LTG: Pt will be Ind with all ADLS.  Pt LTG: to return to bike riding    ADLs:  MET: Pt to perform grooming tasks with [place] from seated position by d/c.  MET: Pt to perform UB dressing with Setup using modified techniques.  Pt to perform LB dressing with SPV using modified techniques.  Pt to perform putting on/off footwear task with SPV.  MET: Pt to perform toileting with Partial A using BSC.   Pt to perform toileting with CGA using BSC and LRAD.    Functional Transfers:  MET: Pt to perform toilet transfers with Partial A using BSC and LRAD for mobility.    Pt to perform toilet transfers with SPV using BSC and LRAD for mobility.    MET: Pt to perform a tub transfer with Max A by re-eval using TTB.   Pt to perform a tub t/f with CGA via TTB and Gbs.      Balance, Strengthening, Endurance, Balance:  Pt  will perform dynamic standing tasks using LRAD for 2 minutes with SPV assist.  Pt to demonstrate 4/5 R shoulder strength and 5/5 distal RUE strength during functional task/                  Time Tracking     OT Received On: 07/27/22  Time In 0830     Time Out 1000  Total Time 90 min  Therapy Time: OT Individual: 90     Billable Minutes: Therapeutic Activity 30 and Self care/Home management 60    07/27/2022

## 2022-07-27 NOTE — PROGRESS NOTES
[facility] ([facility])  Rehab Progress Note    Patient Name: Ayaz Huggins  MRN: [id number]  Age: 77 y.o. Sex: male  : 1945  Hospital Length of Stay: 15 days  Date of Service: 2022   Chief Complaint: Acute left hemispheric CVA s/p CPA and s/p left CEA on 2022    Subjective:     Basic Information  Admit Information: 77-year-old white male presented to [facility] ED on 2022 complaining of right-sided weakness.  PMH significant for DM type 2, CAD s/p CABG x2 in , and HTN.  CT head negative for hemorrhagic bleeding.  Received tPA and transferred to ICU for post tPA monitoring and treatment.  TTE with negative bubble study, but significant for grade 1 diastolic dysfunction.  Carotid ultrasound significant for left ICA stenosis.  MRI significant for last left hemispheric CVA and 3 other small punctate CVAs on the right hemisphere.  Tolerated left CEA on  without periprocedural complications.  Tolerated transfer to [facility] inpatient rehab unit on  without incident.  Today's Information: No acute events overnight.  Sitting in chair comfortably working with therapy.  Wife at bedside.  Reports good sleep and appetite.  Last BM .  Vital signs at goal with no recent recorded fevers.  CBG is at goal.  No labs or imaging today.    Review of patient's allergies indicates:   Allergen Reactions    Penicillins         Current Facility-Administered Medications:     acetaminophen tablet 650 mg, 650 mg, Oral, Q4H PRN, Alexey A Kathryn, FNP    ALPRAZolam tablet 0.25 mg, 0.25 mg, Oral, TID PRN, Alexey A Kathryn, FNP    amLODIPine tablet 10 mg, 10 mg, Oral, Daily, Alexey A Kathryn, FNP, 10 mg at 22    aspirin tablet 325 mg, 325 mg, Oral, Daily, Alexey A Kathryn, FNP, 325 mg at 22    atorvastatin tablet 80 mg, 80 mg, Oral, QHS, Alexey A Kathryn, FNP, 80 mg at 22    baclofen tablet 5 mg, 5 mg, Oral, BID, Meghan Wolf,  FNP, 5 mg at 07/27/22 0811    benzonatate capsule 100 mg, 100 mg, Oral, TID PRN, Alexey A Kathryn, FNP    bisacodyL suppository 10 mg, 10 mg, Rectal, Daily PRN, Alexey A Kathryn, FNP    bisacodyL suppository 10 mg, 10 mg, Rectal, Once, Dorothea Ruggierocher, FNP    bisacodyL suppository 10 mg, 10 mg, Rectal, Once, Dorothea Calvert, FNP    carvediloL tablet 25 mg, 25 mg, Oral, BID WM, Alexey A Kathryn, FNP, 25 mg at 07/27/22 0811    clotrimazole 1 % cream, , Topical (Top), BID, Meghan Wolf, FNP, Given at 07/27/22 0812    dextrose 50 % in water (D50W) injection 12.5 g, 12.5 g, Intravenous, PRN, Alexey A Kathryn, FNP    dextrose 50 % in water (D50W) injection 25 g, 25 g, Intravenous, PRN, Alexey A Kathryn, FNP    docusate sodium capsule 100 mg, 100 mg, Oral, BID PRN, Alexey A Kathryn, FNP    glucagon (human recombinant) injection 1 mg, 1 mg, Intramuscular, PRN, Alexey A Kathryn, FNP    glucose chewable tablet 16 g, 16 g, Oral, PRN, Alexey A Kathryn, FNP    glucose chewable tablet 24 g, 24 g, Oral, PRN, Alexey A Kathryn, FNP    hydrALAZINE injection 10 mg, 10 mg, Intravenous, Q4H PRN, Alexey A Kathryn, FNP    HYDROcodone-acetaminophen 5-325 mg per tablet 1 tablet, 1 tablet, Oral, Q6H PRN, Alexey A Kathryn, FNP    hydrOXYzine pamoate capsule 50 mg, 50 mg, Oral, QHS, Alexey A Kathryn, FNP, 50 mg at 07/26/22 2118    insulin aspart U-100 injection 1-10 Units, 1-10 Units, Subcutaneous, QID (AC + HS) PRN, Alexey A Kathryn, FNP, 2 Units at 07/13/22 1213    labetalol 20 mg/4 mL (5 mg/mL) IV syring, 10 mg, Intravenous, Q4H PRN, RAYMOND GraciaP, 10 mg at 07/13/22 0904    lisinopriL tablet 5 mg, 5 mg, Oral, Daily, RAYMOND GarciaP, 5 mg at 07/27/22 0811    metFORMIN tablet 1,000 mg, 1,000 mg, Oral, BID WM, RAYMOND GarciaP, 1,000 mg at 07/27/22 0811    methocarbamoL tablet 500 mg, 500 mg, Oral, TID PRN, Meghan Wolf, FNP    metoprolol  "injection 10 mg, 10 mg, Intravenous, Q2H PRN, Alexey ARMAS Kathryn, FNP    nitroGLYCERIN SL tablet 0.4 mg, 0.4 mg, Sublingual, Q5 Min PRN, Alexey ARMAS Kathryn, FNP    ondansetron disintegrating tablet 8 mg, 8 mg, Oral, Q6H PRN, Alexey A Kathryn, FNP    polyethylene glycol packet 17 g, 17 g, Oral, BID PRN, Alexey PAWEL Kathryn, FNP    promethazine tablet 25 mg, 25 mg, Oral, Q6H PRN, Alexey A Kathryn, FNP     Review of Systems   Complete 12-point review of symptoms negative except for what's mentioned in HPI     Objective:     /61   Pulse 80   Temp 98.2 °F (36.8 °C) (Oral)   Resp 20   Ht 5' 10.98" (1.803 m)   Wt 85.5 kg (188 lb 7.9 oz)   SpO2 96%   BMI 26.30 kg/m²        Intake/Output Summary (Last 24 hours) at 7/27/2022 0857  Last data filed at 7/27/2022 0800  Gross per 24 hour   Intake 960 ml   Output 1300 ml   Net -340 ml       Physical Exam  Constitutional:       Appearance: Normal appearance.   HENT:      Head: Normocephalic.      Mouth/Throat:      Mouth: Mucous membranes are moist.   Eyes:      Pupils: Pupils are equal, round, and reactive to light.   Neck:      Comments: Left CEA incision clean intact with mild postsurgical swelling  Cardiovascular:      Rate and Rhythm: Normal rate and regular rhythm.      Heart sounds: Normal heart sounds.   Pulmonary:      Effort: Pulmonary effort is normal.      Breath sounds: Normal breath sounds.   Abdominal:      General: Bowel sounds are normal.      Palpations: Abdomen is soft.   Musculoskeletal:      Cervical back: Neck supple.      Comments: Generalized weakness and muscle atrophy.  Right upper extremity dependent swelling   Skin:     General: Skin is warm and dry.   Neurological:      Mental Status: He is alert and oriented to person, place, and time.      Comments: Right-sided hemiplegia.  Can move RUE against gravity.  LLE flaccid.    Psychiatric:         Mood and Affect: Mood normal.         Behavior: Behavior normal.         Thought " Content: Thought content normal.         Judgment: Judgment normal.     *MD performed and documented physical examination       Lines/Drains/Airways     None               Labs  No results found for this or any previous visit (from the past 24 hour(s)).  Radiology  MRI brain without contrast on 07/03/2022 at 2:09 p.m., IMPRESSION: Small acute infarcts supratentorially, the largest is a left posterior frontal 3 cm cortical based infarct with petechial hemorrhage.  No mass effect or shift.  Discussed with Dr. Duncan. Otherwise no acute intracranial abnormalities.  Radiology  Transthoracic echo on 07/02/2022 at 2:40 p.m., IMPRESSION: The left ventricle is  with normal systolic function. The estimated ejection fraction is 60%. There is grade I diastolic dysfunction consistent with impaired relaxation. Left atrial pressure is normal.    Assessment/Plan:     77 y.o. WM admitted on 7/12/2022    Acute left hemispheric CVA   - MRI with 3 other small punctate CVAs on the right hemisphere  - s/p tPA and s/p left CEA on 7/8/2022  - continue                Aspirin 325 mg daily                Lipitor 80 mg at bedtime                Xarelto 20 mg daily   - right knee brace obtained on 7/13  - waiting for right AFO from orthotics  - defer to physiatry for rehab and pain management  - PT/OT/RT following      Carotid artery stenosis  - s/p left CEA on 07/08/2022  - continue                Aspirin 325 mg daily                Lipitor 80 mg at bedtime                Xarelto 20 mg daily                Norco 5 mg/325 mg q.6 hours p.r.n.  - to follow up with vascular surgery outpatient     HTN  - BP at goal!!  - discontinued Metoprolol succinate 50 mg daily on 7/13  - continue     Coreg 25 mg BID (initiated 7/13) (increased 7/14)                Norvasc 10 mg daily                Lisinopril 20 mg daily                 Hydralazine 10 mg every 2 hours as needed for BP > 160/90                Labetalol 10 mg every 2 hours as needed for BP >  160/90     DM type II  - HgA1c with next labs  - continue                Metformin 1000 mg twice daily                ISS   - CBGs AC/HS     Normocytic anemia  - asymptomatic  - H/H stable   - no evidence of active bleeds  - will closely monitor and transfuse if needed      Constipation  - stable, loose stools reported on 7/15  - continue                Colace 100 mg b.i.d. PRN                MiraLax 17 g daily PRN     VTE Prophylaxis:  Xarelto 20 mg daily  COVID-19 testing:  Negative on 07/12/2022  COVID-19 vaccination status:  Vaccinated (Moderna):  x 3     POA: No  Living will: No  Contacts:  Pooja Huggins (wife) [phone]                     Eleonora Manzanares (daughter) [phone]     CODE STATUS: Full Code  Internal Medicine (attending): Weston Rivera MD  Physiatry (consulting): Angel Em MD     OUTPATIENT PROVIDERS  Angel Parham MD  Neurology: Jacque Agee MD  Vascular surgery:  Roxi Lama MD  Cardiology: Dionisio Marin MD     DISPOSITION: Condition stable.  Vital signs at goal.  No labs today.  Good glycemic control.  Sleep hygiene in appetite at goal.  Last BM 7/24.  Initiate Dulcolax suppository once now, if no BM by 1400 give soapsuds enema.  Continues to progress well with therapies.  Monitor closely.  Notify of any acute changes.    Staffing 7/26: Continent of bowel and bladder. PT: overall min to independent, difficulty with right leg, improving. Family training went well. RT: overall min to supervision, improving. Fair appetite. Drinking Boost. OT: overall partial mod. Projected discharge 8/1.      Dorothea Calvert NP conducted independent physical examination and assisted with medical documentation.    Total time spent on this encounter including chart review and direct MD + NP 1-on-1 patient interaction: 39 minutes   Over 50% of this time was spent in counseling and coordination of care

## 2022-07-28 LAB
POCT GLUCOSE: 115 MG/DL (ref 70–110)
POCT GLUCOSE: 120 MG/DL (ref 70–110)

## 2022-07-28 PROCEDURE — 97164 PT RE-EVAL EST PLAN CARE: CPT

## 2022-07-28 PROCEDURE — 25000003 PHARM REV CODE 250: Performed by: NURSE PRACTITIONER

## 2022-07-28 PROCEDURE — 97110 THERAPEUTIC EXERCISES: CPT

## 2022-07-28 PROCEDURE — 99900035 HC TECH TIME PER 15 MIN (STAT)

## 2022-07-28 PROCEDURE — 97535 SELF CARE MNGMENT TRAINING: CPT

## 2022-07-28 PROCEDURE — 97530 THERAPEUTIC ACTIVITIES: CPT

## 2022-07-28 PROCEDURE — 94761 N-INVAS EAR/PLS OXIMETRY MLT: CPT

## 2022-07-28 PROCEDURE — 94799 UNLISTED PULMONARY SVC/PX: CPT

## 2022-07-28 PROCEDURE — 11800000 HC REHAB PRIVATE ROOM

## 2022-07-28 RX ORDER — BISACODYL 10 MG
10 SUPPOSITORY, RECTAL RECTAL ONCE
Status: DISCONTINUED | OUTPATIENT
Start: 2022-07-28 | End: 2022-08-02 | Stop reason: HOSPADM

## 2022-07-28 RX ORDER — BACLOFEN 5 MG/1
5 TABLET ORAL DAILY
Status: DISCONTINUED | OUTPATIENT
Start: 2022-07-29 | End: 2022-07-30

## 2022-07-28 RX ADMIN — AMLODIPINE BESYLATE 10 MG: 5 TABLET ORAL at 08:07

## 2022-07-28 RX ADMIN — LISINOPRIL 5 MG: 5 TABLET ORAL at 08:07

## 2022-07-28 RX ADMIN — ATORVASTATIN CALCIUM 80 MG: 40 TABLET, FILM COATED ORAL at 08:07

## 2022-07-28 RX ADMIN — CARVEDILOL 25 MG: 25 TABLET, FILM COATED ORAL at 08:07

## 2022-07-28 RX ADMIN — METFORMIN HYDROCHLORIDE 1000 MG: 500 TABLET, FILM COATED ORAL at 04:07

## 2022-07-28 RX ADMIN — CARVEDILOL 25 MG: 25 TABLET, FILM COATED ORAL at 04:07

## 2022-07-28 RX ADMIN — METFORMIN HYDROCHLORIDE 1000 MG: 500 TABLET, FILM COATED ORAL at 08:07

## 2022-07-28 RX ADMIN — CLOTRIMAZOLE: 1 CREAM TOPICAL at 08:07

## 2022-07-28 RX ADMIN — HYDROXYZINE PAMOATE 50 MG: 50 CAPSULE ORAL at 08:07

## 2022-07-28 RX ADMIN — ASPIRIN 325 MG ORAL TABLET 325 MG: 325 PILL ORAL at 08:07

## 2022-07-28 RX ADMIN — BACLOFEN 5 MG: 5 TABLET ORAL at 08:07

## 2022-07-28 NOTE — PT/OT/SLP EVAL
"Physical Therapy Rehab Re-Evaluation    Patient Name:  Ayaz Huggins   MRN:  [id number]    Recommendations:     Discharge Recommendations:  home with home health, outpatient OT, outpatient PT (Pending progress)   Discharge Equipment Recommendations: wheelchair, cane, quad   Barriers to discharge: None    Assessment:     Ayaz Huggins is a 77 y.o. male admitted with a medical diagnosis of Stroke.  He presents with the following impairments/functional limitations:  weakness, impaired endurance, impaired functional mobility, gait instability, impaired balance, decreased upper extremity function, decreased lower extremity function, abnormal tone .    Rehab Diagnosis: CVA, R ward    Recent Surgery: * No surgery found *      General Precautions: Standard, fall     Orthopedic Precautions: N/A     Braces: AFO (Ward-sling, neoprene knee brace)    Rehab Prognosis: Good; patient would benefit from acute skilled PT services to address these deficits and reach maximum level of function.      History:     Past Medical History:   Diagnosis Date    Diabetes mellitus     Hypertension     Mixed hyperlipidemia        Past Surgical History:   Procedure Laterality Date    CARDIAC SURGERY         Subjective     Chief Complaint: R sided weakness  Patient/Family Comments/goals: "To be as independent as possible"    Vitals   Vitals at Rest  /67   HR 76   O2 Sat     Pain Pain Rating 1: 0/10     Vitals With Activity  /61   HR 77   O2 Sat     Pain Pain Rating 1: 0/10     Respiratory Status: Room air    Patients cultural, spiritual, Baptism conflicts given the current situation: no       Living Environment  Lives With: spouse (2 college-aged grandchildren)  Name(s) of Who Lives With Patient: Wife  Living Arrangements: house  Home Accessibility: other (see comments) (Threshold to enter)  Stair Railings at Home: none  Home Layout: Able to live on 1st floor  Transportation Anticipated: car, drives self, family or friend will " "provide  Equipment Currently Used at Home: none    Prior Level of Function  Ambulation Skills: independent  Transfer Skills: independent  ADL Skills: independent  Work/Leisure Activity: independent    Equipment used at home: none.  DME owned (not currently used): none.      Upon discharge, patient will have assistance from spouse.    Objective:     Patient found up in chair with    upon PT entry to room.    Exams  Cognitive Exam:  Patient is oriented to Person, Place, Time and Situation  Postural Exam:  Patient presented with the following abnormalities:    -       Forward head  RLE ROM:          -       WFL PROM, limited AROM d/t muscle weakness  RLE Strength:  - 2+/5 hip flexion, 2/5 knee extension, 0/5 ankle DF          LLE ROM:          -       WFL  LLE Strength:          -       WFL    Functional Mobility    GGs   Current   Status Comments   Functional Area: Care Score:    Roll Left and Right 6 Use of rail on L side of bed   Sit to Lying 6 Use of rail on L side of bed, pt able to scoop RLE with LLE.    Lying to Sitting on Side of Bed 6 Use of rail on L side of bed, pt able to scoop RLE with LLE.   Sit to Stand 6 With LBQC   Chair/Bed-to-Chair Transfer 6 Performed twice, first trial with set-up assistance from spouse, second trial with pt setting up WC independently. Transferring toward L (strong) side, stand pivot, no AD.    Walk 10 Feet 4 CGA provided by wife, with LBQC   Walk 50 Feet with Two Turns 4    Walk 150 Feet 4 170ft + 100ft with LBQC. Second trial, shoe cover was taken off of shoe and pt was able to clear RLE. Hip ER still noted but less than previously seen.   1 Step (Curb) 4 4" curb with LBQC, CGA provided by wife. Placed in doorway to simulate home environment. Pt actually descended with the LLE (strong) foot first, still performed safely.    4 Steps 3 Min A d/t RLE adducting while descending. CGA to ascend with jolene rails. Descended with L rail only.   12 Steps 88    Picking Up Object 4 With LBQC " and reacher, CGA.   Wheel 50 Feet with Two Turns 6 Pt able to manage brakes and foot rests   Wheel 150 Feet 6 Propels WC with LUE and LLE, 150ft.      Therapeutic Activities and Exercises:  - From edge of bed, pt performed 2 sit-to-stands with RLE behind LLE, forcing more weightbearing through RLE. CGA-Min A.     Activity Tolerance: Good    Patient left supine with call button in reach and wife present.    Education Provided: transfer training, bed mob, gait training, stair training, balance training, safety awareness, body mechanics, assistive device, wheelchair management, strengthening exercises and fall prevention    Expected compliance: High compliance    GOALS:   Multidisciplinary Problems     Physical Therapy Goals        Problem: Physical Therapy    Goal Priority Disciplines Outcome Goal Variances Interventions   Physical Therapy Goal     PT, PT/OT Ongoing, Progressing     Description: Bed Mobility:  Roll left and right with supervision/touching assist - MET 7/18/22  Sit to supine transfer with supervision/touching assist. - MET 7/22/22  Supine to sit transfer with supervision/touching assist. - MET 7/22/22  Sit <> supine transfer, independent - MET 7/28/22    Transfers:  Sit to stand transfer with supervision/touching assist using RW or LRAD. - MET 7/18/22  Bed to chair transfer with supervision/touching assist using RW or LRAD. - MET 7/22/22  Car transfer with partial/moderate assist using RW or LRAD. - MET 7/22/22  Sit to stand transfer with set-up assist with RW - MET 7/22/22  Sit to stand transfer, independent with LBQC or LRAD - MET 7/28/22  Bed to chair transfer with set-up assist using LBQC or LRAD - MET 7/28/22  Car transfer with supervision assist using LBQC or LRAD - MET 7/27/22  Car transfer with set-up assist with LBQC or LRAD - initial    Mobility:  Ambulate 100 feet with partial/moderate assist using RW or LRAD. - MET 7/28/22  Ambulate 150 feet with supervision assist using LBQC or LRAD -  "initial  Ambulate 10 feet with set-up assist using LBQC - initial  Ambulate 15 feet on uneven surface with partial/moderate assist using LBQC or LRAD - initial  Ascend/descend 4 stairs with Chris rails or L rail with supervision/CGA assist - initial  Ascend/descend 4" curb with LBQC with supervision/CGA assist - initial    WC mobility:   150ft with LUE/LLE with independent. - MET 7/28/22                     Plan:     During this hospitalization, patient to be seen 5 x/week to address the identified rehab impairments via gait training, therapeutic activities, therapeutic exercises, neuromuscular re-education, wheelchair management/training and progress toward the following goals:     Plan of Care Expires:  08/03/22    Time Tracking:     PT Received On:    Time In 0900     Time Out 1000  Total Time 60 min  Therapy Time PT Start Time: 0900  PT Stop Time: 1000  PT Total Time (min): 60 min  PT Individual: 60    Billable Minutes: Re-eval 20 and Therapeutic Activity 40    07/28/2022    "

## 2022-07-28 NOTE — PT/OT/SLP PROGRESS
Occupational Therapy Inpatient Rehab Treatment    Name: Ayaz Huggins   MRN: [id number]    Assessment:  Ayaz Huggins is a 77 y.o. male admitted with a medical diagnosis of Stroke.  He presents with the following impairments/functional limitations:  weakness, impaired endurance, impaired self care skills, impaired functional mobility, gait instability, impaired balance, decreased coordination, decreased lower extremity function, decreased upper extremity function, impaired coordination.    Rehab Diagnosis: CVA    Recent Surgery: * No surgery found *      General Precautions: Standard, fall     Orthopedic Precautions:N/A     Braces: AFO (wes sling, neoprene knee brace, GivMohr sling)    Rehab Prognosis: Good; patient would benefit from IRF skilled OT services to address these deficits and reach maximum level of function.        History:     Past Medical History:   Diagnosis Date    Diabetes mellitus     Hypertension     Mixed hyperlipidemia        Past Surgical History:   Procedure Laterality Date    CARDIAC SURGERY         Subjective     Orientation: Oriented x4    Chief Complaint: poor gross motor coordination in RUE and AROM in RLE, pt and wife are most concerned about showering when returning home. Pt and his wife have stated that they feel more confident with a WIS t/f.    Vitals   Beginning of session  /61   HR 77   O2 Sat    Pain No pain     End of session  /61   HR 70   O2 Sat    Pain No pain     Respiratory Status: Room air    Patients cultural, spiritual, Holiness conflicts given the current situation: no       Objective:     Mobility   Patient completed:  Rolling/Turning to Left with independence and with side rail  Supine to Sit with independence and with side rail  Sit to Supine with independence and with side rail    ADLs   Current Status   Functional Area: Care Score:   Toileting Hygiene 3   Pt asked his wife to pull his pants down and to perform kay hygiene. Pt was able to pull his pants  up while standing with LBQC. Pt has the ROM to pull his pants down and wipe himself, but he used partial-mod A with his wife present during immersive FT.   Shower/Bathe Self 4   @ TTB.   Upper Body Dressing 5   @ TTB while using wes tech for dressing.   Lower Body Dressing 4   Pt pulled his pants down in front of TTB. Pt performed LBD @ TTB using wes tech for dressing.   Putting On/Taking Off Footwear 5   @TTB.   Toilet Transfer 4   With LBQC and his wife providing CGA.   Pt ambulated from his bed > bathroom to perform tub/shower combo t/f via TTB with CGA and LBQC. Pt's wife Ekta provided the set up assist and CGA throughout all ADLs to simulate how they will do it after d/c. Pt then performed a WIS t/f with LBQC, shower chair, gb, and CGA. Pt was instructed to step into the shower with his L foot (stronger foot) then his R foot. The shower chair was set up to simulate the shower in his house and pt and wife felt comfortable with the t/f. OTS and OT edu pt and his wife on bathroom safety and to make sure they do a trial run before taking a shower to promote his safety.    Functional Mobility  Pt ambulated from ADL bathroom > pt's room 404 with LBQC and CGA.     Limiting Factors for ADLs: motor, limited ROM, balance and coordination     LifeStyle Change and Education:     LifeStyle Changes and Education Discussed: Changes in diet/healthy eating, Monitor blood pressure  Patient agrees to LifeStyle Change: Yes    Patient left supine with all lines intact and call button in reach.     Education provided: Roles and goals of OT, ADLs, transfer training, equipment recommendations and home safety    Encounter Problems       Encounter Problems (Active)       Occupational Therapy       Occupational Therapy Goal       Start: 07/12/22   Expected End: 07/25/22       LTG: Pt will be Ind with all ADLS.  Pt LTG: to return to bike riding    ADLs:  MET: Pt to perform grooming tasks with [place] from seated position by  d/c.  MET: Pt to perform UB dressing with Setup using modified techniques.  Pt to perform LB dressing with SPV using modified techniques.  Pt to perform putting on/off footwear task with SPV.  MET: Pt to perform toileting with Partial A using BSC.   Pt to perform toileting with CGA using BSC and LRAD.    Functional Transfers:  MET: Pt to perform toilet transfers with Partial A using BSC and LRAD for mobility.    Pt to perform toilet transfers with SPV using BSC and LRAD for mobility.    MET: Pt to perform a tub transfer with Max A by re-eval using TTB.   Pt to perform a tub t/f with CGA via TTB and Gbs.      Balance, Strengthening, Endurance, Balance:  Pt  will perform dynamic standing tasks using LRAD for 2 minutes with SPV assist.  Pt to demonstrate 4/5 R shoulder strength and 5/5 distal RUE strength during functional task/                Time Tracking     OT Received On: 07/28/22  Time In 1030     Time Out 1200  Total Time 90 min  Therapy Time: OT Individual: 90    Billable Minutes: Therapeutic Activity 15 and Self care/Home management 75    07/28/2022

## 2022-07-28 NOTE — PT/OT/SLP PROGRESS
Recreational Therapy Treatment    Date of Treatment: 7/28/2022  Start Time: 0830   Stop Time: 0900  Total Time: 30  Missed Time:     General Precautions: fall  Ortho Precautions: N/A  Braces: AFO (wes sling, neoprene knee brace, GivMohr sling)    Vitals   Vitals at Rest  /63   HR 76   O2 Sat    Pain      Vitals With Activity  /67   HR 76   O2 Sat    Pain        Treatment     Cognitive Skills Building            Comment:          Dynamic Activities   Activity Assist Position Equipment Response Comment   Activity 1 Golf supervision Standing Cane, quad and Golf club, golf balls good             Comment: Sit to stand was supervision as was dynamic standing balance/reaching.  Standing tolerance was 5 minutes. Improved RUE coordination/dexteriety to min/supervision. Using RUE to hold golf club and swing making 50% of holes he swung at. His wife attended and assisted with his care.          Fine Motor Activities   Activity Assist Position Equipment Response Comment            Comment:        Additional Info: Pt's wife attended therapy and assisted with his care. She remains very supportive    Goals     Short Term Goals    Goal  Goal Status   Will increase sit to stand to mod. Initial Met   Will increase RUE coordination/dexteriety to min. Initial Met   Will improve dynamic standing balance/reaching to min. Initial Met           Long Term Goals    Goal Goal Status   Will increase standing tolerance to 5 minutes.  Initial Progressing   Will increase RUE coordination/dexteriety to supervision. Initial Progressing   Will improve dynamic standing balance/reaching to supervision. Initial Progressing

## 2022-07-28 NOTE — PT/OT/SLP PROGRESS
Physical Therapy Inpatient Rehab Treatment    Patient Name:  Ayaz Huggins   MRN:  [id number]    Recommendations:     Discharge Recommendations:  home with home health, outpatient OT, outpatient PT (Pending progress)   Discharge Equipment Recommendations: wheelchair, cane, quad   Barriers to discharge: None    Assessment:     Ayaz Huggins is a 77 y.o. male admitted with a medical diagnosis of Stroke.  He presents with the following impairments/functional limitations:  weakness, impaired endurance, impaired functional mobility, gait instability, impaired balance, decreased upper extremity function, decreased lower extremity function, abnormal tone.    Rehab Diagnosis: CVA    Recent Surgery: * No surgery found *      General Precautions: Standard, fall     Orthopedic Precautions:N/A     Braces: AFO (Ward-sling, neoprene knee brace)    Rehab Prognosis: Good; patient would benefit from acute skilled PT services to address these deficits and reach maximum level of function.      History:     Past Medical History:   Diagnosis Date    Diabetes mellitus     Hypertension     Mixed hyperlipidemia        Past Surgical History:   Procedure Laterality Date    CARDIAC SURGERY         Subjective     Chief Complaint: RLE weakness   Patient/Family Comments/goals: To learn exercises for HEP    Vitals   Vitals at Rest  /55   HR 72   O2 Sat    Pain      Vitals With Activity  /62   HR 79   O2 Sat    Pain      Respiratory Status: Room air    Patients cultural, spiritual, Mormonism conflicts given the current situation: no      Objective:     Patient found supine with   wife present upon PT entry to room.    Pt is Oriented x3 and Alert, Cooperative and Motivated.    Functional Mobility/Therapeutic Activities/Exercises: pt supine in bed for all of the below  · Bed Mobility:     · Scooting: minimum assistance - performed with BLE and no bed rails, Min A to hold R foot in place for pt to scoot up in bed.  · Bridging: minimum  assistance - Performed 2x10, Min A to hold R foot in place.   · RLE Hip/Knee Flexion/Extension: 2x10 with manual resistance for extension, AROM for knee flexion with Min A for guidance.  · RLE hip add/abd with knee bent, x10  · RLE hip ER stretch, R hamstring stretch    Activity Tolerance: good    Patient left supine with call button in reach and Wife present.    Education provided: bed mob, body mechanics and strengthening exercises    Expected compliance:    GOALS:   Multidisciplinary Problems     Physical Therapy Goals        Problem: Physical Therapy    Goal Priority Disciplines Outcome Goal Variances Interventions   Physical Therapy Goal     PT, PT/OT Ongoing, Progressing     Description: Bed Mobility:  Roll left and right with supervision/touching assist - MET 7/18/22  Sit to supine transfer with supervision/touching assist. - MET 7/22/22  Supine to sit transfer with supervision/touching assist. - MET 7/22/22  Sit <> supine transfer, independent - MET 7/28/22    Transfers:  Sit to stand transfer with supervision/touching assist using RW or LRAD. - MET 7/18/22  Bed to chair transfer with supervision/touching assist using RW or LRAD. - MET 7/22/22  Car transfer with partial/moderate assist using RW or LRAD. - MET 7/22/22  Sit to stand transfer with set-up assist with RW - MET 7/22/22  Sit to stand transfer, independent with LBQC or LRAD - MET 7/28/22  Bed to chair transfer with set-up assist using LBQC or LRAD - MET 7/28/22  Car transfer with supervision assist using LBQC or LRAD - MET 7/27/22  Car transfer with set-up assist with LBQC or LRAD - initial    Mobility:  Ambulate 100 feet with partial/moderate assist using RW or LRAD. - MET 7/28/22  Ambulate 150 feet with supervision assist using LBQC or LRAD - initial  Ambulate 10 feet with set-up assist using LBQC - initial  Ambulate 15 feet on uneven surface with partial/moderate assist using LBQC or LRAD - initial  Ascend/descend 4 stairs with Chris rails or L  "rail with supervision/CGA assist - initial  Ascend/descend 4" curb with LBQC with supervision/CGA assist - initial    WC mobility:   150ft with LUE/LLE with independent. - MET 7/28/22                     Plan:     During this hospitalization, patient to be seen 5 x/week to address the identified rehab impairments via gait training, therapeutic activities, therapeutic exercises, neuromuscular re-education, wheelchair management/training and progress toward the following goals:     Plan of Care Expires:  08/03/22    Additional Information:         Time Tracking:     PT Received On:    Time In 1300     Time Out 1330  Total Time 30 min  Therapy Time PT Individual: 30    Billable Minutes: Therapeutic Exercise 30    07/28/2022  "

## 2022-07-28 NOTE — NURSING
Spoke with patient's wife Pooja ([phone]). Informed her that patient's LBQC, TTB, WC and BSC orders were placed with [facility]. Explained that she should be receiving a phone call from [facility] at some point today if she has not already.    Per Pooja she spoke with someone from [facility] and was told that only the cane and WC was ordered.     Informed patient that I would place a follow-up phone call to [facility] as a LBQC, TTB, WC and BSC were ordered earlier this morning. Pt verbalized understanding.

## 2022-07-28 NOTE — PROGRESS NOTES
[facility] ([facility])  Rehab Progress Note    Patient Name: Ayaz Huggins  MRN: [id number]  Age: 77 y.o. Sex: male  : 1945  Hospital Length of Stay: 16 days  Date of Service: 2022   Chief Complaint: Acute left hemispheric CVA s/p CPA and s/p left CEA on 2022    Subjective:     Basic Information  Admit Information: 77-year-old white male presented to [facility] ED on 2022 complaining of right-sided weakness.  PMH significant for DM type 2, CAD s/p CABG x2 in , and HTN.  CT head negative for hemorrhagic bleeding.  Received tPA and transferred to ICU for post tPA monitoring and treatment.  TTE with negative bubble study, but significant for grade 1 diastolic dysfunction.  Carotid ultrasound significant for left ICA stenosis.  MRI significant for last left hemispheric CVA and 3 other small punctate CVAs on the right hemisphere.  Tolerated left CEA on  without periprocedural complications.  Tolerated transfer to [facility] inpatient rehab unit on  without incident.  Today's Information: No acute events overnight.  Sitting in chair comfortably working with therapy.  Wife at bedside.  Reports good sleep and appetite.  Last BM .  Vital signs at goal with no recent recorded fevers.  No labs or imaging today.  CBGs at goal.    Review of patient's allergies indicates:   Allergen Reactions    Penicillins         Current Facility-Administered Medications:     acetaminophen tablet 650 mg, 650 mg, Oral, Q4H PRN, Alexey A Kathryn, FNP    ALPRAZolam tablet 0.25 mg, 0.25 mg, Oral, TID PRN, Alexey A Kathryn, FNP    amLODIPine tablet 10 mg, 10 mg, Oral, Daily, Aelxey A Kathryn, FNP, 10 mg at 22    aspirin tablet 325 mg, 325 mg, Oral, Daily, Alexey A Kathryn, FNP, 325 mg at 22    atorvastatin tablet 80 mg, 80 mg, Oral, QHS, Alexey A Kathryn, FNP, 80 mg at 22    [START ON 2022] baclofen tablet 5 mg, 5 mg, Oral, Daily,  Meghan Wolf, FNP    benzonatate capsule 100 mg, 100 mg, Oral, TID PRN, Alexey A Kathryn, FNP    bisacodyL suppository 10 mg, 10 mg, Rectal, Daily PRN, Alexey A Kathryn, FNP    bisacodyL suppository 10 mg, 10 mg, Rectal, Once, Dorothea Ruggierocher, FNP    bisacodyL suppository 10 mg, 10 mg, Rectal, Once, Dorothea Ruggierocher, FNP    bisacodyL suppository 10 mg, 10 mg, Rectal, Once, Dorothea ROCK , FNP    carvediloL tablet 25 mg, 25 mg, Oral, BID WM, Alexey A Kathryn, FNP, 25 mg at 07/28/22 0814    clotrimazole 1 % cream, , Topical (Top), BID, Meghan Wolf, RAYMONDP, Given at 07/27/22 2029    dextrose 50 % in water (D50W) injection 12.5 g, 12.5 g, Intravenous, PRN, Alexey A Kathryn, FNP    dextrose 50 % in water (D50W) injection 25 g, 25 g, Intravenous, PRN, Alexey A Kathryn, FNP    docusate sodium capsule 100 mg, 100 mg, Oral, BID PRN, Alexey A Kathryn, FNP    glucagon (human recombinant) injection 1 mg, 1 mg, Intramuscular, PRN, Alexey A Kathryn, FNP    glucose chewable tablet 16 g, 16 g, Oral, PRN, Alexey A Kathryn, FNP    glucose chewable tablet 24 g, 24 g, Oral, PRN, Alexey A Kathryn, FNP    hydrALAZINE injection 10 mg, 10 mg, Intravenous, Q4H PRN, Alexey A Kathryn, FNP    HYDROcodone-acetaminophen 5-325 mg per tablet 1 tablet, 1 tablet, Oral, Q6H PRN, Alexey A Kathryn, FNP    hydrOXYzine pamoate capsule 50 mg, 50 mg, Oral, QHS, Alexey A Kathryn, FNP, 50 mg at 07/27/22 2029    insulin aspart U-100 injection 1-10 Units, 1-10 Units, Subcutaneous, QID (AC + HS) PRN, Alexey A Kathryn, FNP, 2 Units at 07/13/22 1213    labetalol 20 mg/4 mL (5 mg/mL) IV syring, 10 mg, Intravenous, Q4H PRN, Alexey Peralta FNP, 10 mg at 07/13/22 0904    lisinopriL tablet 5 mg, 5 mg, Oral, Daily, RAYMOND GarciaP, 5 mg at 07/28/22 0814    metFORMIN tablet 1,000 mg, 1,000 mg, Oral, BID WM, Alexey Peralta FNP, 1,000 mg at 07/28/22 0814    methocarbamoL tablet 500  "mg, 500 mg, Oral, TID PRN, Meghan Wolf, FNP    metoprolol injection 10 mg, 10 mg, Intravenous, Q2H PRN, Alexey PAWEL Kathryn, FNP    nitroGLYCERIN SL tablet 0.4 mg, 0.4 mg, Sublingual, Q5 Min PRN, Alexey Narvaezart, FNP    ondansetron disintegrating tablet 8 mg, 8 mg, Oral, Q6H PRN, Alexey Narvaezart, FNP    polyethylene glycol packet 17 g, 17 g, Oral, BID PRN, Alexey Narvaezart, FNP    promethazine tablet 25 mg, 25 mg, Oral, Q6H PRN, Alexey Narvaezart, FNP     Review of Systems   Complete 12-point review of symptoms negative except for what's mentioned in HPI     Objective:     /67   Pulse 75   Temp 98.4 °F (36.9 °C) (Oral)   Resp 18   Ht 5' 10.98" (1.803 m)   Wt 85.5 kg (188 lb 7.9 oz)   SpO2 96%   BMI 26.30 kg/m²        Intake/Output Summary (Last 24 hours) at 7/28/2022 0956  Last data filed at 7/28/2022 0500  Gross per 24 hour   Intake 1080 ml   Output 1350 ml   Net -270 ml       Physical Exam  Constitutional:       Appearance: Normal appearance.   HENT:      Head: Normocephalic.      Mouth/Throat:      Mouth: Mucous membranes are moist.   Eyes:      Pupils: Pupils are equal, round, and reactive to light.   Neck:      Comments: Left CEA incision clean intact with mild postsurgical swelling  Cardiovascular:      Rate and Rhythm: Normal rate and regular rhythm.      Heart sounds: Normal heart sounds.   Pulmonary:      Effort: Pulmonary effort is normal.      Breath sounds: Normal breath sounds.   Abdominal:      General: Bowel sounds are normal.      Palpations: Abdomen is soft.   Musculoskeletal:      Cervical back: Neck supple.      Comments: Generalized weakness and muscle atrophy.  Right upper extremity dependent swelling   Skin:     General: Skin is warm and dry.   Neurological:      Mental Status: He is alert and oriented to person, place, and time.      Comments: Right-sided hemiplegia.  Can move RUE against gravity.  LLE flaccid.    Psychiatric:         Mood and Affect: Mood " normal.         Behavior: Behavior normal.         Thought Content: Thought content normal.         Judgment: Judgment normal.     *MD performed and documented physical examination       Lines/Drains/Airways     None               Labs  Recent Results (from the past 24 hour(s))   POCT glucose    Collection Time: 07/28/22  5:36 AM   Result Value Ref Range    POCT Glucose 120 (H) 70 - 110 mg/dL     Radiology  MRI brain without contrast on 07/03/2022 at 2:09 p.m., IMPRESSION: Small acute infarcts supratentorially, the largest is a left posterior frontal 3 cm cortical based infarct with petechial hemorrhage.  No mass effect or shift.  Discussed with Dr. Duncan. Otherwise no acute intracranial abnormalities.  Radiology  Transthoracic echo on 07/02/2022 at 2:40 p.m., IMPRESSION: The left ventricle is  with normal systolic function. The estimated ejection fraction is 60%. There is grade I diastolic dysfunction consistent with impaired relaxation. Left atrial pressure is normal.    Assessment/Plan:     77 y.o. WM admitted on 7/12/2022    Acute left hemispheric CVA   - MRI with 3 other small punctate CVAs on the right hemisphere  - s/p tPA and s/p left CEA on 7/8/2022  - continue                Aspirin 325 mg daily                Lipitor 80 mg at bedtime                Xarelto 20 mg daily   - right knee brace obtained on 7/13  - waiting for right AFO from orthotics  - defer to physiatry for rehab and pain management  - PT/OT/RT following      Carotid artery stenosis  - s/p left CEA on 07/08/2022  - continue                Aspirin 325 mg daily                Lipitor 80 mg at bedtime                Xarelto 20 mg daily                Norco 5 mg/325 mg q.6 hours p.r.n.  - to follow up with vascular surgery outpatient     HTN  - BP at goal!!  - discontinued Metoprolol succinate 50 mg daily on 7/13  - continue     Coreg 25 mg BID (initiated 7/13) (increased 7/14)                Norvasc 10 mg daily                Lisinopril 20 mg  daily                 Hydralazine 10 mg every 2 hours as needed for BP > 160/90                Labetalol 10 mg every 2 hours as needed for BP > 160/90     DM type II  - HgA1c with next labs  - continue                Metformin 1000 mg twice daily                ISS   - CBGs AC/HS     Normocytic anemia  - asymptomatic  - H/H stable   - no evidence of active bleeds  - will closely monitor and transfuse if needed      Constipation  - stable, loose stools reported on 7/15  - continue                Colace 100 mg b.i.d. PRN                MiraLax 17 g daily PRN     VTE Prophylaxis:  Xarelto 20 mg daily  COVID-19 testing:  Negative on 07/12/2022  COVID-19 vaccination status:  Vaccinated (Moderna):  x 3     POA: No  Living will: No  Contacts:  Pooja Huggins (wife) [phone]                     Eleonora Manzanares (daughter) [phone]     CODE STATUS: Full Code  Internal Medicine (attending): Weston Rivera MD  Physiatry (consulting): Angel Em MD     OUTPATIENT PROVIDERS  Angel Parham MD  Neurology: Jacque Agee MD  Vascular surgery:  Roxi Lama MD  Cardiology: Dionisio Marin MD     DISPOSITION: Condition stable.  Vital signs at goal.  No labs today.  Good glycemic control.  Sleep hygiene and appetite at goal.  Last BM 7/25.  Initiate Dulcolax suppository once now, if no BM by 1400 give soapsuds enema.  Continues to progress well with therapies.  Monitor closely.  Notify of any acute changes.  NP updated wife at bedside.  Answered all questions.  Agrees with current medical plan of care.    Staffing 7/26: Continent of bowel and bladder. PT: overall min to independent, difficulty with right leg, improving. Family training went well. RT: overall min to supervision, improving. Fair appetite. Drinking Boost. OT: overall partial mod. Projected discharge 8/1.      Dorothea Calvert NP conducted independent physical examination and assisted with medical documentation.    Total time spent on this encounter  including chart review and direct MD + NP 1-on-1 patient interaction: 37 minutes   Over 50% of this time was spent in counseling and coordination of care

## 2022-07-28 NOTE — PROGRESS NOTES
Subjective  HPI:  76 y/o male with a PMH of Type II DM, diabetic neuropathy, CAD s/p CABG x2 (2007), and HTN who presented to [facility] ED  On 7/2/22 with complaints of right sided upper and lower extremity weakness. Patient's story is unclear upon the time of last known normal. He states he woke up  with a cramp in the RLE and noticed the right sided weakness.The patient was admitted to the ICU with an ischemic CVA and administered tPA; he has had some improvement in his right-sided weakness although he is unable to purposefully move his right lower extremity.  Neurology has seen and evaluated the patient and has deemed this patient stable for transfer to the floor.  This patient was seen and evaluated in the intensive care unit.  7/28: Seen with RT,standing w/LBQC and playing mini golf. Wife present for immersive training. Reports very good sleep overnight and feeling good this morning. PT/OT requesting DC of morning baclofen as it is making him groggy and not improving external rotation of RLE. Relayed to patient. He agrees. Progressing in therapy without current complaint. VSSAF.     Review of Systems  Depression/Anxiety: noted anxiety with discharge planning:improving     ALPRAZolam tablet 0.25 mg TID PRN Anxiety  Pain: denies     baclofen tablet 5 mg qHS  acetaminophen tablet 650 mg q4h PRN pain  methocarbamoL tablet 500 mg TID PRN spasm  Bowels/Bladder: last BM 7/26  Appetite: good       Sleep: good          Physical Exam  General: well-developed, well-nourished, in no acute distress  Respiratory: equal chest rise, no SOB, no audible wheeze  Cardiovascular: regular rate and rhythm, no edema  Gastrointestinal: soft, non-tender, non-distended   Musculoskeletal: right hemiplegia  Integumentary: no rashes or skin lesions present, redness to right heel/ankle, sacral redness, left neck incision-dressing-c/d/i  Neurologic: cranial nerves intact, right hemiplegia, tone, external rotation to RLE with  gait            Assessment/Plan  HTN (hypertension) (Chronic)   Stroke   Carotid artery stenosis, symptomatic, left   Type 2 diabetes mellitus   Heart disease   S/P carotid endarterectomy         Wounds:redness to right heel/ankle, sacral redness, left neck incision-dressing-c/d/i  S/p s/p left CEA on 7/8/2022 (VICENTE Lama MD)  Precautions: fall risk  Bracing/AD: QC  Swallowing: Diabetic Diet  Function: Tolerating therapy. Continue PT/OT  VTE Prophylaxis:aspirin tablet 325 mg qd   Code Status: FULL CODE   Discharge:Pt. Lives with wife in [place] in a single-story home with 1 step to enter the residence. Pt. completed high school and some college.  He has no  history. He is a retired  for [employer]. Wife is retired.  She drives, cooks, cleans, does laundry, grocery shops, manages finances, and can physically assist pt. She will be home with patient after discharge from rehab.  She is also checking into their long-term care policy. Children (2). Date 8/1 Monday.

## 2022-07-28 NOTE — PLAN OF CARE
Problem: Rehabilitation (IRF) Plan of Care  Goal: Absence of New-Onset Illness or Injury  Outcome: Ongoing, Progressing  Intervention: Prevent Fall and Fall Injury  Flowsheets (Taken 7/28/2022 1018)  Safety Promotion/Fall Prevention:   assistive device/personal item within reach   gait belt with ambulation   nonskid shoes/socks when out of bed   medications reviewed   side rails raised x 2  Intervention: Prevent Skin Injury  Flowsheets (Taken 7/28/2022 1018)  Body Position: weight shifting

## 2022-07-28 NOTE — NURSING
Informed by Gunnar, OT that patient will need a BSC ordered in addition to previous DME recommendations.    Faxed new order for BSC to [facility] ([phone]). Spoke with Nella with [facility] regarding additional order for BSC.

## 2022-07-29 LAB
POCT GLUCOSE: 110 MG/DL (ref 70–110)
POCT GLUCOSE: 112 MG/DL (ref 70–110)

## 2022-07-29 PROCEDURE — 25000003 PHARM REV CODE 250: Performed by: NURSE PRACTITIONER

## 2022-07-29 PROCEDURE — 97530 THERAPEUTIC ACTIVITIES: CPT | Mod: CQ

## 2022-07-29 PROCEDURE — 97110 THERAPEUTIC EXERCISES: CPT

## 2022-07-29 PROCEDURE — 94799 UNLISTED PULMONARY SVC/PX: CPT

## 2022-07-29 PROCEDURE — 97535 SELF CARE MNGMENT TRAINING: CPT

## 2022-07-29 PROCEDURE — 97116 GAIT TRAINING THERAPY: CPT

## 2022-07-29 PROCEDURE — 11800000 HC REHAB PRIVATE ROOM

## 2022-07-29 PROCEDURE — 94761 N-INVAS EAR/PLS OXIMETRY MLT: CPT

## 2022-07-29 PROCEDURE — 97112 NEUROMUSCULAR REEDUCATION: CPT

## 2022-07-29 RX ADMIN — METHOCARBAMOL 500 MG: 500 TABLET ORAL at 09:07

## 2022-07-29 RX ADMIN — CARVEDILOL 25 MG: 25 TABLET, FILM COATED ORAL at 05:07

## 2022-07-29 RX ADMIN — METFORMIN HYDROCHLORIDE 1000 MG: 500 TABLET, FILM COATED ORAL at 05:07

## 2022-07-29 RX ADMIN — METFORMIN HYDROCHLORIDE 1000 MG: 500 TABLET, FILM COATED ORAL at 08:07

## 2022-07-29 RX ADMIN — HYDROXYZINE PAMOATE 50 MG: 50 CAPSULE ORAL at 09:07

## 2022-07-29 RX ADMIN — LISINOPRIL 5 MG: 5 TABLET ORAL at 08:07

## 2022-07-29 RX ADMIN — CLOTRIMAZOLE: 1 CREAM TOPICAL at 08:07

## 2022-07-29 RX ADMIN — BACLOFEN 5 MG: 5 TABLET ORAL at 08:07

## 2022-07-29 RX ADMIN — AMLODIPINE BESYLATE 10 MG: 5 TABLET ORAL at 08:07

## 2022-07-29 RX ADMIN — CLOTRIMAZOLE: 1 CREAM TOPICAL at 09:07

## 2022-07-29 RX ADMIN — CARVEDILOL 25 MG: 25 TABLET, FILM COATED ORAL at 08:07

## 2022-07-29 RX ADMIN — ATORVASTATIN CALCIUM 80 MG: 40 TABLET, FILM COATED ORAL at 09:07

## 2022-07-29 RX ADMIN — ASPIRIN 325 MG ORAL TABLET 325 MG: 325 PILL ORAL at 08:07

## 2022-07-29 NOTE — PT/OT/SLP PROGRESS
Physical Therapy Inpatient Rehab Treatment    Patient Name:  Ayaz Huggins   MRN:  [id number]    Recommendations:     Discharge Recommendations:  home with home health, outpatient OT, outpatient PT (Pending progress)   Discharge Equipment Recommendations: wheelchair, cane, quad   Barriers to discharge: None    Assessment:     Ayaz Huggins is a 77 y.o. male admitted with a medical diagnosis of Stroke.  He presents with the following impairments/functional limitations:  weakness, impaired endurance, impaired functional mobility, gait instability, impaired balance, decreased upper extremity function, decreased lower extremity function, abnormal tone .    Rehab Diagnosis: CVA, R ward    Recent Surgery: * No surgery found *      General Precautions: Standard, fall     Orthopedic Precautions:N/A     Braces: AFO (Ward-sling, neoprene knee brace)    Rehab Prognosis: Good; patient would benefit from acute skilled PT services to address these deficits and reach maximum level of function.      History:     Past Medical History:   Diagnosis Date    Diabetes mellitus     Hypertension     Mixed hyperlipidemia        Past Surgical History:   Procedure Laterality Date    CARDIAC SURGERY         Subjective     Chief Complaint: no complaints expressed today  Patient/Family Comments/goals: To walk and go over the HEP     Vitals   Vitals at Rest  /70   HR 81     Vitals With Activity  /61   HR 79     Respiratory Status: Room air    Patients cultural, spiritual, Latter day conflicts given the current situation: no      Objective:     Patient found up in chair with  wife present upon PT entry to room.    Pt is Oriented x3 and Alert, Cooperative and Motivated.    Functional Mobility:      Current   Status Comments   Functional Area: Care Score:    Sit to Lying 6 Pt able to scoop RLE with LLE, use of bed rail   Sit to Stand 6 With LBQC   Chair/Bed-to-Chair Transfer 6 With LBQC toward L side   Walk 10 Feet 4 CGA provided by  wife   Walk 50 Feet with Two Turns 4 Pt able to advance RLE using hip flexors (minimal quad activation). RLE externally rotated.    Walk 150 Feet 4 170ft + 30ft x2 + 100ft.       Therapeutic Activities and Exercises:  - Supine in bed, reviewed HEP handout: Bridges 2x10 with wife stabilizing RLE, hip add with towel and jolene knees bent 2x10, Bridges with adduction with towel 2x10, Quad sets x10 jolene, hip flex/ext RLE with assistance from wife x10, glute sets x10, ankle DF/PF x10 jolene.   - Seated EOB: RLE hip flexion x10, attempted knee extension x10 (minimal activation).     Activity Tolerance: good    Patient left supine with call button in reach and wife present.    Education provided: gait training, balance training, safety awareness, body mechanics and strengthening exercises    Expected compliance: High    GOALS:   Multidisciplinary Problems     Physical Therapy Goals        Problem: Physical Therapy    Goal Priority Disciplines Outcome Goal Variances Interventions   Physical Therapy Goal     PT, PT/OT Ongoing, Progressing     Description: Bed Mobility:  Roll left and right with supervision/touching assist - MET 7/18/22  Sit to supine transfer with supervision/touching assist. - MET 7/22/22  Supine to sit transfer with supervision/touching assist. - MET 7/22/22  Sit <> supine transfer, independent - MET 7/28/22    Transfers:  Sit to stand transfer with supervision/touching assist using RW or LRAD. - MET 7/18/22  Bed to chair transfer with supervision/touching assist using RW or LRAD. - MET 7/22/22  Car transfer with partial/moderate assist using RW or LRAD. - MET 7/22/22  Sit to stand transfer with set-up assist with RW - MET 7/22/22  Sit to stand transfer, independent with LBQC or LRAD - MET 7/28/22  Bed to chair transfer with set-up assist using LBQC or LRAD - MET 7/28/22  Car transfer with supervision assist using LBQC or LRAD - MET 7/27/22  Car transfer with set-up assist with LBQC or LRAD -  "initial    Mobility:  Ambulate 100 feet with partial/moderate assist using RW or LRAD. - MET 7/28/22  Ambulate 150 feet with supervision assist using LBQC or LRAD - initial  Ambulate 10 feet with set-up assist using LBQC - initial  Ambulate 15 feet on uneven surface with partial/moderate assist using LBQC or LRAD - initial  Ascend/descend 4 stairs with Chris rails or L rail with supervision/CGA assist - initial  Ascend/descend 4" curb with LBQC with supervision/CGA assist - initial    WC mobility:   150ft with LUE/LLE with independent. - MET 7/28/22                     Plan:     During this hospitalization, patient to be seen 5 x/week to address the identified rehab impairments via gait training, therapeutic activities, therapeutic exercises, neuromuscular re-education, wheelchair management/training and progress toward the following goals:     Plan of Care Expires:  08/03/22    Additional Information:     Discussed with MARVA Bean/ about pt & wife's request for lists of HH agencies and OP PT/OT clinics. Discussed with MARVA Adame about pt's independence day on Saturday and how they will walk together, but might require a CNA/Nurse to follow behind with the WC.     Time Tracking:     PT Received On:    Time In 0900     Time Out 1000  Total Time 60 min  Therapy Time PT Individual: 60    Billable Minutes: Gait Training 30 and Therapeutic Exercise 30    07/29/2022  "

## 2022-07-29 NOTE — PLAN OF CARE
Problem: Rehabilitation (IRF) Plan of Care  Goal: Absence of New-Onset Illness or Injury  Outcome: Ongoing, Progressing  Goal: Optimal Comfort and Wellbeing  Outcome: Ongoing, Progressing     Problem: Impaired Wound Healing  Goal: Optimal Wound Healing  Outcome: Ongoing, Progressing     Problem: Skin Injury Risk Increased  Goal: Skin Health and Integrity  Outcome: Ongoing, Progressing     Problem: Diabetes Comorbidity  Goal: Blood Glucose Level Within Targeted Range  Outcome: Ongoing, Progressing  Intervention: Monitor and Manage Glycemia  Flowsheets (Taken 7/29/2022 1036)  Glycemic Management:   blood glucose monitored   oral hydration promoted

## 2022-07-29 NOTE — PT/OT/SLP PROGRESS
Recreational Therapy Treatment    Date of Treatment: 7/29/2022  Start Time: 0830  Stop Time: 0900  Total Time: 30 minutes  Missed Time:     General Precautions: fall  Ortho Precautions: N/A  Braces: AFO (wes sling, GivMohr sling, neoprene knee brace)    Vitals   Vitals at Rest  /61   HR 76   O2 Sat    Pain      Vitals With Activity  /70   HR 81   O2 Sat    Pain        Treatment     Cognitive Skills Building   Cognitive Observation Activity Assist Position Equipment Response Comment             Comment:          Dynamic Activities   Activity Assist Position Equipment Response Comment   Activity 1 Bowling supervision Standing Cane, quad and Rubber bowling balls {GOOD              Comment: Sit to stand was supervision as dynamic standing balance/reaching. Standing tolerance was 7 minutes. RUE coordination was min. He remains I with problem solving skills and sequencing skills. Wife attended and assisted with his care           Fine Motor Activities   Activity Assist Position Equipment Response Comment            Comment:        Additional Info: Pt wife attend and assisted with his therapy    Goals     Short Term Goals    Goal  Goal Status   Will increase sit to stand to mod. Initial Met   Will increase RUE coordination/dexteriety to min. Initial Met   Will improve dynamic standing balance/reaching to min. Initial Met           Long Term Goals    Goal Goal Status   Will increase standing tolerance to 5 minutes.  Initial Progressing   Will increase RUE coordination/dexteriety to supervision. Initial Progressing   Will improve dynamic standing balance/reaching to supervision. Initial Progressing

## 2022-07-29 NOTE — PROGRESS NOTES
[facility]  Rehab Progress Note    Patient Name: Ayaz Huggins  MRN: [id number]  Age: 77 y.o. Sex: male  : 1945  Hospital Length of Stay: 17 days  Date of Service: 2022   Chief Complaint: Acute left hemispheric CVA s/p CPA and s/p left CEA on 2022    Subjective:     Basic Information  Admit Information: 77-year-old white male presented to [facility] ED on 2022 complaining of right-sided weakness.  PMH significant for DM type 2, CAD s/p CABG x2 in , and HTN.  CT head negative for hemorrhagic bleeding.  Received tPA and transferred to ICU for post tPA monitoring and treatment.  TTE with negative bubble study, but significant for grade 1 diastolic dysfunction.  Carotid ultrasound significant for left ICA stenosis.  MRI significant for last left hemispheric CVA and 3 other small punctate CVAs on the right hemisphere.  Tolerated left CEA on  without periprocedural complications.  Tolerated transfer to [facility] inpatient rehab unit on  without incident.  Today's Information: No acute events overnight.  Sitting in chair comfortably eating breakfast.  Wife at bedside.  Reports good sleep and appetite.  Last BM .  Vital signs at goal with no recent recorded fevers.  No labs or imaging today.  CBGs at goal.    Review of patient's allergies indicates:   Allergen Reactions    Penicillins         Current Facility-Administered Medications:     acetaminophen tablet 650 mg, 650 mg, Oral, Q4H PRN, Alexey A Kathryn, FNP    ALPRAZolam tablet 0.25 mg, 0.25 mg, Oral, TID PRN, Alexey A Kathryn, FNP    amLODIPine tablet 10 mg, 10 mg, Oral, Daily, Alexey A Kathryn, FNP, 10 mg at 22    aspirin tablet 325 mg, 325 mg, Oral, Daily, Alexey A Kathryn, FNP, 325 mg at 22    atorvastatin tablet 80 mg, 80 mg, Oral, QHS, Alexey A Kathryn, FNP, 80 mg at 22    baclofen tablet 5 mg, 5 mg, Oral, Daily, RAYMOND ZhuP, 5  mg at 07/29/22 0809    benzonatate capsule 100 mg, 100 mg, Oral, TID PRN, Alexey A Kathryn, FNP    bisacodyL suppository 10 mg, 10 mg, Rectal, Daily PRN, Alexey A Kathryn, FNP    bisacodyL suppository 10 mg, 10 mg, Rectal, Once, Dorothea ROCK , FNP    bisacodyL suppository 10 mg, 10 mg, Rectal, Once, Dorothea ROCK , FNP    bisacodyL suppository 10 mg, 10 mg, Rectal, Once, Dorothea ROCK , FNP    carvediloL tablet 25 mg, 25 mg, Oral, BID WM, Alexey A Kathryn, FNP, 25 mg at 07/29/22 0809    clotrimazole 1 % cream, , Topical (Top), BID, Meghan ARMAS Luciano, FNP, Given at 07/28/22 2007    dextrose 50 % in water (D50W) injection 12.5 g, 12.5 g, Intravenous, PRN, Alexey A Kathryn, FNP    dextrose 50 % in water (D50W) injection 25 g, 25 g, Intravenous, PRN, Alexey A Kathryn, FNP    docusate sodium capsule 100 mg, 100 mg, Oral, BID PRN, Alexey A Kathryn, FNP    glucagon (human recombinant) injection 1 mg, 1 mg, Intramuscular, PRN, Alexey A Kathryn, FNP    glucose chewable tablet 16 g, 16 g, Oral, PRN, Alexey A Kathryn, FNP    glucose chewable tablet 24 g, 24 g, Oral, PRN, Alexey A Kathryn, FNP    hydrALAZINE injection 10 mg, 10 mg, Intravenous, Q4H PRN, Alexey A Kathryn, FNP    HYDROcodone-acetaminophen 5-325 mg per tablet 1 tablet, 1 tablet, Oral, Q6H PRN, Alexey A Kathryn, FNP    hydrOXYzine pamoate capsule 50 mg, 50 mg, Oral, QHS, Alexey A Kathryn, FNP, 50 mg at 07/28/22 2007    insulin aspart U-100 injection 1-10 Units, 1-10 Units, Subcutaneous, QID (AC + HS) PRN, Alexey A Kathryn, FNP, 2 Units at 07/13/22 1213    labetalol 20 mg/4 mL (5 mg/mL) IV syring, 10 mg, Intravenous, Q4H PRN, Alexey Peralta FNP, 10 mg at 07/13/22 0904    lisinopriL tablet 5 mg, 5 mg, Oral, Daily, RAYMOND GarciaP, 5 mg at 07/29/22 0809    metFORMIN tablet 1,000 mg, 1,000 mg, Oral, BID WM, Alexey Peralta FNP, 1,000 mg at 07/29/22 0809    methocarbamoL tablet 500 mg,  "500 mg, Oral, TID PRN, Meghan Wolf, FNP    metoprolol injection 10 mg, 10 mg, Intravenous, Q2H PRN, lAexey ARMAS Kathryn, FNP    nitroGLYCERIN SL tablet 0.4 mg, 0.4 mg, Sublingual, Q5 Min PRN, Alexey ARMAS Kathryn, FNP    ondansetron disintegrating tablet 8 mg, 8 mg, Oral, Q6H PRN, Alexey PAWEL Kathryn, FNP    polyethylene glycol packet 17 g, 17 g, Oral, BID PRN, Alexey PAWEL Kathryn, FNP    promethazine tablet 25 mg, 25 mg, Oral, Q6H PRN, Alexey A Kathryn, FNP     Review of Systems   Complete 12-point review of symptoms negative except for what's mentioned in HPI     Objective:     BP (!) 145/65   Pulse 80   Temp 97.6 °F (36.4 °C) (Oral)   Resp 18   Ht 5' 10.98" (1.803 m)   Wt 85.5 kg (188 lb 7.9 oz)   SpO2 96%   BMI 26.30 kg/m²        Intake/Output Summary (Last 24 hours) at 7/29/2022 0832  Last data filed at 7/29/2022 0700  Gross per 24 hour   Intake 720 ml   Output 1850 ml   Net -1130 ml       Physical Exam  Constitutional:       Appearance: Normal appearance.   HENT:      Head: Normocephalic.      Mouth/Throat:      Mouth: Mucous membranes are moist.   Eyes:      Pupils: Pupils are equal, round, and reactive to light.   Neck:      Comments: Left CEA incision clean intact with mild postsurgical swelling  Cardiovascular:      Rate and Rhythm: Normal rate and regular rhythm.      Heart sounds: Normal heart sounds.   Pulmonary:      Effort: Pulmonary effort is normal.      Breath sounds: Normal breath sounds.   Abdominal:      General: Bowel sounds are normal.      Palpations: Abdomen is soft.   Musculoskeletal:      Cervical back: Neck supple.      Comments: Generalized weakness and muscle atrophy.  Right upper extremity dependent swelling   Skin:     General: Skin is warm and dry.   Neurological:      Mental Status: He is alert and oriented to person, place, and time.      Comments: Right-sided hemiplegia.  Can move RUE against gravity.  LLE flaccid.    Psychiatric:         Mood and Affect: Mood " normal.         Behavior: Behavior normal.         Thought Content: Thought content normal.         Judgment: Judgment normal.     *MD performed and documented physical examination       Lines/Drains/Airways     None               Labs  Recent Results (from the past 24 hour(s))   POCT glucose    Collection Time: 07/28/22  4:02 PM   Result Value Ref Range    POCT Glucose 115 (H) 70 - 110 mg/dL   POCT glucose    Collection Time: 07/29/22  5:57 AM   Result Value Ref Range    POCT Glucose 110 70 - 110 mg/dL     Radiology  MRI brain without contrast on 07/03/2022 at 2:09 p.m., IMPRESSION: Small acute infarcts supratentorially, the largest is a left posterior frontal 3 cm cortical based infarct with petechial hemorrhage.  No mass effect or shift.  Discussed with Dr. Duncan. Otherwise no acute intracranial abnormalities.  Radiology  Transthoracic echo on 07/02/2022 at 2:40 p.m., IMPRESSION: The left ventricle is  with normal systolic function. The estimated ejection fraction is 60%. There is grade I diastolic dysfunction consistent with impaired relaxation. Left atrial pressure is normal.    Assessment/Plan:     77 y.o. WM admitted on 7/12/2022    Acute left hemispheric CVA   - MRI with 3 other small punctate CVAs on the right hemisphere  - s/p tPA and s/p left CEA on 7/8/2022  - continue                Aspirin 325 mg daily                Lipitor 80 mg at bedtime                Xarelto 20 mg daily   - right knee brace obtained on 7/13  - waiting for right AFO from orthotics  - defer to physiatry for rehab and pain management  - PT/OT/RT following      Carotid artery stenosis  - s/p left CEA on 07/08/2022  - continue                Aspirin 325 mg daily                Lipitor 80 mg at bedtime                Xarelto 20 mg daily                Norco 5 mg/325 mg q.6 hours p.r.n.  - to follow up with vascular surgery outpatient     HTN  - BP at goal!!  - discontinued Metoprolol succinate 50 mg daily on 7/13  - continue      Coreg 25 mg BID (initiated 7/13) (increased 7/14)                Norvasc 10 mg daily                Lisinopril 20 mg daily                 Hydralazine 10 mg every 2 hours as needed for BP > 160/90                Labetalol 10 mg every 2 hours as needed for BP > 160/90     DM type II  - HgA1c with next labs  - continue                Metformin 1000 mg twice daily                ISS   - CBGs AC/HS     Normocytic anemia  - asymptomatic  - H/H stable   - no evidence of active bleeds  - will closely monitor and transfuse if needed      Constipation  - stable, loose stools reported on 7/15  - continue                Colace 100 mg b.i.d. PRN                MiraLax 17 g daily PRN     VTE Prophylaxis:  Xarelto 20 mg daily  COVID-19 testing:  Negative on 07/12/2022  COVID-19 vaccination status:  Vaccinated (Moderna):  x 3     POA: No  Living will: No  Contacts:  Pooja Huggins (wife) [phone]                     Eleonora Manzanares (daughter) [phone]     CODE STATUS: Full Code  Internal Medicine (attending): Weston Rivera MD  Physiatry (consulting): Angel Em MD     OUTPATIENT PROVIDERS  Angel Parham MD  Neurology: Jacque Agee MD  Vascular surgery:  Roxi Lama MD  Cardiology: Dionisio Marin MD     DISPOSITION: Condition stable.  Vital signs at goal.  No labs today.  Good glycemic control.  Bowel management, sleep hygiene, and appetite at goal.  Continues to progress well with therapies.  Monitor closely.  Notify of any acute changes.  NP updated wife at bedside.  Answered all questions.  Agrees with current medical plan of care.    Staffing 7/26: Continent of bowel and bladder. PT: overall min to independent, difficulty with right leg, improving. Family training went well. RT: overall min to supervision, improving. Fair appetite. Drinking Boost. OT: overall partial mod. Projected discharge 8/1.      Dorothea Calvert NP conducted independent physical examination and assisted with medical  documentation.    Total time spent on this encounter including chart review and direct MD + NP 1-on-1 patient interaction: 36 minutes   Over 50% of this time was spent in counseling and coordination of care

## 2022-07-29 NOTE — PT/OT/SLP PROGRESS
Physical Therapy Inpatient Rehab Treatment    Patient Name:  Ayaz Huggins   MRN:  [id number]    Recommendations:     Discharge Recommendations:  home with home health, outpatient OT, outpatient PT (Pending progress)   Discharge Equipment Recommendations: wheelchair, cane, quad   Barriers to discharge: None    Assessment:     Ayaz Huggins is a 77 y.o. male admitted with a medical diagnosis of Stroke.  He presents with the following impairments/functional limitations:    weakness, impaired bal/mobility, impaired t/fs , rtue/rtle strength def. .    Rehab Diagnosis:     Recent Surgery: * No surgery found *      General Precautions: Standard, fall     Orthopedic Precautions:N/A     Braces: UE Sling    Rehab Prognosis: Good; patient would benefit from acute skilled PT services to address these deficits and reach maximum level of function.      History:     Past Medical History:   Diagnosis Date    Diabetes mellitus     Hypertension     Mixed hyperlipidemia        Past Surgical History:   Procedure Laterality Date    CARDIAC SURGERY         Subjective     Chief Complaint: none  Patient/Family Comments/goals:     Vitals   Vitals at Rest  /61   HR 76   O2 Sat    Pain      Vitals With Activity  /70   HR 81   O2 Sat    Pain      Respiratory Status: Room air    Patients cultural, spiritual, Holiness conflicts given the current situation: no      Objective:     Communicated with nursing  prior to session.  Patient found up in chair with Other (comments) (in wc with wife present)  upon PT entry to room.    Pt is Oriented x3 and Alert.    Functional Mobility:   Balance: Static Sitting/Standing  Patient performed dynamic standing on level surface using lbqc with contact guard assistance and pt performed standing bal with activity bowling rtue with cga wife assisting pt for immersive training pt stood for two trials one 7min another 10min with one seated rest break .    Activity Tolerance    Patient left up in  "chair with wife present and pt awaiting next therapy.    Education provided: balance training    GOALS:   Multidisciplinary Problems     Physical Therapy Goals        Problem: Physical Therapy    Goal Priority Disciplines Outcome Goal Variances Interventions   Physical Therapy Goal     PT, PT/OT Ongoing, Progressing     Description: Bed Mobility:  Roll left and right with supervision/touching assist - MET 7/18/22  Sit to supine transfer with supervision/touching assist. - MET 7/22/22  Supine to sit transfer with supervision/touching assist. - MET 7/22/22  Sit <> supine transfer, independent - MET 7/28/22    Transfers:  Sit to stand transfer with supervision/touching assist using RW or LRAD. - MET 7/18/22  Bed to chair transfer with supervision/touching assist using RW or LRAD. - MET 7/22/22  Car transfer with partial/moderate assist using RW or LRAD. - MET 7/22/22  Sit to stand transfer with set-up assist with RW - MET 7/22/22  Sit to stand transfer, independent with LBQC or LRAD - MET 7/28/22  Bed to chair transfer with set-up assist using LBQC or LRAD - MET 7/28/22  Car transfer with supervision assist using LBQC or LRAD - MET 7/27/22  Car transfer with set-up assist with LBQC or LRAD - initial    Mobility:  Ambulate 100 feet with partial/moderate assist using RW or LRAD. - MET 7/28/22  Ambulate 150 feet with supervision assist using LBQC or LRAD - initial  Ambulate 10 feet with set-up assist using LBQC - initial  Ambulate 15 feet on uneven surface with partial/moderate assist using LBQC or LRAD - initial  Ascend/descend 4 stairs with Chris rails or L rail with supervision/CGA assist - initial  Ascend/descend 4" curb with LBQC with supervision/CGA assist - initial    WC mobility:   150ft with LUE/LLE with independent. - MET 7/28/22                     Plan:     During this hospitalization, patient to be seen 5 x/week to address the identified rehab impairments via gait training, therapeutic activities, therapeutic " exercises, neuromuscular re-education, wheelchair management/training and progress toward the following goals:     Plan of Care Expires:  08/03/22    Additional Information:         Time Tracking:     PT Received On: 07/29/22  Time In 0830     Time Out 0900  Total Time 30 min  Therapy Time PT Individual: 30  Missed Time:    Time Missed due to:      Billable Minutes: Therapeutic Activity 30min    07/29/2022

## 2022-07-29 NOTE — PT/OT/SLP PROGRESS
Physical Therapy Inpatient Rehab Treatment    Patient Name:  Ayaz Huggins   MRN:  [id number]    Recommendations:     Discharge Recommendations:  home with home health, outpatient OT, outpatient PT (Pending progress)   Discharge Equipment Recommendations: wheelchair, cane, quad   Barriers to discharge: None    Assessment:     Ayaz Huggins is a 77 y.o. male admitted with a medical diagnosis of Stroke.  He presents with the following impairments/functional limitations:  weakness, impaired endurance, impaired functional mobility, gait instability, impaired balance, decreased upper extremity function, decreased lower extremity function, abnormal tone .    Rehab Diagnosis: CVA, R ward    Recent Surgery: * No surgery found *      General Precautions: Standard, fall     Orthopedic Precautions:N/A     Braces: AFO (Ward-sling, neoprene knee brace)    Rehab Prognosis: Good; patient would benefit from acute skilled PT services to address these deficits and reach maximum level of function.      History:     Past Medical History:   Diagnosis Date    Diabetes mellitus     Hypertension     Mixed hyperlipidemia        Past Surgical History:   Procedure Laterality Date    CARDIAC SURGERY         Subjective     Chief Complaint: general fatigue d/t working hard during therapy    Vitals   Vitals at Rest  /56   HR 70     Vitals With Activity  /62   HR 76     Patients cultural, spiritual, Latter day conflicts given the current situation: no      Objective:     Patient found up in chair upon PT entry to room.    Pt is Oriented x3 and Alert, Cooperative and Motivated.    Functional Mobility:      Current   Status Comments   Functional Area: Care Score:    Sit to Lying 6    Sit to Stand 6 With LBQC   Chair/Bed-to-Chair Transfer 6 With LBQC toward L side   Walk 10 Feet 4 CGA with LBQC   Walk 50 Feet with Two Turns 4 CGA with LBQC from pt's room <> PT gym, ~130ft. Pt able to advance RLE, slow pace, using significant lean to L  side and initiating swing through with hip flexors, RLE externally rotated d/t tone.       Therapeutic Activities and Exercises:  -  Seated edge of elevated therapy mat, mirror placed in front of pt for visual cues, pt performed x10 sit-to-stands with emphasis on greater weightbearing through RLE. Eccentric control performed with stand to sit, VC to shift weight onto RLE.     Activity Tolerance: good    Patient left HOB elevated with call button in reach.    Education provided: gait training, body mechanics and strengthening exercises    Expected compliance:    GOALS:   Multidisciplinary Problems     Physical Therapy Goals        Problem: Physical Therapy    Goal Priority Disciplines Outcome Goal Variances Interventions   Physical Therapy Goal     PT, PT/OT Ongoing, Progressing     Description: Bed Mobility:  Roll left and right with supervision/touching assist - MET 7/18/22  Sit to supine transfer with supervision/touching assist. - MET 7/22/22  Supine to sit transfer with supervision/touching assist. - MET 7/22/22  Sit <> supine transfer, independent - MET 7/28/22    Transfers:  Sit to stand transfer with supervision/touching assist using RW or LRAD. - MET 7/18/22  Bed to chair transfer with supervision/touching assist using RW or LRAD. - MET 7/22/22  Car transfer with partial/moderate assist using RW or LRAD. - MET 7/22/22  Sit to stand transfer with set-up assist with RW - MET 7/22/22  Sit to stand transfer, independent with LBQC or LRAD - MET 7/28/22  Bed to chair transfer with set-up assist using LBQC or LRAD - MET 7/28/22  Car transfer with supervision assist using LBQC or LRAD - MET 7/27/22  Car transfer with set-up assist with LBQC or LRAD - initial    Mobility:  Ambulate 100 feet with partial/moderate assist using RW or LRAD. - MET 7/28/22  Ambulate 150 feet with supervision assist using LBQC or LRAD - initial  Ambulate 10 feet with set-up assist using LBQC - initial  Ambulate 15 feet on uneven surface  "with partial/moderate assist using LBQC or LRAD - initial  Ascend/descend 4 stairs with Chris rails or L rail with supervision/CGA assist - initial  Ascend/descend 4" curb with LBQC with supervision/CGA assist - initial    WC mobility:   150ft with LUE/LLE with independent. - MET 7/28/22                     Plan:     During this hospitalization, patient to be seen 5 x/week to address the identified rehab impairments via gait training, therapeutic activities, therapeutic exercises, neuromuscular re-education, wheelchair management/training and progress toward the following goals:     Plan of Care Expires:  08/03/22    Additional Information:     Pt scheduled for session from 3252-3181, however an extra 10 minutes performed. Discussed with MARVA Stallings about pt's baclofen - rescheduling next dose for tomorrow in PM instead of AM, and pt will bridge gap tonight by requesting Robaxin.     Time Tracking:     PT Received On:    Time In 1400     Time Out 1440  Total Time 40 min  Therapy Time PT Individual: 40    Billable Minutes: Therapeutic Activity 40    07/29/2022  "

## 2022-07-29 NOTE — PT/OT/SLP PROGRESS
Occupational Therapy Inpatient Rehab Treatment    Name: Ayaz Huggins  MRN: [id number]    Assessment:  Ayaz Huggins is a 77 y.o. male admitted with a medical diagnosis of Stroke.  He presents with the following impairments/functional limitations:  weakness, impaired endurance, impaired self care skills, impaired functional mobility, gait instability, impaired balance, decreased lower extremity function, decreased coordination, impaired coordination.    Rehab Diagnosis: CVA    Recent Surgery: * No surgery found *      General Precautions: Standard, fall     Orthopedic Precautions:N/A     Braces: AFO (wes sling, GivMohr sling, neoprene knee brace)    Rehab Prognosis: Good; patient would benefit from acute skilled OT services to address these deficits and reach maximum level of function.        History:     Past Medical History:   Diagnosis Date    Diabetes mellitus     Hypertension     Mixed hyperlipidemia        Past Surgical History:   Procedure Laterality Date    CARDIAC SURGERY         Subjective     Orientation: Oriented x4    Chief Complaint: decreased functional mobility/independence and decreased RUE gross motor coordination    Vitals   /54   HR 76   O2 Sat    Pain 0/10       Respiratory Status: Room air    Patients cultural, spiritual, Rastafari conflicts given the current situation: no       Objective:     Functional Mobility  Pt's wife measured their bathroom @ home and decided to buy a TTB to promote pt's safety. OTS set up the ADL bathroom to look like pt's bathroom to simulate what pt and his wife will do at home. Pt ambulated from his w/c>WIS with LBQC and performed a WIS via TTB with LBQC and CGA provided by Pooja. Pt's wife stated she will buy TTB and OTS and OT edu pt on safest way to t/f into WIS and when to doff his shoes/AFO to promote his safety. OT and OTS also edu pt and his wife on their recommendations for their bathroom to promote his safety.    Additional Treatments: OTS edu  pt's wife Pooja on how to don pt's wes sling and the importance of wearing the wes sling. OT and OTS also edu them on OTs recommendation for outpatient therapy services to improve his functional ability and independence after d/c.    Pt and his wife stated they do not have any questions/concerns and they feel comfortable with going home.      Education:     Education provided: Roles and goals of OT, ADLs and transfer training  Encounter Problems     Encounter Problems (Active)     Occupational Therapy     Occupational Therapy Goal     Start: 07/12/22   Expected End: 07/25/22       LTG: Pt will be Ind with all ADLS.  Pt LTG: to return to bike riding    ADLs:  MET: Pt to perform grooming tasks with [place] from seated position by d/c.  MET: Pt to perform UB dressing with Setup using modified techniques.  Pt to perform LB dressing with SPV using modified techniques.  Pt to perform putting on/off footwear task with SPV.  MET: Pt to perform toileting with Partial A using BSC.   Pt to perform toileting with CGA using BSC and LRAD.    Functional Transfers:  MET: Pt to perform toilet transfers with Partial A using BSC and LRAD for mobility.    Pt to perform toilet transfers with SPV using BSC and LRAD for mobility.    MET: Pt to perform a tub transfer with Max A by re-eval using TTB.   Pt to perform a tub t/f with CGA via TTB and Gbs.      Balance, Strengthening, Endurance, Balance:  Pt  will perform dynamic standing tasks using LRAD for 2 minutes with SPV assist.  Pt to demonstrate 4/5 R shoulder strength and 5/5 distal RUE strength during functional task/                  Time Tracking     OT Received On: 07/29/22  Time In 1100     Time Out 1130  Total Time 30 min  Therapy Time: OT Individual: 30    Billable Minutes: Self care/home management 30 07/29/2022

## 2022-07-29 NOTE — PT/OT/SLP PROGRESS
Occupational Therapy Inpatient Rehab Treatment    Name: King Barbara  MRN: [id number]    Assessment:  Ayaz Huggins is a 77 y.o. male admitted with a medical diagnosis of Stroke.  He presents with the following impairments/functional limitations:  weakness, impaired endurance, impaired self care skills, impaired functional mobility, gait instability, impaired balance, impaired coordination, decreased lower extremity function, decreased upper extremity function, decreased coordination.    Rehab Diagnosis: CVA    Recent Surgery: * No surgery found *      General Precautions: Standard, fall     Orthopedic Precautions:N/A     Braces: AFO (wes sling, GivMohr sling, neoprene knee brace)    Rehab Prognosis: Good; patient would benefit from acute skilled OT services to address these deficits and reach maximum level of function.        History:     Past Medical History:   Diagnosis Date    Diabetes mellitus     Hypertension     Mixed hyperlipidemia        Past Surgical History:   Procedure Laterality Date    CARDIAC SURGERY         Subjective     Orientation: Oriented x4    Chief Complaint: decreased functional independence and mobility    Patient/Family Comments/goals: pt wants to get back to riding bikes    Vitals   Vitals at Rest  /55   HR 71   O2 Sat    Pain No pain     Vitals With Activity  /69   HR 75   O2 Sat    Pain No pain     Respiratory Status: Room air    Patients cultural, spiritual, Gnosticism conflicts given the current situation: no       Objective:     Neuromuscular Re-Education  Pt performed a step t/f from w/c > mat with LBQC with CGA. Pt went from sitting>supine with independence. Pt supine <> prone with mod A. Pt tried to get in quadruped position to promote his core muscles, but pt was only able to lift his torso off of the mat. Pt went from supine>sitting EOM with indep.     Pt participated in a neuromuscular re-ed ax to promote his RUE gross motor coordination and dynamic standing balance. Pt  stood with LBQC to reach with his RUE to stick a sticky note on the wall as high as he could. Pt stuck the 3 notes on his R side x 2 rounds and then crossed midline to stick 3 notes on L side x 2 rounds. Vc improved motor activation with ax.       Education:     Patient left up in chair with all lines intact and call button in reach.     Education provided: Roles and goals of OT and transfer training    Encounter Problems       Encounter Problems (Active)       Occupational Therapy       Occupational Therapy Goal       Start: 07/12/22   Expected End: 07/25/22       LTG: Pt will be Ind with all ADLS.  Pt LTG: to return to bike riding    ADLs:  MET: Pt to perform grooming tasks with [place] from seated position by d/c.  MET: Pt to perform UB dressing with Setup using modified techniques.  Pt to perform LB dressing with SPV using modified techniques.  Pt to perform putting on/off footwear task with SPV.  MET: Pt to perform toileting with Partial A using BSC.   Pt to perform toileting with CGA using BSC and LRAD.    Functional Transfers:  MET: Pt to perform toilet transfers with Partial A using BSC and LRAD for mobility.    Pt to perform toilet transfers with SPV using BSC and LRAD for mobility.    MET: Pt to perform a tub transfer with Max A by re-eval using TTB.   Pt to perform a tub t/f with CGA via TTB and Gbs.      Balance, Strengthening, Endurance, Balance:  Pt  will perform dynamic standing tasks using LRAD for 2 minutes with SPV assist.  Pt to demonstrate 4/5 R shoulder strength and 5/5 distal RUE strength during functional task/                  Time Tracking     OT Received On: 07/29/22  Time In 1300     Time Out 1330  Total Time 30 min  Therapy Time: OT Individual: 30    Billable Minutes: Neuromuscular Re-education 30 07/29/2022

## 2022-07-30 LAB — POCT GLUCOSE: 120 MG/DL (ref 70–110)

## 2022-07-30 PROCEDURE — 11800000 HC REHAB PRIVATE ROOM

## 2022-07-30 PROCEDURE — 25000003 PHARM REV CODE 250: Performed by: NURSE PRACTITIONER

## 2022-07-30 PROCEDURE — 94799 UNLISTED PULMONARY SVC/PX: CPT

## 2022-07-30 PROCEDURE — 97530 THERAPEUTIC ACTIVITIES: CPT | Mod: CQ

## 2022-07-30 PROCEDURE — 94761 N-INVAS EAR/PLS OXIMETRY MLT: CPT

## 2022-07-30 PROCEDURE — 97116 GAIT TRAINING THERAPY: CPT | Mod: CQ

## 2022-07-30 PROCEDURE — 25000003 PHARM REV CODE 250: Performed by: INTERNAL MEDICINE

## 2022-07-30 RX ORDER — BACLOFEN 5 MG/1
5 TABLET ORAL NIGHTLY
Status: DISCONTINUED | OUTPATIENT
Start: 2022-07-30 | End: 2022-08-02 | Stop reason: HOSPADM

## 2022-07-30 RX ADMIN — ASPIRIN 325 MG ORAL TABLET 325 MG: 325 PILL ORAL at 08:07

## 2022-07-30 RX ADMIN — METFORMIN HYDROCHLORIDE 1000 MG: 500 TABLET, FILM COATED ORAL at 08:07

## 2022-07-30 RX ADMIN — CLOTRIMAZOLE: 1 CREAM TOPICAL at 08:07

## 2022-07-30 RX ADMIN — LISINOPRIL 5 MG: 5 TABLET ORAL at 08:07

## 2022-07-30 RX ADMIN — BACLOFEN 5 MG: 5 TABLET ORAL at 08:07

## 2022-07-30 RX ADMIN — HYDROXYZINE PAMOATE 50 MG: 50 CAPSULE ORAL at 08:07

## 2022-07-30 RX ADMIN — ATORVASTATIN CALCIUM 80 MG: 40 TABLET, FILM COATED ORAL at 08:07

## 2022-07-30 RX ADMIN — AMLODIPINE BESYLATE 10 MG: 5 TABLET ORAL at 08:07

## 2022-07-30 RX ADMIN — CARVEDILOL 25 MG: 25 TABLET, FILM COATED ORAL at 05:07

## 2022-07-30 RX ADMIN — CARVEDILOL 25 MG: 25 TABLET, FILM COATED ORAL at 08:07

## 2022-07-30 RX ADMIN — METFORMIN HYDROCHLORIDE 1000 MG: 500 TABLET, FILM COATED ORAL at 05:07

## 2022-07-30 NOTE — PT/OT/SLP PROGRESS
Physical Therapy Inpatient Rehab Treatment    Patient Name:  Ayaz Huggins   MRN:  [id number]    Recommendations:     Discharge Recommendations:  home with home health, outpatient OT, outpatient PT (Pending progress)   Discharge Equipment Recommendations: wheelchair, cane, quad   Barriers to discharge:      Assessment:     Ayaz Huggins is a 77 y.o. male admitted with a medical diagnosis of Stroke.  He presents with the following impairments/functional limitations:     .    Rehab Diagnosis:     Recent Surgery: * No surgery found *      General Precautions: Standard, fall     Orthopedic Precautions:N/A     Braces: AFO (Ward-sling, neoprene knee brace)    Rehab Prognosis: Good; patient would benefit from acute skilled PT services to address these deficits and reach maximum level of function.      History:     Past Medical History:   Diagnosis Date    Diabetes mellitus     Hypertension     Mixed hyperlipidemia        Past Surgical History:   Procedure Laterality Date    CARDIAC SURGERY         Subjective     Chief Complaint: No complaints at this time. Wife present and very involved in tx session.  Patient/Family Comments/goals:      Vitals   Vitals at Rest  /64   HR 80   O2 Sat    Pain 0/10     Vitals With Activity  /68   HR 86   O2 Sat    Pain 0/10     Respiratory Status: Room air    Patients cultural, spiritual, Lutheran conflicts given the current situation: no      Objective:     Communicated with NSG prior to session.  Patient found up in chair with    upon PT entry to room.    Pt is Oriented x3 and Alert and Cooperative.    Functional Mobility:   · Bed Mobility:     · Rolling Left:  independence  · Rolling Right: independence  · Bridging: contact guard assistance  · Sit to Supine: independence    · Transfers:     · Sit to Stand:  independence with quad cane  · Bed to Chair: independence with  quad cane  using  Step Transfer  · Chair to mat: independence with  quad cane  using  Step  "Transfer    · Gait: Pt ambulates 155'+170' with seated rest between trials with LBQC with Contact Guard Assistance provided by wife.  · Impairments contributing to gait deviations include impaired motor control, decreased ROM and decreased strength.  · 4 stairs with bilateral handrails with Contact Guard Assistance  · 4" curb with quad cane with Contact Guard Assistance    ·  an object from the ground in standing position with quad cane with Contact Guard Assistance    · Wheelchair Propulsion:  Pt propelled Standard wheelchair x 130 feet on Level tile with  Left upper extremity and LLE with Independent.      Current   Status  Discharge   Goal   Functional Area: Care Score:    Roll Left and Right 6 Independent   Sit to Lying 6 Independent   Lying to Sitting on Side of Bed   Independent   Sit to Stand 6 Independent   Chair/Bed-to-Chair Transfer 6 Independent   Car Transfer   Supervision or touching assistance   Walk 10 Feet 4 Supervision or touching assistance   Walk 50 Feet with Two Turns 4 Supervision or touching assistance   Walk 150 Feet 4 Partial/moderate assistance   Walk 10 Feet Uneven Surface   Partial/moderate assistance   1 Step (Curb) 4 Partial/moderate assistance   4 Steps 4 Partial/moderate assistance   12 Steps   Partial/moderate assistance   Picking Up Object 6 Partial/moderate assistance   Wheel 50 Feet with Two Turns 6 Supervision or touching assistance   Wheel 150 Feet   Supervision or touching assistance       Activity Tolerance    Patient left HOB elevated with call button in reach.    Education provided: transfer training, gait training, stair training and balance training    Expected compliance:    GOALS:   Multidisciplinary Problems     Physical Therapy Goals        Problem: Physical Therapy    Goal Priority Disciplines Outcome Goal Variances Interventions   Physical Therapy Goal     PT, PT/OT Ongoing, Progressing     Description: Bed Mobility:  Roll left and right with " "supervision/touching assist - MET 7/18/22  Sit to supine transfer with supervision/touching assist. - MET 7/22/22  Supine to sit transfer with supervision/touching assist. - MET 7/22/22  Sit <> supine transfer, independent - MET 7/28/22    Transfers:  Sit to stand transfer with supervision/touching assist using RW or LRAD. - MET 7/18/22  Bed to chair transfer with supervision/touching assist using RW or LRAD. - MET 7/22/22  Car transfer with partial/moderate assist using RW or LRAD. - MET 7/22/22  Sit to stand transfer with set-up assist with RW - MET 7/22/22  Sit to stand transfer, independent with LBQC or LRAD - MET 7/28/22  Bed to chair transfer with set-up assist using LBQC or LRAD - MET 7/28/22  Car transfer with supervision assist using LBQC or LRAD - MET 7/27/22  Car transfer with set-up assist with LBQC or LRAD - initial    Mobility:  Ambulate 100 feet with partial/moderate assist using RW or LRAD. - MET 7/28/22  Ambulate 150 feet with supervision assist using LBQC or LRAD - initial  Ambulate 10 feet with set-up assist using LBQC - initial  Ambulate 15 feet on uneven surface with partial/moderate assist using LBQC or LRAD - initial  Ascend/descend 4 stairs with Chris rails or L rail with supervision/CGA assist - initial  Ascend/descend 4" curb with LBQC with supervision/CGA assist - initial    WC mobility:   150ft with LUE/LLE with independent. - MET 7/28/22                     Plan:     During this hospitalization, patient to be seen 5 x/week to address the identified rehab impairments via gait training, therapeutic activities, therapeutic exercises, neuromuscular re-education, wheelchair management/training and progress toward the following goals:     Plan of Care Expires:  08/03/22    Additional Information:   Wife present and assisting pt with all tasks and activities throughout session.      Time Tracking:     PT Received On:    Time In 0830     Time Out 0930  Total Time 60 min  Therapy Time PT " Individual: 60  Missed Time:    Time Missed due to:      Billable Minutes: Gait Training 25 and Therapeutic Activity 35    07/30/2022

## 2022-07-30 NOTE — PROGRESS NOTES
[facility] ([facility])  Rehab Progress Note    Patient Name: Ayaz Huggins  MRN: [id number]  Age: 77 y.o. Sex: male  : 1945  Hospital Length of Stay: 18 days  Date of Service: 2022   Chief Complaint: Acute left hemispheric CVA s/p CPA and s/p left CEA on 2022    Subjective:     Basic Information  Admit Information: 77-year-old white male presented to [facility] ED on 2022 complaining of right-sided weakness.  PMH significant for DM type 2, CAD s/p CABG x2 in , and HTN.  CT head negative for hemorrhagic bleeding.  Received tPA and transferred to ICU for post tPA monitoring and treatment.  TTE with negative bubble study, but significant for grade 1 diastolic dysfunction.  Carotid ultrasound significant for left ICA stenosis.  MRI significant for last left hemispheric CVA and 3 other small punctate CVAs on the right hemisphere.  Tolerated left CEA on  without periprocedural complications.  Tolerated transfer to [facility] inpatient rehab unit on  without incident.  Today's Information: No acute events overnight.  Sitting up in bed.  Reports good sleep.  Appetite fair.  Bowel maintenance at goal.  Vital signs at goal with no recorded fevers.  No labs or imaging today.    Review of patient's allergies indicates:   Allergen Reactions    Penicillins         Current Facility-Administered Medications:     acetaminophen tablet 650 mg, 650 mg, Oral, Q4H PRN, Alexey A Kathryn, FNP    ALPRAZolam tablet 0.25 mg, 0.25 mg, Oral, TID PRN, Alexey A Kathryn, FNP    amLODIPine tablet 10 mg, 10 mg, Oral, Daily, Alexey A Kathryn, FNP, 10 mg at 22 0809    aspirin tablet 325 mg, 325 mg, Oral, Daily, Alexey A Kathryn, FNP, 325 mg at 22 0809    atorvastatin tablet 80 mg, 80 mg, Oral, QHS, Alexey A Kathryn, FNP, 80 mg at 22 2100    baclofen tablet 5 mg, 5 mg, Oral, QHS, Weston Rivera MD    benzonatate capsule 100 mg, 100 mg, Oral, TID PRN,  Alexey A Kathryn, FNP    bisacodyL suppository 10 mg, 10 mg, Rectal, Daily PRN, Alexey A Kathryn, FNP    bisacodyL suppository 10 mg, 10 mg, Rectal, Once, Dorothea ROCK , FNP    bisacodyL suppository 10 mg, 10 mg, Rectal, Once, Dorothea PRANAV Ruggiero, FNP    bisacodyL suppository 10 mg, 10 mg, Rectal, Once, Dorothearadha Ruggierocher, FNP    carvediloL tablet 25 mg, 25 mg, Oral, BID WM, Alexey A Katrhyn, FNP, 25 mg at 07/29/22 1736    clotrimazole 1 % cream, , Topical (Top), BID, Meghan Wolf, FNP, Given at 07/29/22 2101    dextrose 50 % in water (D50W) injection 12.5 g, 12.5 g, Intravenous, PRN, Alexey A Kathryn, FNP    dextrose 50 % in water (D50W) injection 25 g, 25 g, Intravenous, PRN, Alexey A Kathryn, FNP    docusate sodium capsule 100 mg, 100 mg, Oral, BID PRN, Alexey A Kathryn, FNP    glucagon (human recombinant) injection 1 mg, 1 mg, Intramuscular, PRN, Alexey A Kathryn, FNP    glucose chewable tablet 16 g, 16 g, Oral, PRN, Alexey A Kathryn, FNP    glucose chewable tablet 24 g, 24 g, Oral, PRN, Alexey A Kathryn, FNP    hydrALAZINE injection 10 mg, 10 mg, Intravenous, Q4H PRN, Alexey A Kathryn, FNP    HYDROcodone-acetaminophen 5-325 mg per tablet 1 tablet, 1 tablet, Oral, Q6H PRN, Alexey A Kathryn, FNP    hydrOXYzine pamoate capsule 50 mg, 50 mg, Oral, QHS, Alexey A Kathryn, FNP, 50 mg at 07/29/22 2100    insulin aspart U-100 injection 1-10 Units, 1-10 Units, Subcutaneous, QID (AC + HS) PRN, Alexey A Kathryn, FNP, 2 Units at 07/13/22 1213    labetalol 20 mg/4 mL (5 mg/mL) IV syring, 10 mg, Intravenous, Q4H PRN, Alexey Narvaezart, FNP, 10 mg at 07/13/22 0904    lisinopriL tablet 5 mg, 5 mg, Oral, Daily, Alexey Peralta, FNP, 5 mg at 07/29/22 0809    metFORMIN tablet 1,000 mg, 1,000 mg, Oral, BID WM, Alexey Peralta, FNP, 1,000 mg at 07/29/22 1737    methocarbamoL tablet 500 mg, 500 mg, Oral, TID PRN, Meghan Wolf, FNP, 500 mg at 07/29/22 2100     "metoprolol injection 10 mg, 10 mg, Intravenous, Q2H PRN, Alexey ARMAS Kathryn, FNP    nitroGLYCERIN SL tablet 0.4 mg, 0.4 mg, Sublingual, Q5 Min PRN, Alexey PAWEL Kathryn, FNP    ondansetron disintegrating tablet 8 mg, 8 mg, Oral, Q6H PRN, Alexey PAWEL Kathryn, FNP    polyethylene glycol packet 17 g, 17 g, Oral, BID PRN, Alexey PAWEL Kathryn, FNP    promethazine tablet 25 mg, 25 mg, Oral, Q6H PRN, Alexey ARMAS Kathryn, FNP     Review of Systems   Complete 12-point review of symptoms negative except for what's mentioned in HPI     Objective:     BP (!) 132/59   Pulse 74   Temp 97.7 °F (36.5 °C) (Oral)   Resp 16   Ht 5' 10.98" (1.803 m)   Wt 82.2 kg (181 lb 3.5 oz)   SpO2 97%   BMI 25.29 kg/m²        Intake/Output Summary (Last 24 hours) at 7/30/2022 0740  Last data filed at 7/30/2022 0700  Gross per 24 hour   Intake 1440 ml   Output --   Net 1440 ml       Physical Exam  Constitutional:       Appearance: Normal appearance.   HENT:      Head: Normocephalic.      Mouth/Throat:      Mouth: Mucous membranes are moist.   Eyes:      Pupils: Pupils are equal, round, and reactive to light.   Neck:      Comments: Left CEA incision clean intact with mild postsurgical swelling  Cardiovascular:      Rate and Rhythm: Normal rate and regular rhythm.      Heart sounds: Normal heart sounds.   Pulmonary:      Effort: Pulmonary effort is normal.      Breath sounds: Normal breath sounds.   Abdominal:      General: Bowel sounds are normal.      Palpations: Abdomen is soft.   Musculoskeletal:      Cervical back: Neck supple.      Comments: Generalized weakness and muscle atrophy.  Right upper extremity dependent swelling   Skin:     General: Skin is warm and dry.   Neurological:      Mental Status: He is alert and oriented to person, place, and time.      Comments: Right-sided hemiplegia.  Can move RUE against gravity.  LLE flaccid.    Psychiatric:         Mood and Affect: Mood normal.         Behavior: Behavior normal.         " Thought Content: Thought content normal.         Judgment: Judgment normal.          Lines/Drains/Airways     None               Labs  Recent Results (from the past 24 hour(s))   POCT glucose    Collection Time: 07/29/22  4:31 PM   Result Value Ref Range    POCT Glucose 112 (H) 70 - 110 mg/dL   POCT glucose    Collection Time: 07/30/22  6:05 AM   Result Value Ref Range    POCT Glucose 120 (H) 70 - 110 mg/dL     Radiology  MRI brain without contrast on 07/03/2022 at 2:09 p.m., IMPRESSION: Small acute infarcts supratentorially, the largest is a left posterior frontal 3 cm cortical based infarct with petechial hemorrhage.  No mass effect or shift.  Discussed with Dr. Duncan. Otherwise no acute intracranial abnormalities.  Radiology  Transthoracic echo on 07/02/2022 at 2:40 p.m., IMPRESSION: The left ventricle is  with normal systolic function. The estimated ejection fraction is 60%. There is grade I diastolic dysfunction consistent with impaired relaxation. Left atrial pressure is normal.    Assessment/Plan:     77 y.o. WM admitted on 7/12/2022    Acute left hemispheric CVA   - MRI with 3 other small punctate CVAs on the right hemisphere  - s/p tPA and s/p left CEA on 7/8/2022  - continue                Aspirin 325 mg daily                Lipitor 80 mg at bedtime                Xarelto 20 mg daily   - right knee brace obtained on 7/13  - waiting for right AFO from orthotics  - defer to physiatry for rehab and pain management  - PT/OT/RT following      Carotid artery stenosis  - s/p left CEA on 07/08/2022  - continue                Aspirin 325 mg daily                Lipitor 80 mg at bedtime                Xarelto 20 mg daily                Norco 5 mg/325 mg q.6 hours p.r.n.  - to follow up with vascular surgery outpatient     HTN  - BP at goal!!  - discontinued Metoprolol succinate 50 mg daily on 7/13  - continue     Coreg 25 mg BID (initiated 7/13) (increased 7/14)                Norvasc 10 mg daily                 Lisinopril 20 mg daily                 Hydralazine 10 mg every 2 hours as needed for BP > 160/90                Labetalol 10 mg every 2 hours as needed for BP > 160/90     DM type II  - HgA1c with next labs  - continue                Metformin 1000 mg twice daily                ISS   - CBGs AC/HS     Normocytic anemia  - asymptomatic  - H/H stable   - no evidence of active bleeds  - will closely monitor and transfuse if needed      Constipation  - stable, loose stools reported on 7/15  - continue                Colace 100 mg b.i.d. PRN                MiraLax 17 g daily PRN     VTE Prophylaxis:  Xarelto 20 mg daily  COVID-19 testing:  Negative on 07/12/2022  COVID-19 vaccination status:  Vaccinated (Moderna):  x 3     POA: No  Living will: No  Contacts:  Pooja Huggins (wife) [phone]                     Eleonora Manzanares (daughter) [phone]     CODE STATUS: Full Code  Internal Medicine (attending): Weston Rivera MD  Physiatry (consulting): Angel Em MD     OUTPATIENT PROVIDERS  Angel Parham MD  Neurology: Jacque Agee MD  Vascular surgery:  Roxi Lama MD  Cardiology: Dionisio Marin MD     DISPOSITION: Condition stable.  Sleep hygiene, bowel maintenance at goal.  Appetite fair.  Improving in therapy.  Bowel maintenance at goal.  Vital signs at goal with no recorded fevers.  No labs imaging today.  Plans to discharge home on Monday.  Continue aggressive mobilization as tolerated.  Monitor closely.  Notify of acute changes.    Staffing 7/26: Continent of bowel and bladder. PT: overall min to independent, difficulty with right leg, improving. Family training went well. RT: overall min to supervision, improving. Fair appetite. Drinking Boost. OT: overall partial mod. Projected discharge 8/1.      Total time spent on this encounter including chart review and direct 1-on-1 patient interaction: 37 minutes   Over 50% of this time was spent in counseling and coordination of care

## 2022-07-31 LAB
POCT GLUCOSE: 105 MG/DL (ref 70–110)
POCT GLUCOSE: 93 MG/DL (ref 70–110)

## 2022-07-31 PROCEDURE — 94799 UNLISTED PULMONARY SVC/PX: CPT

## 2022-07-31 PROCEDURE — 11800000 HC REHAB PRIVATE ROOM

## 2022-07-31 PROCEDURE — 94761 N-INVAS EAR/PLS OXIMETRY MLT: CPT

## 2022-07-31 PROCEDURE — 99900035 HC TECH TIME PER 15 MIN (STAT)

## 2022-07-31 PROCEDURE — 25000003 PHARM REV CODE 250: Performed by: NURSE PRACTITIONER

## 2022-07-31 PROCEDURE — 97116 GAIT TRAINING THERAPY: CPT

## 2022-07-31 PROCEDURE — 97530 THERAPEUTIC ACTIVITIES: CPT

## 2022-07-31 PROCEDURE — 97535 SELF CARE MNGMENT TRAINING: CPT

## 2022-07-31 PROCEDURE — 25000003 PHARM REV CODE 250: Performed by: INTERNAL MEDICINE

## 2022-07-31 RX ADMIN — HYDROXYZINE PAMOATE 50 MG: 50 CAPSULE ORAL at 08:07

## 2022-07-31 RX ADMIN — AMLODIPINE BESYLATE 10 MG: 5 TABLET ORAL at 08:07

## 2022-07-31 RX ADMIN — CARVEDILOL 25 MG: 25 TABLET, FILM COATED ORAL at 08:07

## 2022-07-31 RX ADMIN — BACLOFEN 5 MG: 5 TABLET ORAL at 08:07

## 2022-07-31 RX ADMIN — METFORMIN HYDROCHLORIDE 1000 MG: 500 TABLET, FILM COATED ORAL at 05:07

## 2022-07-31 RX ADMIN — ATORVASTATIN CALCIUM 80 MG: 40 TABLET, FILM COATED ORAL at 08:07

## 2022-07-31 RX ADMIN — CARVEDILOL 25 MG: 25 TABLET, FILM COATED ORAL at 05:07

## 2022-07-31 RX ADMIN — ASPIRIN 325 MG ORAL TABLET 325 MG: 325 PILL ORAL at 08:07

## 2022-07-31 RX ADMIN — LISINOPRIL 5 MG: 5 TABLET ORAL at 08:07

## 2022-07-31 RX ADMIN — METFORMIN HYDROCHLORIDE 1000 MG: 500 TABLET, FILM COATED ORAL at 08:07

## 2022-07-31 RX ADMIN — CLOTRIMAZOLE: 1 CREAM TOPICAL at 08:07

## 2022-07-31 NOTE — PT/OT/SLP PROGRESS
Occupational Therapy Inpatient Rehab Treatment    Name: Ayaz Huggins  MRN: [id number]    Assessment:  Ayaz Huggins is a 77 y.o. male admitted with a medical diagnosis of Stroke.  He presents with the following impairments/functional limitations:  weakness, impaired endurance, impaired self care skills, impaired functional mobility, gait instability, impaired balance, decreased coordination, decreased upper extremity function, decreased lower extremity function, decreased ROM, impaired coordination, impaired fine motor.    Rehab Diagnosis: CVA    Recent Surgery: * No surgery found *      General Precautions: Standard, fall     Orthopedic Precautions:N/A     Braces:  (R UE wes-sling, R LE AFO, R LE knee brace)    Rehab Prognosis: Good; patient would benefit from acute skilled OT services to address these deficits and reach maximum level of function.        History:     Past Medical History:   Diagnosis Date    Diabetes mellitus     Hypertension     Mixed hyperlipidemia        Past Surgical History:   Procedure Laterality Date    CARDIAC SURGERY         Subjective     Orientation: Oriented x4    Patient with command following & safety awareness intact.     Chief Complaint: Patient with no complaints this AM during OT session.     Vitals   Vitals at Rest  /61   HR 71   O2 Sat    Pain 0/10     Vitals With Activity - Vitals signs assessed after ADL shower performed (see chart below). Patient with low BP, but was asymptomatic. Nsg notified. Patient returned to bed & placed in semi-supine position. Vitals assessed again & returned to WNL (BP: 127/57, HR: 69). Nsg notified.   BP 92/47 - Asymptomatic    HR 67   O2 Sat    Pain 0/10     Respiratory Status: Room air    Patients cultural, spiritual, Jainism conflicts given the current situation: no       Objective:     Communicated with Nsg prior to session.  Patient found up in chair with Other (comments) (R UE wes-sling, R LE knee brace, & R LE AFO)  upon OT  entry to room.    Mobility   Patient completed:  Sit to Supine with independence and with side rail  Sit to Stand Transfer with independence with LBQC  Stand to Sit Transfer with independence with LBQC  Bed to Chair Transfer using Step Transfer technique with independence with LBQC & side-rail  Toilet Transfer Step Transfer technique with contact guard assistance with  LBQC and BSC over toilet   Tub Transfer Step Transfer technique with contact guard assistance with LBQC & TTB     Functional Mobility  Patient performed functional ambulation with LBQC to<>from bathroom with CGA.     ADLs   Care Score/ETHAN Descriptors Equipment   Eating 6  N/A   Grooming Independent Unsupported short sit and Wheelchair N/A   Oral Hygiene 6 Unsupported short sit and Wheelchair N/A   Shower, [place] Self 4 - supervision in seated position  unsupported short sit on TTB  Grab bars, Hand held shower and Tub bench   Upper Body Dressing 5 Unsupported short sit and ward-techniques  R UE ward-sling   Lower Body Dressing 4 - CGA Ward techniques, Standing and Unsupported short sit LBQC, knee brace, AFO, elastic shoe laces    Toileting Hygiene 4 - CGA Sitting without back support and Standing without device LBQC   Toilet Transfer 4 - CGA  LBQC & BSC over toilet    Tub Transfer Contact Guard Assistance  LBQC & TTB          Putting On, Taking Off Footwear 5 Unsupported short sit AFO & elastic shoe laces      Patient performed ADL for [place] with wife present. Patient's wife helped patient perform his ADL & was able to provide all CGA, supervision, & set-up needed for entire ADL session with great performance & technique. Patient & patient's wife were able to doff/don R UE ward-sling, R LE AFO, & R LE knee brace with good technique. Patient & patient's wife demonstrated safe & proper technique with all ADL tasks, functional t/f's, & functional mobility with use of correct DME & AE. Patient & patient's wife reported to feel safe going home &  verbalize & demonstrated understanding of all learned skills. Patient & patient's wife had not further questions & concerns for OT at end of session.     Limiting Factors for ADLs: motor, endurance, limited ROM, balance, weakness and coordination       LifeStyle Change and Education:             Patient left supine with call button in reach and wife present & R UE supported with pillow.     Education provided: Roles and goals of OT, ADLs, transfer training, bed mobility, body mechanics, assistive device, safety precautions, equipment recommendations and home safety     Goals    None         Time Tracking     OT Received On: 07/31/22  Time In 1045     Time Out 1200  Total Time 75 min  Therapy Time: OT Individual: 75  Missed Time:    Missed Time Reason:      Billable Minutes: ADL/Self-Care 75    07/31/2022

## 2022-07-31 NOTE — PT/OT/SLP PROGRESS
Physical Therapy Inpatient Rehab Treatment    Patient Name:  Ayaz Huggins   MRN:  [id number]    Recommendations:     Discharge Recommendations:  outpatient PT, outpatient OT   Discharge Equipment Recommendations: bedside commode, grab bar, bath bench   Barriers to discharge: None    Assessment:     Ayaz Huggins is a 77 y.o. male admitted with a medical diagnosis of Stroke.  He presents with the following impairments/functional limitations:    impaired neuromotor control, balance deficits, gross motor coordination deficits. .    Rehab Diagnosis:     Recent Surgery: * No surgery found *      General Precautions: Standard, fall     Orthopedic Precautions:N/A     Braces: AFO    Rehab Prognosis: Good; patient would benefit from acute skilled PT services to address these deficits and reach maximum level of function.      History:     Past Medical History:   Diagnosis Date    Diabetes mellitus     Hypertension     Mixed hyperlipidemia        Past Surgical History:   Procedure Laterality Date    CARDIAC SURGERY         Subjective     Chief Complaint: Inability to ambulate on his own.   Patient/Family Comments/goals: Patient doing well, had a good sleep last night. Looking forward to discharge back home scheduled tomorrow.     Vitals   Vitals at Rest  /61 mmHg   HR 71 bpm   O2 Sat    Pain      Vitals With Activity     HR    O2 Sat    Pain      Respiratory Status: Room air    Patients cultural, spiritual, Shinto conflicts given the current situation: no      Objective:     Communicated with nursing prior to session.  Patient found up in chair with Other (comments) (AFO)  upon PT entry to room.    Pt is Oriented x3 and Alert, Cooperative and Motivated.    Functional Mobility:   · Bed Mobility:     · Rolling Left:  modified independence  · Rolling Right: modified independence  · Supine to Sit: modified independence  · Sit to Supine: modified independence  · Transfers:     · Sit to Stand:  modified  independence with quad cane  · Bed to Chair: modified independence with  quad cane  using  Step Transfer  · Toilet Transfer: supervision with  grab bars  using  Step Transfer  · Car Transfer: modified independence with  no AD  using  Step Transfer  · Gait: Pt ambulates 280 with LBQC with Supervision.   · 4 stairs with bilateral handrails with Moderate Assistance  · Ambulate 10 feet on uneven surfaces/ramps with quad cane with Moderate Assistance  ·  an object from the ground in standing position with quad cane with Modified [place]     Current   Status  Discharge   Goal   Functional Area: Care Score:    Roll Left and Right 6 Independent   Sit to Lying 6 Independent   Lying to Sitting on Side of Bed 6 Independent   Sit to Stand 6 Independent   Chair/Bed-to-Chair Transfer 6 Independent   Car Transfer 4 Supervision or touching assistance   Walk 10 Feet 4 Supervision or touching assistance   Walk 50 Feet with Two Turns 4 Supervision or touching assistance   Walk 150 Feet 4 Partial/moderate assistance   Walk 10 Feet Uneven Surface 3 Supervision or touching assistance   1 Step (Curb) 4 Partial/moderate assistance   4 Steps 3 Supervision or touching assistance   12 Steps 88 Partial/moderate assistance   Picking Up Object 6 Partial/moderate assistance   Wheel 50 Feet with Two Turns - Independent   Wheel 150 Feet 6 Independent       Therapeutic Activities and Exercises:  Gait training, transfer training, standing balance training.     Activity Tolerance    Patient left up in chair with all lines intact, call button in reach and spouse present.    Education provided: transfer training, gait training and body mechanics    Expected compliance:    GOALS:   Multidisciplinary Problems     Physical Therapy Goals        Problem: Physical Therapy    Goal Priority Disciplines Outcome Goal Variances Interventions   Physical Therapy Goal     PT, PT/OT Ongoing, Progressing     Description: Bed Mobility:  Roll left and right  "with supervision/touching assist - MET 7/18/22  Sit to supine transfer with supervision/touching assist. - MET 7/22/22  Supine to sit transfer with supervision/touching assist. - MET 7/22/22  Sit <> supine transfer, independent - MET 7/28/22    Transfers:  Sit to stand transfer with supervision/touching assist using RW or LRAD. - MET 7/18/22  Bed to chair transfer with supervision/touching assist using RW or LRAD. - MET 7/22/22  Car transfer with partial/moderate assist using RW or LRAD. - MET 7/22/22  Sit to stand transfer with set-up assist with RW - MET 7/22/22  Sit to stand transfer, independent with LBQC or LRAD - MET 7/28/22  Bed to chair transfer with set-up assist using LBQC or LRAD - MET 7/28/22  Car transfer with supervision assist using LBQC or LRAD - MET 7/27/22  Car transfer with set-up assist with LBQC or LRAD - initial    Mobility:  Ambulate 100 feet with partial/moderate assist using RW or LRAD. - MET 7/28/22  Ambulate 150 feet with supervision assist using LBQC or LRAD - initial  Ambulate 10 feet with set-up assist using LBQC - initial  Ambulate 15 feet on uneven surface with partial/moderate assist using LBQC or LRAD - initial  Ascend/descend 4 stairs with Chris rails or L rail with supervision/CGA assist - initial  Ascend/descend 4" curb with LBQC with supervision/CGA assist - initial    WC mobility:   150ft with LUE/LLE with independent. - MET 7/28/22                     Plan:     During this hospitalization, patient to be seen 5 x/week to address the identified rehab impairments via gait training, therapeutic activities, therapeutic exercises, neuromuscular re-education and progress toward the following goals:     Plan of Care Expires:  08/03/22    Additional Information:         Time Tracking:     PT Received On: 07/31/22  Time In 0730     Time Out 0830  Total Time 60 min  Therapy Time PT Individual: 60  Missed Time:    Time Missed due to:      Billable Minutes: Gait Training 30 and Therapeutic " Activity 30    07/31/2022

## 2022-08-01 LAB
ALBUMIN SERPL-MCNC: 3.4 GM/DL (ref 3.4–4.8)
ALBUMIN/GLOB SERPL: 1.1 RATIO (ref 1.1–2)
ALP SERPL-CCNC: 68 UNIT/L (ref 40–150)
ALT SERPL-CCNC: 27 UNIT/L (ref 0–55)
AST SERPL-CCNC: 20 UNIT/L (ref 5–34)
BASOPHILS # BLD AUTO: 0.04 X10(3)/MCL (ref 0–0.2)
BASOPHILS NFR BLD AUTO: 0.5 %
BILIRUBIN DIRECT+TOT PNL SERPL-MCNC: 0.4 MG/DL
BUN SERPL-MCNC: 34.9 MG/DL (ref 8.4–25.7)
CALCIUM SERPL-MCNC: 9.5 MG/DL (ref 8.8–10)
CHLORIDE SERPL-SCNC: 101 MMOL/L (ref 98–107)
CO2 SERPL-SCNC: 23 MMOL/L (ref 23–31)
CREAT SERPL-MCNC: 0.74 MG/DL (ref 0.73–1.18)
EOSINOPHIL # BLD AUTO: 0.44 X10(3)/MCL (ref 0–0.9)
EOSINOPHIL NFR BLD AUTO: 5.4 %
ERYTHROCYTE [DISTWIDTH] IN BLOOD BY AUTOMATED COUNT: 12.9 % (ref 11.5–17)
GLOBULIN SER-MCNC: 3.1 GM/DL (ref 2.4–3.5)
GLUCOSE SERPL-MCNC: 125 MG/DL (ref 82–115)
HCT VFR BLD AUTO: 30.9 % (ref 42–52)
HGB BLD-MCNC: 10.8 GM/DL (ref 14–18)
IMM GRANULOCYTES # BLD AUTO: 0.02 X10(3)/MCL (ref 0–0.04)
IMM GRANULOCYTES NFR BLD AUTO: 0.2 %
LYMPHOCYTES # BLD AUTO: 2.05 X10(3)/MCL (ref 0.6–4.6)
LYMPHOCYTES NFR BLD AUTO: 25.3 %
MAGNESIUM SERPL-MCNC: 1.7 MG/DL (ref 1.6–2.6)
MCH RBC QN AUTO: 29.9 PG (ref 27–31)
MCHC RBC AUTO-ENTMCNC: 35 MG/DL (ref 33–36)
MCV RBC AUTO: 85.6 FL (ref 80–94)
MONOCYTES # BLD AUTO: 0.76 X10(3)/MCL (ref 0.1–1.3)
MONOCYTES NFR BLD AUTO: 9.4 %
NEUTROPHILS # BLD AUTO: 4.8 X10(3)/MCL (ref 2.1–9.2)
NEUTROPHILS NFR BLD AUTO: 59.2 %
NRBC BLD AUTO-RTO: 0 %
PHOSPHATE SERPL-MCNC: 3.5 MG/DL (ref 2.3–4.7)
PLATELET # BLD AUTO: 208 X10(3)/MCL (ref 130–400)
PMV BLD AUTO: 11 FL (ref 7.4–10.4)
POCT GLUCOSE: 107 MG/DL (ref 70–110)
POCT GLUCOSE: 124 MG/DL (ref 70–110)
POTASSIUM SERPL-SCNC: 5.7 MMOL/L (ref 3.5–5.1)
POTASSIUM SERPL-SCNC: 5.8 MMOL/L (ref 3.5–5.1)
PREALB SERPL-MCNC: 20.6 MG/DL (ref 16–42)
PROT SERPL-MCNC: 6.5 GM/DL (ref 5.8–7.6)
RBC # BLD AUTO: 3.61 X10(6)/MCL (ref 4.7–6.1)
SODIUM SERPL-SCNC: 135 MMOL/L (ref 136–145)
WBC # SPEC AUTO: 8.1 X10(3)/MCL (ref 4.5–11.5)

## 2022-08-01 PROCEDURE — 84134 ASSAY OF PREALBUMIN: CPT | Performed by: NURSE PRACTITIONER

## 2022-08-01 PROCEDURE — 83735 ASSAY OF MAGNESIUM: CPT | Performed by: NURSE PRACTITIONER

## 2022-08-01 PROCEDURE — 94799 UNLISTED PULMONARY SVC/PX: CPT

## 2022-08-01 PROCEDURE — 84100 ASSAY OF PHOSPHORUS: CPT | Performed by: NURSE PRACTITIONER

## 2022-08-01 PROCEDURE — 25000003 PHARM REV CODE 250: Performed by: NURSE PRACTITIONER

## 2022-08-01 PROCEDURE — 84132 ASSAY OF SERUM POTASSIUM: CPT | Performed by: NURSE PRACTITIONER

## 2022-08-01 PROCEDURE — 11800000 HC REHAB PRIVATE ROOM

## 2022-08-01 PROCEDURE — 97112 NEUROMUSCULAR REEDUCATION: CPT

## 2022-08-01 PROCEDURE — 25000003 PHARM REV CODE 250: Performed by: INTERNAL MEDICINE

## 2022-08-01 PROCEDURE — 97530 THERAPEUTIC ACTIVITIES: CPT

## 2022-08-01 PROCEDURE — 97116 GAIT TRAINING THERAPY: CPT

## 2022-08-01 PROCEDURE — 80053 COMPREHEN METABOLIC PANEL: CPT | Performed by: NURSE PRACTITIONER

## 2022-08-01 PROCEDURE — 85025 COMPLETE CBC W/AUTO DIFF WBC: CPT | Performed by: NURSE PRACTITIONER

## 2022-08-01 PROCEDURE — 97110 THERAPEUTIC EXERCISES: CPT

## 2022-08-01 PROCEDURE — 36415 COLL VENOUS BLD VENIPUNCTURE: CPT | Performed by: NURSE PRACTITIONER

## 2022-08-01 RX ORDER — BACLOFEN 5 MG/1
5 TABLET ORAL NIGHTLY
Qty: 30 TABLET | Refills: 0 | Status: SHIPPED | OUTPATIENT
Start: 2022-08-01 | End: 2022-08-31

## 2022-08-01 RX ORDER — METFORMIN HYDROCHLORIDE 1000 MG/1
1000 TABLET ORAL 2 TIMES DAILY WITH MEALS
Qty: 180 TABLET | Refills: 0 | Status: SHIPPED | OUTPATIENT
Start: 2022-08-01 | End: 2023-08-08

## 2022-08-01 RX ORDER — AMLODIPINE BESYLATE 10 MG/1
10 TABLET ORAL DAILY
Qty: 90 TABLET | Refills: 0
Start: 2022-08-01 | End: 2022-11-30

## 2022-08-01 RX ORDER — ASPIRIN 325 MG
325 TABLET ORAL DAILY
Qty: 90 TABLET | Refills: 0 | Status: SHIPPED | OUTPATIENT
Start: 2022-08-02 | End: 2023-08-08

## 2022-08-01 RX ORDER — LISINOPRIL 5 MG/1
5 TABLET ORAL DAILY
Qty: 90 TABLET | Refills: 0 | Status: SHIPPED | OUTPATIENT
Start: 2022-08-01 | End: 2023-08-08

## 2022-08-01 RX ORDER — ROSUVASTATIN CALCIUM 10 MG/1
10 TABLET, COATED ORAL DAILY
Qty: 90 TABLET | Refills: 0 | Status: SHIPPED | OUTPATIENT
Start: 2022-08-01 | End: 2023-08-08

## 2022-08-01 RX ORDER — HYDROCODONE BITARTRATE AND ACETAMINOPHEN 5; 325 MG/1; MG/1
1 TABLET ORAL EVERY 6 HOURS PRN
Qty: 10 TABLET | Refills: 0 | Status: SHIPPED | OUTPATIENT
Start: 2022-08-01 | End: 2022-08-11

## 2022-08-01 RX ORDER — CARVEDILOL 25 MG/1
25 TABLET ORAL 2 TIMES DAILY WITH MEALS
Qty: 180 TABLET | Refills: 0 | Status: SHIPPED | OUTPATIENT
Start: 2022-08-01 | End: 2023-08-08

## 2022-08-01 RX ADMIN — CARVEDILOL 25 MG: 25 TABLET, FILM COATED ORAL at 05:08

## 2022-08-01 RX ADMIN — HYDROXYZINE PAMOATE 50 MG: 50 CAPSULE ORAL at 08:08

## 2022-08-01 RX ADMIN — ATORVASTATIN CALCIUM 80 MG: 40 TABLET, FILM COATED ORAL at 08:08

## 2022-08-01 RX ADMIN — LISINOPRIL 5 MG: 5 TABLET ORAL at 08:08

## 2022-08-01 RX ADMIN — SODIUM POLYSTYRENE SULFONATE 30 G: 15 SUSPENSION ORAL; RECTAL at 08:08

## 2022-08-01 RX ADMIN — METFORMIN HYDROCHLORIDE 1000 MG: 500 TABLET, FILM COATED ORAL at 05:08

## 2022-08-01 RX ADMIN — AMLODIPINE BESYLATE 10 MG: 5 TABLET ORAL at 08:08

## 2022-08-01 RX ADMIN — CLOTRIMAZOLE: 1 CREAM TOPICAL at 08:08

## 2022-08-01 RX ADMIN — BACLOFEN 5 MG: 5 TABLET ORAL at 08:08

## 2022-08-01 RX ADMIN — ASPIRIN 325 MG ORAL TABLET 325 MG: 325 PILL ORAL at 08:08

## 2022-08-01 RX ADMIN — CARVEDILOL 25 MG: 25 TABLET, FILM COATED ORAL at 08:08

## 2022-08-01 RX ADMIN — METFORMIN HYDROCHLORIDE 1000 MG: 500 TABLET, FILM COATED ORAL at 08:08

## 2022-08-01 RX ADMIN — SODIUM POLYSTYRENE SULFONATE 30 G: 15 SUSPENSION ORAL; RECTAL at 11:08

## 2022-08-01 NOTE — PT/OT/SLP PROGRESS
Physical Therapy Inpatient Rehab Treatment    Patient Name:  Ayaz Huggins   MRN:  [id number]    Recommendations:     Discharge Recommendations:  outpatient PT, outpatient OT   Discharge Equipment Recommendations: bedside commode, grab bar, bath bench   Barriers to discharge: None    Assessment:     Ayaz Huggins is a 77 y.o. male admitted with a medical diagnosis of Stroke.  He presents with the following impairments/functional limitations:  weakness, impaired endurance, impaired functional mobility, gait instability, impaired balance, decreased upper extremity function, decreased lower extremity function, abnormal tone .    Rehab Diagnosis: CVA    Recent Surgery: * No surgery found *      General Precautions: Standard, fall     Orthopedic Precautions:N/A     Braces: AFO    Rehab Prognosis: Good; patient would benefit from acute skilled PT services to address these deficits and reach maximum level of function.      History:     Past Medical History:   Diagnosis Date    Diabetes mellitus     Hypertension     Mixed hyperlipidemia        Past Surgical History:   Procedure Laterality Date    CARDIAC SURGERY      CAROTID ENDARTERECTOMY Right 7/8/2022    Procedure: ENDARTERECTOMY, CAROTID;  Surgeon: Dread Lama MD;  Location: [facility];  Service: Peripheral Vascular;  Laterality: Right;  RIGHT CAROTID ENDARTERECTOMY       Subjective     Chief Complaint: fatigue   Patient/Family Comments/goals: to go home tomorrow    Vitals   Vitals at Rest  /59   HR 67   Pain none     Vitals With Activity  /67   HR 80     Respiratory Status: Room air    Patients cultural, spiritual, Holiness conflicts given the current situation: no      Objective:     Patient found supine with  wife present  upon PT entry to room.    Pt is Alert, Cooperative and Motivated.    Functional Mobility:      Current   Status Comments:  Discharge   Goal   Functional Area: Care Score:     Lying to Sitting on Side of Bed 6 Pt hooks RLE with LLE,  "use of bed rail Independent   Sit to Stand 6 With LBQC Independent   Chair/Bed-to-Chair Transfer 6 With LBQC Independent   Walk 10 Feet 4 CGA with LBQC, 100ft. Pt also ambulated from toilet to EOB, ~10ft, CGA provided by wife.  Supervision or touching assistance   Walk 50 Feet with Two Turns 4  Supervision or touching assistance   Walk 10 Feet Uneven Surface 4 20ft x2 with LBQC on uneven sidewalk/small ramp outside, CGA.  Supervision or touching assistance   1 Step (Curb) 4 4" curb with LBQC, outside. Partial/moderate assistance   4 Steps 4 Chris rails to ascend, L rail to descend, CGA Supervision or touching assistance   Toilet 4 CGA provided by wife, with LBQC, stand pivot.        Therapeutic Activities and Exercises:  - Pt sat EOB and donned neoprene knee brace independently. Pt's wife then donned wes-sling on RUE.  - Pt seated in WC, RUE on skate, performed R knee flexion/extension AROM/AAROM x10  - Pt propelled WC with BLE (emphasis on RLE) forward x20ft and backward x20ft. Performed to increase R knee flexion/extension strength.    Activity Tolerance: good    Patient left sitting edge of bed with call button in reach and Wife present.    Education provided: gait training, stair training, balance training, safety awareness, body mechanics, assistive device, wheelchair management and strengthening exercises    Expected compliance: high    GOALS:   Multidisciplinary Problems     Physical Therapy Goals        Problem: Physical Therapy    Goal Priority Disciplines Outcome Goal Variances Interventions   Physical Therapy Goal     PT, PT/OT Ongoing, Progressing     Description: Bed Mobility:  Roll left and right with supervision/touching assist - MET 7/18/22  Sit to supine transfer with supervision/touching assist. - MET 7/22/22  Supine to sit transfer with supervision/touching assist. - MET 7/22/22  Sit <> supine transfer, independent - MET 7/28/22    Transfers:  Sit to stand transfer with supervision/touching assist " "using RW or LRAD. - MET 7/18/22  Bed to chair transfer with supervision/touching assist using RW or LRAD. - MET 7/22/22  Car transfer with partial/moderate assist using RW or LRAD. - MET 7/22/22  Sit to stand transfer with set-up assist with RW - MET 7/22/22  Sit to stand transfer, independent with LBQC or LRAD - MET 7/28/22  Bed to chair transfer with set-up assist using LBQC or LRAD - MET 7/28/22  Car transfer with supervision assist using LBQC or LRAD - MET 7/27/22  Car transfer with set-up assist with LBQC or LRAD - initial    Mobility:  Ambulate 100 feet with partial/moderate assist using RW or LRAD. - MET 7/28/22  Ambulate 150 feet with supervision assist using LBQC or LRAD - initial  Ambulate 10 feet with set-up assist using LBQC - initial  Ambulate 15 feet on uneven surface with partial/moderate assist using LBQC or LRAD - initial  Ascend/descend 4 stairs with Chris rails or L rail with supervision/CGA assist - initial  Ascend/descend 4" curb with LBQC with supervision/CGA assist - initial    WC mobility:   150ft with LUE/LLE with independent. - MET 7/28/22                     Plan:     During this hospitalization, patient to be seen 5 x/week to address the identified rehab impairments via gait training, therapeutic activities, therapeutic exercises, neuromuscular re-education and progress toward the following goals:     Plan of Care Expires:  08/03/22    Additional Information:         Time Tracking:     PT Received On:    Time In 1330     Time Out 1430  Total Time 60 min  Therapy Time PT Individual: 60    Billable Minutes: Therapeutic Activity 45 and Therapeutic Exercise 15    08/01/2022  "

## 2022-08-01 NOTE — PT/OT/SLP PROGRESS
Recreational Therapy Treatment    Date of Treatment: 8/1/2022  Start Time: 0830  Stop Time: 0900  Total Time: 30 minutes  Missed Time:     General Precautions: fall  Ortho Precautions: N/A  Braces: AFO    Vitals   Vitals at Rest  /67   HR 71   O2 Sat    Pain      Vitals With Activity  /57   HR 75   O2 Sat    Pain        Treatment     Cognitive Skills Building   Cognitive Observation Activity Assist Position Equipment Response Comment             Comment:          Dynamic Activities   Activity Assist Position Equipment Response Comment   Activity 1 Bean bag toos modified independence Standing Cane, quad and Bean Bags {GOOD              Comment: Sit to stand was supervision/setup as was dynamic standing balance/reaching. Standing tolerance was 5 minutes. RUE coordination/dexteriety improved to min/supervision. He remains determined and motivated         Fine Motor Activities   Activity Assist Position Equipment Response Comment            Comment:        Additional Info: Pt progress, plan of care and discharge plans were discussed during staffing at 0930    Goals     Short Term Goals    Goal  Goal Status   Will increase sit to stand to mod. Initial Met   Will increase RUE coordination/dexteriety to min. Initial Met   Will improve dynamic standing balance/reaching to min. Initial Met           Long Term Goals    Goal Goal Status   Will increase standing tolerance to 5 minutes.  Initial Progressing   Will increase RUE coordination/dexteriety to supervision. Initial Progressing   Will improve dynamic standing balance/reaching to supervision. Initial Progressing

## 2022-08-01 NOTE — PT/OT/SLP PROGRESS
Occupational Therapy Inpatient Rehab Treatment    Name: Ayaz Huggins  MRN: [id number]    Assessment:  Ayaz Huggins is a 77 y.o. male admitted with a medical diagnosis of Stroke.  He presents with the following impairments/functional limitations:  weakness, impaired self care skills, impaired functional mobility, gait instability, impaired balance, decreased upper extremity function, decreased lower extremity function, decreased ROM    Recent Surgery: * No surgery found *    General Precautions: Standard, fall   Orthopedic Precautions:N/A   Braces:  (R UE wes-sling, R LE AFO, R LE knee brace)    Rehab Prognosis: Good; patient would benefit from acute skilled OT services to address these deficits and reach maximum level of function.        History:     Past Medical History:   Diagnosis Date    Diabetes mellitus     Hypertension     Mixed hyperlipidemia        Past Surgical History:   Procedure Laterality Date    CARDIAC SURGERY         Subjective     Orientation: Oriented x4  Chief Complaint: Pt and spouse have no complaints. Both voiced they feel comfortable and confident with discharging today to home environment.    Vitals    08/01/22 0930 08/01/22 1000   BP: (!) 120/56 (!) 128/59   Pulse: 76 74     Respiratory Status: Room air  Patients cultural, spiritual, Episcopal conflicts given the current situation: no       Objective:     Patient found up in chair with spouse at bedside upon OT entry to room.    Mobility   Patient completed:  Sit to Stand Transfer with contact guard assistance with quad cane  Stand to Sit Transfer with contact guard assistance with quad cane   CGA provided from pt's spouse    Functional Mobility  Pt ambulated throughout OT kitchen with CGA provided by spouse and LBQC.    Neuromuscular Re-Education  Pt propelled 30 cycles forward (level 2 resistance) + 30 cycles backward (level 0 resistance) on UE ergometer using RUE only.  It is important to note that pt performed this exercise  without the use of his trunk to assist with propulsion.    Pt then performed a functional reaching task in the kitchen using RUE only. Pt opened/closed cabinets, retrieved, and put back various items (pancake mix, macaroni and cheese mix, Jiffy mix, vegetable oil bottle, etc) from an above shoulder level height shelf with RUE only. Pt then ambulated to the fridge, retrieved a half-filled gallon jug of water, carried it to the cup cabinet, and filled the cup with water from the jug using RUE throughout. No LOB noted as well throughout task. Two sitting breaks taken throughout.       LifeStyle Change and Education:     LifeStyle Changes and Education Discussed: Changes in diet/healthy eating  Patient agrees to LifeStyle Change: Yes    Patient left up in chair with all lines intact.     Education provided: Roles and goals of OT, ADLs, transfer training, body mechanics, safety precautions, fall prevention, equipment recommendations and home safety    Time Tracking     OT Received On: 08/01/22  Time In 0930     Time Out 1000  Total Time 30 min  Therapy Time: OT Individual: 30    Billable Minutes: Neuromuscular Re-education 30 08/01/2022

## 2022-08-01 NOTE — PLAN OF CARE
Scheduled for discharge today.    Alerted Nella with [facility] ([phone]).  They plan to deliver LBQC, w/c, and BSC to room this a.m. but have not yet heard from wife re: purchase of TTB.    Alerted Josefa with [facility].  They had not received the referral for HH services, so I am currently sending HH PT/OT/RN orders and clinicals now via [facility]. Will also send her the AVS once completed later today.    Family training was completed.

## 2022-08-01 NOTE — PROGRESS NOTES
[facility] ([facility])  Rehab Progress Note    Patient Name: Ayaz Huggins  MRN: [id number]  Age: 77 y.o. Sex: male  : 1945  Hospital Length of Stay: 19 days  Date of Service: 2022   Chief Complaint: Acute left hemispheric CVA s/p CPA and s/p left CEA on 2022    Subjective:     Basic Information  Admit Information: 77-year-old white male presented to [facility] ED on 2022 complaining of right-sided weakness.  PMH significant for DM type 2, CAD s/p CABG x2 in , and HTN.  CT head negative for hemorrhagic bleeding.  Received tPA and transferred to ICU for post tPA monitoring and treatment.  TTE with negative bubble study, but significant for grade 1 diastolic dysfunction.  Carotid ultrasound significant for left ICA stenosis.  MRI significant for last left hemispheric CVA and 3 other small punctate CVAs on the right hemisphere.  Tolerated left CEA on  without periprocedural complications.  Tolerated transfer to [facility] inpatient rehab unit on  without incident.  Today's Information: No acute events overnight.  Mobilizing with therapy.  Reports good sleep and appetite.  Bowel maintenance at goal.  Vital signs at goal with no recorded fevers.  No labs or imaging today.    Review of patient's allergies indicates:   Allergen Reactions    Penicillins         Current Facility-Administered Medications:     acetaminophen tablet 650 mg, 650 mg, Oral, Q4H PRN, Alexey A Kathryn, FNP    ALPRAZolam tablet 0.25 mg, 0.25 mg, Oral, TID PRN, Alexey A Kathryn, FNP    amLODIPine tablet 10 mg, 10 mg, Oral, Daily, Alexey A Kathryn, FNP, 10 mg at 22    aspirin tablet 325 mg, 325 mg, Oral, Daily, Alexey A Kathryn, FNP, 325 mg at 22    atorvastatin tablet 80 mg, 80 mg, Oral, QHS, Alexey A Kathryn, FNP, 80 mg at 22    baclofen tablet 5 mg, 5 mg, Oral, QHS, Weston Rivera MD, 5 mg at 22    benzonatate capsule 100 mg, 100  mg, Oral, TID PRN, Alexey A Kathryn, FNP    bisacodyL suppository 10 mg, 10 mg, Rectal, Daily PRN, Alexey A Kathryn, FNP    bisacodyL suppository 10 mg, 10 mg, Rectal, Once, Dorothea ROCK , FNP    bisacodyL suppository 10 mg, 10 mg, Rectal, Once, Dorothea Ruggierocher, FNP    bisacodyL suppository 10 mg, 10 mg, Rectal, Once, Dorothea Ruggierocher, FNP    carvediloL tablet 25 mg, 25 mg, Oral, BID WM, Alexey A Kathryn, FNP, 25 mg at 07/31/22 1738    clotrimazole 1 % cream, , Topical (Top), BID, Meghan Wolf, FNP, Given at 07/31/22 2023    dextrose 50 % in water (D50W) injection 12.5 g, 12.5 g, Intravenous, PRN, Alexey A Kathryn, FNP    dextrose 50 % in water (D50W) injection 25 g, 25 g, Intravenous, PRN, Alexey A Kathryn, FNP    docusate sodium capsule 100 mg, 100 mg, Oral, BID PRN, Alexey A Kathryn, FNP    glucagon (human recombinant) injection 1 mg, 1 mg, Intramuscular, PRN, Alexey A Kathryn, FNP    glucose chewable tablet 16 g, 16 g, Oral, PRN, Alexey A Kathryn, FNP    glucose chewable tablet 24 g, 24 g, Oral, PRN, Alexey A Kathryn, FNP    hydrALAZINE injection 10 mg, 10 mg, Intravenous, Q4H PRN, Alexey A Kathryn, FNP    HYDROcodone-acetaminophen 5-325 mg per tablet 1 tablet, 1 tablet, Oral, Q6H PRN, Alexey A Kathryn, FNP    hydrOXYzine pamoate capsule 50 mg, 50 mg, Oral, QHS, Alexey A Kathryn, FNP, 50 mg at 07/31/22 2022    insulin aspart U-100 injection 1-10 Units, 1-10 Units, Subcutaneous, QID (AC + HS) PRN, Alexey A Kathryn, FNP, 2 Units at 07/13/22 1213    labetalol 20 mg/4 mL (5 mg/mL) IV syring, 10 mg, Intravenous, Q4H PRN, Alexey Peralta, FNP, 10 mg at 07/13/22 0904    lisinopriL tablet 5 mg, 5 mg, Oral, Daily, Alexey Peralta, FNP, 5 mg at 07/31/22 0834    metFORMIN tablet 1,000 mg, 1,000 mg, Oral, BID WM, Alexey Peralta, FNP, 1,000 mg at 07/31/22 1738    methocarbamoL tablet 500 mg, 500 mg, Oral, TID PRN, Meghan Wolf, FNP, 500 mg at  "07/29/22 2100    metoprolol injection 10 mg, 10 mg, Intravenous, Q2H PRN, Alexey ARMAS Kathryn, FNP    nitroGLYCERIN SL tablet 0.4 mg, 0.4 mg, Sublingual, Q5 Min PRN, Alexey ARMAS Kathryn, FNP    ondansetron disintegrating tablet 8 mg, 8 mg, Oral, Q6H PRN, Alexey ARMAS Kathryn, FNP    polyethylene glycol packet 17 g, 17 g, Oral, BID PRN, Alexey ARMAS Kathryn, FNP    promethazine tablet 25 mg, 25 mg, Oral, Q6H PRN, Alexey ARMAS Kathryn, FNP     Review of Systems   Complete 12-point review of symptoms negative except for what's mentioned in HPI     Objective:     /61   Pulse 75   Temp 97.8 °F (36.6 °C) (Oral)   Resp 16   Ht 5' 10.98" (1.803 m)   Wt 82.2 kg (181 lb 3.5 oz)   SpO2 96%   BMI 25.29 kg/m²        Intake/Output Summary (Last 24 hours) at 7/31/2022 2122  Last data filed at 7/31/2022 1700  Gross per 24 hour   Intake 1140 ml   Output 300 ml   Net 840 ml       Physical Exam  Constitutional:       Appearance: Normal appearance.   HENT:      Head: Normocephalic.      Mouth/Throat:      Mouth: Mucous membranes are moist.   Eyes:      Pupils: Pupils are equal, round, and reactive to light.   Neck:      Comments: Left CEA incision clean intact with mild postsurgical swelling  Cardiovascular:      Rate and Rhythm: Normal rate and regular rhythm.      Heart sounds: Normal heart sounds.   Pulmonary:      Effort: Pulmonary effort is normal.      Breath sounds: Normal breath sounds.   Abdominal:      General: Bowel sounds are normal.      Palpations: Abdomen is soft.   Musculoskeletal:      Cervical back: Neck supple.      Comments: Generalized weakness and muscle atrophy.  Right upper extremity dependent swelling   Skin:     General: Skin is warm and dry.   Neurological:      Mental Status: He is alert and oriented to person, place, and time.      Comments: Right-sided hemiplegia.  Can move RUE against gravity.  LLE flaccid.    Psychiatric:         Mood and Affect: Mood normal.         Behavior: Behavior " normal.         Thought Content: Thought content normal.         Judgment: Judgment normal.        Lines/Drains/Airways     None               Labs  Recent Results (from the past 24 hour(s))   POCT glucose    Collection Time: 07/31/22  6:08 AM   Result Value Ref Range    POCT Glucose 105 70 - 110 mg/dL   POCT glucose    Collection Time: 07/31/22  4:25 PM   Result Value Ref Range    POCT Glucose 93 70 - 110 mg/dL     Radiology  MRI brain without contrast on 07/03/2022 at 2:09 p.m., IMPRESSION: Small acute infarcts supratentorially, the largest is a left posterior frontal 3 cm cortical based infarct with petechial hemorrhage.  No mass effect or shift.  Discussed with Dr. Duncan. Otherwise no acute intracranial abnormalities.  Radiology  Transthoracic echo on 07/02/2022 at 2:40 p.m., IMPRESSION: The left ventricle is  with normal systolic function. The estimated ejection fraction is 60%. There is grade I diastolic dysfunction consistent with impaired relaxation. Left atrial pressure is normal.    Assessment/Plan:     77 y.o. WM admitted on 7/12/2022    Acute left hemispheric CVA   - MRI with 3 other small punctate CVAs on the right hemisphere  - s/p tPA and s/p left CEA on 7/8/2022  - continue                Aspirin 325 mg daily                Lipitor 80 mg at bedtime                Xarelto 20 mg daily   - right knee brace obtained on 7/13  - waiting for right AFO from orthotics  - defer to physiatry for rehab and pain management  - PT/OT/RT following      Carotid artery stenosis  - s/p left CEA on 07/08/2022  - continue                Aspirin 325 mg daily                Lipitor 80 mg at bedtime                Xarelto 20 mg daily                Norco 5 mg/325 mg q.6 hours p.r.n.  - to follow up with vascular surgery outpatient     HTN  - BP at goal!!  - discontinued Metoprolol succinate 50 mg daily on 7/13  - continue     Coreg 25 mg BID (initiated 7/13) (increased 7/14)                Norvasc 10 mg daily                 Lisinopril 20 mg daily                 Hydralazine 10 mg every 2 hours as needed for BP > 160/90                Labetalol 10 mg every 2 hours as needed for BP > 160/90     DM type II  - HgA1c with next labs  - continue                Metformin 1000 mg twice daily                ISS   - CBGs AC/HS     Normocytic anemia  - asymptomatic  - H/H stable   - no evidence of active bleeds  - will closely monitor and transfuse if needed      Constipation  - stable, loose stools reported on 7/15  - continue                Colace 100 mg b.i.d. PRN                MiraLax 17 g daily PRN     VTE Prophylaxis:  Xarelto 20 mg daily  COVID-19 testing:  Negative on 07/12/2022  COVID-19 vaccination status:  Vaccinated (Moderna):  x 3     POA: No  Living will: No  Contacts:  Pooja Huggins (wife) [phone]                     Eleonora Manzanares (daughter) [phone]     CODE STATUS: Full Code  Internal Medicine (attending): Weston Rivera MD  Physiatry (consulting): Angel Em MD     OUTPATIENT PROVIDERS  Angel Parham MD  Neurology: Jacque Agee MD  Vascular surgery:  Roxi Lama MD  Cardiology: Dionisio Marin MD     DISPOSITION: Condition stable.  Sleep hygiene, bowel maintenance at goal.  Appetite is good.  No acute complaints..  Vital signs at goal with no recorded fevers.  No labs imaging today.  Continue aggressive mobilization as tolerated.  Monitor closely.  Notify of acute changes.  Repeat lab work morning.    Staffing 7/26: Continent of bowel and bladder. PT: overall min to independent, difficulty with right leg, improving. Family training went well. RT: overall min to supervision, improving. Fair appetite. Drinking Boost. OT: overall partial mod. Projected discharge 8/1.      Total time spent on this encounter including chart review and direct 1-on-1 patient interaction: 36 minutes   Over 50% of this time was spent in counseling and coordination of care

## 2022-08-01 NOTE — PT/OT/SLP PROGRESS
Physical Therapy Inpatient Rehab Treatment    Patient Name:  Ayaz Huggins   MRN:  [id number]    Recommendations:     Discharge Recommendations:  outpatient PT, outpatient OT   Discharge Equipment Recommendations: bedside commode, grab bar, bath bench   Barriers to discharge: None    Assessment:     Ayaz Huggins is a 77 y.o. male admitted with a medical diagnosis of Stroke.  He presents with the following impairments/functional limitations:  weakness, impaired endurance, impaired functional mobility, gait instability, impaired balance, decreased upper extremity function, decreased lower extremity function, abnormal tone .    Rehab Diagnosis: CVA    Recent Surgery: * No surgery found *      General Precautions: Standard, fall     Orthopedic Precautions:N/A     Braces: AFO    Rehab Prognosis: Good; patient would benefit from acute skilled PT services to address these deficits and reach maximum level of function.      History:     Past Medical History:   Diagnosis Date    Diabetes mellitus     Hypertension     Mixed hyperlipidemia        Past Surgical History:   Procedure Laterality Date    CARDIAC SURGERY         Subjective     Chief Complaint: fatigue  Patient/Family Comments/goals: to go home soon    Vitals   Vitals at Rest  /51   HR 74     Vitals With Activity  /65   HR 72     Respiratory Status: Room air    Patients cultural, spiritual, Tenriism conflicts given the current situation: no      Objective:     Communicated with Janusz PT prior to session, who reported that pt is not to DC today as planned d/t increased potassium levels.  Patient found up in chair with  wife present upon PT entry to room.    Pt is Oriented x3 and Fatigued, Alert, Cooperative and Motivated.    Functional Mobility:      Current   Status Comments:   Functional Area: Care Score:    Sit to Lying 6    Sit to Stand 6 With LBQC   Chair/Bed-to-Chair Transfer 6 No AD, stand pivot from WC to bed toward L side.       Therapeutic  Activities and Exercises:  - Seated EOB: sit-to-stands with greater WB on RLE x5, hip flexion AROM jolene x10, knee extension AROM jolene x10  - Supine, with assistance from Wife, x10 jolene: Bridges, hip add with knees bent and squeezing towel, bridges with hip add, glute sets, quad sets, hip/knee flexion with resistance from wife for hip extension, hip adduction with straight leg, ankle DF/PF (2x10).   - Supine: R hip ER stretch x1 min, figure 4 stretch x1 min, R calf stretch x1 min    Activity Tolerance: good    Patient left supine with call button in reach and Wife and daughter present.    Education provided: roles and goals of PT/PTA, transfer training, bed mob, safety awareness, body mechanics, assistive device, wheelchair management and strengthening exercises    Expected compliance:    GOALS:   Multidisciplinary Problems     Physical Therapy Goals        Problem: Physical Therapy    Goal Priority Disciplines Outcome Goal Variances Interventions   Physical Therapy Goal     PT, PT/OT Ongoing, Progressing     Description: Bed Mobility:  Roll left and right with supervision/touching assist - MET 7/18/22  Sit to supine transfer with supervision/touching assist. - MET 7/22/22  Supine to sit transfer with supervision/touching assist. - MET 7/22/22  Sit <> supine transfer, independent - MET 7/28/22    Transfers:  Sit to stand transfer with supervision/touching assist using RW or LRAD. - MET 7/18/22  Bed to chair transfer with supervision/touching assist using RW or LRAD. - MET 7/22/22  Car transfer with partial/moderate assist using RW or LRAD. - MET 7/22/22  Sit to stand transfer with set-up assist with RW - MET 7/22/22  Sit to stand transfer, independent with LBQC or LRAD - MET 7/28/22  Bed to chair transfer with set-up assist using LBQC or LRAD - MET 7/28/22  Car transfer with supervision assist using LBQC or LRAD - MET 7/27/22  Car transfer with set-up assist with LBQC or LRAD - initial    Mobility:  Ambulate 100 feet  "with partial/moderate assist using RW or LRAD. - MET 7/28/22  Ambulate 150 feet with supervision assist using LBQC or LRAD - initial  Ambulate 10 feet with set-up assist using LBQC - initial  Ambulate 15 feet on uneven surface with partial/moderate assist using LBQC or LRAD - initial  Ascend/descend 4 stairs with Chris rails or L rail with supervision/CGA assist - initial  Ascend/descend 4" curb with LBQC with supervision/CGA assist - initial    WC mobility:   150ft with LUE/LLE with independent. - MET 7/28/22                     Plan:     During this hospitalization, patient to be seen 5 x/week to address the identified rehab impairments via gait training, therapeutic activities, therapeutic exercises, neuromuscular re-education and progress toward the following goals:     Plan of Care Expires:  08/03/22    Additional Information:     Pt's wife present at start of session, and pt's daughter joined session approximately [facility] through. Answered all questions as appropriate, discussed DC plans with daughter.     Time Tracking:     PT Received On:    Time In 1100     Time Out 1200  Total Time 60 min  Therapy Time PT Individual: 60    Billable Minutes: Therapeutic Activity 15 and Therapeutic Exercise 45    08/01/2022  "

## 2022-08-01 NOTE — PLAN OF CARE
Medical team has made the decision to delay discharge due to elevated potassium level this a.m.  Will give xayexalate and fluids today then recheck labs in a.m. tomorrow.    Alerted David with [facility] ([phone]), though DME was already delivered.    Alerted Josefa with [facility] via [facility].      Will update both again tomorrow re: discharge readiness.

## 2022-08-01 NOTE — PT/OT/SLP PROGRESS
"Physical Therapy Inpatient Rehab Treatment    Patient Name:  Ayaz Huggins   MRN:  [id number]    Recommendations:     Discharge Recommendations:  outpatient PT, outpatient OT   Discharge Equipment Recommendations: bedside commode, grab bar, bath bench   Barriers to discharge: None    Assessment:     Ayaz Huggins is a 77 y.o. male admitted with a medical diagnosis of Stroke.  He presents with the following impairments/functional limitations:  weakness, impaired endurance, impaired functional mobility, gait instability, impaired balance, decreased coordination, decreased lower extremity function, decreased upper extremity function, abnormal tone, impaired coordination. Pt tolerated session well, however displayed increased toe drag and increased circumduction gait during end of gait training possibly due to increased fatigue. Pt's wife notes that pt was supposed to D/C home today but has to stay an extra day due to increased potassium levels.     Rehab Diagnosis: Good    Recent Surgery: * No surgery found *      General Precautions: Standard, fall     Orthopedic Precautions:N/A     Braces: AFO    Rehab Prognosis: Good; patient would benefit from acute skilled PT services to address these deficits and reach maximum level of function.      History:     Past Medical History:   Diagnosis Date    Diabetes mellitus     Hypertension     Mixed hyperlipidemia        Past Surgical History:   Procedure Laterality Date    CARDIAC SURGERY      CAROTID ENDARTERECTOMY Right 7/8/2022    Procedure: ENDARTERECTOMY, CAROTID;  Surgeon: Dread Lama MD;  Location: [facility];  Service: Peripheral Vascular;  Laterality: Right;  RIGHT CAROTID ENDARTERECTOMY       Subjective     Chief Complaint: "I feel somewhat tired today from working so hard over the past few days."  Patient/Family Comments/goals: Return home as soon as possible    Vitals   Vitals at Rest  /66   HR 73   O2 Sat    Pain 0/10     Vitals With Activity  /51   HR " 74   O2 Sat    Pain 0/10     Respiratory Status: Room air    Patients cultural, spiritual, Orthodox conflicts given the current situation: no      Objective:     Communicated with RN prior to session.  Patient found up in chair with peripheral IV  upon PT entry to room.    Pt is Oriented x3 and Alert, Cooperative and Motivated.    Functional Mobility:   · Bed Mobility:     · Supine to Sit: independence  · Transfers:     · Sit to Stand:  independence with quad cane  · Car Transfer: contact guard assistance with  quad cane  using  Step Transfer (pt required increased time to perform activity in order to advance R LE.)  · Gait: Pt ambulates 250ft with quad cane at CGA. with LBQC with Contact Guard Assistance.   · Impairments contributing to gait deviations include impaired balance, impaired coordination, impaired motor control, abnormal muscle tone, decreased strength and (pt displays increased circumduction gait on R LE with R hip ER during swing phase. Pt required verbal cueing for improving step pattern with use of quad cane and improving heel/toe gait pattern. Pt displayed no episodes of LOB..     Current   Status  Discharge   Goal   Functional Area: Care Score:    Roll Left and Right 6 Independent   Sit to Lying 6 Independent   Lying to Sitting on Side of Bed 6 Independent   Sit to Stand 6 Independent   Chair/Bed-to-Chair Transfer 6 Independent   Car Transfer 4 Supervision or touching assistance   Walk 10 Feet 4 Supervision or touching assistance   Walk 50 Feet with Two Turns 4 Supervision or touching assistance   Walk 150 Feet 4 Partial/moderate assistance   Walk 10 Feet Uneven Surface 4 Supervision or touching assistance   1 Step (Curb) 4 Partial/moderate assistance   4 Steps 4 Supervision or touching assistance   12 Steps   Partial/moderate assistance   Picking Up Object   Partial/moderate assistance   Wheel 50 Feet with Two Turns   Independent   Wheel 150 Feet   Independent       Therapeutic Activities and  "Exercises:  Pt's wife educated about proper sequencing during car transfer and discussed loading W/C in/out of vehicle.     Activity Tolerance: Good    Patient left up in chair with all lines intact, call button in reach, RN notified and Wife present.    Education provided: transfer training, gait training, safety awareness, body mechanics and fall prevention    Expected compliance: Good    GOALS:   Multidisciplinary Problems     Physical Therapy Goals        Problem: Physical Therapy    Goal Priority Disciplines Outcome Goal Variances Interventions   Physical Therapy Goal     PT, PT/OT Ongoing, Progressing     Description: Bed Mobility:  Roll left and right with supervision/touching assist - MET 7/18/22  Sit to supine transfer with supervision/touching assist. - MET 7/22/22  Supine to sit transfer with supervision/touching assist. - MET 7/22/22  Sit <> supine transfer, independent - MET 7/28/22    Transfers:  Sit to stand transfer with supervision/touching assist using RW or LRAD. - MET 7/18/22  Bed to chair transfer with supervision/touching assist using RW or LRAD. - MET 7/22/22  Car transfer with partial/moderate assist using RW or LRAD. - MET 7/22/22  Sit to stand transfer with set-up assist with RW - MET 7/22/22  Sit to stand transfer, independent with LBQC or LRAD - MET 7/28/22  Bed to chair transfer with set-up assist using LBQC or LRAD - MET 7/28/22  Car transfer with supervision assist using LBQC or LRAD - MET 7/27/22  Car transfer with set-up assist with LBQC or LRAD - initial    Mobility:  Ambulate 100 feet with partial/moderate assist using RW or LRAD. - MET 7/28/22  Ambulate 150 feet with supervision assist using LBQC or LRAD - initial  Ambulate 10 feet with set-up assist using LBQC - initial  Ambulate 15 feet on uneven surface with partial/moderate assist using LBQC or LRAD - initial  Ascend/descend 4 stairs with Chris rails or L rail with supervision/CGA assist - initial  Ascend/descend 4" curb with " LBQC with supervision/CGA assist - initial    WC mobility:   150ft with LUE/LLE with independent. - MET 7/28/22                     Plan:     During this hospitalization, patient to be seen 5 x/week to address the identified rehab impairments via gait training, therapeutic activities, therapeutic exercises, neuromuscular re-education and progress toward the following goals:     Plan of Care Expires:  08/03/22    Additional Information:         Time Tracking:     PT Received On: 08/01/22  Time In 1030     Time Out 1100  Total Time 30 min  Therapy Time PT Individual: 30  Missed Time:    Time Missed due to:      Billable Minutes: Gait Training 18 and Therapeutic Activity 12    08/01/2022

## 2022-08-01 NOTE — PROGRESS NOTES
[facility] ([facility])  Rehab Progress Note  MD Telemedicine Visit    Patient Name: Ayaz Huggins  MRN: [id number]  Age: 77 y.o. Sex: male  : 1945  Hospital Length of Stay: 20 days  Date of Service: 2022   Chief Complaint: Acute left hemispheric CVA s/p CPA and s/p left CEA on 2022    Subjective:     Basic Information  Admit Information: 77-year-old white male presented to [facility] ED on 2022 complaining of right-sided weakness.  PMH significant for DM type 2, CAD s/p CABG x2 in , and HTN.  CT head negative for hemorrhagic bleeding.  Received tPA and transferred to ICU for post tPA monitoring and treatment.  TTE with negative bubble study, but significant for grade 1 diastolic dysfunction.  Carotid ultrasound significant for left ICA stenosis.  MRI significant for last left hemispheric CVA and 3 other small punctate CVAs on the right hemisphere.  Tolerated left CEA on  without periprocedural complications.  Tolerated transfer to [facility] inpatient rehab unit on  without incident.  Today's Information: No acute events overnight.  Sitting up in chair.  Reports good sleep and appetite.  Last BM .  Vital signs at goal.  Potassium 5.8.  Repeat potassium 5.7.  Dietary reports supplements have a lot of potassium in them.  No imaging today.    Review of patient's allergies indicates:   Allergen Reactions    Penicillins         Current Facility-Administered Medications:     acetaminophen tablet 650 mg, 650 mg, Oral, Q4H PRN, Alexey A Kathryn, FNP    ALPRAZolam tablet 0.25 mg, 0.25 mg, Oral, TID PRN, Alexey A Kathryn, FNP    amLODIPine tablet 10 mg, 10 mg, Oral, Daily, Alexey A Kathryn, FNP, 10 mg at 22    aspirin tablet 325 mg, 325 mg, Oral, Daily, Alexey A Kathryn, FNP, 325 mg at 22    atorvastatin tablet 80 mg, 80 mg, Oral, QHS, Alexey A Kathryn, FNP, 80 mg at 22    baclofen tablet 5 mg, 5 mg, Oral, QHS,  Weston Rivera MD, 5 mg at 07/31/22 2022    benzonatate capsule 100 mg, 100 mg, Oral, TID PRN, Alexey A Kathryn, FNP    bisacodyL suppository 10 mg, 10 mg, Rectal, Daily PRN, Alexey A Kathryn, FNP    bisacodyL suppository 10 mg, 10 mg, Rectal, Once, Dorothea Calvert, FNP    bisacodyL suppository 10 mg, 10 mg, Rectal, Once, Dorothea Calvert, RAYMONDP    bisacodyL suppository 10 mg, 10 mg, Rectal, Once, Dorothea Calvert, RAYMONDP    carvediloL tablet 25 mg, 25 mg, Oral, BID WM, Alexey A Kathryn, FNP, 25 mg at 08/01/22 0810    clotrimazole 1 % cream, , Topical (Top), BID, Meghan Wolf, FNP, Given by Other at 08/01/22 0800    dextrose 50 % in water (D50W) injection 12.5 g, 12.5 g, Intravenous, PRN, Alexey A Kathryn, FNP    dextrose 50 % in water (D50W) injection 25 g, 25 g, Intravenous, PRN, Alexey A Kathryn, FNP    docusate sodium capsule 100 mg, 100 mg, Oral, BID PRN, Alexey A Kathryn, FNP    glucagon (human recombinant) injection 1 mg, 1 mg, Intramuscular, PRN, Alexey A Kathryn, FNP    glucose chewable tablet 16 g, 16 g, Oral, PRN, Alexey A Kathryn, FNP    glucose chewable tablet 24 g, 24 g, Oral, PRN, Alexey A Kathryn, FNP    hydrALAZINE injection 10 mg, 10 mg, Intravenous, Q4H PRN, Alexey A Kathryn, FNP    HYDROcodone-acetaminophen 5-325 mg per tablet 1 tablet, 1 tablet, Oral, Q6H PRN, Alexey A Kathryn, FNP    hydrOXYzine pamoate capsule 50 mg, 50 mg, Oral, QHS, Alexey A Kathryn, FNP, 50 mg at 07/31/22 2022    insulin aspart U-100 injection 1-10 Units, 1-10 Units, Subcutaneous, QID (AC + HS) PRN, Alexey Peralta FNP, 2 Units at 07/13/22 1213    labetalol 20 mg/4 mL (5 mg/mL) IV syring, 10 mg, Intravenous, Q4H PRN, Alexey Peralta, FNP, 10 mg at 07/13/22 0904    lisinopriL tablet 5 mg, 5 mg, Oral, Daily, RAYMOND GarciaP, 5 mg at 08/01/22 0810    metFORMIN tablet 1,000 mg, 1,000 mg, Oral, BID WM, Alexey Peralta, FNP, 1,000 mg at 08/01/22 0810     "methocarbamoL tablet 500 mg, 500 mg, Oral, TID PRN, Meghan Wolf, FNP, 500 mg at 07/29/22 2100    metoprolol injection 10 mg, 10 mg, Intravenous, Q2H PRN, Alexey Avalosehart, FNP    nitroGLYCERIN SL tablet 0.4 mg, 0.4 mg, Sublingual, Q5 Min PRN, Alexey A Kathryn, FNP    ondansetron disintegrating tablet 8 mg, 8 mg, Oral, Q6H PRN, Alexey A Kathryn, FNP    polyethylene glycol packet 17 g, 17 g, Oral, BID PRN, Alexey A Kathryn, FNP    promethazine tablet 25 mg, 25 mg, Oral, Q6H PRN, Alexey A Kathryn, FNP    sodium polystyrene 15 gram/60 mL suspension 30 g, 30 g, Oral, BID, Dorothea Calvert, FNP, 30 g at 08/01/22 1100     Review of Systems   Complete 12-point review of symptoms negative except for what's mentioned in HPI     Objective:     BP (!) 128/59   Pulse 74   Temp 98.2 °F (36.8 °C)   Resp 18   Ht 5' 10.98" (1.803 m)   Wt 82.2 kg (181 lb 3.5 oz)   SpO2 95%   BMI 25.29 kg/m²        Intake/Output Summary (Last 24 hours) at 8/1/2022 1144  Last data filed at 8/1/2022 0800  Gross per 24 hour   Intake 840 ml   Output 400 ml   Net 440 ml       Physical Exam  Constitutional:       Appearance: Normal appearance.   HENT:      Head: Normocephalic.      Mouth/Throat:      Mouth: Mucous membranes are moist.   Eyes:      Pupils: Pupils are equal, round, and reactive to light.   Neck:      Comments: Left CEA incision clean intact with mild postsurgical swelling  Cardiovascular:      Rate and Rhythm: Normal rate and regular rhythm.      Heart sounds: Normal heart sounds.   Pulmonary:      Effort: Pulmonary effort is normal.      Breath sounds: Normal breath sounds.   Abdominal:      General: Bowel sounds are normal.      Palpations: Abdomen is soft.   Musculoskeletal:      Cervical back: Neck supple.      Comments: Generalized weakness and muscle atrophy.  Right upper extremity dependent swelling   Skin:     General: Skin is warm and dry.   Neurological:      Mental Status: He is alert and oriented " to person, place, and time.      Comments: Right-sided hemiplegia.  Can move RUE against gravity.  LLE flaccid.    Psychiatric:         Mood and Affect: Mood normal.         Behavior: Behavior normal.         Thought Content: Thought content normal.         Judgment: Judgment normal.        Lines/Drains/Airways     None               Labs  Recent Results (from the past 24 hour(s))   POCT glucose    Collection Time: 07/31/22  4:25 PM   Result Value Ref Range    POCT Glucose 93 70 - 110 mg/dL   Prealbumin    Collection Time: 08/01/22  5:30 AM   Result Value Ref Range    Prealbumin 20.6 16.0 - 42.0 mg/dL   Comprehensive Metabolic Panel    Collection Time: 08/01/22  5:30 AM   Result Value Ref Range    Sodium Level 135 (L) 136 - 145 mmol/L    Potassium Level 5.8 (H) 3.5 - 5.1 mmol/L    Chloride 101 98 - 107 mmol/L    Carbon Dioxide 23 23 - 31 mmol/L    Glucose Level 125 (H) 82 - 115 mg/dL    Blood Urea Nitrogen 34.9 (H) 8.4 - 25.7 mg/dL    Creatinine 0.74 0.73 - 1.18 mg/dL    Calcium Level Total 9.5 8.8 - 10.0 mg/dL    Protein Total 6.5 5.8 - 7.6 gm/dL    Albumin Level 3.4 3.4 - 4.8 gm/dL    Globulin 3.1 2.4 - 3.5 gm/dL    Albumin/Globulin Ratio 1.1 1.1 - 2.0 ratio    Bilirubin Total 0.4 <=1.5 mg/dL    Alkaline Phosphatase 68 40 - 150 unit/L    Alanine Aminotransferase 27 0 - 55 unit/L    Aspartate Aminotransferase 20 5 - 34 unit/L    Estimated GFR-Non  >60 mls/min/1.73/m2   Magnesium    Collection Time: 08/01/22  5:30 AM   Result Value Ref Range    Magnesium Level 1.70 1.60 - 2.60 mg/dL   Phosphorus    Collection Time: 08/01/22  5:30 AM   Result Value Ref Range    Phosphorus Level 3.5 2.3 - 4.7 mg/dL   CBC with Differential    Collection Time: 08/01/22  5:30 AM   Result Value Ref Range    WBC 8.1 4.5 - 11.5 x10(3)/mcL    RBC 3.61 (L) 4.70 - 6.10 x10(6)/mcL    Hgb 10.8 (L) 14.0 - 18.0 gm/dL    Hct 30.9 (L) 42.0 - 52.0 %    MCV 85.6 80.0 - 94.0 fL    MCH 29.9 27.0 - 31.0 pg    MCHC 35.0 33.0 - 36.0 mg/dL     RDW 12.9 11.5 - 17.0 %    Platelet 208 130 - 400 x10(3)/mcL    MPV 11.0 (H) 7.4 - 10.4 fL    Neut % 59.2 %    Lymph % 25.3 %    Mono % 9.4 %    Eos % 5.4 %    Basophil % 0.5 %    Lymph # 2.05 0.6 - 4.6 x10(3)/mcL    Neut # 4.8 2.1 - 9.2 x10(3)/mcL    Mono # 0.76 0.1 - 1.3 x10(3)/mcL    Eos # 0.44 0 - 0.9 x10(3)/mcL    Baso # 0.04 0 - 0.2 x10(3)/mcL    IG# 0.02 0 - 0.04 x10(3)/mcL    IG% 0.2 %    NRBC% 0.0 %   POCT glucose    Collection Time: 08/01/22  6:04 AM   Result Value Ref Range    POCT Glucose 124 (H) 70 - 110 mg/dL   Potassium    Collection Time: 08/01/22  8:18 AM   Result Value Ref Range    Potassium Level 5.7 (H) 3.5 - 5.1 mmol/L     Radiology  MRI brain without contrast on 07/03/2022 at 2:09 p.m., IMPRESSION: Small acute infarcts supratentorially, the largest is a left posterior frontal 3 cm cortical based infarct with petechial hemorrhage.  No mass effect or shift.  Discussed with Dr. Duncan. Otherwise no acute intracranial abnormalities.  Radiology  Transthoracic echo on 07/02/2022 at 2:40 p.m., IMPRESSION: The left ventricle is  with normal systolic function. The estimated ejection fraction is 60%. There is grade I diastolic dysfunction consistent with impaired relaxation. Left atrial pressure is normal.    Assessment/Plan:     77 y.o. WM admitted on 7/12/2022    Acute left hemispheric CVA   - MRI with 3 other small punctate CVAs on the right hemisphere  - s/p tPA and s/p left CEA on 7/8/2022  - continue                Aspirin 325 mg daily                Lipitor 80 mg at bedtime                Xarelto 20 mg daily   - right knee brace obtained on 7/13  - waiting for right AFO from orthotics  - defer to physiatry for rehab and pain management  - PT/OT/RT following      Carotid artery stenosis  - s/p left CEA on 07/08/2022  - continue                Aspirin 325 mg daily                Lipitor 80 mg at bedtime                Xarelto 20 mg daily                Norco 5 mg/325 mg q.6 hours p.r.n.  - to  follow up with vascular surgery outpatient     HTN  - BP at goal!!  - discontinued Metoprolol succinate 50 mg daily on 7/13  - discontinued Lisinopril 5 mg daily on 8/1  - continue     Coreg 25 mg BID (initiated 7/13) (increased 7/14)                Norvasc 10 mg daily                Hydralazine 10 mg every 2 hours as needed for BP > 160/90                Labetalol 10 mg every 2 hours as needed for BP > 160/90     DM type II  - HgA1c with next labs  - continue                Metformin 1000 mg twice daily                ISS   - CBGs AC/HS     Normocytic anemia  - asymptomatic  - H/H stable   - no evidence of active bleeds  - will closely monitor and transfuse if needed      Constipation  - stable  - continue                Colace 100 mg b.i.d. PRN                MiraLax 17 g daily PRN    Hyperkalemia  - Potassium 5.7  - discontinued Lisinopril 5 mg daily on 8/1  - Initiate   Kayexalate 30 g x 1 dose now  - Registered dietitian taking off supplements with potassium  - Repeat BMP in a.m.     VTE Prophylaxis:  Xarelto 20 mg daily  COVID-19 testing:  Negative on 07/12/2022  COVID-19 vaccination status:  Vaccinated (Moderna):  x 3     POA: No  Living will: No  Contacts:  Pooja Huggins (wife) [phone]                     Eleonora Manzanares (daughter) [phone]     CODE STATUS: Full Code  Internal Medicine (attending): Weston Rivera MD  Physiatry (consulting): Angel Em MD     OUTPATIENT PROVIDERS  Angel Parham MD  Neurology: Jacque Agee MD  Vascular surgery:  Roxi Lama MD  Cardiology: Dionisio Marin MD     DISPOSITION: Condition stable.  Sleep hygiene, bowel maintenance at goal.  Appetite is good.  No acute complaints..  Vital signs at goal with no recorded fevers.  Potassium 5.8.  Repeat potassium 5.7.  Initiate Kayexalate 30 g x 1 dose now and repeat BMP in a.m. Discontinue lisinopril 5 mg daily.  Hold discharge today.  Continue aggressive mobilization as tolerated.  Monitor closely.  Notify of  acute changes.  Staffing completed today.    Staffing 8/1/2022: Continent of bowel and bladder. RT: motivated and determined. PT: Independent bed mobility. Contact guard for mobilization. Wife is improving, confident in assisting at home. OT: independent overall, contact guard when standing for bathing.     *Verbal consent obtained for live A/V Telehealth Visit     Originating Site: [facility]  Place of service of originating site: Inpatient Rehab Facility  Distant Site (MD Location):     [address] [address]  Synchronous Communication Encounter Clock Time: 6 minutes     Bakari Peralta NP conducted independent physical examination and assisted with medical documentation.    Total time spent on this encounter including chart review and direct MD + NP 1-on-1 patient interaction: 41 minutes   Over 50% of this time was spent in counseling and coordination of care

## 2022-08-01 NOTE — DISCHARGE SUMMARY
[facility] ([facility])  Rehab Discharge Summary  MD Telemedicine Visit    Patient Name: Ayaz Huggins  MRN: [id number]  Age: 77 y.o. Sex: male  : 1945  Hospital Length of Stay: 21 days   Date of Service: 2022      Discharge Information   Date of Admission: 2022  Date of Discharge: 2022  Admit Diagnosis:  Acute left hemispheric CVA          Carotid artery stenosis          HTN          DM type 2          Normocytic anemia          Constipation  Discharge Diagnosis: Acute left hemispheric CVA (stable)            Carotid artery stenosis (stable)                 HTN (stable)            DM type 2 (stable)            Normocytic anemia (stable)            Constipation (stable)  COVID-19 testing:  Negative on 2022  COVID-19 vaccination status:  Vaccinated (Moderna):  x 3    Internal Medicine (attending): Weston Rivera MD  Physiatry (consulting): Angel Em MD     OUTPATIENT PROVIDERS  Angel Parham MD  Neurology: Jacque Agee MD  Vascular surgery:  Roxi Lama MD  Cardiology: [facility]   77[facility]year-[facility] of right-sided weakness.  PMH significant for DM type 2, CAD s/p CABG x2 in , and HTN.  CT head negative for hemorrhagic bleeding.  Received tPA and transferred to ICU for post tPA monitoring and treatment.  TTE with negative bubble study, but significant for grade 1 diastolic dysfunction.  Carotid ultrasound significant for left ICA stenosis.  MRI significant for last left hemispheric CVA and 3 other small punctate CVAs on the right hemisphere.  Tolerated left CEA on  without periprocedural complications.  Tolerated transfer to [facility] inpatient rehab unit on  without incident.    During inpatient rehab course metoprolol discontinued and Coreg 12.5 mg b.i.d. initiated on .  Coreg increased to 25 mg b.i.d. on .  AFO obtain.  Continue to  "participate in progress in therapy.  Overall independent to contact guard with ADLs.  PT reports overall monitor and to supervision with PT.  He is a mod assistance with 4 stairs and bilateral railing.  Ambulates 10 ft on uneven surfaces/Ramp  with quad cane with moderate assistance.  Vital signs at goal with no reported fevers.  Potassium elevated due to supplements on 08/01.  Received Kayexalate 30 g x 1 and supplements discontinued per registered dietitian.  Med reconciliation completed.  Discharge orders initiated.  Stable for transfer home home health.  To follow-up with scheduled appointments documented below.    Staffing 8/1: Continent of bowel and bladder. RT: motivated and determined. PT: Independent bed mobility. Contact guard for mobilization. Wife is improving, confident in assisting at home. OT: independent overall, contact guard when standing for bathing.     Chief Complaint: Acute left hemispheric CVA s/p CPA and s/p left CEA on 07/08/2022    BP (!) 146/62   Pulse 84   Temp 97.8 °F (36.6 °C) (Oral)   Resp 18   Ht 5' 10.98" (1.803 m)   Wt 82.2 kg (181 lb 3.5 oz)   SpO2 97%   BMI 25.29 kg/m²      Physical Exam  Constitutional:       Appearance: Normal appearance.   HENT:      Head: Normocephalic.      Mouth/Throat:      Mouth: Mucous membranes are moist.   Eyes:      Pupils: Pupils are equal, round, and reactive to light.   Cardiovascular:      Rate and Rhythm: Normal rate and regular rhythm.      Heart sounds: Normal heart sounds.   Pulmonary:      Effort: Pulmonary effort is normal.      Breath sounds: Normal breath sounds.   Abdominal:      General: Bowel sounds are normal.      Palpations: Abdomen is soft.   Musculoskeletal:      Cervical back: Neck supple.      Comments: Generalized weakness and muscle atrophy.  Right upper extremity dependent swelling   Skin:     General: Skin is warm and dry.   Neurological:      Mental Status: He is alert and oriented to person, place, and time.      " Comments: Right-sided hemiplegia.  Can move RUE against gravity.  LLE flaccid.    Psychiatric:         Mood and Affect: Mood normal.         Behavior: Behavior normal.         Thought Content: Thought content normal.         Judgment: Judgment normal.      Labs  Recent Results (from the past 24 hour(s))   POCT glucose    Collection Time: 08/01/22  4:06 PM   Result Value Ref Range    POCT Glucose 107 70 - 110 mg/dL   Basic Metabolic Panel    Collection Time: 08/02/22  5:32 AM   Result Value Ref Range    Sodium Level 137 136 - 145 mmol/L    Potassium Level 4.6 3.5 - 5.1 mmol/L    Chloride 104 98 - 107 mmol/L    Carbon Dioxide 25 23 - 31 mmol/L    Glucose Level 128 (H) 82 - 115 mg/dL    Blood Urea Nitrogen 21.6 8.4 - 25.7 mg/dL    Creatinine 0.67 (L) 0.73 - 1.18 mg/dL    BUN/Creatinine Ratio 32     Calcium Level Total 9.4 8.8 - 10.0 mg/dL    Estimated GFR-Non  >60 mls/min/1.73/m2    Anion Gap 8.0 mEq/L   POCT glucose    Collection Time: 08/02/22  6:05 AM   Result Value Ref Range    POCT Glucose 129 (H) 70 - 110 mg/dL   Radiology  MRI brain without contrast on 07/03/2022 at 2:09 p.m., IMPRESSION: Small acute infarcts supratentorially, the largest is a left posterior frontal 3 cm cortical based infarct with petechial hemorrhage.  No mass effect or shift.  Discussed with Dr. Duncan. Otherwise no acute intracranial abnormalities.  Radiology  Transthoracic echo on 07/02/2022 at 2:40 p.m., IMPRESSION: The left ventricle is  with normal systolic function. The estimated ejection fraction is 60%. There is grade I diastolic dysfunction consistent with impaired relaxation. Left atrial pressure is normal.    Discharge Summary Plan   Discharge Status: Improved    Location: Discharge home with     Medications: See discharge medicine reconciliation    Activity: as tolerated    Diet: ADA    Instructions:  Take all medications as prescribed.      Attend appointments as scheduled.      Return to ED if symptoms worsen,  or if t > 100.4.    Education: CVA. HTN. DM type 2    Follow-up:  Angel Parham MD with 1 week      Jacque Wang MD on 8/30/2022 at 1500      Dread Lama MD on 8/24/2022 at 1045      Ghislaine Marin MD within 2-4 weeks    Discussed plan of care, and patient communicated understanding. Agreed to comply with recommendations.    *Verbal consent obtained for live A/V Telehealth Visit     Originating Site: [facility]  Place of service of originating site: Inpatient Rehab Facility  Distant Site (MD Location):     [facility]                                                          [id number] [place] [address]  Synchronous Communication Encounter Clock Time: 6 minutes    Bakari Peralta NP conducted independent examination and assisted with medical documentation.     Discharge Time: 51 minutes

## 2022-08-02 VITALS
TEMPERATURE: 98 F | HEART RATE: 95 BPM | RESPIRATION RATE: 18 BRPM | HEIGHT: 71 IN | WEIGHT: 181.19 LBS | DIASTOLIC BLOOD PRESSURE: 72 MMHG | BODY MASS INDEX: 25.37 KG/M2 | OXYGEN SATURATION: 97 % | SYSTOLIC BLOOD PRESSURE: 146 MMHG

## 2022-08-02 LAB
ANION GAP SERPL CALC-SCNC: 8 MEQ/L
BUN SERPL-MCNC: 21.6 MG/DL (ref 8.4–25.7)
CALCIUM SERPL-MCNC: 9.4 MG/DL (ref 8.8–10)
CHLORIDE SERPL-SCNC: 104 MMOL/L (ref 98–107)
CO2 SERPL-SCNC: 25 MMOL/L (ref 23–31)
CREAT SERPL-MCNC: 0.67 MG/DL (ref 0.73–1.18)
CREAT/UREA NIT SERPL: 32
GLUCOSE SERPL-MCNC: 128 MG/DL (ref 82–115)
POCT GLUCOSE: 129 MG/DL (ref 70–110)
POTASSIUM SERPL-SCNC: 4.6 MMOL/L (ref 3.5–5.1)
SODIUM SERPL-SCNC: 137 MMOL/L (ref 136–145)

## 2022-08-02 PROCEDURE — 80048 BASIC METABOLIC PNL TOTAL CA: CPT | Performed by: NURSE PRACTITIONER

## 2022-08-02 PROCEDURE — 25000003 PHARM REV CODE 250: Performed by: NURSE PRACTITIONER

## 2022-08-02 PROCEDURE — 36415 COLL VENOUS BLD VENIPUNCTURE: CPT | Performed by: NURSE PRACTITIONER

## 2022-08-02 PROCEDURE — 94799 UNLISTED PULMONARY SVC/PX: CPT

## 2022-08-02 PROCEDURE — 99900035 HC TECH TIME PER 15 MIN (STAT)

## 2022-08-02 PROCEDURE — 97530 THERAPEUTIC ACTIVITIES: CPT

## 2022-08-02 RX ADMIN — METFORMIN HYDROCHLORIDE 1000 MG: 500 TABLET, FILM COATED ORAL at 09:08

## 2022-08-02 RX ADMIN — ASPIRIN 325 MG ORAL TABLET 325 MG: 325 PILL ORAL at 09:08

## 2022-08-02 RX ADMIN — CARVEDILOL 25 MG: 25 TABLET, FILM COATED ORAL at 09:08

## 2022-08-02 RX ADMIN — AMLODIPINE BESYLATE 10 MG: 5 TABLET ORAL at 09:08

## 2022-08-02 NOTE — PLAN OF CARE
Confirmed today's discharge with SANDRA Villegas.  No redraws needed via HH per Bakari.    Contacted [facility] via [facility] to relay discharge.  They will provide HH PT/OT/RN services.  Will send AVS once it is completed.    Relayed discharge to David with [facility].  They delivered LBQC, w/c, and BSC yesterday.

## 2022-08-02 NOTE — PT/OT/SLP PROGRESS
"Physical Therapy Inpatient Rehab Treatment    Patient Name:  Ayaz Huggins   MRN:  [id number]    Recommendations:     Discharge Recommendations:  outpatient PT, outpatient OT   Discharge Equipment Recommendations: bedside commode, grab bar, bath bench   Barriers to discharge: None    Assessment:     Ayaz Huggins is a 77 y.o. male admitted with a medical diagnosis of Stroke.  He presents with the following impairments/functional limitations:  weakness, impaired endurance, impaired functional mobility, gait instability, impaired balance, decreased upper extremity function, decreased lower extremity function, abnormal tone.    Rehab Diagnosis: CVA    Recent Surgery: * No surgery found *      General Precautions: Standard, fall     Orthopedic Precautions:N/A     Braces: AFO    Rehab Prognosis: Good; patient would benefit from acute skilled PT services to address these deficits and reach maximum level of function.      History:     Past Medical History:   Diagnosis Date    Diabetes mellitus     Hypertension     Mixed hyperlipidemia        Past Surgical History:   Procedure Laterality Date    CARDIAC SURGERY      CAROTID ENDARTERECTOMY Right 7/8/2022    Procedure: ENDARTERECTOMY, CAROTID;  Surgeon: Dread Lama MD;  Location: [facility];  Service: Peripheral Vascular;  Laterality: Right;  RIGHT CAROTID ENDARTERECTOMY       Subjective     Chief Complaint: "I had a rough night because of the medicine to get my potassium to go back down"  Patient/Family Comments/goals: To go home today    Objective:     Communicated with MARVA Hicks during session.  Patient found up in chair with  wife and daughter present upon PT entry to room.    Pt is Oriented x3 and Alert, Cooperative and Motivated.    Pt seen for PT last visit session. Answered all last questions/concerns as appropriate. Discussed with Shelbi , about including  contact info in pt's DC packet to adjust AFO. Pt ready for DC to home later " "today.    Patient left up in chair with wife and daughter present.    Education provided: roles and goals of PT/PTA and DC recommendations    Expected compliance: High    GOALS:   Multidisciplinary Problems     Physical Therapy Goals        Problem: Physical Therapy    Goal Priority Disciplines Outcome Goal Variances Interventions   Physical Therapy Goal     PT, PT/OT Ongoing, Progressing     Description: Bed Mobility:  Roll left and right with supervision/touching assist - MET 7/18/22  Sit to supine transfer with supervision/touching assist. - MET 7/22/22  Supine to sit transfer with supervision/touching assist. - MET 7/22/22  Sit <> supine transfer, independent - MET 7/28/22    Transfers:  Sit to stand transfer with supervision/touching assist using RW or LRAD. - MET 7/18/22  Bed to chair transfer with supervision/touching assist using RW or LRAD. - MET 7/22/22  Car transfer with partial/moderate assist using RW or LRAD. - MET 7/22/22  Sit to stand transfer with set-up assist with RW - MET 7/22/22  Sit to stand transfer, independent with LBQC or LRAD - MET 7/28/22  Bed to chair transfer with set-up assist using LBQC or LRAD - MET 7/28/22  Car transfer with supervision assist using LBQC or LRAD - MET 7/27/22  Car transfer with set-up assist with LBQC or LRAD - initial    Mobility:  Ambulate 100 feet with partial/moderate assist using RW or LRAD. - MET 7/28/22  Ambulate 150 feet with supervision assist using LBQC or LRAD - initial  Ambulate 10 feet with set-up assist using LBQC - initial  Ambulate 15 feet on uneven surface with partial/moderate assist using LBQC or LRAD - initial  Ascend/descend 4 stairs with Chris rails or L rail with supervision/CGA assist - initial  Ascend/descend 4" curb with LBQC with supervision/CGA assist - initial    WC mobility:   150ft with LUE/LLE with independent. - MET 7/28/22                     Plan:     During this hospitalization, patient to be seen 5 x/week to address the identified " rehab impairments via gait training, therapeutic activities, therapeutic exercises, neuromuscular re-education and progress toward the following goals:     Plan of Care Expires:  08/03/22    Additional Information:         Time Tracking:     PT Received On:    Time In 1030     Time Out 1045  Total Time 15 min  Therapy Time PT Individual: 15    Billable Minutes: Therapeutic Activity 15    08/02/2022

## 2022-08-02 NOTE — PLAN OF CARE
Problem: Rehabilitation (IR[facility]) Plan of Care  Goal: Plan of Care Review  Outcome: Met  Goal: Patient-Specific Goal (Individualized)  Outcome: Met  Goal: Absence of New-Onset Illness or Injury  Outcome: Met  Goal: Optimal Comfort and Wellbeing  Outcome: Met  Goal: Readiness for Transition of Care  Outcome: Met     Problem: Impaired Wound Healing  Goal: Optimal Wound Healing  Outcome: Met     Problem: Skin Injury Risk Increased  Goal: Skin Health and Integrity  Outcome: Met     Problem: Diabetes Comorbidity  Goal: Blood Glucose Level Within Targeted Range  Outcome: Met

## 2022-08-02 NOTE — PT/OT/SLP DISCHARGE
"Physical Therapy Discharge Summary    Name: Ayaz Huggins  MRN: [id number]   Principal Problem: Stroke     Patient Discharged from [facility] on 8/2/22.     Assessment:     Pt has made great progress while at [facility] and is ready for DC from hospital    Objective:     GOALS:   Multidisciplinary Problems     Physical Therapy Goals     Not on file          Multidisciplinary Problems (Resolved)        Problem: Physical Therapy    Goal Priority Disciplines Outcome Goal Variances Interventions   Physical Therapy Goal   (Resolved)     PT, PT/OT Met     Description: Bed Mobility:  Roll left and right with supervision/touching assist - MET 7/18/22  Sit to supine transfer with supervision/touching assist. - MET 7/22/22  Supine to sit transfer with supervision/touching assist. - MET 7/22/22  Sit <> supine transfer, independent - MET 7/28/22    Transfers:  Sit to stand transfer with supervision/touching assist using RW or LRAD. - MET 7/18/22  Bed to chair transfer with supervision/touching assist using RW or LRAD. - MET 7/22/22  Car transfer with partial/moderate assist using RW or LRAD. - MET 7/22/22  Sit to stand transfer with set-up assist with RW - MET 7/22/22  Sit to stand transfer, independent with LBQC or LRAD - MET 7/28/22  Bed to chair transfer with set-up assist using LBQC or LRAD - MET 7/28/22  Car transfer with supervision assist using LBQC or LRAD - MET 7/27/22  Car transfer with set-up assist with LBQC or LRAD - MET    Mobility:  Ambulate 100 feet with partial/moderate assist using RW or LRAD. - MET 7/28/22  Ambulate 150 feet with supervision assist using LBQC or LRAD - MET 8/1/22  Ambulate 10 feet with set-up assist using LBQC - not met  Ambulate 15 feet on uneven surface with partial/moderate assist using LBQC or LRAD - MET 8/1/22  Ascend/descend 4 stairs with Chris rails or L rail with supervision/CGA assist - MET 8/1/22  Ascend/descend 4" curb with LBQC with supervision/CGA assist - MET 8/1/22    WC " mobility:   150ft with LUE/LLE with independent. - MET 7/28/22                     Care Scores:   Admission Assessment Current   Status  Discharge   Goal   Functional Area: Care Score:  Care Score:    Roll Left and Right 3 6 Independent   Sit to Lying 3 6 Independent   Lying to Sitting on Side of Bed 3 6 Independent   Sit to Stand 3 6 Independent   Chair/Bed-to-Chair Transfer 3 6 Independent   Car Transfer 1 4 Supervision or touching assistance   Walk 10 Feet 88 4 Supervision or touching assistance   Walk 50 Feet with Two Turns 88 4 Supervision or touching assistance   Walk 150 Feet 88 4 Partial/moderate assistance   Walk 10 Feet Uneven Surface 88 4 Supervision or touching assistance   1 Step (Curb) 88 4 Partial/moderate assistance   4 Steps 88 4 Supervision or touching assistance   12 Steps 88 88 Partial/moderate assistance   Picking Up Object 88 4 Partial/moderate assistance   Wheel 50 Feet with Two Turns 3 6 Independent   Wheel 150 Feet 2 6 Independent       Reasons for Discontinuation of Therapy Services  Pt is ready for DC from IRF with continued therapy through Home Health and Outpatient Therapy      Lifestyle Change: To eat healthy and to stay active.    Plan:     Patient Discharged to: Home with Home Health Service.    8/2/2022

## 2022-08-02 NOTE — PROGRESS NOTES
08/02/22 0830   Rec Therapy Time Calculation   Date of Treatment 08/02/22   Rec Start Time 0830   Rec Stop Time 0900   Rec Total Time (min) 30 min   Time   Treatment time 2 units   Vital Signs   Pulse 91   BP (!) 141/65   Overall Level of Functioning   Activity Tolerance Independent   Right UE Coodination/Dexterity Min A   Left UE Coordination/Dexterity Independent   Problem Solving/Sequencing Skills Independent   Memory Recall Independent   Attention Span Independent   Plan   Patient to be seen Daily   Planned Duration Other (Comment)  (1 day)   Treatments Planned Energy conservation training;Fine motor;Coordination   Treatment plan/goals estblished with Patient/Caregiver Yes

## 2022-08-02 NOTE — PT/OT/SLP DISCHARGE
Recreational Therapy Discharge      Date of Treatment: 08/02/22  Start Time: 0830  Stop Time: 0900  Total Time: 30 min  Missed Time:     Assessment      Ayaz Huggins is a 77 y.o. male admitted with a medical diagnosis of Stroke.  He presents with the following impairments/functional limitations:  weakness, impaired balance, decreased upper extremity function, decreased lower extremity function, impaired coordination, impaired fine motor    Rehab Diagnosis:    Recent Surgery:     General Precautions: Standard, fall     Orthopedic Precautions:N/A     Braces: AFO    Rehab Prognosis: Good; patient would benefit from acute skilled Recreational Therapy services to address these deficits and reach maximum level of function.      Impairments: Coordination deficits, Range of motion deficits and Strength deficits  Rehab Potential: Good  Treatment Recommendations: Complete discharge plan  Treatment Diagnosis: R sided weakness s/p TPA, acute L hemispheric ischemic CVA, CEA, DM, CAD, HLD, HTN  Orientation: Oriented x4  Affect/Behavior: Appropriate  Safety/Judgement: intact   Basic Command Following: {Desc; intact/  Spiritual Cultural: no        History     Past Medical History:   Diagnosis Date    Diabetes mellitus     Hypertension     Mixed hyperlipidemia        Past Surgical History:   Procedure Laterality Date    CARDIAC SURGERY      CAROTID ENDARTERECTOMY Right 7/8/2022    Procedure: ENDARTERECTOMY, CAROTID;  Surgeon: Dread Lama MD;  Location: [facility];  Service: Peripheral Vascular;  Laterality: Right;  RIGHT CAROTID ENDARTERECTOMY       Home Environment     Living Situation  Lives With: spouse  Name(s) of Who Lives With Patient: Wife  Lives in: house  Patients Responsibilities: Community mobility, , Financial management, Health and wellness, Laundry, Leisure/play/hobbies, Meal preparation, Retired, Shopping, Yard Work    Instrumental Activities of Daily Living     Previous Hand Dominance: Right Current Hand  "Dominance: Right (R sided weakness)     Other iADL Information:        Cognitive Skills Building                Comment:        Dynamic Activities      Activity Assist Position Equipment Response Comment   Activity 1 Washer toss modified independence Standing Cane, quad and Metal washers good             Comment:        Fine Motor Activities      Activity Assist Position Equipment Response Comment            Comment:          Goals     Patient Goals  Patient Goal 1: "I hope to walk out of here , take care of myself and ride my bike again    Short Term Goals    Goal  Goal Status   Will increase sit to stand to mod. Initial Met   Will increase RUE coordination/dexteriety to min. Initial Met   Will improve dynamic standing balance/reaching to min. Initial Met           Long Term Goals    Goal Goal Status   Will increase standing tolerance to 5 minutes.  Initial Met   Will increase RUE coordination/dexteriety to supervision. Initial Met   Will improve dynamic standing balance/reaching to supervision. Initial Met               Plan       Patient to be seen: Daily  Duration: Other (Comment) (1 day)  Treatments planned: Energy conservation training, Fine motor, Coordination  Treatment plan/goals established with Patient/Caregiver: Yes     "

## 2022-08-02 NOTE — PT/OT/SLP DISCHARGE
"Discharge Summary    Pt able to recall previously discussed lifestyle change of LIFESTYLE CHANGE.    "To eat healthier."  "

## 2022-08-03 NOTE — PT/OT/SLP DISCHARGE
Occupational Therapy Discharge Summary    Ayaz Huggins  MRN: [id number]   Principal Problem: Stroke      Patient Discharged from acute Occupational Therapy on 8/2/22.    Assessment:      Goals partially met.    Objective:     GOALS:   Multidisciplinary Problems     Occupational Therapy Goals        Problem: Occupational Therapy    Goal Priority Disciplines Outcome Interventions   Occupational Therapy Goal     OT, PT/OT Ongoing, Progressing    Description: LTG: Pt will be Ind with all ADLS.  Pt LTG: to return to bike riding    ADLs:  MET: Pt to perform grooming tasks with Fisher from seated position by d/c.  MET: Pt to perform UB dressing with Setup using modified techniques.  Pt to perform LB dressing with SPV using modified techniques.  Pt to perform putting on/off footwear task with SPV.  MET: Pt to perform toileting with Partial A using BSC.   Pt to perform toileting with CGA using BSC and LRAD.    Functional Transfers:  MET: Pt to perform toilet transfers with Partial A using BSC and LRAD for mobility.    Pt to perform toilet transfers with SPV using BSC and LRAD for mobility.    MET: Pt to perform a tub transfer with Max A by re-eval using TTB.   Pt to perform a tub t/f with CGA via TTB and Gbs.      Balance, Strengthening, Endurance, Balance:  Pt  will perform dynamic standing tasks using LRAD for 2 minutes with SPV assist.  Pt to demonstrate 4/5 R shoulder strength and 5/5 distal RUE strength during functional task/                     Care Scores:   Admission Assessment Current Status Discharge  Goal   Functional Area: Care Score:  Care Score:    Eating 6 6 Independent   Oral Hygiene 5 6 Independent   Toileting Hygiene 2 4 Independent   Shower/Bathe Self 3 4 Independent   Upper Body Dressing 3 5 Independent   Lower Body Dressing 2 4 Independent   Putting On/Taking Off Footwear 3 5 Independent   Toilet Transfer 2 4 Independent     Reasons for Discontinuation of Therapy Services   Satisfactory goal  achievement.      Lifestyle Change: To eat healthy and to stay active.    Plan:     Patient Discharged to: Home with Home Health Service and Outpatient Therapy Services      8/3/2022

## 2022-08-30 ENCOUNTER — OFFICE VISIT (OUTPATIENT)
Dept: NEUROLOGY | Facility: CLINIC | Age: 77
End: 2022-08-30
Payer: MEDICARE

## 2022-08-30 VITALS
DIASTOLIC BLOOD PRESSURE: 71 MMHG | HEIGHT: 70 IN | SYSTOLIC BLOOD PRESSURE: 158 MMHG | BODY MASS INDEX: 25.91 KG/M2 | WEIGHT: 181 LBS

## 2022-08-30 DIAGNOSIS — I63.9 CEREBROVASCULAR ACCIDENT (CVA), UNSPECIFIED MECHANISM: Primary | ICD-10-CM

## 2022-08-30 PROCEDURE — 99213 OFFICE O/P EST LOW 20 MIN: CPT | Mod: S$PBB,,, | Performed by: NURSE PRACTITIONER

## 2022-08-30 PROCEDURE — 99999 PR PBB SHADOW E&M-EST. PATIENT-LVL III: ICD-10-PCS | Mod: PBBFAC,,, | Performed by: NURSE PRACTITIONER

## 2022-08-30 PROCEDURE — 99213 PR OFFICE/OUTPT VISIT, EST, LEVL III, 20-29 MIN: ICD-10-PCS | Mod: S$PBB,,, | Performed by: NURSE PRACTITIONER

## 2022-08-30 PROCEDURE — 99999 PR PBB SHADOW E&M-EST. PATIENT-LVL III: CPT | Mod: PBBFAC,,, | Performed by: NURSE PRACTITIONER

## 2022-08-30 PROCEDURE — 99213 OFFICE O/P EST LOW 20 MIN: CPT | Mod: PBBFAC | Performed by: NURSE PRACTITIONER

## 2022-08-30 NOTE — PROGRESS NOTES
Subjective:       Patient ID: Ayaz Huggins is a 77 y.o. male.    Chief Complaint:  Stroke (Hosp. F/u . Pt denies HA, blurred vision, slurred speech. Pt states right sided weakness. )      History of Present Illness  Patient presents for with medical history of DM, HTN, HLD, diabetic neuropathy, CAD s/p CABG presents for hospital follow up of stroke. On 7/2/33 patient presented to ED with reports of right sided hemiplegia. CT head on admit normal, patient given IV TPA and admitted to hospital for stroke workup.  Stroke workup as listed below:  -CTh: no acute intracranial abnormalities  -CTA h/n: moderate left and mild right carotid bulb stenoses  -MRI brain: small acute infarcts supratentorially, the largest is a left posterior frontal 3cm cortical based infarct with petechial hemorrhage  -ECHO: EF 60-65%, bubble study negative, mild LA enlargement  -LDL: 48  -A1c: 5.9  -TSH: 1.4067  -reported compliance with ASA 81mg daily and Rosuvastatin 10mg daily at home    Patient was also found to have severe stenosis of left carotid on US and had Left CEA by Dr. Lama during hospitalization. Today patient denies any new onset of numbness, tingling or weakness. Denies any falls. Denies any difficulty swallowing or with speech. Reports in outpatient PT, was able to complete PT without AFO brace this week. Feels as if his right arm is getting stronger as well as his right leg. Recently saw Dr. Lama who stated that Left carotid was clear and that right carotid had minimal plaque.        Review of Systems  Review of Systems   Neurological:  Positive for weakness. Negative for dizziness, facial asymmetry and headaches.   All other systems reviewed and are negative.    Objective:      Neurologic Exam     Mental Status   Oriented to person, place, and time.   Attention: normal. Concentration: normal.   Speech: speech is normal   Level of consciousness: alert  Knowledge: good.   Normal comprehension.     Cranial Nerves     CN II    Visual fields full to confrontation.     CN III, IV, VI   Pupils are equal, round, and reactive to light.  Extraocular motions are normal.     CN V   Facial sensation intact.     CN VII   Facial expression full, symmetric.     CN VIII   Hearing: intact    CN XII   CN XII normal.     Motor Exam   Muscle bulk: normal  Overall muscle tone: normal  Right arm tone: normal  Left arm tone: normal  Right arm pronator drift: present  Left arm pronator drift: present  Right leg tone: normal  Left leg tone: normal    Strength   Right biceps: 4/5  Left biceps: 5/5  Right triceps: 4/5  Left triceps: 5/5  Right wrist flexion: 4/5  Left wrist flexion: 5/5  Right quadriceps: 3/5  Left quadriceps: 5/5  Right hamstring: 3/5  Left hamstrin/5    Sensory Exam   Light touch normal.     Gait, Coordination, and Reflexes     Coordination   Finger to nose coordination: abnormal (right ataxia)Outward turn to right foot, wearing AFO brace, ambulating careful and slow with quad cane     Physical Exam  Vitals and nursing note reviewed.   Constitutional:       Appearance: Normal appearance.   Eyes:      Extraocular Movements: EOM normal.      Pupils: Pupils are equal, round, and reactive to light.   Neurological:      Mental Status: He is alert and oriented to person, place, and time.      Coordination: Finger-Nose-Finger Test abnormal (right ataxia).   Psychiatric:         Mood and Affect: Mood normal.         Speech: Speech normal.         Behavior: Behavior normal.         Thought Content: Thought content normal.         Judgment: Judgment normal.         Assessment:        1. Cerebrovascular accident (CVA), unspecified mechanism        Plan:     Secondary stroke prevention measures discussed. Education provided on signs and symptoms of stroke; advised to call  with any new onset of numbness, tingling or weakness, difficulty with speech or facial droop.    Continue Crestor/ mg  Continue outpatient PT    MDM low

## 2022-09-09 NOTE — PROGRESS NOTES
[facility]  Hospital Medicine Progress Note        Chief Complaint: Inpatient Follow-up for     Subjective:  Mr. Huggins is a 78 y/o male with a PMH of Type II DM, diabetic neuropathy, CAD s/p CABG x2 (2007), and HTN who presented to [facility] ED  On 7/2/22 with complaints of right sided upper and lower extremity weakness. Patient's story is unclear upon the time of last known normal. He states he woke up  with a cramp in the RLE and noticed the right sided weakness.The patient was admitted to the ICU with an ischemic CVA and administered tPA; he has had some improvement in his right-sided weakness although he is unable to purposefully move his right lower extremity.  Neurology has seen and evaluated the patient and has deemed this patient stable for transfer to the floor.  This patient was seen and evaluated in the intensive care unit.    Doing well. No acute issues.     Objective/physical exam:  General: In no acute distress, AAOx3   Chest: Clear to auscultation bilaterally, non-labored   Heart: RRR, +S1, S2, no appreciable murmur  Abdomen: Soft, nontender, BS +  MSK: Warm, no lower extremity edema, no clubbing or cyanosis  Neurologic:  Cranial nerve II-XII intact, Strength 5/5 in all 4 extremities    VITAL SIGNS: 24 HRS MIN & MAX LAST   No data recorded 98.1 °F (36.7 °C)   No data recorded (!) 143/54   No data recorded  74   No data recorded 18   No data recorded 97 %       Labs, Microbiology and Imaging were Reviewed.      Microbiology Results (last 7 days)       ** No results found for the last 168 hours. **               Medications  REM       REM     Radiology:  MRI Brain W WO Contrast  Narrative: EXAMINATION:  MRI BRAIN W WO CONTRAST    CLINICAL HISTORY:  Stroke, follow up;;    TECHNIQUE:  Multiplanar, multisequence MR images of the brain were obtained WITH and WITHOUT the administration of intravenous gadolinium based contrast.    COMPARISON:  Head CT/CTA  07/02/2022    FINDINGS:  Scalp: No abnormalities.    Bone marrow: No signal abnormalities.    Brain sulci: Appropriate for patient's age.    Ventricles: Normal in size and configuration. No hydrocephalus.    Extra-axial spaces:    No masses or fluid collections.    Parenchyma:    Scattered foci of ischemia supratentorially (L>R), the largest is a left posterior frontal 3 x 2.1 cm focus, with additional subcentimeter bifrontal, biparietal, left occipitotemporal and left basal ganglia foci.    No additional acute vascular insults.    The largest infarct is associated with patchy magnetic susceptibility, remaining do not show magnetic susceptibility.    No mass or large volume hemorrhage.    Punctate left posterior frontal foci of enhancement is favored to relate to ischemia.    Otherwise no enhancing abnormalities.    Vessels: Normal flow voids in major arteries and veins.    Sellar/Suprasellar region: No abnormalities.    Craniocervical junction: No abnormalities.  Impression: 1. Small acute infarcts supratentorially, the largest is a left posterior frontal 3 cm cortical based infarct with petechial hemorrhage.  No mass effect or shift.  Discussed with Dr. Duncan.  2. Otherwise no acute intracranial abnormalities.    Electronically signed by: Abner Moore  Date:    07/03/2022  Time:    14:09          Assessment/Plan:  Acute left hemispheric ischemic CVA, S/P TPA   Hypertension  DM Type II  HLD    - CEA today.  -monitor neuro status closely  - q4 hr neurochecks  -ST/OT/PT  - zofran and phenergan for nausea.   - vascular surgery is planning carotid endarterectomy tomorrow   - aspirin 325 mg daily per neurology.   -BP parameters < 180/105  -prn antihypertensives  - continue metoprolol 50 mg and continue lisinopril 10 mg.         All diagnosis and differential diagnosis have been reviewed; assessment and plan has been documented; I have personally reviewed the labs and test results that are presently available; I have  reviewed the patients medication list; I have reviewed the consulting providers response and recommendations. I have reviewed or attempted to review medical records based upon their availability.       Nikko Murphy MD   09/08/2022

## 2022-11-11 ENCOUNTER — LAB REQUISITION (OUTPATIENT)
Dept: LAB | Facility: HOSPITAL | Age: 77
End: 2022-11-11
Payer: MEDICARE

## 2022-11-11 DIAGNOSIS — E11.40 TYPE 2 DIABETES MELLITUS WITH DIABETIC NEUROPATHY, UNSPECIFIED: ICD-10-CM

## 2022-11-11 LAB
EST. AVERAGE GLUCOSE BLD GHB EST-MCNC: 111.2 MG/DL
HBA1C MFR BLD: 5.5 %

## 2022-11-11 PROCEDURE — 83036 HEMOGLOBIN GLYCOSYLATED A1C: CPT | Performed by: NURSE PRACTITIONER

## 2022-11-30 ENCOUNTER — OFFICE VISIT (OUTPATIENT)
Dept: NEUROLOGY | Facility: CLINIC | Age: 77
End: 2022-11-30
Payer: MEDICARE

## 2022-11-30 VITALS
BODY MASS INDEX: 25.34 KG/M2 | HEIGHT: 70 IN | DIASTOLIC BLOOD PRESSURE: 75 MMHG | SYSTOLIC BLOOD PRESSURE: 165 MMHG | WEIGHT: 177 LBS | HEART RATE: 60 BPM

## 2022-11-30 DIAGNOSIS — I63.9 CEREBROVASCULAR ACCIDENT (CVA), UNSPECIFIED MECHANISM: Primary | ICD-10-CM

## 2022-11-30 PROCEDURE — 99213 OFFICE O/P EST LOW 20 MIN: CPT | Mod: PBBFAC | Performed by: NURSE PRACTITIONER

## 2022-11-30 PROCEDURE — 99999 PR PBB SHADOW E&M-EST. PATIENT-LVL III: ICD-10-PCS | Mod: PBBFAC,,, | Performed by: NURSE PRACTITIONER

## 2022-11-30 PROCEDURE — 99213 OFFICE O/P EST LOW 20 MIN: CPT | Mod: S$PBB,,, | Performed by: NURSE PRACTITIONER

## 2022-11-30 PROCEDURE — 99213 PR OFFICE/OUTPT VISIT, EST, LEVL III, 20-29 MIN: ICD-10-PCS | Mod: S$PBB,,, | Performed by: NURSE PRACTITIONER

## 2022-11-30 PROCEDURE — 99999 PR PBB SHADOW E&M-EST. PATIENT-LVL III: CPT | Mod: PBBFAC,,, | Performed by: NURSE PRACTITIONER

## 2022-11-30 NOTE — PROGRESS NOTES
Subjective:       Patient ID: Ayaz Huggins is a 77 y.o. male.    Chief Complaint:  Cerebrovascular Accident (3 month f/u for CVA. Patient c/o weakness in R leg denies all other deficits.)      History of Present Illness  Patient with medical history of DM, HTN, HLD, diabetic neuropathy, CAD s/p CABG presents for follow up of stroke. In 2022, patient presented to ED with reports of right sided hemiplegia. CT head on admit normal, patient given IV TPA and admitted to hospital for stroke workup. MRI brain: small acute infarcts supratentorially, the largest is a left posterior frontal 3cm cortical based infarct with petechial hemorrhage.  Patient was also found to have severe stenosis of left carotid on US and had Left CEA by Dr. Lama during hospitalization.   Today patient denies any new onset of numbness, tingling or weakness. Has residual right lower extremity weakness. Ambulates with cane. States he walks about 2 miles a day and performs PT exercises at home. BP elevated this AM at 165/75. Home BP log presented in room shows normotensive blood pressures.      Past Medical History:   Diagnosis Date    Diabetes mellitus     Hypertension     Mixed hyperlipidemia     Stroke        Past Surgical History:   Procedure Laterality Date    CARDIAC SURGERY      CAROTID ENDARTERECTOMY Right 2022    Procedure: ENDARTERECTOMY, CAROTID;  Surgeon: Dread Lama MD;  Location: [facility];  Service: Peripheral Vascular;  Laterality: Right;  RIGHT CAROTID ENDARTERECTOMY       Family History   Problem Relation Age of Onset    Heart failure Mother     Heart attack Father     Heart disease Brother        Social History     Socioeconomic History    Marital status:    Tobacco Use    Smoking status: Former     Types: Cigarettes     Quit date:      Years since quittin.9    Smokeless tobacco: Never   Substance and Sexual Activity    Alcohol use: Not Currently    Drug use: Never    Sexual activity: Yes     Partners: Female        Current Outpatient Medications   Medication Sig Dispense Refill    aspirin 325 MG tablet Take 1 tablet (325 mg total) by mouth once daily. 90 tablet 0    carvediloL (COREG) 25 MG tablet Take 1 tablet (25 mg total) by mouth 2 (two) times daily with meals. 180 tablet 0    lisinopriL (PRINIVIL,ZESTRIL) 5 MG tablet Take 1 tablet (5 mg total) by mouth once daily. 90 tablet 0    metFORMIN (GLUCOPHAGE) 1000 MG tablet Take 1 tablet (1,000 mg total) by mouth 2 (two) times daily with meals. 180 tablet 0    rosuvastatin (CRESTOR) 10 MG tablet Take 1 tablet (10 mg total) by mouth once daily. 90 tablet 0     No current facility-administered medications for this visit.       Review of patient's allergies indicates:   Allergen Reactions    Penicillins       Vitals:    11/30/22 0852   BP: (!) 165/75   Pulse: 60         Review of Systems  Review of Systems   Musculoskeletal:         Ambulates with cane   Neurological:  Negative for dizziness, facial asymmetry, speech difficulty, light-headedness, numbness and headaches.        Right lower extremity weakness     Objective:      Neurologic Exam     Mental Status   Oriented to person, place, and time.   Concentration: normal.   Level of consciousness: alert  Knowledge: good.   Normal comprehension.     Cranial Nerves     CN II   Visual fields full to confrontation.     CN III, IV, VI   Pupils are equal, round, and reactive to light.  Extraocular motions are normal.     CN V   Facial sensation intact.     CN VII   Facial expression full, symmetric.     CN VIII   Hearing: intact    CN IX, X   CN IX normal.     CN XI   CN XI normal.     CN XII   CN XII normal.     Motor Exam   Muscle bulk: normal  Right arm tone: normal  Left arm tone: normal  Right leg tone: normal  Left leg tone: normal    Strength   Right deltoid: 5/5  Left deltoid: 5/5  Right biceps: 5/5  Left biceps: 5/5  Right triceps: 5/5  Left triceps: 5/5  Right wrist flexion: 5/5  Left wrist flexion: 5/5  Right wrist  extension: 5/5  Left wrist extension: 5/5  Right quadriceps: 4/5  Left quadriceps: 5/5  Right hamstrin/5  Left hamstrin/5  Right glutei: 4/5  Left glutei: 5/5  Right anterior tibial: 4/5  Left anterior tibial: 5/5  Right posterior tibial: 4/5  Left posterior tibial: 5/5Outward rotation of right foot     Sensory Exam   Light touch normal.     Gait, Coordination, and Reflexes Ambulates with cane     Physical Exam  Vitals and nursing note reviewed.   Eyes:      Extraocular Movements: EOM normal.      Pupils: Pupils are equal, round, and reactive to light.   Pulmonary:      Effort: Pulmonary effort is normal.   Neurological:      Mental Status: He is oriented to person, place, and time.         Assessment:        1. Cerebrovascular accident (CVA), unspecified mechanism        Plan:     Continue ASA/crestor  Advised patient to continue BP log at home. If he has any further elevated readings; advised patient to contact PCP for further management of BP  A1C on  5.5; continue management of DM per PCP  Secondary stroke prevention measures discussed. Education provided on signs and symptoms of stroke; advised to call - with any new onset of numbness, tingling or weakness, difficulty with speech or facial droop.

## 2023-05-22 ENCOUNTER — OFFICE VISIT (OUTPATIENT)
Dept: NEUROLOGY | Facility: CLINIC | Age: 78
End: 2023-05-22
Payer: MEDICARE

## 2023-05-22 VITALS
DIASTOLIC BLOOD PRESSURE: 72 MMHG | HEIGHT: 70 IN | WEIGHT: 177 LBS | BODY MASS INDEX: 25.34 KG/M2 | SYSTOLIC BLOOD PRESSURE: 140 MMHG

## 2023-05-22 DIAGNOSIS — I65.22 CAROTID ARTERY STENOSIS, SYMPTOMATIC, LEFT: ICD-10-CM

## 2023-05-22 DIAGNOSIS — I63.032 CEREBROVASCULAR ACCIDENT (CVA) DUE TO THROMBOSIS OF LEFT CAROTID ARTERY: Primary | ICD-10-CM

## 2023-05-22 PROCEDURE — 99213 OFFICE O/P EST LOW 20 MIN: CPT | Mod: PBBFAC | Performed by: PSYCHIATRY & NEUROLOGY

## 2023-05-22 PROCEDURE — 99214 PR OFFICE/OUTPT VISIT, EST, LEVL IV, 30-39 MIN: ICD-10-PCS | Mod: S$PBB,,, | Performed by: PSYCHIATRY & NEUROLOGY

## 2023-05-22 PROCEDURE — 99999 PR PBB SHADOW E&M-EST. PATIENT-LVL III: CPT | Mod: PBBFAC,,, | Performed by: PSYCHIATRY & NEUROLOGY

## 2023-05-22 PROCEDURE — 99214 OFFICE O/P EST MOD 30 MIN: CPT | Mod: S$PBB,,, | Performed by: PSYCHIATRY & NEUROLOGY

## 2023-05-22 PROCEDURE — 99999 PR PBB SHADOW E&M-EST. PATIENT-LVL III: ICD-10-PCS | Mod: PBBFAC,,, | Performed by: PSYCHIATRY & NEUROLOGY

## 2023-05-22 RX ORDER — BLOOD SUGAR DIAGNOSTIC
1 STRIP MISCELLANEOUS 2 TIMES DAILY
COMMUNITY
Start: 2023-04-19

## 2023-05-22 NOTE — PROGRESS NOTES
Neurology Office Visit  Neurology    Ayaz Huggins is a 78 y.o. male for f/u.  No new stroke concerns.  Tolerating meds.    ROS:  Review of Systems   All other systems reviewed and are negative.     Past Medical History:   Diagnosis Date    Diabetes mellitus     Hypertension     Mixed hyperlipidemia     Stroke      Past Surgical History:   Procedure Laterality Date    CARDIAC SURGERY      CAROTID ENDARTERECTOMY Right 7/8/2022    Procedure: ENDARTERECTOMY, CAROTID;  Surgeon: Dread Lama MD;  Location: [facility];  Service: Peripheral Vascular;  Laterality: Right;  RIGHT CAROTID ENDARTERECTOMY     Family History   Problem Relation Age of Onset    Heart failure Mother     Heart attack Father     Heart disease Brother      Review of patient's allergies indicates:   Allergen Reactions    Penicillins        Current Outpatient Medications:     aspirin 325 MG tablet, Take 1 tablet (325 mg total) by mouth once daily., Disp: 90 tablet, Rfl: 0    carvediloL (COREG) 25 MG tablet, Take 1 tablet (25 mg total) by mouth 2 (two) times daily with meals. (Patient taking differently: Take 12.5 mg by mouth 2 (two) times daily with meals.), Disp: 180 tablet, Rfl: 0    CONTOUR NEXT TEST STRIPS Strp, 1 strip 2 (two) times daily., Disp: , Rfl:     lisinopriL (PRINIVIL,ZESTRIL) 5 MG tablet, Take 1 tablet (5 mg total) by mouth once daily., Disp: 90 tablet, Rfl: 0    metFORMIN (GLUCOPHAGE) 1000 MG tablet, Take 1 tablet (1,000 mg total) by mouth 2 (two) times daily with meals., Disp: 180 tablet, Rfl: 0    rosuvastatin (CRESTOR) 10 MG tablet, Take 1 tablet (10 mg total) by mouth once daily., Disp: 90 tablet, Rfl: 0  Outpatient Medications Marked as Taking for the 5/22/23 encounter (Office Visit) with Jaiden Pacheco MD   Medication Sig Dispense Refill    aspirin 325 MG tablet Take 1 tablet (325 mg total) by mouth once daily. 90 tablet 0    carvediloL (COREG) 25 MG tablet Take 1 tablet (25 mg total) by mouth 2 (two) times daily with meals.  (Patient taking differently: Take 12.5 mg by mouth 2 (two) times daily with meals.) 180 tablet 0    CONTOUR NEXT TEST STRIPS Strp 1 strip 2 (two) times daily.      lisinopriL (PRINIVIL,ZESTRIL) 5 MG tablet Take 1 tablet (5 mg total) by mouth once daily. 90 tablet 0    metFORMIN (GLUCOPHAGE) 1000 MG tablet Take 1 tablet (1,000 mg total) by mouth 2 (two) times daily with meals. 180 tablet 0    rosuvastatin (CRESTOR) 10 MG tablet Take 1 tablet (10 mg total) by mouth once daily. 90 tablet 0     Social History     Tobacco Use    Smoking status: Former     Types: Cigarettes     Quit date:      Years since quittin.4    Smokeless tobacco: Never   Substance Use Topics    Alcohol use: Not Currently    Drug use: Never         Vitals:    23 1412   BP: (!) 140/72     Neurologic Exam      Mental Status   Oriented to person, place, and time.   Concentration: normal.   Level of consciousness: alert  Knowledge: good.   Normal comprehension.      Cranial Nerves      CN II   Visual fields full to confrontation.      CN III, IV, VI   Pupils are equal, round, and reactive to light.  Extraocular motions are normal.      CN V   Facial sensation intact.      CN VII   Facial expression full, symmetric.      CN VIII   Hearing: intact     CN IX, X   CN IX normal.      CN XI   CN XI normal.      CN XII   CN XII normal.      Motor Exam   Muscle bulk: normal  Right arm tone: normal  Left arm tone: normal  Right leg tone: normal  Left leg tone: normal     Strength   Right deltoid: 5/5  Left deltoid: 5/5  Right biceps: 5/5  Left biceps: 5/5  Right triceps: 5/5  Left triceps: 5/5  Right wrist flexion: 5/5  Left wrist flexion: 5/5  Right wrist extension: 5/5  Left wrist extension: 5/5  Right quadriceps: 4/5  Left quadriceps: 5/5  Right hamstrin/5  Left hamstrin/5  Right glutei: 4/5  Left glutei: 5/5  Right anterior tibial: 4/5  Left anterior tibial: 5/5  Right posterior tibial: 4/5  Left posterior tibial: 5/5Outward rotation  of right foot      Sensory Exam   Light touch normal.      Gait, Coordination, and Reflexes Ambulates with cane      Physical Exam  Vitals and nursing note reviewed.   Eyes:      Extraocular Movements: EOM normal.      Pupils: Pupils are equal, round, and reactive to light.   Pulmonary:      Effort: Pulmonary effort is normal.   Neurological:      Mental Status: He is oriented to person, place, and time.         Assessment: Stroke  ANTON    Plan: Order f/u diagnostic cerebral angiogram

## 2023-05-24 DIAGNOSIS — I63.032 CEREBROVASCULAR ACCIDENT (CVA) DUE TO THROMBOSIS OF LEFT CAROTID ARTERY: Primary | ICD-10-CM

## 2023-05-24 DIAGNOSIS — I63.89 OTHER CEREBRAL INFARCTION: ICD-10-CM

## 2023-06-01 ENCOUNTER — HOSPITAL ENCOUNTER (OUTPATIENT)
Dept: RADIOLOGY | Facility: HOSPITAL | Age: 78
Discharge: HOME | End: 2023-06-01
Attending: PSYCHIATRY & NEUROLOGY
Payer: MEDICARE

## 2023-06-01 DIAGNOSIS — I63.89 OTHER CEREBRAL INFARCTION: ICD-10-CM

## 2023-06-01 PROCEDURE — 70450 CT HEAD/BRAIN W/O DYE: CPT | Mod: TC

## 2023-06-09 ENCOUNTER — TELEPHONE (OUTPATIENT)
Dept: NEUROLOGY | Facility: CLINIC | Age: 78
End: 2023-06-09
Payer: MEDICARE

## 2023-06-09 NOTE — TELEPHONE ENCOUNTER
Pt requesting c/b to discuss results of CT head.  Also inquiring on if he should be fasting when labs are drawn prior to cerebral angiogram.    CB# [phone]

## 2023-07-27 ENCOUNTER — TELEPHONE (OUTPATIENT)
Dept: NEUROLOGY | Facility: CLINIC | Age: 78
End: 2023-07-27
Payer: MEDICARE

## 2023-07-27 NOTE — TELEPHONE ENCOUNTER
S/w pt to remind him of angiogram scheduled on 8/8/23. Pt reminded of labs, advised to have them drawn 2-3 days prior to angiogram.   Pt notified that he will receive a call from the hospital the evening before angiogram to notify him of arrival time.  Pt verbalized understanding and confirmed angiogram date.

## 2023-08-04 ENCOUNTER — LAB VISIT (OUTPATIENT)
Dept: LAB | Facility: HOSPITAL | Age: 78
End: 2023-08-04
Attending: PSYCHIATRY & NEUROLOGY
Payer: MEDICARE

## 2023-08-04 DIAGNOSIS — I63.032 CEREBROVASCULAR ACCIDENT (CVA) DUE TO THROMBOSIS OF LEFT CAROTID ARTERY: ICD-10-CM

## 2023-08-04 LAB
ANION GAP SERPL CALC-SCNC: 7 MEQ/L
BASOPHILS # BLD AUTO: 0.05 X10(3)/MCL
BASOPHILS NFR BLD AUTO: 0.8 %
BUN SERPL-MCNC: 12 MG/DL (ref 8.4–25.7)
CALCIUM SERPL-MCNC: 9.3 MG/DL (ref 8.8–10)
CHLORIDE SERPL-SCNC: 108 MMOL/L (ref 98–107)
CO2 SERPL-SCNC: 26 MMOL/L (ref 23–31)
CREAT SERPL-MCNC: 0.66 MG/DL (ref 0.73–1.18)
CREAT/UREA NIT SERPL: 18
EOSINOPHIL # BLD AUTO: 0.24 X10(3)/MCL (ref 0–0.9)
EOSINOPHIL NFR BLD AUTO: 3.8 %
ERYTHROCYTE [DISTWIDTH] IN BLOOD BY AUTOMATED COUNT: 12.8 % (ref 11.5–17)
GFR SERPLBLD CREATININE-BSD FMLA CKD-EPI: >60 MLS/MIN/1.73/M2
GLUCOSE SERPL-MCNC: 106 MG/DL (ref 82–115)
HCT VFR BLD AUTO: 34.5 % (ref 42–52)
HGB BLD-MCNC: 11.8 G/DL (ref 14–18)
IMM GRANULOCYTES # BLD AUTO: 0.01 X10(3)/MCL (ref 0–0.04)
IMM GRANULOCYTES NFR BLD AUTO: 0.2 %
INR PPP: 1.1
LYMPHOCYTES # BLD AUTO: 2.09 X10(3)/MCL (ref 0.6–4.6)
LYMPHOCYTES NFR BLD AUTO: 32.9 %
MCH RBC QN AUTO: 30.6 PG (ref 27–31)
MCHC RBC AUTO-ENTMCNC: 34.2 G/DL (ref 33–36)
MCV RBC AUTO: 89.4 FL (ref 80–94)
MONOCYTES # BLD AUTO: 0.64 X10(3)/MCL (ref 0.1–1.3)
MONOCYTES NFR BLD AUTO: 10.1 %
NEUTROPHILS # BLD AUTO: 3.33 X10(3)/MCL (ref 2.1–9.2)
NEUTROPHILS NFR BLD AUTO: 52.2 %
NRBC BLD AUTO-RTO: 0 %
PLATELET # BLD AUTO: 172 X10(3)/MCL (ref 130–400)
PMV BLD AUTO: 10.6 FL (ref 7.4–10.4)
POTASSIUM SERPL-SCNC: 4.9 MMOL/L (ref 3.5–5.1)
PROTHROMBIN TIME: 14 SECONDS (ref 12.5–14.5)
RBC # BLD AUTO: 3.86 X10(6)/MCL (ref 4.7–6.1)
SODIUM SERPL-SCNC: 141 MMOL/L (ref 136–145)
WBC # SPEC AUTO: 6.36 X10(3)/MCL (ref 4.5–11.5)

## 2023-08-04 PROCEDURE — 85610 PROTHROMBIN TIME: CPT

## 2023-08-04 PROCEDURE — 36415 COLL VENOUS BLD VENIPUNCTURE: CPT

## 2023-08-04 PROCEDURE — 80048 BASIC METABOLIC PNL TOTAL CA: CPT

## 2023-08-04 PROCEDURE — 85025 COMPLETE CBC W/AUTO DIFF WBC: CPT

## 2023-08-08 ENCOUNTER — HOSPITAL ENCOUNTER (OUTPATIENT)
Dept: RADIOLOGY | Facility: HOSPITAL | Age: 78
Discharge: HOME | End: 2023-08-08
Attending: PSYCHIATRY & NEUROLOGY | Admitting: PSYCHIATRY & NEUROLOGY
Payer: MEDICARE

## 2023-08-08 ENCOUNTER — HOSPITAL ENCOUNTER (OUTPATIENT)
Dept: INTERVENTIONAL RADIOLOGY/VASCULAR | Facility: HOSPITAL | Age: 78
Discharge: HOME | End: 2023-08-08
Attending: PSYCHIATRY & NEUROLOGY | Admitting: PSYCHIATRY & NEUROLOGY
Payer: MEDICARE

## 2023-08-08 VITALS
BODY MASS INDEX: 26.42 KG/M2 | WEIGHT: 188.69 LBS | SYSTOLIC BLOOD PRESSURE: 134 MMHG | OXYGEN SATURATION: 100 % | HEIGHT: 71 IN | TEMPERATURE: 98 F | RESPIRATION RATE: 20 BRPM | DIASTOLIC BLOOD PRESSURE: 60 MMHG | HEART RATE: 53 BPM

## 2023-08-08 DIAGNOSIS — I63.032 CEREBROVASCULAR ACCIDENT (CVA) DUE TO THROMBOSIS OF LEFT CAROTID ARTERY: ICD-10-CM

## 2023-08-08 DIAGNOSIS — I63.89 OTHER CEREBRAL INFARCTION: ICD-10-CM

## 2023-08-08 DIAGNOSIS — I63.9 STROKE: ICD-10-CM

## 2023-08-08 LAB — POCT GLUCOSE: 103 MG/DL (ref 70–110)

## 2023-08-08 PROCEDURE — 36224 PR ANGIO INTRCRNL ART +/- CERVIOCEREBRAL ARCH, INTRNL CAROTID ART, SELECTV CATH ,S&I: ICD-10-PCS | Mod: 50,,, | Performed by: PSYCHIATRY & NEUROLOGY

## 2023-08-08 PROCEDURE — 36226 PLACE CATH VERTEBRAL ART: CPT | Mod: 51,50,, | Performed by: PSYCHIATRY & NEUROLOGY

## 2023-08-08 PROCEDURE — 36227 PR ANGIO XTRNL CAROTD CIRC, XTRNL CAROTID, SELECTV CATH S&I: ICD-10-PCS | Mod: 50,,, | Performed by: PSYCHIATRY & NEUROLOGY

## 2023-08-08 PROCEDURE — 36226 PLACE CATH VERTEBRAL ART: CPT | Mod: 50 | Performed by: PSYCHIATRY & NEUROLOGY

## 2023-08-08 PROCEDURE — 99152 MOD SED SAME PHYS/QHP 5/>YRS: CPT | Mod: ,,, | Performed by: PSYCHIATRY & NEUROLOGY

## 2023-08-08 PROCEDURE — 70450 CT HEAD/BRAIN W/O DYE: CPT | Mod: TC

## 2023-08-08 PROCEDURE — 99152 PR MOD CONSCIOUS SEDATION, SAME PHYS, 5+ YRS, FIRST 15 MIN: ICD-10-PCS | Mod: ,,, | Performed by: PSYCHIATRY & NEUROLOGY

## 2023-08-08 PROCEDURE — C1769 GUIDE WIRE: HCPCS

## 2023-08-08 PROCEDURE — 25000003 PHARM REV CODE 250: Performed by: PSYCHIATRY & NEUROLOGY

## 2023-08-08 PROCEDURE — C1760 CLOSURE DEV, VASC: HCPCS

## 2023-08-08 PROCEDURE — 36227 PLACE CATH XTRNL CAROTID: CPT | Mod: 50 | Performed by: PSYCHIATRY & NEUROLOGY

## 2023-08-08 PROCEDURE — 63600175 PHARM REV CODE 636 W HCPCS: Performed by: PSYCHIATRY & NEUROLOGY

## 2023-08-08 PROCEDURE — 99153 MOD SED SAME PHYS/QHP EA: CPT | Performed by: PSYCHIATRY & NEUROLOGY

## 2023-08-08 PROCEDURE — 36226 PR ANGIO VERTEBRAL ARTERY +/- CERVIOCEREBRAL ARCH, VERTEBRAL ART, SELECTV CATH,S&I: ICD-10-PCS | Mod: 51,50,, | Performed by: PSYCHIATRY & NEUROLOGY

## 2023-08-08 PROCEDURE — 36224 PLACE CATH CAROTD ART: CPT | Mod: 50

## 2023-08-08 PROCEDURE — C1894 INTRO/SHEATH, NON-LASER: HCPCS

## 2023-08-08 PROCEDURE — 99152 MOD SED SAME PHYS/QHP 5/>YRS: CPT

## 2023-08-08 PROCEDURE — 25500020 PHARM REV CODE 255: Performed by: PSYCHIATRY & NEUROLOGY

## 2023-08-08 PROCEDURE — C1887 CATHETER, GUIDING: HCPCS

## 2023-08-08 PROCEDURE — 36227 PLACE CATH XTRNL CAROTID: CPT | Mod: 50,,, | Performed by: PSYCHIATRY & NEUROLOGY

## 2023-08-08 PROCEDURE — 36224 PLACE CATH CAROTD ART: CPT | Mod: 50,,, | Performed by: PSYCHIATRY & NEUROLOGY

## 2023-08-08 RX ORDER — HYDRALAZINE HYDROCHLORIDE 20 MG/ML
INJECTION INTRAMUSCULAR; INTRAVENOUS
Status: COMPLETED | OUTPATIENT
Start: 2023-08-08 | End: 2023-08-08

## 2023-08-08 RX ORDER — SODIUM CHLORIDE 9 MG/ML
0-999 INJECTION, SOLUTION INTRAVENOUS CONTINUOUS
Status: DISCONTINUED | OUTPATIENT
Start: 2023-08-08 | End: 2023-08-09 | Stop reason: HOSPADM

## 2023-08-08 RX ORDER — SODIUM CHLORIDE 450 MG/100ML
INJECTION, SOLUTION INTRAVENOUS CONTINUOUS
Status: DISCONTINUED | OUTPATIENT
Start: 2023-08-08 | End: 2023-08-08

## 2023-08-08 RX ORDER — LIDOCAINE HYDROCHLORIDE 20 MG/ML
INJECTION, SOLUTION INFILTRATION; PERINEURAL
Status: COMPLETED | OUTPATIENT
Start: 2023-08-08 | End: 2023-08-08

## 2023-08-08 RX ORDER — FENTANYL CITRATE 50 UG/ML
INJECTION, SOLUTION INTRAMUSCULAR; INTRAVENOUS
Status: COMPLETED | OUTPATIENT
Start: 2023-08-08 | End: 2023-08-08

## 2023-08-08 RX ORDER — HEPARIN SOD,PORCINE/0.9 % NACL 1000/500ML
INTRAVENOUS SOLUTION INTRAVENOUS
Status: COMPLETED | OUTPATIENT
Start: 2023-08-08 | End: 2023-08-08

## 2023-08-08 RX ORDER — SODIUM CHLORIDE 9 MG/ML
0-999 INJECTION, SOLUTION INTRAVENOUS CONTINUOUS
Status: DISCONTINUED | OUTPATIENT
Start: 2023-08-08 | End: 2023-08-08

## 2023-08-08 RX ORDER — MIDAZOLAM HYDROCHLORIDE 1 MG/ML
INJECTION INTRAMUSCULAR; INTRAVENOUS
Status: COMPLETED | OUTPATIENT
Start: 2023-08-08 | End: 2023-08-08

## 2023-08-08 RX ADMIN — Medication 2000 ML: at 11:08

## 2023-08-08 RX ADMIN — MIDAZOLAM 0.5 MG: 1 INJECTION INTRAMUSCULAR; INTRAVENOUS at 11:08

## 2023-08-08 RX ADMIN — SODIUM CHLORIDE 75 ML/HR: 9 INJECTION, SOLUTION INTRAVENOUS at 09:08

## 2023-08-08 RX ADMIN — LIDOCAINE HYDROCHLORIDE 5 ML: 20 INJECTION, SOLUTION INFILTRATION; PERINEURAL at 11:08

## 2023-08-08 RX ADMIN — IOPAMIDOL 100 ML: 755 INJECTION, SOLUTION INTRAVENOUS at 11:08

## 2023-08-08 RX ADMIN — HYDRALAZINE HYDROCHLORIDE 10 MG: 20 INJECTION INTRAMUSCULAR; INTRAVENOUS at 11:08

## 2023-08-08 RX ADMIN — FENTANYL CITRATE 25 MCG: 50 INJECTION, SOLUTION INTRAMUSCULAR; INTRAVENOUS at 11:08

## 2023-08-08 NOTE — DISCHARGE INSTRUCTIONS
REMOVE DRESSING IN 24 HOURS. SHOWER AFTER 24 HOURS. NO TUB BATHS FOR 5 DAYS.  NO LIFTING, PUSHING OR PULLING ANYTHING GREATER THAN 10 POUNDS (A GALLON OF MILK) FOR 5 DAYS  NO DRIVING FOR 48 HOURS  MONITOR GROIN SITE FOR SIGNS/SYMPTOMS OF INFECTION (SWELLING, REDNESS, YELLOW/GREEN DRAINAGE, WORSENING PAIN)

## 2023-08-08 NOTE — OP NOTE
[facility]                       [address]                      [place] [address]    PATIENT NAME:      EVELIO VENTURA  YOB: 1945  CSN:               [id number]  MRN:               [id number]  ADMIT DATE:        08/08/2023 07:57:02  PHYSICIAN:         Jaiden Pacheco MD                          OPERATIVE REPORT      DATE OF SURGERY:    08/08/2023 00:00:00    SURGEON:  Jaiden Pacheco MD    PREOPERATIVE DIAGNOSIS:  Bilateral carotid artery stenosis.    POSTOPERATIVE DIAGNOSIS:  Mild right carotid bulb stenosis.    PROCEDURE PERFORMED:  Cerebral angiogram with catheter insertion in the   following arteries:  Right common carotid, right internal carotid, right   external carotid, right subclavian, right vertebral, left common carotid, left   internal carotid, left external carotid, left subclavian, left vertebral.    LEVEL OF SEDATION:  Conscious sedation.  Sedation administered by independent   trained observer under attending supervision with continuous monitoring of the   patient's level of consciousness and physiologic status.  Total intraservice   sedation time 45 minutes.    COMPLICATIONS:  None.    DETAILED DESCRIPTION:  Following informed consent, the patient was prepped and   draped in usual sterile fashion.  I infiltrated the right groin with local   anesthetic and punctured the right femoral artery placing a 5-[place] sheath   without incident.  I introduced my catheter wire and advanced into the aortic   arch.  I selected the right common carotid artery.  Angiographic images   demonstrated mild stenosis of the carotid bulb.  I selected the right internal   carotid artery.  Cerebral AP and lateral views demonstrated no evidence of   significant stenosis, mass effect, vascular malformation, or early venous   drainage.  I then selected the right external carotid artery.  Angiographic   images demonstrated normal external  carotid circulation.  I then selected the   right subclavian artery.  Angiographic images demonstrated no stenosis at the   origin of the right vertebral artery.  I selected the right vertebral artery.    Cerebral AP and lateral views demonstrated no evidence of significant stenosis,   mass effect, vascular malformation, or early venous drainage.  I then selected   the left common carotid artery.  Angiographic images demonstrated no significant   stenosis.  I selected the left internal carotid artery.  Cerebral AP and   lateral views demonstrated no evidence of significant stenosis, mass effect,   vascular malformation, or early venous drainage.  I then selected the left   external carotid artery.  Angiographic images demonstrated normal external   carotid circulation.  I then selected the left subclavian artery.  Angiographic   images demonstrated no stenosis at the origin of the left vertebral artery.  I   selected the left vertebral artery.  Cerebral AP and lateral views demonstrated   no evidence of significant stenosis, mass effect, vascular malformation, or   early venous drainage.  I removed the catheter.  Hemostasis was achieved using a   Mynx closure device.  The patient tolerated the procedure well without apparent   complication.        ______________________________  Jaiden Pacheco MD    DEP/AQS  DD:  08/08/2023  Time:  11:43AM  DT:  08/08/2023  Time:  02:52PM  Job #:  [id number]      OPERATIVE REPORT

## 2023-08-08 NOTE — H&P
Pre-Operative History and Physical   Interventional Neurology    Ayaz Huggins is a 78 y.o. male here for DSA.    ROS:  Review of Systems   All other systems reviewed and are negative.       Past Medical History:   Diagnosis Date    Diabetes mellitus     Hypertension     Mixed hyperlipidemia     Stroke      Past Surgical History:   Procedure Laterality Date    CARDIAC SURGERY      CAROTID ENDARTERECTOMY Right 7/8/2022    Procedure: ENDARTERECTOMY, CAROTID;  Surgeon: Dread Lama MD;  Location: [facility];  Service: Peripheral Vascular;  Laterality: Right;  RIGHT CAROTID ENDARTERECTOMY     Family History   Problem Relation Age of Onset    Heart failure Mother     Heart attack Father     Heart disease Brother      Review of patient's allergies indicates:   Allergen Reactions    Penicillins        Current Outpatient Medications:     aspirin 325 MG tablet, Take 1 tablet (325 mg total) by mouth once daily., Disp: 90 tablet, Rfl: 0    carvediloL (COREG) 25 MG tablet, Take 1 tablet (25 mg total) by mouth 2 (two) times daily with meals. (Patient taking differently: Take 12.5 mg by mouth 2 (two) times daily with meals.), Disp: 180 tablet, Rfl: 0    lisinopriL (PRINIVIL,ZESTRIL) 5 MG tablet, Take 1 tablet (5 mg total) by mouth once daily., Disp: 90 tablet, Rfl: 0    metFORMIN (GLUCOPHAGE) 1000 MG tablet, Take 1 tablet (1,000 mg total) by mouth 2 (two) times daily with meals., Disp: 180 tablet, Rfl: 0    rosuvastatin (CRESTOR) 10 MG tablet, Take 1 tablet (10 mg total) by mouth once daily., Disp: 90 tablet, Rfl: 0    CONTOUR NEXT TEST STRIPS Strp, 1 strip 2 (two) times daily., Disp: , Rfl:     Current Facility-Administered Medications:     0.9%  NaCl infusion, 0-999 mL/hr, Intravenous, Continuous, Jaiden Pacheco MD, Last Rate: 75 mL/hr at 08/08/23 0918, 75 mL/hr at 08/08/23 0918  Outpatient Medications Marked as Taking for the 8/8/23 encounter (Hospital Encounter) with [facility]   Medication Sig Dispense Refill    aspirin  325 MG tablet Take 1 tablet (325 mg total) by mouth once daily. 90 tablet 0    carvediloL (COREG) 25 MG tablet Take 1 tablet (25 mg total) by mouth 2 (two) times daily with meals. (Patient taking differently: Take 12.5 mg by mouth 2 (two) times daily with meals.) 180 tablet 0    lisinopriL (PRINIVIL,ZESTRIL) 5 MG tablet Take 1 tablet (5 mg total) by mouth once daily. 90 tablet 0    metFORMIN (GLUCOPHAGE) 1000 MG tablet Take 1 tablet (1,000 mg total) by mouth 2 (two) times daily with meals. 180 tablet 0    rosuvastatin (CRESTOR) 10 MG tablet Take 1 tablet (10 mg total) by mouth once daily. 90 tablet 0     Social History     Tobacco Use    Smoking status: Former     Current packs/day: 0.00     Types: Cigarettes     Quit date:      Years since quittin.6    Smokeless tobacco: Never   Substance Use Topics    Alcohol use: Not Currently    Drug use: Never         Vitals:    23 0921   BP: (!) 177/73   Pulse:    Temp:      Gen NAD  HEENT NC/AT  CV RRR, no carotid bruits  Resp clear  GI soft  Ext no C/C/E  Neuro  AAOx4  Speech fluent, appropriate  EOMI, PERRLA, VFF  Normal facial strength, sensation  Tongue and palate midline  Motor RLE 4/5  Sensation intact  DTRs symmetric  Coord intact  Gait Normal        Assessment: Stroke  ANTON    Plan: DSA    I have discussed the risks, benefits, indications, and alternatives of the procedure in detail.  The patient verbalizes her understanding.  All questions answered.  Consents signed.  The patient agrees to proceed to proceed as planned.

## 2023-08-30 ENCOUNTER — OFFICE VISIT (OUTPATIENT)
Dept: NEUROLOGY | Facility: CLINIC | Age: 78
End: 2023-08-30
Payer: MEDICARE

## 2023-08-30 VITALS
DIASTOLIC BLOOD PRESSURE: 71 MMHG | HEART RATE: 55 BPM | SYSTOLIC BLOOD PRESSURE: 155 MMHG | BODY MASS INDEX: 26.32 KG/M2 | WEIGHT: 188 LBS | HEIGHT: 71 IN

## 2023-08-30 DIAGNOSIS — I63.9 CEREBROVASCULAR ACCIDENT (CVA), UNSPECIFIED MECHANISM: Primary | ICD-10-CM

## 2023-08-30 PROCEDURE — 99213 OFFICE O/P EST LOW 20 MIN: CPT | Mod: S$PBB,,, | Performed by: NURSE PRACTITIONER

## 2023-08-30 PROCEDURE — 99999 PR PBB SHADOW E&M-EST. PATIENT-LVL III: CPT | Mod: PBBFAC,,, | Performed by: NURSE PRACTITIONER

## 2023-08-30 PROCEDURE — 99999 PR PBB SHADOW E&M-EST. PATIENT-LVL III: ICD-10-PCS | Mod: PBBFAC,,, | Performed by: NURSE PRACTITIONER

## 2023-08-30 PROCEDURE — 99213 PR OFFICE/OUTPT VISIT, EST, LEVL III, 20-29 MIN: ICD-10-PCS | Mod: S$PBB,,, | Performed by: NURSE PRACTITIONER

## 2023-08-30 PROCEDURE — 99213 OFFICE O/P EST LOW 20 MIN: CPT | Mod: PBBFAC | Performed by: NURSE PRACTITIONER

## 2023-08-30 NOTE — PROGRESS NOTES
Subjective:      Patient ID: Ayaz Huggins is a 78 y.o. male.    Chief Complaint:  Stroke (F/u angiogram. Patient denies pain, swelling or drainage at site. Patient states double vision after angiogram. Patient denies HA, slurred speech, weakness or dizziness )      History of Present Illness  Patient with medical history of DM, HTN, HLD, diabetic neuropathy, CAD s/p CABG presents for follow up after cerebral angiogram. In July 2022, patient presented to ED with reports of right sided hemiplegia. MRI brain had shown small acute infarcts supratentorially, the largest is a left posterior frontal 3cm cortical based infarct with petechial hemorrhage.  Patient was also found to have severe stenosis of left carotid on US and had Left CEA by Dr. Lama during hospitalization.   Patient's recent cerebral angiogram showed mild right carotid bulb stenosis. Today, patient denies any new onset of numbness, tingling or weakness. Denies any difficulty with speech. Reports following angiogram had some blurred vision that has since resolved. Reports he will be having nuc stress test as well as echo with cardiologist, needs cataract removal per his ophthalmologist, and will be seeing Dr. Lama for carotid stenosis soon. Ambulating with a cane. Still with right lower extremity weakness but is able to drive without any difficulty.         CEREBRAL ANGIOGRAM  DATE OF SURGERY:    08/08/2023 00:00:00     SURGEON:  Jaiden Pacheco MD     PREOPERATIVE DIAGNOSIS:  Bilateral carotid artery stenosis.     POSTOPERATIVE DIAGNOSIS:  Mild right carotid bulb stenosis.     PROCEDURE PERFORMED:  Cerebral angiogram with catheter insertion in the   following arteries:  Right common carotid, right internal carotid, right   external carotid, right subclavian, right vertebral, left common carotid, left   internal carotid, left external carotid, left subclavian, left vertebral.     LEVEL OF SEDATION:  Conscious sedation.  Sedation administered by  independent   trained observer under attending supervision with continuous monitoring of the   patient's level of consciousness and physiologic status.  Total intraservice   sedation time 45 minutes.     COMPLICATIONS:  None.     DETAILED DESCRIPTION:  Following informed consent, the patient was prepped and   draped in usual sterile fashion.  I infiltrated the right groin with local   anesthetic and punctured the right femoral artery placing a 5-[place] sheath   without incident.  I introduced my catheter wire and advanced into the aortic   arch.  I selected the right common carotid artery.  Angiographic images   demonstrated mild stenosis of the carotid bulb.  I selected the right internal   carotid artery.  Cerebral AP and lateral views demonstrated no evidence of   significant stenosis, mass effect, vascular malformation, or early venous   drainage.  I then selected the right external carotid artery.  Angiographic   images demonstrated normal external carotid circulation.  I then selected the   right subclavian artery.  Angiographic images demonstrated no stenosis at the   origin of the right vertebral artery.  I selected the right vertebral artery.    Cerebral AP and lateral views demonstrated no evidence of significant stenosis,   mass effect, vascular malformation, or early venous drainage.  I then selected   the left common carotid artery.  Angiographic images demonstrated no significant   stenosis.  I selected the left internal carotid artery.  Cerebral AP and   lateral views demonstrated no evidence of significant stenosis, mass effect,   vascular malformation, or early venous drainage.  I then selected the left   external carotid artery.  Angiographic images demonstrated normal external   carotid circulation.  I then selected the left subclavian artery.  Angiographic   images demonstrated no stenosis at the origin of the left vertebral artery.  I   selected the left vertebral artery.  Cerebral AP and  lateral views demonstrated   no evidence of significant stenosis, mass effect, vascular malformation, or   early venous drainage.  I removed the catheter.  Hemostasis was achieved using a   Mynx closure device.  The patient tolerated the procedure well without apparent   complication.           ______________________________  Jaiden Pacheco MD     DEP/AQS  DD:  2023  Time:  11:43AM      Past Medical History:   Diagnosis Date    Diabetes mellitus     Hypertension     Mixed hyperlipidemia     Stroke        Past Surgical History:   Procedure Laterality Date    CARDIAC SURGERY      CAROTID ENDARTERECTOMY Right 2022    Procedure: ENDARTERECTOMY, CAROTID;  Surgeon: Dread Lama MD;  Location: [facility];  Service: Peripheral Vascular;  Laterality: Right;  RIGHT CAROTID ENDARTERECTOMY       Family History   Problem Relation Age of Onset    Heart failure Mother     Heart attack Father     Heart disease Brother        Social History     Socioeconomic History    Marital status:    Tobacco Use    Smoking status: Former     Current packs/day: 0.00     Types: Cigarettes     Quit date:      Years since quittin.6    Smokeless tobacco: Never   Substance and Sexual Activity    Alcohol use: Not Currently    Drug use: Never    Sexual activity: Yes     Partners: Female       Current Outpatient Medications   Medication Sig Dispense Refill    CONTOUR NEXT TEST STRIPS Strp 1 strip 2 (two) times daily.      aspirin 325 MG tablet Take 1 tablet (325 mg total) by mouth once daily. 90 tablet 0    carvediloL (COREG) 25 MG tablet Take 1 tablet (25 mg total) by mouth 2 (two) times daily with meals. (Patient taking differently: Take 12.5 mg by mouth 2 (two) times daily with meals.) 180 tablet 0    lisinopriL (PRINIVIL,ZESTRIL) 5 MG tablet Take 1 tablet (5 mg total) by mouth once daily. 90 tablet 0    metFORMIN (GLUCOPHAGE) 1000 MG tablet Take 1 tablet (1,000 mg total) by mouth 2 (two) times daily with  meals. 180 tablet 0    rosuvastatin (CRESTOR) 10 MG tablet Take 1 tablet (10 mg total) by mouth once daily. 90 tablet 0     No current facility-administered medications for this visit.       Review of patient's allergies indicates:   Allergen Reactions    Penicillins         Review of Systems  Review of Systems   Neurological:  Positive for weakness. Negative for headaches.   All other systems reviewed and are negative.    Objective:     Neurologic Exam     Mental Status   Oriented to person, place, and time.   Speech: speech is normal   Level of consciousness: alert    Cranial Nerves   Cranial nerves II through XII intact.     Motor Exam   Muscle bulk: normalRight lower extremity 4+/5     Sensory Exam   Light touch normal.     Gait, Coordination, and Reflexes Ambulates with cane, weakened right gait       Physical Exam  Vitals reviewed.   Cardiovascular:      Rate and Rhythm: Normal rate and regular rhythm.   Pulmonary:      Effort: Pulmonary effort is normal.      Breath sounds: Normal breath sounds.   Neurological:      Mental Status: He is oriented to person, place, and time.      Cranial Nerves: Cranial nerves 2-12 are intact.   Psychiatric:         Speech: Speech normal.        Assessment:     1. Cerebrovascular accident (CVA), unspecified mechanism        Plan:     Angiogram results reviewed  Continue ASA/statin  Patient at goal A1C (states A1C was in the 5 range)  Will fax cerebral angiogram results to Dr. Lama per patient's request  Secondary stroke prevention measures discussed. Education provided on signs and symptoms of stroke; advised to call 9-1-1 with any new onset of numbness, tingling or weakness, difficulty with speech or facial droop.

## 2024-08-29 ENCOUNTER — OFFICE VISIT (OUTPATIENT)
Dept: NEUROLOGY | Facility: CLINIC | Age: 79
End: 2024-08-29
Payer: MEDICARE

## 2024-08-29 VITALS
SYSTOLIC BLOOD PRESSURE: 155 MMHG | HEIGHT: 71 IN | BODY MASS INDEX: 26.33 KG/M2 | DIASTOLIC BLOOD PRESSURE: 64 MMHG | WEIGHT: 188.06 LBS | HEART RATE: 60 BPM

## 2024-08-29 DIAGNOSIS — I63.9 CEREBROVASCULAR ACCIDENT (CVA), UNSPECIFIED MECHANISM: Primary | ICD-10-CM

## 2024-08-29 DIAGNOSIS — I10 HYPERTENSION, UNSPECIFIED TYPE: Chronic | ICD-10-CM

## 2024-08-29 PROCEDURE — 99213 OFFICE O/P EST LOW 20 MIN: CPT | Mod: PBBFAC | Performed by: NURSE PRACTITIONER

## 2024-08-29 PROCEDURE — 99999 PR PBB SHADOW E&M-EST. PATIENT-LVL III: CPT | Mod: PBBFAC,,, | Performed by: NURSE PRACTITIONER

## 2024-08-29 RX ORDER — FLUTICASONE PROPIONATE 50 MCG
1 SPRAY, SUSPENSION (ML) NASAL DAILY
COMMUNITY

## 2024-08-29 NOTE — PROGRESS NOTES
Subjective:      Patient ID: Ayaz Huggins is a 79 y.o. male.    Chief Complaint:  Cerebrovascular Accident (1 year follow up, CVA.  Patient c/o weakness, due to muscle loss. )      History of Present Illness  Patient presents for follow up of stroke. Patient has a medical history of DM, HTN, HLD, diabetic neuropathy, CAD s/p CABG  In 2022, patient presented to ED with reports of right sided hemiplegia. MRI brain had shown small acute infarcts supratentorially, the largest is a left posterior frontal 3cm cortical based infarct with petechial hemorrhage.  Patient was also found to have severe stenosis of left carotid on US and had Left CEA by Dr. Lama during hospitalization.   Cerebral angiogram showed mild right carotid bulb stenosis.   Today, patient denies any new onset of numbness, tingling or weakness. Denies any difficulty with speech. Ambulating with a cane. Still with right lower extremity weakness but is able to drive without any difficulty. Reports he has muscle weakness because he is losing weight. States he has a bad taste in his mouth for the past 6 months or so. He did have covid prior to the change in taste. Reports it is slowly improving.          Past Medical History:   Diagnosis Date    Diabetes mellitus     Hypertension     Mixed hyperlipidemia     Stroke        Past Surgical History:   Procedure Laterality Date    CARDIAC SURGERY      CAROTID ENDARTERECTOMY Right 2022    Procedure: ENDARTERECTOMY, CAROTID;  Surgeon: Dread Lama MD;  Location: [facility];  Service: Peripheral Vascular;  Laterality: Right;  RIGHT CAROTID ENDARTERECTOMY       Family History   Problem Relation Name Age of Onset    Heart failure Mother      Heart attack Father      Heart disease Brother         Social History     Socioeconomic History    Marital status:    Tobacco Use    Smoking status: Former     Current packs/day: 0.00     Types: Cigarettes     Quit date:      Years since quittin.6     "Smokeless tobacco: Never   Substance and Sexual Activity    Alcohol use: Not Currently    Drug use: Never    Sexual activity: Yes     Partners: Female       Current Outpatient Medications   Medication Sig Dispense Refill    aspirin 325 MG tablet Take 1 tablet (325 mg total) by mouth once daily. 90 tablet 0    carvediloL (COREG) 25 MG tablet Take 1 tablet (25 mg total) by mouth 2 (two) times daily with meals. (Patient taking differently: Take 12.5 mg by mouth 2 (two) times daily with meals.) 180 tablet 0    CONTOUR NEXT TEST STRIPS Strp 1 strip 2 (two) times daily.      lisinopriL (PRINIVIL,ZESTRIL) 5 MG tablet Take 1 tablet (5 mg total) by mouth once daily. 90 tablet 0    metFORMIN (GLUCOPHAGE) 1000 MG tablet Take 1 tablet (1,000 mg total) by mouth 2 (two) times daily with meals. (Patient taking differently: Take 1,000 mg by mouth 2 (two) times daily with meals. Takes 1500 mg, one tablet in morning and a half at night.) 180 tablet 0    rosuvastatin (CRESTOR) 10 MG tablet Take 1 tablet (10 mg total) by mouth once daily. 90 tablet 0    fluticasone propionate (FLONASE) 50 mcg/actuation nasal spray 1 spray by Each Nostril route once daily.       No current facility-administered medications for this visit.       Review of patient's allergies indicates:   Allergen Reactions    Penicillins       Vitals:    08/29/24 0909 08/29/24 0910   BP: (!) 150/68 (!) 155/64   BP Location: Left arm    Patient Position: Sitting    Pulse: (!) 57 60   Weight: 85.3 kg (188 lb 0.8 oz)    Height: 5' 11" (1.803 m)       Review of Systems  12 point ROS performed and negative unless otherwise documented in HPI  Objective:   Neurologic Exam      Mental Status   Oriented to person, place, and time.   Speech: speech is normal   Level of consciousness: alert     Cranial Nerves   Cranial nerves II through XII intact.      Motor Exam   Muscle bulk: normalRight lower extremity 4+/5      Sensory Exam   Light touch normal.      Gait, Coordination, and " Reflexes Ambulates with cane, weakened right gait         Physical Exam  Vitals reviewed.   Cardiovascular:      Rate and Rhythm: Normal rate and regular rhythm.   Pulmonary:      Effort: Pulmonary effort is normal.      Breath sounds: Normal breath sounds.   Neurological:      Mental Status: He is oriented to person, place, and time.      Cranial Nerves: Cranial nerves 2-12 are intact.   Psychiatric:         Speech: Speech normal.      Assessment:     1. Cerebrovascular accident (CVA), unspecified mechanism    2. Hypertension, unspecified type        Plan:     BP elevated today, Home BP log shown in clinic and BP is normotensive at home.  Continue  mg daily for stroke prevention  Continue statin; goal LDL <70  Continue management of DM per PCP; goal A1C for stroke prevention is <7.0  Discussed PT for generalized weakness; patient will call if he would like to pursue  Secondary stroke prevention measures discussed. Education provided on signs and symptoms of stroke; advised to call 9-1-1 with any new onset of numbness, tingling or weakness, difficulty with speech or facial droop.

## 2025-09-02 ENCOUNTER — OFFICE VISIT (OUTPATIENT)
Dept: NEUROLOGY | Facility: CLINIC | Age: 80
End: 2025-09-02
Payer: MEDICARE

## 2025-09-02 VITALS
WEIGHT: 185 LBS | HEART RATE: 51 BPM | BODY MASS INDEX: 25.9 KG/M2 | HEIGHT: 71 IN | DIASTOLIC BLOOD PRESSURE: 73 MMHG | SYSTOLIC BLOOD PRESSURE: 160 MMHG

## 2025-09-02 DIAGNOSIS — I63.9 CEREBROVASCULAR ACCIDENT (CVA), UNSPECIFIED MECHANISM: Primary | ICD-10-CM

## 2025-09-02 DIAGNOSIS — M54.2 NECK PAIN: ICD-10-CM

## 2025-09-02 DIAGNOSIS — I69.341 MONOPLEGIA OF LOWER LIMB FOLLOWING CEREBRAL INFARCTION AFFECTING RIGHT DOMINANT SIDE: ICD-10-CM

## 2025-09-02 PROCEDURE — 99213 OFFICE O/P EST LOW 20 MIN: CPT | Mod: PBBFAC | Performed by: NURSE PRACTITIONER

## 2025-09-02 PROCEDURE — 99999 PR PBB SHADOW E&M-EST. PATIENT-LVL III: CPT | Mod: PBBFAC,,, | Performed by: NURSE PRACTITIONER

## (undated) DEVICE — Device

## (undated) DEVICE — LOOP STERION MINI RED 8X406MM

## (undated) DEVICE — DRAPE INCISE IOBAN 2 13X13IN

## (undated) DEVICE — TUBE SUCTION MEDI-VAC STERILE

## (undated) DEVICE — SUT 4-0 12-18IN SILK BLACK

## (undated) DEVICE — SOL NORMAL USPCA 0.9%

## (undated) DEVICE — ADHESIVE DERMABOND ADVANCED

## (undated) DEVICE — SUT 3-0 12-18IN SILK

## (undated) DEVICE — SUT 7/0 24IN PROLENE BL MO

## (undated) DEVICE — NDL 27G X 1 1/4

## (undated) DEVICE — KIT SURGICAL TURNOVER

## (undated) DEVICE — SPNG CHERRY DISECT XRAY DTECT

## (undated) DEVICE — SUT VICRYL 3-0 27 SH

## (undated) DEVICE — BAG MEDI-PLAST DECANTER C-FLOW

## (undated) DEVICE — HEMOSTAT SURGICEL FIBRLR 2X4IN

## (undated) DEVICE — DRESSING TEGASORB THIN 4X4 3/4

## (undated) DEVICE — TIP SUCTION YANKAUER

## (undated) DEVICE — BOWL STERILE LARGE 32OZ

## (undated) DEVICE — SUT 2-0 12-18IN SILK

## (undated) DEVICE — DRESSING TELFA N ADH 3X8

## (undated) DEVICE — DRAPE SLUSH WARMER 66X44IN

## (undated) DEVICE — COVER PROBE US 5.5X58L NON LTX

## (undated) DEVICE — WIPE MERCL POLYVIL ACETL 3X3IN

## (undated) DEVICE — SUT MCRYL PLUS 4-0 PS2 27IN

## (undated) DEVICE — SHUNT CAROTID BYPASS 27.5CM

## (undated) DEVICE — SUT PROLENE 6-0 BV-1 30IN

## (undated) DEVICE — ELECTRODE PATIENT RETURN DISP

## (undated) DEVICE — SHEET DRAPE MEDIUM

## (undated) DEVICE — GLOVE PROTEXIS LTX MICRO 6